# Patient Record
Sex: MALE | Race: WHITE | NOT HISPANIC OR LATINO | Employment: OTHER | URBAN - NONMETROPOLITAN AREA
[De-identification: names, ages, dates, MRNs, and addresses within clinical notes are randomized per-mention and may not be internally consistent; named-entity substitution may affect disease eponyms.]

---

## 2017-01-03 RX ORDER — MELOXICAM 15 MG/1
TABLET ORAL
Qty: 30 TABLET | Refills: 4 | Status: SHIPPED | OUTPATIENT
Start: 2017-01-03 | End: 2017-06-02 | Stop reason: SDUPTHER

## 2017-02-22 ENCOUNTER — OFFICE VISIT (OUTPATIENT)
Dept: INTERNAL MEDICINE | Facility: CLINIC | Age: 82
End: 2017-02-22

## 2017-02-22 VITALS
HEIGHT: 74 IN | DIASTOLIC BLOOD PRESSURE: 64 MMHG | BODY MASS INDEX: 28.49 KG/M2 | OXYGEN SATURATION: 96 % | TEMPERATURE: 97.3 F | WEIGHT: 222 LBS | HEART RATE: 72 BPM | SYSTOLIC BLOOD PRESSURE: 122 MMHG

## 2017-02-22 DIAGNOSIS — J43.9 PULMONARY EMPHYSEMA, UNSPECIFIED EMPHYSEMA TYPE (HCC): ICD-10-CM

## 2017-02-22 DIAGNOSIS — I10 ESSENTIAL HYPERTENSION: Primary | ICD-10-CM

## 2017-02-22 DIAGNOSIS — L72.0 EPIDERMAL CYST OF FACE: ICD-10-CM

## 2017-02-22 DIAGNOSIS — E78.5 HYPERLIPIDEMIA, UNSPECIFIED HYPERLIPIDEMIA TYPE: ICD-10-CM

## 2017-02-22 DIAGNOSIS — N40.0 ENLARGED PROSTATE WITHOUT LOWER URINARY TRACT SYMPTOMS (LUTS): ICD-10-CM

## 2017-02-22 DIAGNOSIS — M15.9 PRIMARY OSTEOARTHRITIS INVOLVING MULTIPLE JOINTS: ICD-10-CM

## 2017-02-22 DIAGNOSIS — E55.9 VITAMIN D DEFICIENCY: ICD-10-CM

## 2017-02-22 PROCEDURE — 99406 BEHAV CHNG SMOKING 3-10 MIN: CPT | Performed by: INTERNAL MEDICINE

## 2017-02-22 PROCEDURE — 99214 OFFICE O/P EST MOD 30 MIN: CPT | Performed by: INTERNAL MEDICINE

## 2017-02-22 RX ORDER — GABAPENTIN 300 MG/1
300 CAPSULE ORAL DAILY
Qty: 30 CAPSULE | Refills: 11 | Status: SHIPPED | OUTPATIENT
Start: 2017-02-22 | End: 2017-06-07 | Stop reason: SDUPTHER

## 2017-02-22 NOTE — PROGRESS NOTES
"Chief Complaint   Patient presents with   • Follow-up     6 months for HLD and HTN. Pt states he's having some mid back pain. Pt states mobic doesn't seem to be working anymore. Pt would also like spot under left eye looked at.      Subjective   Ed Spear is a 81 y.o. male.     HPI Comments: PMH of BPH, HTN, HLD, vitD def.   No CP. He is SOB with minimal exertion.  He denies edema.   DJD bother hands, neck, back. He is taking mobic 15mg q day.  Back pain is worse with standing or walking.   He is smoking 1-2 PPD.   Using his nebulizer about three times a day.   May awaken once or twice at night to urinate.  He is on flomax every day.   He is taking vit D 1000 u daily.   His flu shot is UTD.   Has developed skin masses over left cheek.  Has had these removed in the past and these have recurred.  He cannot remember name of doctor that did this.        The following portions of the patient's history were reviewed and updated as appropriate: allergies, current medications, past family history, past medical history, past social history, past surgical history and problem list.    Review of Systems   Respiratory: Positive for shortness of breath.    Cardiovascular: Negative for chest pain, palpitations and leg swelling.   Genitourinary:        Urinates approximately twice every night   Musculoskeletal: Positive for arthralgias and back pain.   Skin:        Skin lesions over her left cheek   All other systems reviewed and are negative.      Objective   Visit Vitals   • /64   • Pulse 72   • Temp 97.3 °F (36.3 °C)   • Ht 74\" (188 cm)   • Wt 222 lb (101 kg)   • SpO2 96%   • BMI 28.5 kg/m2     Body mass index is 28.5 kg/(m^2).  Physical Exam   Constitutional: He is oriented to person, place, and time. He appears well-developed and well-nourished.   HENT:   Head: Normocephalic and atraumatic.   Mouth/Throat: Oropharynx is clear and moist.   Eyes: Conjunctivae and EOM are normal. Pupils are equal, round, and reactive to " light.   Neck: Normal range of motion. Neck supple. No thyromegaly present.   Cardiovascular: Normal rate, regular rhythm and normal heart sounds.    No murmur heard.  Dampened dorsalis pedis and posterior tibial pulses bilaterally   Pulmonary/Chest: Effort normal. No respiratory distress.   Decreased breath sounds in all lung fields, scattered rhonchi and end expiratory wheezes   Abdominal: Soft. Bowel sounds are normal.   Musculoskeletal: He exhibits no edema.   Lymphadenopathy:     He has no cervical adenopathy.   Neurological: He is alert and oriented to person, place, and time. No cranial nerve deficit.   Skin:   Left upper cheek just lateral to right eye over zygomatic area with multiple cystic lesions measuring 5-12 mm in size   Psychiatric: He has a normal mood and affect. Judgment normal.   Nursing note and vitals reviewed.      Assessment/Plan   Ed Spear is here today and the following problems have been addressed:      Ed was seen today for follow-up.    Diagnoses and all orders for this visit:    Essential hypertension  -     CBC & Differential; Future    Hyperlipidemia, unspecified hyperlipidemia type  -     Comprehensive Metabolic Panel; Future  -     Lipid Panel; Future    Pulmonary emphysema, unspecified emphysema type    Vitamin D deficiency    Enlarged prostate without lower urinary tract symptoms (luts)  -     PSA; Future    Primary osteoarthritis involving multiple joints    Epidermal cyst of face  -     Ambulatory Referral to Dermatology    Other orders  -     gabapentin (NEURONTIN) 300 MG capsule; Take 1 capsule by mouth Daily.    labs as noted  Start gabapentin 300 mg qhs one hour before bed for chronic back pain  No other med changes  Follow HH diet  Try to cut back on smoking more-discussed need for smoking cessation for approximately 3-5 minutes with patient, he is not interested at this time  Exercise as tolerated  Referred to derm for left facial cystic lesions    RTC 2 mo to  followup on back pain

## 2017-02-27 ENCOUNTER — LAB (OUTPATIENT)
Dept: INTERNAL MEDICINE | Facility: CLINIC | Age: 82
End: 2017-02-27

## 2017-02-27 DIAGNOSIS — I10 ESSENTIAL HYPERTENSION: ICD-10-CM

## 2017-02-27 DIAGNOSIS — E78.5 HYPERLIPIDEMIA, UNSPECIFIED HYPERLIPIDEMIA TYPE: ICD-10-CM

## 2017-02-27 DIAGNOSIS — N40.0 ENLARGED PROSTATE WITHOUT LOWER URINARY TRACT SYMPTOMS (LUTS): ICD-10-CM

## 2017-02-27 LAB
ALBUMIN SERPL-MCNC: 4.2 G/DL (ref 3.2–4.8)
ALBUMIN/GLOB SERPL: 1.5 G/DL (ref 1.5–2.5)
ALP SERPL-CCNC: 55 U/L (ref 25–100)
ALT SERPL W P-5'-P-CCNC: 18 U/L (ref 7–40)
ANION GAP SERPL CALCULATED.3IONS-SCNC: 1 MMOL/L (ref 3–11)
ARTICHOKE IGE QN: 86 MG/DL (ref 0–130)
AST SERPL-CCNC: 19 U/L (ref 0–33)
BASOPHILS # BLD AUTO: 0.04 10*3/MM3 (ref 0–0.2)
BASOPHILS NFR BLD AUTO: 0.5 % (ref 0–1)
BILIRUB SERPL-MCNC: 0.8 MG/DL (ref 0.3–1.2)
BUN BLD-MCNC: 15 MG/DL (ref 9–23)
BUN/CREAT SERPL: 15 (ref 7–25)
CALCIUM SPEC-SCNC: 9.9 MG/DL (ref 8.7–10.4)
CHLORIDE SERPL-SCNC: 103 MMOL/L (ref 99–109)
CHOLEST SERPL-MCNC: 155 MG/DL (ref 0–200)
CO2 SERPL-SCNC: 36 MMOL/L (ref 20–31)
CREAT BLD-MCNC: 1 MG/DL (ref 0.6–1.3)
DEPRECATED RDW RBC AUTO: 47.3 FL (ref 37–54)
EOSINOPHIL # BLD AUTO: 0.27 10*3/MM3 (ref 0.1–0.3)
EOSINOPHIL NFR BLD AUTO: 3.7 % (ref 0–3)
ERYTHROCYTE [DISTWIDTH] IN BLOOD BY AUTOMATED COUNT: 13.3 % (ref 11.3–14.5)
GFR SERPL CREATININE-BSD FRML MDRD: 72 ML/MIN/1.73
GLOBULIN UR ELPH-MCNC: 2.8 GM/DL
GLUCOSE BLD-MCNC: 84 MG/DL (ref 70–100)
HCT VFR BLD AUTO: 48 % (ref 38.9–50.9)
HDLC SERPL-MCNC: 57 MG/DL (ref 40–60)
HGB BLD-MCNC: 15.4 G/DL (ref 13.1–17.5)
IMM GRANULOCYTES # BLD: 0.01 10*3/MM3 (ref 0–0.03)
IMM GRANULOCYTES NFR BLD: 0.1 % (ref 0–0.6)
LYMPHOCYTES # BLD AUTO: 2.78 10*3/MM3 (ref 0.6–4.8)
LYMPHOCYTES NFR BLD AUTO: 38.2 % (ref 24–44)
MCH RBC QN AUTO: 31.2 PG (ref 27–31)
MCHC RBC AUTO-ENTMCNC: 32.1 G/DL (ref 32–36)
MCV RBC AUTO: 97.2 FL (ref 80–99)
MONOCYTES # BLD AUTO: 0.65 10*3/MM3 (ref 0–1)
MONOCYTES NFR BLD AUTO: 8.9 % (ref 0–12)
NEUTROPHILS # BLD AUTO: 3.53 10*3/MM3 (ref 1.5–8.3)
NEUTROPHILS NFR BLD AUTO: 48.6 % (ref 41–71)
PLATELET # BLD AUTO: 202 10*3/MM3 (ref 150–450)
PMV BLD AUTO: 10.7 FL (ref 6–12)
POTASSIUM BLD-SCNC: 5.2 MMOL/L (ref 3.5–5.5)
PROT SERPL-MCNC: 7 G/DL (ref 5.7–8.2)
PSA SERPL-MCNC: 4.4 NG/ML (ref 0–4)
RBC # BLD AUTO: 4.94 10*6/MM3 (ref 4.2–5.76)
SODIUM BLD-SCNC: 140 MMOL/L (ref 132–146)
TRIGL SERPL-MCNC: 92 MG/DL (ref 0–150)
WBC NRBC COR # BLD: 7.28 10*3/MM3 (ref 3.5–10.8)

## 2017-02-27 PROCEDURE — 85025 COMPLETE CBC W/AUTO DIFF WBC: CPT | Performed by: INTERNAL MEDICINE

## 2017-02-27 PROCEDURE — 80061 LIPID PANEL: CPT | Performed by: INTERNAL MEDICINE

## 2017-02-27 PROCEDURE — 80053 COMPREHEN METABOLIC PANEL: CPT | Performed by: INTERNAL MEDICINE

## 2017-02-27 PROCEDURE — 36415 COLL VENOUS BLD VENIPUNCTURE: CPT | Performed by: INTERNAL MEDICINE

## 2017-02-27 PROCEDURE — 84153 ASSAY OF PSA TOTAL: CPT | Performed by: INTERNAL MEDICINE

## 2017-02-28 ENCOUNTER — TELEPHONE (OUTPATIENT)
Dept: INTERNAL MEDICINE | Facility: CLINIC | Age: 82
End: 2017-02-28

## 2017-02-28 DIAGNOSIS — R97.20 ELEVATED PSA: Primary | ICD-10-CM

## 2017-03-02 DIAGNOSIS — N40.0 ENLARGED PROSTATE WITHOUT LOWER URINARY TRACT SYMPTOMS (LUTS): ICD-10-CM

## 2017-03-02 RX ORDER — LORATADINE 10 MG/1
TABLET ORAL
Qty: 30 TABLET | Refills: 9 | Status: SHIPPED | OUTPATIENT
Start: 2017-03-02 | End: 2018-01-06 | Stop reason: SDUPTHER

## 2017-03-02 RX ORDER — TAMSULOSIN HYDROCHLORIDE 0.4 MG/1
CAPSULE ORAL
Qty: 30 CAPSULE | Refills: 10 | Status: SHIPPED | OUTPATIENT
Start: 2017-03-02 | End: 2018-02-02 | Stop reason: SDUPTHER

## 2017-04-03 RX ORDER — SIMVASTATIN 40 MG
TABLET ORAL
Qty: 30 TABLET | Refills: 2 | Status: SHIPPED | OUTPATIENT
Start: 2017-04-03 | End: 2017-07-03 | Stop reason: SDUPTHER

## 2017-06-02 RX ORDER — ALBUTEROL SULFATE 2.5 MG/3ML
SOLUTION RESPIRATORY (INHALATION)
Qty: 375 ML | Refills: 5 | Status: SHIPPED | OUTPATIENT
Start: 2017-06-02 | End: 2021-02-01

## 2017-06-02 RX ORDER — MELOXICAM 15 MG/1
TABLET ORAL
Qty: 30 TABLET | Refills: 3 | Status: SHIPPED | OUTPATIENT
Start: 2017-06-02 | End: 2017-10-02 | Stop reason: SDUPTHER

## 2017-06-07 ENCOUNTER — OFFICE VISIT (OUTPATIENT)
Dept: INTERNAL MEDICINE | Facility: CLINIC | Age: 82
End: 2017-06-07

## 2017-06-07 VITALS
BODY MASS INDEX: 30.54 KG/M2 | SYSTOLIC BLOOD PRESSURE: 132 MMHG | OXYGEN SATURATION: 96 % | WEIGHT: 238 LBS | DIASTOLIC BLOOD PRESSURE: 66 MMHG | HEART RATE: 99 BPM | HEIGHT: 74 IN | TEMPERATURE: 97.6 F

## 2017-06-07 DIAGNOSIS — M54.50 CHRONIC BILATERAL LOW BACK PAIN WITHOUT SCIATICA: Primary | ICD-10-CM

## 2017-06-07 DIAGNOSIS — G89.29 CHRONIC BILATERAL LOW BACK PAIN WITHOUT SCIATICA: Primary | ICD-10-CM

## 2017-06-07 DIAGNOSIS — R10.32 LEFT LOWER QUADRANT PAIN: ICD-10-CM

## 2017-06-07 PROCEDURE — 99214 OFFICE O/P EST MOD 30 MIN: CPT | Performed by: INTERNAL MEDICINE

## 2017-06-07 RX ORDER — GABAPENTIN 300 MG/1
300 CAPSULE ORAL 2 TIMES DAILY
Qty: 60 CAPSULE | Refills: 11 | Status: SHIPPED | OUTPATIENT
Start: 2017-06-07 | End: 2017-08-25

## 2017-06-07 RX ORDER — RANITIDINE 150 MG/1
150 CAPSULE ORAL 2 TIMES DAILY
Qty: 60 CAPSULE | Refills: 11 | Status: SHIPPED | OUTPATIENT
Start: 2017-06-07 | End: 2018-06-03 | Stop reason: SDUPTHER

## 2017-06-07 NOTE — PROGRESS NOTES
"Chief Complaint   Patient presents with   • Follow-up     2 months for back pain. Pt states back is still bothering him. Also states he's been having some abdominal pain.      Subjective   Ed Spear is a 81 y.o. male.     HPI Comments: Here for follow up of back pain.  Was here 4 mo ago with complaints of back pain and was started on gabapentin.  If he stands or is very active his back is bothersome.  If he lays around and does not do much, he back does not bother him.   He occasionally takes the robaxin.  He also takes meloxicam daily with food.    Complains of intermittent stomach pain in mid abdomen.  Has gas after he eats.  No NVD or constipation. No black stools.  Has a good appetite.   The pain is more left sided.     Labs in Feb revealed elevated PSA.  He has not been to see the urologist.  Cannot afford it.        The following portions of the patient's history were reviewed and updated as appropriate: allergies, current medications, past family history, past medical history, past social history, past surgical history and problem list.    Review of Systems   Constitutional: Negative for appetite change and unexpected weight change.   Gastrointestinal: Positive for abdominal pain. Negative for blood in stool, constipation, diarrhea, nausea and vomiting.        Occasional gas after eating meals   Musculoskeletal: Positive for back pain.   Neurological: Negative for numbness.       Objective   /66  Pulse 99  Temp 97.6 °F (36.4 °C)  Ht 74\" (188 cm)  Wt 238 lb (108 kg)  SpO2 96%  BMI 30.56 kg/m2  Body mass index is 30.56 kg/(m^2).  Physical Exam   Constitutional: He is oriented to person, place, and time. He appears well-developed and well-nourished.   Obese   HENT:   Head: Normocephalic and atraumatic.   Cardiovascular: Normal rate, regular rhythm and normal heart sounds.    Pulmonary/Chest: Effort normal and breath sounds normal.   Abdominal: Soft. Bowel sounds are normal. There is no rebound and " no guarding.   Obese, tenderness noted in epigastric and left abdomen, no palpable masses, no hepatosplenomegaly noted however obesity limits exam   Musculoskeletal:   No point tenderness over vertebrae or sacroiliac joints, negative straight leg raise, no pain with hip inversion or eversion   Neurological: He is alert and oriented to person, place, and time.   Nursing note and vitals reviewed.      Assessment/Plan   Ed Spear is here today and the following problems have been addressed:      Ed was seen today for follow-up.    Diagnoses and all orders for this visit:    Chronic bilateral low back pain without sciatica    Left lower quadrant pain  -     CBC & Differential  -     Comprehensive Metabolic Panel  -     Helicobacter Pylori, IgA IgG IgM  -     ranitidine (ZANTAC) 150 MG capsule; Take 1 capsule by mouth 2 (Two) Times a Day.    Other orders  -     gabapentin (NEURONTIN) 300 MG capsule; Take 1 capsule by mouth 2 (Two) Times a Day.    Labs as noted  Start zantac 150 mg BID  Increase neurontin to BID for chronic back pain- no red flags on exam or history  No other med changes  Encouraged patient to see urologist as scheduled due to elevated PSA noted on labs last visit, as metastatic prostate cancer could be causing back pain and or stomach pain and this was discussed with patient and wife    RTC one mo    Please note that portions of this note were completed with a voice recognition program.  Efforts were made to edit dictation, but occasionally words are mistranscribed.

## 2017-06-08 LAB
ALBUMIN SERPL-MCNC: 3.7 G/DL (ref 3.5–5)
ALBUMIN/GLOB SERPL: 1.3 G/DL (ref 1–2)
ALP SERPL-CCNC: 59 U/L (ref 38–126)
ALT SERPL-CCNC: 25 U/L (ref 13–69)
AST SERPL-CCNC: 18 U/L (ref 15–46)
BASOPHILS # BLD AUTO: 0.04 10*3/MM3 (ref 0–0.2)
BASOPHILS NFR BLD AUTO: 0.6 % (ref 0–2.5)
BILIRUB SERPL-MCNC: 0.8 MG/DL (ref 0.2–1.3)
BUN SERPL-MCNC: 17 MG/DL (ref 7–20)
BUN/CREAT SERPL: 15.5 (ref 6.3–21.9)
CALCIUM SERPL-MCNC: 9.5 MG/DL (ref 8.4–10.2)
CHLORIDE SERPL-SCNC: 101 MMOL/L (ref 98–107)
CO2 SERPL-SCNC: 27 MMOL/L (ref 26–30)
CREAT SERPL-MCNC: 1.1 MG/DL (ref 0.6–1.3)
EOSINOPHIL # BLD AUTO: 0.2 10*3/MM3 (ref 0–0.7)
EOSINOPHIL NFR BLD AUTO: 3 % (ref 0–7)
ERYTHROCYTE [DISTWIDTH] IN BLOOD BY AUTOMATED COUNT: 13.3 % (ref 11.5–14.5)
GLOBULIN SER CALC-MCNC: 2.8 GM/DL
GLUCOSE SERPL-MCNC: 87 MG/DL (ref 74–98)
H PYLORI IGA SER-ACNC: <9 UNITS (ref 0–8.9)
H PYLORI IGG SER IA-ACNC: 1 U/ML (ref 0–0.8)
H PYLORI IGM SER-ACNC: <9 UNITS (ref 0–8.9)
HCT VFR BLD AUTO: 45 % (ref 42–52)
HGB BLD-MCNC: 14.5 G/DL (ref 14–18)
IMM GRANULOCYTES # BLD: 0.01 10*3/MM3 (ref 0–0.06)
IMM GRANULOCYTES NFR BLD: 0.1 % (ref 0–0.6)
LYMPHOCYTES # BLD AUTO: 2.2 10*3/MM3 (ref 0.6–3.4)
LYMPHOCYTES NFR BLD AUTO: 32.8 % (ref 10–50)
MCH RBC QN AUTO: 31.1 PG (ref 27–31)
MCHC RBC AUTO-ENTMCNC: 32.2 G/DL (ref 30–37)
MCV RBC AUTO: 96.6 FL (ref 80–94)
MONOCYTES # BLD AUTO: 0.77 10*3/MM3 (ref 0–0.9)
MONOCYTES NFR BLD AUTO: 11.5 % (ref 0–12)
NEUTROPHILS # BLD AUTO: 3.48 10*3/MM3 (ref 2–6.9)
NEUTROPHILS NFR BLD AUTO: 52 % (ref 37–80)
NRBC BLD AUTO-RTO: 0 /100 WBC (ref 0–0)
PLATELET # BLD AUTO: 193 10*3/MM3 (ref 130–400)
POTASSIUM SERPL-SCNC: 4.6 MMOL/L (ref 3.5–5.1)
PROT SERPL-MCNC: 6.5 G/DL (ref 6.3–8.2)
RBC # BLD AUTO: 4.66 10*6/MM3 (ref 4.7–6.1)
SODIUM SERPL-SCNC: 138 MMOL/L (ref 137–145)
WBC # BLD AUTO: 6.7 10*3/MM3 (ref 4.8–10.8)

## 2017-07-03 RX ORDER — SIMVASTATIN 40 MG
TABLET ORAL
Qty: 30 TABLET | Refills: 1 | Status: SHIPPED | OUTPATIENT
Start: 2017-07-03 | End: 2017-09-01 | Stop reason: SDUPTHER

## 2017-08-25 ENCOUNTER — TELEPHONE (OUTPATIENT)
Dept: INTERNAL MEDICINE | Facility: CLINIC | Age: 82
End: 2017-08-25

## 2017-08-29 ENCOUNTER — TELEPHONE (OUTPATIENT)
Dept: INTERNAL MEDICINE | Facility: CLINIC | Age: 82
End: 2017-08-29

## 2017-08-29 RX ORDER — METHOCARBAMOL 500 MG/1
500 TABLET, FILM COATED ORAL 3 TIMES DAILY PRN
Qty: 90 TABLET | Refills: 1 | Status: SHIPPED | OUTPATIENT
Start: 2017-08-29 | End: 2017-11-16

## 2017-09-01 RX ORDER — SIMVASTATIN 40 MG
40 TABLET ORAL NIGHTLY
Qty: 30 TABLET | Refills: 0 | Status: SHIPPED | OUTPATIENT
Start: 2017-09-01 | End: 2017-10-02 | Stop reason: SDUPTHER

## 2017-09-01 RX ORDER — SIMVASTATIN 40 MG
TABLET ORAL
Qty: 30 TABLET | Refills: 0 | OUTPATIENT
Start: 2017-09-01

## 2017-10-02 RX ORDER — SIMVASTATIN 40 MG
TABLET ORAL
Qty: 30 TABLET | Refills: 0 | Status: SHIPPED | OUTPATIENT
Start: 2017-10-02 | End: 2017-11-01 | Stop reason: SDUPTHER

## 2017-10-02 RX ORDER — MELOXICAM 15 MG/1
TABLET ORAL
Qty: 30 TABLET | Refills: 2 | Status: SHIPPED | OUTPATIENT
Start: 2017-10-02 | End: 2017-11-16

## 2017-10-12 ENCOUNTER — FLU SHOT (OUTPATIENT)
Dept: INTERNAL MEDICINE | Facility: CLINIC | Age: 82
End: 2017-10-12

## 2017-10-12 PROCEDURE — 90662 IIV NO PRSV INCREASED AG IM: CPT | Performed by: INTERNAL MEDICINE

## 2017-10-12 PROCEDURE — G0008 ADMIN INFLUENZA VIRUS VAC: HCPCS | Performed by: INTERNAL MEDICINE

## 2017-11-01 RX ORDER — SIMVASTATIN 40 MG
TABLET ORAL
Qty: 30 TABLET | Refills: 5 | Status: SHIPPED | OUTPATIENT
Start: 2017-11-01 | End: 2017-11-02 | Stop reason: SDUPTHER

## 2017-11-02 RX ORDER — SIMVASTATIN 40 MG
40 TABLET ORAL NIGHTLY
Qty: 90 TABLET | Refills: 1 | Status: SHIPPED | OUTPATIENT
Start: 2017-11-02 | End: 2018-11-14 | Stop reason: SDUPTHER

## 2017-11-16 ENCOUNTER — OFFICE VISIT (OUTPATIENT)
Dept: INTERNAL MEDICINE | Facility: CLINIC | Age: 82
End: 2017-11-16

## 2017-11-16 VITALS
BODY MASS INDEX: 30.29 KG/M2 | SYSTOLIC BLOOD PRESSURE: 126 MMHG | TEMPERATURE: 97.9 F | HEART RATE: 69 BPM | WEIGHT: 236 LBS | HEIGHT: 74 IN | DIASTOLIC BLOOD PRESSURE: 82 MMHG | OXYGEN SATURATION: 96 %

## 2017-11-16 DIAGNOSIS — I10 ESSENTIAL HYPERTENSION: ICD-10-CM

## 2017-11-16 DIAGNOSIS — G89.29 CHRONIC BILATERAL LOW BACK PAIN WITHOUT SCIATICA: ICD-10-CM

## 2017-11-16 DIAGNOSIS — I47.1 PAT (PAROXYSMAL ATRIAL TACHYCARDIA) (HCC): ICD-10-CM

## 2017-11-16 DIAGNOSIS — M54.50 CHRONIC BILATERAL LOW BACK PAIN WITHOUT SCIATICA: ICD-10-CM

## 2017-11-16 DIAGNOSIS — E78.2 MIXED HYPERLIPIDEMIA: ICD-10-CM

## 2017-11-16 DIAGNOSIS — J43.9 PULMONARY EMPHYSEMA, UNSPECIFIED EMPHYSEMA TYPE (HCC): Primary | ICD-10-CM

## 2017-11-16 PROBLEM — I47.19 PAT (PAROXYSMAL ATRIAL TACHYCARDIA): Status: ACTIVE | Noted: 2017-11-16

## 2017-11-16 PROCEDURE — 99214 OFFICE O/P EST MOD 30 MIN: CPT | Performed by: INTERNAL MEDICINE

## 2017-11-16 PROCEDURE — 90732 PPSV23 VACC 2 YRS+ SUBQ/IM: CPT | Performed by: INTERNAL MEDICINE

## 2017-11-16 PROCEDURE — 93000 ELECTROCARDIOGRAM COMPLETE: CPT | Performed by: INTERNAL MEDICINE

## 2017-11-16 PROCEDURE — G0009 ADMIN PNEUMOCOCCAL VACCINE: HCPCS | Performed by: INTERNAL MEDICINE

## 2017-11-16 RX ORDER — MELOXICAM 15 MG/1
15 TABLET ORAL DAILY
Qty: 30 TABLET | Refills: 2
Start: 2017-11-16 | End: 2018-01-30

## 2017-11-16 RX ORDER — IPRATROPIUM BROMIDE AND ALBUTEROL SULFATE 2.5; .5 MG/3ML; MG/3ML
3 SOLUTION RESPIRATORY (INHALATION)
Qty: 360 ML | Refills: 6 | Status: SHIPPED | OUTPATIENT
Start: 2017-11-16 | End: 2018-10-04 | Stop reason: SDUPTHER

## 2017-11-16 RX ORDER — TIZANIDINE HYDROCHLORIDE 4 MG/1
4 CAPSULE, GELATIN COATED ORAL 2 TIMES DAILY
Qty: 60 CAPSULE | Refills: 5 | Status: SHIPPED | OUTPATIENT
Start: 2017-11-16 | End: 2017-11-17 | Stop reason: CLARIF

## 2017-11-16 RX ORDER — CARVEDILOL 6.25 MG/1
6.25 TABLET ORAL 2 TIMES DAILY WITH MEALS
Qty: 60 TABLET | Refills: 3 | Status: SHIPPED | OUTPATIENT
Start: 2017-11-16 | End: 2018-03-01 | Stop reason: SDUPTHER

## 2017-11-16 NOTE — PROGRESS NOTES
"Chief Complaint   Patient presents with   • Follow-up     for HLD, HTN, and COPD. Pt states his back has been hurting him.      Subjective   Ed Spear is a 82 y.o. male.     HPI Comments: Here for followup of BPH, HTN, HLD, vitD def.   He denies any CP, edema.  He is having some SOB.  He does have wheezing.  He notes occasional palpitations especially when he lays down at night.  Uses his nebulizer 3 times a day.    He is still smoking about 1.5 PPD.   He does take his vit D daily.   Has occasional GERD.  Takes zantac twice a day.  He awakens twice a night to urinate.   Has some back pain when he stands a lot.   Takes mobic daily.  States the robaxin made his stomach upset.    He has lesions over left upper cheek on face that have been removed in the past.  He is able to express discharge from these lesions that has a very foul smell.  He needs his pneumovax today.        The following portions of the patient's history were reviewed and updated as appropriate: allergies, current medications, past family history, past medical history, past social history, past surgical history and problem list.    Review of Systems   Respiratory: Positive for shortness of breath.    Cardiovascular: Positive for palpitations.   Musculoskeletal: Positive for back pain.   Skin:        Lesions over left upper cheek with foul-smelling discharge       Objective   /82  Pulse 69  Temp 97.9 °F (36.6 °C)  Ht 74\" (188 cm)  Wt 236 lb (107 kg)  SpO2 96%  BMI 30.3 kg/m2  Body mass index is 30.3 kg/(m^2).  Physical Exam   Constitutional: He is oriented to person, place, and time. He appears well-developed and well-nourished.   HENT:   Head: Normocephalic and atraumatic.   Mouth/Throat: Oropharynx is clear and moist.   Eyes: Conjunctivae and EOM are normal. Pupils are equal, round, and reactive to light.   Neck: Normal range of motion. Neck supple. No thyromegaly present.   Cardiovascular: Normal heart sounds.    No murmur " heard.  Rate approximately 120 with ectopic beats noted  Dampened DP and PT pulses   Pulmonary/Chest: Effort normal. No respiratory distress.   Decreased breath sounds with diffuse rhonchi in all lung fields   Abdominal: Soft. Bowel sounds are normal.   Musculoskeletal: He exhibits no edema.   Lymphadenopathy:     He has no cervical adenopathy.   Neurological: He is alert and oriented to person, place, and time. No cranial nerve deficit.   Skin:   There is a large cluster of cystic lesions over left upper cheek consistent with sebaceous cysts   Psychiatric: He has a normal mood and affect. Judgment normal.   Nursing note and vitals reviewed.      ECG 12 Lead  Date/Time: 11/16/2017 5:11 PM  Performed by: VERMEESCH, MARILYN K  Authorized by: VERMEESCH, MARILYN K   Comparison: not compared with previous ECG   Rhythm: SVT  Ectopy: atrial premature contractions  Rate: tachycardic  BPM: 130  Conduction: conduction normal  QRS axis: left  Q waves: III and aVF  Clinical impression: abnormal ECG            Assessment/Plan   Ed Spear is here today and the following problems have been addressed:      Ed was seen today for follow-up.    Diagnoses and all orders for this visit:    Pulmonary emphysema, unspecified emphysema type    PAT (paroxysmal atrial tachycardia)    Essential hypertension    Mixed hyperlipidemia    Other orders  -     ipratropium-albuterol (DUO-NEB) 0.5-2.5 mg/mL nebulizer; Take 3 mL by nebulization 4 (Four) Times a Day. USE 1 UNIT DOSE VIA NEBULIZER TWO - FOUR TIMES DAILY  -     meloxicam (MOBIC) 15 MG tablet; Take 1 tablet by mouth Daily.  -     TiZANidine (ZANAFLEX) 4 MG capsule; Take 1 capsule by mouth 2 (Two) Times a Day.  -     carvedilol (COREG) 6.25 MG tablet; Take 1 tablet by mouth 2 (Two) Times a Day With Meals.    Stop lisinopril  Start coreg 6.25 mg BID for HR and BP  EKG with paroxysmal atrial tachycardia likely due to underlying COPD  Given tizanidine for LBP, continue Mobic  Heat to LBP  3-4 times daily prn  Pneumovax given today  Given sample of BREO 200/25 1 puff daily-explained proper use.  Patient has enough for 2 weeks supply, he is to call at that time if medication is working well and I will call in refill    RTC 4 weeks  Please note that portions of this note were completed with a voice recognition program.  Efforts were made to edit dictation, but occasionally words are mistranscribed.

## 2017-11-17 RX ORDER — METHOCARBAMOL 500 MG/1
500 TABLET, FILM COATED ORAL 3 TIMES DAILY PRN
Qty: 75 TABLET | Refills: 2 | Status: SHIPPED | OUTPATIENT
Start: 2017-11-17 | End: 2018-11-02

## 2018-01-02 RX ORDER — MELOXICAM 15 MG/1
TABLET ORAL
Qty: 30 TABLET | Refills: 5 | Status: SHIPPED | OUTPATIENT
Start: 2018-01-02 | End: 2018-01-23 | Stop reason: SDUPTHER

## 2018-01-08 RX ORDER — LORATADINE 10 MG/1
TABLET ORAL
Qty: 30 TABLET | Refills: 8 | Status: SHIPPED | OUTPATIENT
Start: 2018-01-08 | End: 2018-11-02

## 2018-01-23 ENCOUNTER — OFFICE VISIT (OUTPATIENT)
Dept: INTERNAL MEDICINE | Facility: CLINIC | Age: 83
End: 2018-01-23

## 2018-01-23 VITALS
SYSTOLIC BLOOD PRESSURE: 134 MMHG | TEMPERATURE: 97.8 F | OXYGEN SATURATION: 88 % | HEART RATE: 87 BPM | DIASTOLIC BLOOD PRESSURE: 80 MMHG | BODY MASS INDEX: 28.43 KG/M2 | WEIGHT: 221.5 LBS | HEIGHT: 74 IN

## 2018-01-23 DIAGNOSIS — J44.1 OBSTRUCTIVE CHRONIC BRONCHITIS WITH EXACERBATION (HCC): Primary | ICD-10-CM

## 2018-01-23 DIAGNOSIS — R19.7 DIARRHEA, UNSPECIFIED TYPE: ICD-10-CM

## 2018-01-23 PROCEDURE — 99213 OFFICE O/P EST LOW 20 MIN: CPT | Performed by: INTERNAL MEDICINE

## 2018-01-23 RX ORDER — METRONIDAZOLE 500 MG/1
500 TABLET ORAL 3 TIMES DAILY
Qty: 30 TABLET | Refills: 0 | Status: SHIPPED | OUTPATIENT
Start: 2018-01-23 | End: 2018-03-23

## 2018-01-23 RX ORDER — CIPROFLOXACIN 500 MG/1
500 TABLET, FILM COATED ORAL EVERY 12 HOURS SCHEDULED
Qty: 20 TABLET | Refills: 0 | Status: SHIPPED | OUTPATIENT
Start: 2018-01-23 | End: 2018-03-23

## 2018-01-23 NOTE — PROGRESS NOTES
"Chief Complaint   Patient presents with   • Diarrhea     X 1 week. Pt also states he's having pain behind his left ear. States he's been having left sided abdominal pain.      Subjective   Ed Spear is a 82 y.o. male.     HPI Comments: He has had pain behind left ear for a week.     Diarrhea    This is a new problem. The current episode started in the past 7 days. The problem occurs less than 2 times per day. The problem has been waxing and waning. The stool consistency is described as watery. The patient states that diarrhea does not awaken him from sleep. Associated symptoms include abdominal pain, bloating and coughing. Pertinent negatives include no chills, fever or vomiting. Associated symptoms comments: Left lower quadrant pain, this improves after a BM.  The pain comes and goes throughout the day.  Cramping type pain. Nothing aggravates the symptoms. There are no known risk factors. Treatments tried: gas X and helped minimally. The treatment provided mild relief. There is no history of bowel resection, inflammatory bowel disease, irritable bowel syndrome, malabsorption, a recent abdominal surgery or short gut syndrome.        The following portions of the patient's history were reviewed and updated as appropriate: allergies, current medications, past family history, past medical history, past social history, past surgical history and problem list.    Review of Systems   Constitutional: Negative for chills and fever.   HENT: Positive for ear pain. Negative for ear discharge, postnasal drip, sinus pain, sinus pressure and sore throat.    Respiratory: Positive for cough.    Gastrointestinal: Positive for abdominal pain, bloating and diarrhea. Negative for blood in stool, constipation, nausea and vomiting.       Objective   /80  Pulse 87  Temp 97.8 °F (36.6 °C)  Ht 188 cm (74\")  Wt 100 kg (221 lb 8 oz)  SpO2 (!) 88%  BMI 28.44 kg/m2  Body mass index is 28.44 kg/(m^2).  Physical Exam "   Constitutional: He is oriented to person, place, and time. He appears well-developed and well-nourished.   Pleasant gentleman in no apparent distress, overweight, tip of nose appears slightly cyanotic   HENT:   Head: Normocephalic and atraumatic.   Right Ear: External ear normal.   Mouth/Throat: Oropharynx is clear and moist.   Left mastoid area is slightly tender to touch and mildly inflamed compared to right side, tympanic membranes are slightly dull bilaterally with no erythema   Eyes: Conjunctivae and EOM are normal. Pupils are equal, round, and reactive to light.   Neck: Normal range of motion. Neck supple. No thyromegaly present.   Cardiovascular: Normal rate, regular rhythm and normal heart sounds.    No murmur heard.  Pulmonary/Chest: Effort normal. No respiratory distress.   Scattered rhonchi in all lung fields posteriorly with occasional end expiratory wheeze   Abdominal: Soft. Bowel sounds are normal. He exhibits no mass. There is tenderness. There is no rebound and no guarding. No hernia.   Left upper and lower quadrant tenderness to palpation, no rebound or guarding, tenderness is mild, no distention   Musculoskeletal: He exhibits no edema.   Lymphadenopathy:     He has no cervical adenopathy.   Neurological: He is alert and oriented to person, place, and time. No cranial nerve deficit.   Psychiatric: He has a normal mood and affect. Judgment normal.   Nursing note and vitals reviewed.      Assessment/Plan   Ed Spear is here today and the following problems have been addressed:      Ed was seen today for diarrhea.    Diagnoses and all orders for this visit:    Obstructive chronic bronchitis with exacerbation    Diarrhea, unspecified type    Other orders  -     ciprofloxacin (CIPRO) 500 MG tablet; Take 1 tablet by mouth Every 12 (Twelve) Hours.  -     metroNIDAZOLE (FLAGYL) 500 MG tablet; Take 1 tablet by mouth 3 (Three) Times a Day.    Suspect he may have diverticulitis based on history and  exam  Given cipro and flagyl to take over next 10 days  Has bronchitis also based on exam, thus antibiotics were also given for this reason  Recommend he do deep breathing exercises hourly as directed  Take OTC mucinex as directed  Discussed need to work on smoking cessation again  Do nebulizers every 3-4 hours prn  Apply heat to right posterior ear 2-3 times a day  I know patient has lost approximately 15 pounds over last 2 months, will discuss this with him on follow-up visit and perhaps do CAT scan of lungs and labs for further evaluation    RTC 2 weeks for follow up  Please note that portions of this note were completed with a voice recognition program.  Efforts were made to edit dictation, but occasionally words are mistranscribed.

## 2018-01-30 ENCOUNTER — HOSPITAL ENCOUNTER (OUTPATIENT)
Dept: GENERAL RADIOLOGY | Facility: HOSPITAL | Age: 83
Discharge: HOME | End: 2018-01-30
Attending: INTERNAL MEDICINE | Admitting: INTERNAL MEDICINE

## 2018-01-30 ENCOUNTER — OFFICE VISIT (OUTPATIENT)
Dept: INTERNAL MEDICINE | Facility: CLINIC | Age: 83
End: 2018-01-30

## 2018-01-30 VITALS
TEMPERATURE: 97.4 F | HEIGHT: 74 IN | WEIGHT: 223 LBS | OXYGEN SATURATION: 93 % | DIASTOLIC BLOOD PRESSURE: 80 MMHG | BODY MASS INDEX: 28.62 KG/M2 | HEART RATE: 87 BPM | SYSTOLIC BLOOD PRESSURE: 128 MMHG

## 2018-01-30 DIAGNOSIS — E16.2 HYPOGLYCEMIA: ICD-10-CM

## 2018-01-30 DIAGNOSIS — I48.0 PAROXYSMAL ATRIAL FIBRILLATION (HCC): ICD-10-CM

## 2018-01-30 DIAGNOSIS — R19.7 DIARRHEA, UNSPECIFIED TYPE: ICD-10-CM

## 2018-01-30 DIAGNOSIS — R63.4 WEIGHT LOSS: ICD-10-CM

## 2018-01-30 DIAGNOSIS — I10 ESSENTIAL HYPERTENSION: Primary | ICD-10-CM

## 2018-01-30 DIAGNOSIS — J43.9 PULMONARY EMPHYSEMA, UNSPECIFIED EMPHYSEMA TYPE (HCC): ICD-10-CM

## 2018-01-30 PROBLEM — I48.91 ATRIAL FIBRILLATION: Status: ACTIVE | Noted: 2018-01-30

## 2018-01-30 LAB
ALBUMIN SERPL-MCNC: 4.2 G/DL (ref 3.5–5)
ALBUMIN/GLOB SERPL: 1.8 G/DL (ref 1–2)
ALP SERPL-CCNC: 48 U/L (ref 38–126)
ALT SERPL-CCNC: 41 U/L (ref 13–69)
AST SERPL-CCNC: 28 U/L (ref 15–46)
BASOPHILS # BLD AUTO: 0.05 10*3/MM3 (ref 0–0.2)
BASOPHILS NFR BLD AUTO: 0.8 % (ref 0–2.5)
BILIRUB SERPL-MCNC: 0.6 MG/DL (ref 0.2–1.3)
BUN SERPL-MCNC: 22 MG/DL (ref 7–20)
BUN/CREAT SERPL: 22 (ref 6.3–21.9)
CALCIUM SERPL-MCNC: 9.8 MG/DL (ref 8.4–10.2)
CHLORIDE SERPL-SCNC: 99 MMOL/L (ref 98–107)
CO2 SERPL-SCNC: 29 MMOL/L (ref 26–30)
CREAT SERPL-MCNC: 1 MG/DL (ref 0.6–1.3)
EOSINOPHIL # BLD AUTO: 0.2 10*3/MM3 (ref 0–0.7)
EOSINOPHIL NFR BLD AUTO: 3.2 % (ref 0–7)
ERYTHROCYTE [DISTWIDTH] IN BLOOD BY AUTOMATED COUNT: 13.3 % (ref 11.5–14.5)
GFR SERPLBLD CREATININE-BSD FMLA CKD-EPI: 72 ML/MIN/1.73
GFR SERPLBLD CREATININE-BSD FMLA CKD-EPI: 87 ML/MIN/1.73
GLOBULIN SER CALC-MCNC: 2.4 GM/DL
GLUCOSE SERPL-MCNC: 97 MG/DL (ref 74–98)
HBA1C MFR BLD: 5.9 %
HCT VFR BLD AUTO: 47.5 % (ref 42–52)
HGB BLD-MCNC: 15.8 G/DL (ref 14–18)
IMM GRANULOCYTES # BLD: 0.01 10*3/MM3 (ref 0–0.06)
IMM GRANULOCYTES NFR BLD: 0.2 % (ref 0–0.6)
LYMPHOCYTES # BLD AUTO: 2.26 10*3/MM3 (ref 0.6–3.4)
LYMPHOCYTES NFR BLD AUTO: 35.9 % (ref 10–50)
MCH RBC QN AUTO: 31.9 PG (ref 27–31)
MCHC RBC AUTO-ENTMCNC: 33.3 G/DL (ref 30–37)
MCV RBC AUTO: 95.8 FL (ref 80–94)
MONOCYTES # BLD AUTO: 0.66 10*3/MM3 (ref 0–0.9)
MONOCYTES NFR BLD AUTO: 10.5 % (ref 0–12)
NEUTROPHILS # BLD AUTO: 3.11 10*3/MM3 (ref 2–6.9)
NEUTROPHILS NFR BLD AUTO: 49.4 % (ref 37–80)
NRBC BLD AUTO-RTO: 0 /100 WBC (ref 0–0)
PLATELET # BLD AUTO: 167 10*3/MM3 (ref 130–400)
POTASSIUM SERPL-SCNC: 4.3 MMOL/L (ref 3.5–5.1)
PROT SERPL-MCNC: 6.6 G/DL (ref 6.3–8.2)
RBC # BLD AUTO: 4.96 10*6/MM3 (ref 4.7–6.1)
SODIUM SERPL-SCNC: 140 MMOL/L (ref 137–145)
TSH SERPL DL<=0.005 MIU/L-ACNC: 1.32 MIU/ML (ref 0.47–4.68)
WBC # BLD AUTO: 6.29 10*3/MM3 (ref 4.8–10.8)

## 2018-01-30 PROCEDURE — 71046 X-RAY EXAM CHEST 2 VIEWS: CPT

## 2018-01-30 PROCEDURE — 99213 OFFICE O/P EST LOW 20 MIN: CPT | Performed by: INTERNAL MEDICINE

## 2018-01-30 PROCEDURE — 93000 ELECTROCARDIOGRAM COMPLETE: CPT | Performed by: INTERNAL MEDICINE

## 2018-01-30 NOTE — PROGRESS NOTES
"Chief Complaint   Patient presents with   • Follow-up     4 weeks for HR, LBP, and BP. And 2 weeks for bronchitis and diverticulitis.     Subjective   Ed Spear is a 82 y.o. male.     HPI Comments: Here for follow up of HTN and LBP.   We have changed lisinopril to coreg a few months ago.  His BP is running about 120s/80s.  His HR is 70-80s.  He has noted less palpitations and SOA.   A few weeks ago he was treated for diverticulitis with cipro and flagyl.  He had not been taking the cipro twice a day.  Still has some twinges of LLQ pain.  No diarrhea.   He no longer has a cough.  Has some sinus congestion.   He is taking tylenol for his back pain.    He has lost 20 lbs in 2 months.  He denies any black stools or blood in stool.        The following portions of the patient's history were reviewed and updated as appropriate: allergies, current medications, past family history, past medical history, past social history, past surgical history and problem list.    Review of Systems   Constitutional: Positive for fatigue and unexpected weight change.   HENT: Positive for congestion.    Respiratory: Positive for shortness of breath. Negative for cough.    Cardiovascular: Positive for palpitations. Negative for chest pain and leg swelling.   Gastrointestinal: Positive for abdominal pain (occasional twinge of left lower quadrant pain). Negative for diarrhea.   Neurological: Positive for weakness.   All other systems reviewed and are negative.      Objective   /80  Pulse 87  Temp 97.4 °F (36.3 °C)  Ht 188 cm (74\")  Wt 101 kg (223 lb)  SpO2 93%  BMI 28.63 kg/m2  Body mass index is 28.63 kg/(m^2).  Physical Exam   Constitutional: He is oriented to person, place, and time. He appears well-developed and well-nourished.   Pleasant gentleman, quiet, no apparent distress   HENT:   Head: Normocephalic and atraumatic.   Mouth/Throat: Oropharynx is clear and moist.   Eyes: Conjunctivae and EOM are normal. Pupils are " equal, round, and reactive to light.   Neck: Normal range of motion. Neck supple. No thyromegaly present.   Cardiovascular: Normal rate, normal heart sounds and intact distal pulses.    No murmur heard.  Irregularly irregular, distant heart sounds   Pulmonary/Chest: Effort normal. No respiratory distress.   Decreased breath sounds throughout   Abdominal: Soft. Bowel sounds are normal. He exhibits no distension. There is no tenderness. There is no rebound and no guarding.   Musculoskeletal: He exhibits no edema.   Lymphadenopathy:     He has no cervical adenopathy.   Neurological: He is alert and oriented to person, place, and time. No cranial nerve deficit.   Psychiatric: Judgment normal.   Flat affect, quiet today   Nursing note and vitals reviewed.    ECG 12 Lead  Date/Time: 1/30/2018 6:04 PM  Performed by: VERMEESCH, MARILYN K  Authorized by: VERMEESCH, MARILYN K   Comparison: not compared with previous ECG   Rhythm: atrial fibrillation  Rate: normal  BPM: 100  Conduction: conduction normal  ST Segments: ST segments normal  T Waves: T waves normal  QRS axis: normal  Other: no other findings  Clinical impression: abnormal ECG  Comments: A. fib with rate 100            Assessment/Plan   Ed Spear is here today and the following problems have been addressed:      Ed was seen today for follow-up.    Diagnoses and all orders for this visit:    Essential hypertension  -     CBC & Differential    Pulmonary emphysema, unspecified emphysema type  -     XR Chest 2 View; Future    Diarrhea, unspecified type    Weight loss  -     CBC & Differential  -     Comprehensive Metabolic Panel  -     Hemoglobin A1c  -     TSH  -     XR Chest 2 View; Future    Hypoglycemia   -     Hemoglobin A1c    Paroxysmal atrial fibrillation  -     Ambulatory Referral to Cardiology    Other orders  -     apixaban (ELIQUIS) 2.5 MG tablet tablet; Take 1 tablet by mouth Every 12 (Twelve) Hours.    Follow heart healthy/low salt diet  Avoid  processed foods  Monitor blood pressure as discussed  Exercise as tolerated up to 30 minutes 5 days per week  Take all medications as prescribed  EKG with Afib, rate controlled  On coreg for rate control  Start eliquis 2. 5 mg BID  Stop mobic  Labs as noted due to weight loss  Finish current ABX for LLQ pain due to diverticulitis, takes Cipro twice daily and Flagyl 3 times daily  XR chest ordered due to weight loss    RTC 6 weeks    Please note that portions of this note were completed with a voice recognition program.  Efforts were made to edit dictation, but occasionally words are mistranscribed.

## 2018-02-02 DIAGNOSIS — N40.0 ENLARGED PROSTATE WITHOUT LOWER URINARY TRACT SYMPTOMS (LUTS): ICD-10-CM

## 2018-02-02 RX ORDER — TAMSULOSIN HYDROCHLORIDE 0.4 MG/1
CAPSULE ORAL
Qty: 30 CAPSULE | Refills: 9 | Status: SHIPPED | OUTPATIENT
Start: 2018-02-02 | End: 2018-06-03 | Stop reason: SDUPTHER

## 2018-03-01 RX ORDER — CARVEDILOL 6.25 MG/1
TABLET ORAL
Qty: 60 TABLET | Refills: 2 | Status: SHIPPED | OUTPATIENT
Start: 2018-03-01 | End: 2018-03-20 | Stop reason: SDUPTHER

## 2018-03-20 ENCOUNTER — TELEPHONE (OUTPATIENT)
Dept: INTERNAL MEDICINE | Facility: CLINIC | Age: 83
End: 2018-03-20

## 2018-03-20 RX ORDER — CARVEDILOL 6.25 MG/1
6.25 TABLET ORAL 2 TIMES DAILY WITH MEALS
Qty: 60 TABLET | Refills: 0 | Status: SHIPPED | OUTPATIENT
Start: 2018-03-20 | End: 2018-03-20 | Stop reason: SDUPTHER

## 2018-03-20 RX ORDER — CARVEDILOL 6.25 MG/1
6.25 TABLET ORAL 2 TIMES DAILY WITH MEALS
Qty: 60 TABLET | Refills: 0 | Status: SHIPPED | OUTPATIENT
Start: 2018-03-20 | End: 2018-03-23 | Stop reason: ALTCHOICE

## 2018-03-20 NOTE — TELEPHONE ENCOUNTER
----- Message from Ed Spear sent at 3/20/2018 12:34 PM EDT -----  Regarding: Prescription Question  Contact: [phone]  I'm out of refills for Carvedilol 6.25MG [facility]

## 2018-03-20 NOTE — TELEPHONE ENCOUNTER
----- Message from dE Spear sent at 3/20/2018  3:35 PM EDT -----  Regarding: Non-Urgent Medical Question  Contact: [phone]  I gave the wrong pharmacy [facility]

## 2018-03-23 ENCOUNTER — OFFICE VISIT (OUTPATIENT)
Dept: INTERNAL MEDICINE | Facility: CLINIC | Age: 83
End: 2018-03-23

## 2018-03-23 VITALS
TEMPERATURE: 97.9 F | HEART RATE: 78 BPM | OXYGEN SATURATION: 93 % | DIASTOLIC BLOOD PRESSURE: 80 MMHG | WEIGHT: 220 LBS | SYSTOLIC BLOOD PRESSURE: 130 MMHG | HEIGHT: 74 IN | BODY MASS INDEX: 28.23 KG/M2

## 2018-03-23 DIAGNOSIS — R10.32 LEFT LOWER QUADRANT PAIN: Primary | ICD-10-CM

## 2018-03-23 DIAGNOSIS — J43.9 PULMONARY EMPHYSEMA, UNSPECIFIED EMPHYSEMA TYPE (HCC): ICD-10-CM

## 2018-03-23 DIAGNOSIS — I48.0 PAROXYSMAL ATRIAL FIBRILLATION (HCC): ICD-10-CM

## 2018-03-23 PROBLEM — R19.7 DIARRHEA: Status: RESOLVED | Noted: 2018-01-23 | Resolved: 2018-03-23

## 2018-03-23 PROCEDURE — 99214 OFFICE O/P EST MOD 30 MIN: CPT | Performed by: INTERNAL MEDICINE

## 2018-03-23 RX ORDER — AMOXICILLIN AND CLAVULANATE POTASSIUM 875; 125 MG/1; MG/1
1 TABLET, FILM COATED ORAL EVERY 12 HOURS SCHEDULED
Qty: 20 TABLET | Refills: 0 | Status: SHIPPED | OUTPATIENT
Start: 2018-03-23 | End: 2018-11-02

## 2018-03-23 RX ORDER — BISOPROLOL FUMARATE 10 MG/1
TABLET, FILM COATED ORAL
COMMUNITY
Start: 2018-03-12 | End: 2021-01-06 | Stop reason: SDUPTHER

## 2018-03-23 NOTE — PROGRESS NOTES
"Chief Complaint   Patient presents with   • Follow-up     6 weeks for diverticulitis and afib. No new complaints.      Subjective   Ed Spear is a 82 y.o. male.     Here for follow up of Afib, diverticulitis and wt loss.   He has been seen by Dr Mittal, has had nuclear stress test and it was good.   Has an ECHO ordered and is pending. He was taken off coreg and placed on bisoprolol once a day.  He has not been having any palpitations or edema.   He is quite SOA with minimal exertion.   He is still having intermittent LLQ pain, no loose stools or blood in stool.  He did not take cipro and flagyl as directed, initially only took them once a day.   His wt is down another 3 lbs.  He states he is eating well overall.  Chest x-ray reveals interstitial fibrosis and a small oval density at the left hemidiaphragm.  We will repeat chest x-ray at end of April.  He is smoking about 1-1.5 PPD.  He may have cut back some.   Has been doing his nebulizer machine 4 times a day.            The following portions of the patient's history were reviewed and updated as appropriate: allergies, current medications, past family history, past medical history, past social history, past surgical history and problem list.    Review of Systems   Constitutional: Positive for unexpected weight change.   Respiratory: Positive for shortness of breath.    Cardiovascular: Negative for chest pain, palpitations and leg swelling.   Gastrointestinal: Positive for abdominal pain. Negative for blood in stool, constipation and diarrhea.   Psychiatric/Behavioral: Negative for dysphoric mood.   All other systems reviewed and are negative.      Objective   /80   Pulse 78   Temp 97.9 °F (36.6 °C)   Ht 188 cm (74\")   Wt 99.8 kg (220 lb)   SpO2 93%   BMI 28.25 kg/m²   Body mass index is 28.25 kg/m².  Physical Exam   Constitutional: He is oriented to person, place, and time. He appears well-developed and well-nourished.   HENT:   Head: Normocephalic " and atraumatic.   Mouth/Throat: Oropharynx is clear and moist.   Eyes: Conjunctivae and EOM are normal. Pupils are equal, round, and reactive to light.   Neck: Normal range of motion. Neck supple. No thyromegaly present.   Cardiovascular: Normal rate, normal heart sounds and intact distal pulses.    No murmur heard.  Irregularly irregular   Pulmonary/Chest: Effort normal. No respiratory distress.   Decreased breath sounds throughout with occasional rhonchi   Abdominal: Soft. Bowel sounds are normal. He exhibits no mass. There is tenderness. There is no rebound and no guarding.   Mild tenderness in left lower quadrant with no rebound or guarding   Musculoskeletal: He exhibits no edema.   Lymphadenopathy:     He has no cervical adenopathy.   Neurological: He is alert and oriented to person, place, and time. No cranial nerve deficit.   Psychiatric: He has a normal mood and affect. Judgment normal.   Nursing note and vitals reviewed.      Assessment/Plan   Ed Spear is here today and the following problems have been addressed:      Ed was seen today for follow-up.    Diagnoses and all orders for this visit:    Left lower quadrant pain    Pulmonary emphysema, unspecified emphysema type    Paroxysmal atrial fibrillation    Other orders  -     amoxicillin-clavulanate (AUGMENTIN) 875-125 MG per tablet; Take 1 tablet by mouth Every 12 (Twelve) Hours.  -     umeclidinium-vilanterol (ANORO ELLIPTA) 62.5-25 MCG/INH aerosol powder  inhaler; Inhale 1 puff Daily.    given augmentin to take BID for 7-10 days for ongoing left lower quadrant pain due to diverticulitis  Given anoro to use one puff daily  Use duo nebs only twice a day  Follow up with Dr Mittal as scheduled  No other med changes  Repeat chest x-ray ordered for follow-up of abnormality near left hemidiaphragm on chest x-ray done in January    [facility] 3 mo    Please note that portions of this note were completed with a voice recognition program.  Efforts were made to edit  dictation, but occasionally words are mistranscribed.

## 2018-06-03 DIAGNOSIS — N40.0 ENLARGED PROSTATE WITHOUT LOWER URINARY TRACT SYMPTOMS (LUTS): ICD-10-CM

## 2018-06-03 DIAGNOSIS — R10.32 LEFT LOWER QUADRANT PAIN: ICD-10-CM

## 2018-06-04 RX ORDER — TAMSULOSIN HYDROCHLORIDE 0.4 MG/1
CAPSULE ORAL
Qty: 30 CAPSULE | Refills: 2 | Status: SHIPPED | OUTPATIENT
Start: 2018-06-04 | End: 2018-11-02

## 2018-06-04 RX ORDER — RANITIDINE 150 MG/1
CAPSULE ORAL
Qty: 60 CAPSULE | Refills: 2 | Status: SHIPPED | OUTPATIENT
Start: 2018-06-04 | End: 2018-09-04 | Stop reason: SDUPTHER

## 2018-09-04 DIAGNOSIS — R10.32 LEFT LOWER QUADRANT PAIN: ICD-10-CM

## 2018-09-04 RX ORDER — RANITIDINE 150 MG/1
CAPSULE ORAL
Qty: 60 CAPSULE | Refills: 1 | Status: SHIPPED | OUTPATIENT
Start: 2018-09-04 | End: 2018-11-02 | Stop reason: SDUPTHER

## 2018-10-04 RX ORDER — IPRATROPIUM BROMIDE AND ALBUTEROL SULFATE 2.5; .5 MG/3ML; MG/3ML
SOLUTION RESPIRATORY (INHALATION)
Qty: 360 ML | Refills: 5 | Status: SHIPPED | OUTPATIENT
Start: 2018-10-04 | End: 2019-09-30 | Stop reason: SDUPTHER

## 2018-11-02 ENCOUNTER — OFFICE VISIT (OUTPATIENT)
Dept: INTERNAL MEDICINE | Facility: CLINIC | Age: 83
End: 2018-11-02

## 2018-11-02 VITALS
SYSTOLIC BLOOD PRESSURE: 130 MMHG | DIASTOLIC BLOOD PRESSURE: 70 MMHG | HEART RATE: 82 BPM | OXYGEN SATURATION: 94 % | BODY MASS INDEX: 28.75 KG/M2 | WEIGHT: 224 LBS | HEIGHT: 74 IN | TEMPERATURE: 96.9 F

## 2018-11-02 DIAGNOSIS — I47.1 PAT (PAROXYSMAL ATRIAL TACHYCARDIA) (HCC): ICD-10-CM

## 2018-11-02 DIAGNOSIS — J43.9 PULMONARY EMPHYSEMA, UNSPECIFIED EMPHYSEMA TYPE (HCC): ICD-10-CM

## 2018-11-02 DIAGNOSIS — L72.0 EPIDERMAL CYST OF FACE: ICD-10-CM

## 2018-11-02 DIAGNOSIS — E78.2 MIXED HYPERLIPIDEMIA: Primary | ICD-10-CM

## 2018-11-02 DIAGNOSIS — N40.0 ENLARGED PROSTATE WITHOUT LOWER URINARY TRACT SYMPTOMS (LUTS): ICD-10-CM

## 2018-11-02 DIAGNOSIS — I10 ESSENTIAL HYPERTENSION: ICD-10-CM

## 2018-11-02 DIAGNOSIS — R10.32 LEFT LOWER QUADRANT PAIN: ICD-10-CM

## 2018-11-02 DIAGNOSIS — E55.9 VITAMIN D DEFICIENCY: ICD-10-CM

## 2018-11-02 PROCEDURE — 99214 OFFICE O/P EST MOD 30 MIN: CPT | Performed by: INTERNAL MEDICINE

## 2018-11-02 PROCEDURE — 90662 IIV NO PRSV INCREASED AG IM: CPT | Performed by: INTERNAL MEDICINE

## 2018-11-02 PROCEDURE — G0008 ADMIN INFLUENZA VIRUS VAC: HCPCS | Performed by: INTERNAL MEDICINE

## 2018-11-02 RX ORDER — RANITIDINE 150 MG/1
CAPSULE ORAL
Qty: 60 CAPSULE | Refills: 2 | Status: SHIPPED | OUTPATIENT
Start: 2018-11-02 | End: 2019-02-01 | Stop reason: SDUPTHER

## 2018-11-02 RX ORDER — DILTIAZEM HYDROCHLORIDE 180 MG/1
CAPSULE, EXTENDED RELEASE ORAL
COMMUNITY
Start: 2018-10-04 | End: 2021-04-01 | Stop reason: SDUPTHER

## 2018-11-02 RX ORDER — APIXABAN 5 MG/1
TABLET, FILM COATED ORAL
COMMUNITY
Start: 2018-10-04 | End: 2019-10-03 | Stop reason: SDUPTHER

## 2018-11-02 NOTE — PROGRESS NOTES
Chief Complaint   Patient presents with   • Follow-up     for COPD, HLD, and HTN. Daughter states Pt's having SOA. Pt would like bumps/spots on forehead looked at.      Subjective   Ed Spear is a 83 y.o. male.     Here today for follow up of COPD, HTN, HLD, Afib, vit D def, LBP, BPH.   COPD- he stopped taking the anoro inhaler.  He does not feel it helped his breathing.  Uses nebulizer 3 times a day with duo nebs.  He continues to smoke over a PPD.   HTN/HLD- BP is controlled today.  He is not checking his BP.  He denies CP or palpitations.  He is always SOA.  No edema  Afib- no palpitations noted per pt  Vit D def- he has not been taking any vitamin D supplement  LBP- tylenol is helpful for this  BPH- he does not awaken more than once to urinate.  No difficulty to start a stream.  He used to see Dr Reyna, was supposed to see Dr Nava last yr for PSA of 4.4 and never went  HCM- just got his flu shot.    He has has had large cysts on left cheek of face for yrs.  He would like to see dermatologist about this.           The following portions of the patient's history were reviewed and updated as appropriate: allergies, current medications, past family history, past medical history, past social history, past surgical history and problem list.    Review of Systems   Constitutional: Negative for activity change, appetite change and unexpected weight change.   HENT: Negative.    Respiratory: Positive for shortness of breath.    Cardiovascular: Negative for chest pain, palpitations and leg swelling.   Gastrointestinal: Negative for abdominal pain.   Genitourinary: Negative for difficulty urinating.   Musculoskeletal: Positive for back pain.   Skin:        Left cheek skin lesions   Neurological: Negative for headaches.   Psychiatric/Behavioral: Negative for dysphoric mood and sleep disturbance.   All other systems reviewed and are negative.      Objective   /70   Pulse 82   Temp 96.9 °F (36.1 °C)   Ht 188 cm  "(74\")   Wt 102 kg (224 lb)   SpO2 94%   BMI 28.76 kg/m²   Body mass index is 28.76 kg/m².  Physical Exam   Constitutional: He is oriented to person, place, and time. He appears well-developed and well-nourished.   Pleasant gentleman with a loose cough noted   HENT:   Head: Normocephalic and atraumatic.   Right Ear: External ear normal.   Left Ear: External ear normal.   Mouth/Throat: Oropharynx is clear and moist.   Eyes: Pupils are equal, round, and reactive to light. Conjunctivae and EOM are normal.   Neck: Normal range of motion. Neck supple. No thyromegaly present.   Cardiovascular: Normal rate, regular rhythm, normal heart sounds and intact distal pulses.    No murmur heard.  Currently in normal sinus rhythm today   Pulmonary/Chest: Effort normal. No respiratory distress. He has wheezes. He has rales.   Slightly diminished breath sounds with scattered rhonchi and wheezes throughout all lung fields   Abdominal: Soft. Bowel sounds are normal. He exhibits no distension. There is no tenderness.   Musculoskeletal: Normal range of motion.   Lymphadenopathy:     He has no cervical adenopathy.   Neurological: He is alert and oriented to person, place, and time. No cranial nerve deficit. Coordination normal.   Skin:   Face: Left cheek with large cystic lesions in a cluster approximately 2 x 5 cm in length   Psychiatric: He has a normal mood and affect. His behavior is normal. Judgment and thought content normal.   Nursing note and vitals reviewed.      Assessment/Plan   Ed Spear is here today and the following problems have been addressed:      Ed was seen today for follow-up.    Diagnoses and all orders for this visit:    Mixed hyperlipidemia  -     Comprehensive Metabolic Panel  -     Lipid Panel With / Chol / HDL Ratio    Essential hypertension  -     CBC & Differential    PAT (paroxysmal atrial tachycardia) (CMS/HCC)    Pulmonary emphysema, unspecified emphysema type (CMS/HCC)    Vitamin D deficiency  -     " Vitamin D 25 Hydroxy    Enlarged prostate without lower urinary tract symptoms (luts)  -     PSA Screen    Epidermal cyst of face  -     Ambulatory Referral to Dermatology      Labs as noted  Follow heart healthy/low salt diet  Avoid processed foods  Monitor blood pressure on occasion  Exercise as tolerated up to 30 minutes 5 days per week  Take all medications as prescribed  Recommend continue nebulizers with DuoNeb 2-3 times daily  Encourage patient to work on smoking cessation, although he is not interested at this time  Patient is not interested in pulmonary consult as he states he cannot afford it  Patient did not tolerate anoro inhaler due to side effects, do not have samples of any other inhalers for COPD at this time  Refer to dermatologist for left cheek cysts  If PSA is elevated again will refer patient back to Dr. Nava, as he recently missed appointment  He was given flu shot today, recommend hep A vaccine at pharmacy    Return in about 4 months (around 3/2/2019) for Next scheduled follow up.      Marilyn K. Vermeesch, MD      Please note that portions of this note were completed with a voice recognition program.  Efforts were made to edit dictation, but occasionally words are mistranscribed.

## 2018-11-14 RX ORDER — SIMVASTATIN 40 MG
TABLET ORAL
Qty: 90 TABLET | Refills: 0 | Status: SHIPPED | OUTPATIENT
Start: 2018-11-14 | End: 2019-02-01 | Stop reason: SDUPTHER

## 2019-02-01 DIAGNOSIS — R10.32 LEFT LOWER QUADRANT PAIN: ICD-10-CM

## 2019-02-01 RX ORDER — APIXABAN 2.5 MG/1
TABLET, FILM COATED ORAL
Qty: 60 TABLET | Refills: 5 | Status: SHIPPED | OUTPATIENT
Start: 2019-02-01 | End: 2019-09-30 | Stop reason: SDUPTHER

## 2019-02-01 RX ORDER — SIMVASTATIN 40 MG
TABLET ORAL
Qty: 90 TABLET | Refills: 1 | Status: SHIPPED | OUTPATIENT
Start: 2019-02-01 | End: 2019-07-31 | Stop reason: SDUPTHER

## 2019-02-01 RX ORDER — RANITIDINE 150 MG/1
CAPSULE ORAL
Qty: 90 CAPSULE | Refills: 1 | Status: SHIPPED | OUTPATIENT
Start: 2019-02-01 | End: 2019-06-14 | Stop reason: SDUPTHER

## 2019-03-01 DIAGNOSIS — N40.0 ENLARGED PROSTATE WITHOUT LOWER URINARY TRACT SYMPTOMS (LUTS): ICD-10-CM

## 2019-03-01 RX ORDER — TAMSULOSIN HYDROCHLORIDE 0.4 MG/1
CAPSULE ORAL
Qty: 90 CAPSULE | Refills: 0 | Status: SHIPPED | OUTPATIENT
Start: 2019-03-01 | End: 2019-06-04 | Stop reason: SDUPTHER

## 2019-04-25 LAB
25(OH)D3+25(OH)D2 SERPL-MCNC: 39.4 NG/ML
ALBUMIN SERPL-MCNC: 3.9 G/DL (ref 3.5–5)
ALBUMIN/GLOB SERPL: 1.4 G/DL (ref 1–2)
ALP SERPL-CCNC: 63 U/L (ref 38–126)
ALT SERPL-CCNC: 19 U/L (ref 13–69)
AST SERPL-CCNC: 16 U/L (ref 15–46)
BASOPHILS # BLD AUTO: 0.06 10*3/MM3 (ref 0–0.2)
BASOPHILS NFR BLD AUTO: 1 % (ref 0–1.5)
BILIRUB SERPL-MCNC: 0.8 MG/DL (ref 0.2–1.3)
BUN SERPL-MCNC: 13 MG/DL (ref 7–20)
BUN/CREAT SERPL: 11.8 (ref 6.3–21.9)
CALCIUM SERPL-MCNC: 9.6 MG/DL (ref 8.4–10.2)
CHLORIDE SERPL-SCNC: 102 MMOL/L (ref 98–107)
CHOLEST SERPL-MCNC: 117 MG/DL (ref 0–199)
CHOLEST/HDLC SERPL: 2.6 {RATIO}
CO2 SERPL-SCNC: 28 MMOL/L (ref 26–30)
CREAT SERPL-MCNC: 1.1 MG/DL (ref 0.6–1.3)
EOSINOPHIL # BLD AUTO: 0.15 10*3/MM3 (ref 0–0.4)
EOSINOPHIL NFR BLD AUTO: 2.5 % (ref 0.3–6.2)
ERYTHROCYTE [DISTWIDTH] IN BLOOD BY AUTOMATED COUNT: 14.4 % (ref 12.3–15.4)
GLOBULIN SER CALC-MCNC: 2.7 GM/DL
GLUCOSE SERPL-MCNC: 91 MG/DL (ref 74–98)
HCT VFR BLD AUTO: 40.5 % (ref 37.5–51)
HDLC SERPL-MCNC: 45 MG/DL (ref 40–60)
HGB BLD-MCNC: 12.4 G/DL (ref 13–17.7)
IMM GRANULOCYTES # BLD AUTO: 0.02 10*3/MM3 (ref 0–0.05)
IMM GRANULOCYTES NFR BLD AUTO: 0.3 % (ref 0–0.5)
LDLC SERPL CALC-MCNC: 51 MG/DL (ref 0–99)
LYMPHOCYTES # BLD AUTO: 2.09 10*3/MM3 (ref 0.7–3.1)
LYMPHOCYTES NFR BLD AUTO: 34.2 % (ref 19.6–45.3)
MCH RBC QN AUTO: 28.4 PG (ref 26.6–33)
MCHC RBC AUTO-ENTMCNC: 30.6 G/DL (ref 31.5–35.7)
MCV RBC AUTO: 92.9 FL (ref 79–97)
MONOCYTES # BLD AUTO: 0.7 10*3/MM3 (ref 0.1–0.9)
MONOCYTES NFR BLD AUTO: 11.5 % (ref 5–12)
NEUTROPHILS # BLD AUTO: 3.09 10*3/MM3 (ref 1.7–7)
NEUTROPHILS NFR BLD AUTO: 50.5 % (ref 42.7–76)
NRBC BLD AUTO-RTO: 0 /100 WBC (ref 0–0.2)
PLATELET # BLD AUTO: 240 10*3/MM3 (ref 140–450)
POTASSIUM SERPL-SCNC: 4.9 MMOL/L (ref 3.5–5.1)
PROT SERPL-MCNC: 6.6 G/DL (ref 6.3–8.2)
PSA SERPL-MCNC: 3.06 NG/ML (ref 0.06–4)
RBC # BLD AUTO: 4.36 10*6/MM3 (ref 4.14–5.8)
SODIUM SERPL-SCNC: 139 MMOL/L (ref 137–145)
TRIGL SERPL-MCNC: 106 MG/DL
VLDLC SERPL CALC-MCNC: 21.2 MG/DL
WBC # BLD AUTO: 6.11 10*3/MM3 (ref 3.4–10.8)

## 2019-04-26 DIAGNOSIS — J43.2 CENTRILOBULAR EMPHYSEMA (HCC): Primary | ICD-10-CM

## 2019-04-26 DIAGNOSIS — D50.9 IRON DEFICIENCY ANEMIA, UNSPECIFIED IRON DEFICIENCY ANEMIA TYPE: ICD-10-CM

## 2019-05-06 ENCOUNTER — CLINICAL SUPPORT (OUTPATIENT)
Dept: INTERNAL MEDICINE | Facility: CLINIC | Age: 84
End: 2019-05-06

## 2019-05-06 DIAGNOSIS — D50.9 IRON DEFICIENCY ANEMIA, UNSPECIFIED IRON DEFICIENCY ANEMIA TYPE: ICD-10-CM

## 2019-05-06 LAB
DEVELOPER EXPIRATION DATE: ABNORMAL
DEVELOPER LOT NUMBER: ABNORMAL
EXPIRATION DATE: ABNORMAL
FECAL OCCULT BLOOD SCREEN, POC: POSITIVE
Lab: ABNORMAL
NEGATIVE CONTROL: NEGATIVE
POSITIVE CONTROL: POSITIVE

## 2019-05-06 PROCEDURE — 82274 ASSAY TEST FOR BLOOD FECAL: CPT | Performed by: INTERNAL MEDICINE

## 2019-05-06 NOTE — PROGRESS NOTES
Please let daughter know that stool was positive for blood.  In lab orders there is an iron panel and B12 that still need to be done.  I would like to see those lab results before I do any further testing on him such as colonoscopy given his age.  I suspect blood in his stool could be due to hemorrhoids.  If he has iron deficiency anemia I would be more worried and be more likely to do a colonoscopy.  Please ask her if she could bring him up to the lab and have the labs drawn prior to my ordering a GI consult.

## 2019-05-09 ENCOUNTER — HOSPITAL ENCOUNTER (OUTPATIENT)
Dept: GENERAL RADIOLOGY | Facility: HOSPITAL | Age: 84
Discharge: HOME | End: 2019-05-09
Admitting: INTERNAL MEDICINE

## 2019-05-09 DIAGNOSIS — J43.2 CENTRILOBULAR EMPHYSEMA (HCC): ICD-10-CM

## 2019-05-09 LAB
IRON SATN MFR SERPL: 18 % (ref 11–46)
IRON SERPL-MCNC: 65 MCG/DL (ref 37–181)
TIBC SERPL-MCNC: 369 MCG/DL (ref 261–497)
UIBC SERPL-MCNC: 304 MCG/DL (ref 112–346)
VIT B12 SERPL-MCNC: 241 PG/ML (ref 239–931)

## 2019-05-09 PROCEDURE — 71046 X-RAY EXAM CHEST 2 VIEWS: CPT

## 2019-05-09 NOTE — PROGRESS NOTES
Please call daughter or family with results.  CXR reveals old scarring in lungs but no infection or mass.

## 2019-06-04 DIAGNOSIS — N40.0 ENLARGED PROSTATE WITHOUT LOWER URINARY TRACT SYMPTOMS (LUTS): ICD-10-CM

## 2019-06-04 RX ORDER — TAMSULOSIN HYDROCHLORIDE 0.4 MG/1
CAPSULE ORAL
Qty: 90 CAPSULE | Refills: 1 | Status: SHIPPED | OUTPATIENT
Start: 2019-06-04 | End: 2019-11-29 | Stop reason: SDUPTHER

## 2019-06-14 DIAGNOSIS — R10.32 LEFT LOWER QUADRANT PAIN: ICD-10-CM

## 2019-06-14 RX ORDER — RANITIDINE 150 MG/1
CAPSULE ORAL
Qty: 90 CAPSULE | Refills: 0 | Status: SHIPPED | OUTPATIENT
Start: 2019-06-14 | End: 2019-07-11 | Stop reason: SDUPTHER

## 2019-07-11 DIAGNOSIS — R10.32 LEFT LOWER QUADRANT PAIN: ICD-10-CM

## 2019-07-11 RX ORDER — RANITIDINE 150 MG/1
CAPSULE ORAL
Qty: 90 CAPSULE | Refills: 3 | Status: SHIPPED | OUTPATIENT
Start: 2019-07-11 | End: 2019-10-31

## 2019-07-31 RX ORDER — SIMVASTATIN 40 MG
TABLET ORAL
Qty: 90 TABLET | Refills: 0 | Status: SHIPPED | OUTPATIENT
Start: 2019-07-31 | End: 2019-11-01 | Stop reason: SDUPTHER

## 2019-09-30 RX ORDER — IPRATROPIUM BROMIDE AND ALBUTEROL SULFATE 2.5; .5 MG/3ML; MG/3ML
SOLUTION RESPIRATORY (INHALATION)
Qty: 360 ML | Refills: 0 | Status: SHIPPED | OUTPATIENT
Start: 2019-09-30 | End: 2019-11-29 | Stop reason: SDUPTHER

## 2019-09-30 RX ORDER — APIXABAN 2.5 MG/1
TABLET, FILM COATED ORAL
Qty: 180 TABLET | Refills: 0 | Status: SHIPPED | OUTPATIENT
Start: 2019-09-30 | End: 2019-12-27

## 2019-10-03 ENCOUNTER — OFFICE VISIT (OUTPATIENT)
Dept: INTERNAL MEDICINE | Facility: CLINIC | Age: 84
End: 2019-10-03

## 2019-10-03 VITALS
DIASTOLIC BLOOD PRESSURE: 72 MMHG | TEMPERATURE: 97.4 F | OXYGEN SATURATION: 97 % | HEIGHT: 74 IN | HEART RATE: 81 BPM | BODY MASS INDEX: 27.98 KG/M2 | SYSTOLIC BLOOD PRESSURE: 134 MMHG | WEIGHT: 218 LBS

## 2019-10-03 DIAGNOSIS — E55.9 VITAMIN D DEFICIENCY: ICD-10-CM

## 2019-10-03 DIAGNOSIS — E78.2 MIXED HYPERLIPIDEMIA: ICD-10-CM

## 2019-10-03 DIAGNOSIS — I10 ESSENTIAL HYPERTENSION: Primary | ICD-10-CM

## 2019-10-03 DIAGNOSIS — L57.0 ACTINIC KERATOSIS: ICD-10-CM

## 2019-10-03 DIAGNOSIS — L72.0 EPIDERMAL CYST OF FACE: ICD-10-CM

## 2019-10-03 DIAGNOSIS — N40.0 ENLARGED PROSTATE WITHOUT LOWER URINARY TRACT SYMPTOMS (LUTS): ICD-10-CM

## 2019-10-03 DIAGNOSIS — J43.9 PULMONARY EMPHYSEMA, UNSPECIFIED EMPHYSEMA TYPE (HCC): ICD-10-CM

## 2019-10-03 DIAGNOSIS — I48.0 PAROXYSMAL ATRIAL FIBRILLATION (HCC): ICD-10-CM

## 2019-10-03 PROCEDURE — 90653 IIV ADJUVANT VACCINE IM: CPT | Performed by: INTERNAL MEDICINE

## 2019-10-03 PROCEDURE — G0008 ADMIN INFLUENZA VIRUS VAC: HCPCS | Performed by: INTERNAL MEDICINE

## 2019-10-03 PROCEDURE — 99214 OFFICE O/P EST MOD 30 MIN: CPT | Performed by: INTERNAL MEDICINE

## 2019-10-03 NOTE — PROGRESS NOTES
Chief Complaint   Patient presents with   • Follow-up     for COPD, HLD, and HTN. Pt would like bumps on head and arms looked at. Daughter states Pt is having more SOA.      Subjective   Ed Spear is a 83 y.o. male.     Here today for follow up of COPD, HTN, HLD, Afib, vit D def, LBP, BPH.   HTN/HLD/Afib- BP is controlled today.  He is not checking his BP.  He denies CP or palpitations.  He is always SOA.  No edema.  Some dizziness  COPD- he continues to smoke a PPD.  He is using neb twice a day.  He also uses ProAir several times a day.   Complains of DOA and wheezing.  He barely walks more than 20 feet or across the room and he is out of air  Vit D def- he has not been taking any vitamin D supplement  Vit B12 def- he has been taking B12 daily  LBP- he is taking tylenol 2 tabs every 4-6 hours, it does not help his pain much.  He has not tried any of the over-the-counter pain patches or heat patches  BPH- he does not awaken more than once to urinate.  No difficulty to start a stream.  Last PSA level in April this yr was down to normal from last yr when elevated  HCM- just got his flu shot.    He has has had large cysts on left cheek of face for yrs.  He also has one on back of his neck.  If he lances these there is thick and white foul smelling discharge.  He has a lot of dry, white skin lesions over forearms that are itchy.  He has seen dermatology in the past for lesions on forearms and was given a cream that cost over $300, he could not afford this so did not treat his arms         The following portions of the patient's history were reviewed and updated as appropriate: allergies, current medications, past family history, past medical history, past social history, past surgical history and problem list.    Review of Systems   Constitutional: Negative for activity change, appetite change and unexpected weight change.   Eyes: Negative for visual disturbance.   Respiratory: Positive for shortness of breath and  "wheezing.    Cardiovascular: Negative for chest pain, palpitations and leg swelling.   Gastrointestinal: Negative for abdominal pain.   Musculoskeletal: Positive for back pain.   Skin:        Cystic lesions over left upper cheek of face  Multiple skin lesions over forearms   Neurological: Negative for headaches.   Psychiatric/Behavioral: Positive for sleep disturbance. Negative for dysphoric mood. The patient is not nervous/anxious.        Objective   /72   Pulse 81   Temp 97.4 °F (36.3 °C)   Ht 188 cm (74\")   Wt 98.9 kg (218 lb)   SpO2 97%   BMI 27.99 kg/m²   Body mass index is 27.99 kg/m².  Physical Exam   Constitutional: He is oriented to person, place, and time. He appears well-developed and well-nourished. No distress.   Very pleasant gentleman who appears his stated age, he appears somewhat short of breath    HENT:   Head: Normocephalic and atraumatic.   Right Ear: External ear normal.   Left Ear: External ear normal.   Mouth/Throat: Oropharynx is clear and moist.   Tympanic membranes normal, oropharynx clear   Eyes: Conjunctivae and EOM are normal. Pupils are equal, round, and reactive to light.   Neck: Normal range of motion. Neck supple. No thyromegaly present.   Cardiovascular: Normal rate, regular rhythm and intact distal pulses.   No murmur heard.  No carotid bruits  Distant heart sounds with occasional ectopic beat noted   Pulmonary/Chest: Effort normal. No respiratory distress. He has no wheezes.   Decreased breath sounds, with scattered rhonchi noted throughout all lung fields   Abdominal: Soft. Bowel sounds are normal. He exhibits no distension. There is no tenderness.   Musculoskeletal: Normal range of motion. He exhibits edema.   Trace edema at ankles noted  Patient ambulates with use of a cane   Lymphadenopathy:     He has no cervical adenopathy.   Neurological: He is alert and oriented to person, place, and time. No cranial nerve deficit. Coordination normal.   Skin:   Left upper " cheek with multiple cystic lesions approximately 5 to 10 mm in size, no erythema or discharge noted  Forearms are covered with erythematous/brown/tan lesions with intermittent white peeling scabs consistent with AK   Psychiatric: He has a normal mood and affect. His behavior is normal. Judgment and thought content normal.   Nursing note and vitals reviewed.      Assessment/Plan   Ed Spear is here today and the following problems have been addressed:      Ed was seen today for follow-up.    Diagnoses and all orders for this visit:    Essential hypertension    Mixed hyperlipidemia    Paroxysmal atrial fibrillation (CMS/HCC)    Pulmonary emphysema, unspecified emphysema type (CMS/HCC)    Vitamin D deficiency    Enlarged prostate without lower urinary tract symptoms (luts)    Epidermal cyst of face  -     Ambulatory Referral to Dermatology    Actinic keratosis  -     Ambulatory Referral to Dermatology        Follow heart healthy/low salt diet  Avoid processed foods  Monitor blood pressure and heart rate on occasion  Take all medications as prescribed  Flu given shot today  Given sample of symbicort 160/4.5 1 puff twice a day-rinse mouth well after use  Take vitamin B12 1000 mcg on Monday, Wednesday and Friday  Encouraged him to take vitamin D 1000 units daily  Refer to dermatology for cystic lesions on face and AK lesions on arms  Patient did a 6-minute walk test in office today due to shortness of breath.  Oxygen was 97% at rest on room air.  He was unable to walk more than a few minutes and oxygen saturation dropped to 87%, he was placed on oxygen at 2 L per nasal cannula and saturations returned to 97.  We will arrange oxygen at 2 L per nasal cannula to be used during ambulation and sleep    Return in about 4 weeks (around 10/31/2019) for Medicare Wellness.      Marilyn K. Vermeesch, MD      Please note that portions of this note were completed with a voice recognition program.  Efforts were made to edit  dictation, but occasionally words are mistranscribed.

## 2019-10-31 ENCOUNTER — OFFICE VISIT (OUTPATIENT)
Dept: INTERNAL MEDICINE | Facility: CLINIC | Age: 84
End: 2019-10-31

## 2019-10-31 VITALS
TEMPERATURE: 97.2 F | HEIGHT: 74 IN | BODY MASS INDEX: 28.11 KG/M2 | OXYGEN SATURATION: 91 % | HEART RATE: 78 BPM | WEIGHT: 219 LBS | DIASTOLIC BLOOD PRESSURE: 68 MMHG | SYSTOLIC BLOOD PRESSURE: 115 MMHG

## 2019-10-31 DIAGNOSIS — E78.2 MIXED HYPERLIPIDEMIA: ICD-10-CM

## 2019-10-31 DIAGNOSIS — N40.0 ENLARGED PROSTATE WITHOUT LOWER URINARY TRACT SYMPTOMS (LUTS): ICD-10-CM

## 2019-10-31 DIAGNOSIS — I10 ESSENTIAL HYPERTENSION: ICD-10-CM

## 2019-10-31 DIAGNOSIS — J43.9 PULMONARY EMPHYSEMA, UNSPECIFIED EMPHYSEMA TYPE (HCC): ICD-10-CM

## 2019-10-31 DIAGNOSIS — E53.8 VITAMIN B 12 DEFICIENCY: ICD-10-CM

## 2019-10-31 DIAGNOSIS — G89.29 CHRONIC BILATERAL LOW BACK PAIN WITHOUT SCIATICA: ICD-10-CM

## 2019-10-31 DIAGNOSIS — E55.9 VITAMIN D DEFICIENCY: ICD-10-CM

## 2019-10-31 DIAGNOSIS — I48.0 PAROXYSMAL ATRIAL FIBRILLATION (HCC): ICD-10-CM

## 2019-10-31 DIAGNOSIS — Z00.00 ENCOUNTER FOR MEDICARE ANNUAL WELLNESS EXAM: Primary | ICD-10-CM

## 2019-10-31 DIAGNOSIS — M54.50 CHRONIC BILATERAL LOW BACK PAIN WITHOUT SCIATICA: ICD-10-CM

## 2019-10-31 PROBLEM — L57.0 ACTINIC KERATOSIS: Status: RESOLVED | Noted: 2019-10-03 | Resolved: 2019-10-31

## 2019-10-31 PROCEDURE — G0439 PPPS, SUBSEQ VISIT: HCPCS | Performed by: INTERNAL MEDICINE

## 2019-10-31 RX ORDER — DULOXETIN HYDROCHLORIDE 30 MG/1
30 CAPSULE, DELAYED RELEASE ORAL DAILY
Qty: 30 CAPSULE | Refills: 1 | Status: SHIPPED | OUTPATIENT
Start: 2019-10-31 | End: 2019-12-27

## 2019-10-31 RX ORDER — FAMOTIDINE 40 MG/1
40 TABLET, FILM COATED ORAL NIGHTLY PRN
Qty: 30 TABLET | Refills: 11 | Status: SHIPPED | OUTPATIENT
Start: 2019-10-31 | End: 2021-02-17 | Stop reason: SDUPTHER

## 2019-10-31 NOTE — PROGRESS NOTES
The ABCs of the Annual Wellness Visit  Subsequent Medicare Wellness Visit    Chief Complaint   Patient presents with   • Medicare Wellness-subsequent     Pt declined O2 when they brought it out to his house, as he wouldn't be alot to smoke.        Subjective   History of Present Illness:  Ed Spear is a 84 y.o. male who presents for a Subsequent Medicare Wellness Visit.  PMH of HTN, HLD, Afib, COPD, vit D def, LBP, skin cancer/AK, BPH, vit B12 def.  DJD has been bothering his right hip lately.  He was breathing better with symbicort 1 puff twice a day.   He stopped using it about 3 days after starting it because he thought it caused his hip to hurt.  Hip is still bothering him.  He denies any CP or palpitations.  He is smoking about 1.5 PPD.  He is unable to quit at this time.  He smokes more if he cannot get outside.  He is having a lot of back pain on and off.  He still has not been to the skin doctor due to lack of monies.  He urinates only once at night.  He is taking vit B 12 MWF.  He is a worrier and gets depressed at times.  He does sleep well.      HEALTH RISK ASSESSMENT    Recent Hospitalizations:  No hospitalization(s) within the last year.    Current Medical Providers:  Patient Care Team:  Vermeesch, Marilyn K, MD as PCP - General    Smoking Status:  Social History     Tobacco Use   Smoking Status Current Every Day Smoker       Alcohol Consumption:  Social History     Substance and Sexual Activity   Alcohol Use No       Depression Screen:   PHQ-2/PHQ-9 Depression Screening 10/31/2019   Little interest or pleasure in doing things 0   Feeling down, depressed, or hopeless 1   Total Score 1       Fall Risk Screen:  STEADI Fall Risk Assessment was completed, and patient is at MODERATE risk for falls. Assessment completed on:10/31/2019    Health Habits and Functional and Cognitive Screening:  Functional & Cognitive Status 10/31/2019   Do you have difficulty preparing food and eating? No   Do you have  difficulty bathing yourself, getting dressed or grooming yourself? No   Do you have difficulty using the toilet? No   Do you have difficulty moving around from place to place? Yes   Do you have trouble with steps or getting out of a bed or a chair? Yes   Current Diet Unhealthy Diet   Dental Exam Not up to date   Eye Exam Not up to date   Exercise (times per week) 0 times per week   Current Exercise Activities Include None   Do you need help using the phone?  No   Are you deaf or do you have serious difficulty hearing?  Yes   Do you need help with transportation? Yes   Do you need help shopping? Yes   Do you need help preparing meals?  No   Do you need help with housework?  No   Do you need help with laundry? No   Do you need help taking your medications? Yes   Do you need help managing money? Yes   Do you ever drive or ride in a car without wearing a seat belt? No   Have you felt unusual stress, anger or loneliness in the last month? Yes   Who do you live with? Child   If you need help, do you have trouble finding someone available to you? No   Do you have difficulty concentrating, remembering or making decisions? Yes         Does the patient have evidence of cognitive impairment? No    Asprin use counseling:Does not need ASA (and currently is not on it)    Age-appropriate Screening Schedule:  Refer to the list below for future screening recommendations based on patient's age, sex and/or medical conditions. Orders for these recommended tests are listed in the plan section. The patient has been provided with a written plan.    Health Maintenance   Topic Date Due   • LIPID PANEL  04/25/2020   • INFLUENZA VACCINE  Completed   • PNEUMOCOCCAL VACCINES (65+ LOW/MEDIUM RISK)  Completed   • TDAP/TD VACCINES  Discontinued   • ZOSTER VACCINE  Discontinued          The following portions of the patient's history were reviewed and updated as appropriate: allergies, current medications, past family history, past medical history,  past social history, past surgical history and problem list.    Outpatient Medications Prior to Visit   Medication Sig Dispense Refill   • acetaminophen (TYLENOL) 325 MG tablet Take  by mouth.     • albuterol (PROVENTIL) (2.5 MG/3ML) 0.083% nebulizer solution INHALE ONE VIAL VIA NEBULIZER BY MOUTH EVERY 4 TO 6 HOURS AS NEEDED FOR WHEEZING 375 mL 5   • bisoprolol (ZEBeta) 10 MG tablet      • CARTIA  MG 24 hr capsule      • ELIQUIS 2.5 MG tablet tablet TAKE ONE TABLET BY MOUTH EVERY 12 HOURS 180 tablet 0   • ipratropium-albuterol (DUO-NEB) 0.5-2.5 mg/3 ml nebulizer INHALE ONE VIAL VIA NEBULIZATION BY MOUTH TWO TIMES A DAY TO FOUR TIMES A  mL 0   • PROAIR  (90 Base) MCG/ACT inhaler INHALE ONE PUFF BY MOUTH EVERY 4 HOURS AS NEEDED 1 inhaler 5   • ranitidine (ZANTAC) 150 MG capsule TAKE ONE CAPSULE BY MOUTH TWICE A DAY 90 capsule 3   • simvastatin (ZOCOR) 40 MG tablet TAKE ONE TABLET BY MOUTH ONCE NIGHTLY 90 tablet 0   • tamsulosin (FLOMAX) 0.4 MG capsule 24 hr capsule TAKE ONE CAPSULE BY MOUTH DAILY 90 capsule 1     No facility-administered medications prior to visit.        Patient Active Problem List   Diagnosis   • Pulmonary emphysema (CMS/HCC)   • Hyperlipidemia   • Hypertension   • Malignant neoplasm of skin   • Vitamin D deficiency   • Arthritis, degenerative   • Enlarged prostate without lower urinary tract symptoms (luts)   • Skin cancer   • Epidermal cyst of face   • Chronic bilateral low back pain without sciatica   • PAT (paroxysmal atrial tachycardia) (CMS/HCC)   • Weight loss   • Atrial fibrillation (CMS/HCC)   • Encounter for Medicare annual wellness exam   • Vitamin B 12 deficiency       Advanced Care Planning:  Patient does not have an advance directive - information provided to the patient today    Review of Systems   Constitutional: Positive for fatigue.   HENT: Positive for hearing loss and tinnitus.    Eyes: Negative.    Respiratory: Positive for cough, shortness of breath and  "wheezing.    Cardiovascular: Negative.    Gastrointestinal: Negative.    Endocrine: Negative.    Genitourinary: Negative.    Musculoskeletal: Positive for arthralgias and back pain.   Skin:        Cysts on left side of cheek   Allergic/Immunologic: Negative.    Neurological: Positive for weakness.   Hematological: Bruises/bleeds easily.   Psychiatric/Behavioral: Positive for dysphoric mood.       Compared to one year ago, the patient feels his physical health is the same.  Compared to one year ago, the patient feels his mental health is the same.    Reviewed chart for potential of high risk medication in the elderly: yes  Reviewed chart for potential of harmful drug interactions in the elderly:yes    Objective         Vitals:    10/31/19 1358   BP: 115/68   Pulse: 78   Temp: 97.2 °F (36.2 °C)   SpO2: 91%   Weight: 99.3 kg (219 lb)   Height: 188 cm (74\")   PainSc: 2  Comment: right leg/hip       Body mass index is 28.12 kg/m².  Discussed the patient's BMI with him. The BMI is above average; BMI management plan is completed.    Physical Exam   Constitutional: He is oriented to person, place, and time. He appears well-developed and well-nourished. No distress.   Very pleasant gentleman who appears his stated age, he is in no distress today   HENT:   Head: Normocephalic and atraumatic.   Right Ear: External ear normal.   Left Ear: External ear normal.   Mouth/Throat: Oropharynx is clear and moist.   Tympanic membranes normal, dentures are in place   Eyes: Conjunctivae and EOM are normal. Pupils are equal, round, and reactive to light.   Neck: Normal range of motion. Neck supple. No thyromegaly present.   Cardiovascular: Normal rate, normal heart sounds and intact distal pulses.   No murmur heard.  No carotid bruits  Irregularly irregular   Pulmonary/Chest: Effort normal. No respiratory distress. He has no wheezes.   Decreased breath sounds throughout all lung fields, scattered rhonchi in all lung fields   Abdominal: " Soft. Bowel sounds are normal. He exhibits no distension and no mass. There is tenderness. There is no rebound and no guarding.   Mild tenderness in the left mid abdomen   Musculoskeletal: Normal range of motion. He exhibits no edema.   Mild inflammation of ankles noted  Antalgic gait noted due to back pain and ankle pain   Lymphadenopathy:     He has no cervical adenopathy.   Neurological: He is alert and oriented to person, place, and time. He displays normal reflexes. No cranial nerve deficit. Coordination normal.   Skin:   Face: Several cystic lesions noted over left upper cheek   Psychiatric: His behavior is normal. Judgment and thought content normal.   Flat affect noted, patient appears depressed and does not like to make eye contact during our interview today   Nursing note and vitals reviewed.            Assessment/Plan   Medicare Risks and Personalized Health Plan  CMS Preventative Services Quick Reference  Advance Directive Discussion  Cardiovascular risk  Chronic Pain   Depression/Dysphoria  Hearing Problem  Immunizations Discussed/Encouraged (specific immunizations; Shingrix )  Inactivity/Sedentary  Obesity/Overweight     The above risks/problems have been discussed with the patient.  Pertinent information has been shared with the patient in the After Visit Summary.  Follow up plans and orders are seen below in the Assessment/Plan Section.    Diagnoses and all orders for this visit:    1. Encounter for Medicare annual wellness exam (Primary)  -     Vitamin B12    2. Essential hypertension    3. Mixed hyperlipidemia    4. Paroxysmal atrial fibrillation (CMS/[facility])    5. Pulmonary emphysema, unspecified emphysema type (CMS/HCC)    6. Vitamin D deficiency    7. Chronic bilateral low back pain without sciatica    8. Enlarged prostate without lower urinary tract symptoms (luts)    9. Vitamin B 12 deficiency  -     Vitamin B12    Other orders  -     DULoxetine (CYMBALTA) 30 MG capsule; Take 1 capsule by mouth  Daily.  Dispense: 30 capsule; Refill: 1    check B12 level today  Continue vitamin B12 1000 mcg 3 days a week, will adjust dose if level is still low, or will consider monthly injections if needed  Continue daily vitamin D  A. fib rate is well controlled with use of Cartia, continue Eliquis  Start duloextine 30 mg with evening meal daily for back pain/DJD/mood  Continue Flomax, no current BPH symptoms with this medication  Follow heart healthy/low salt diet  Avoid processed foods  Monitor blood pressure on occasion  Exercise as tolerated up to 30 minutes 5 days per week  Take all medications as prescribed  Other labs are UTD  Given info on advance directives, need copy of living will when complete  Recommend shingles vaccine at pharmacy  Resume symbicort 1 puff twice a day  Patient continues to smoke and is not interested in smoking cessation  Discontinue Zantac due to medication recall, change to Pepcid 40 mg daily    Follow Up:  Return in about 6 weeks (around 12/12/2019) for Next scheduled follow up.  For follow-up of depression and back pain/arthritis    An After Visit Summary and PPPS were given to the patient.

## 2019-11-01 LAB — VIT B12 SERPL-MCNC: 593 PG/ML (ref 211–946)

## 2019-11-01 RX ORDER — SIMVASTATIN 40 MG
TABLET ORAL
Qty: 90 TABLET | Refills: 1 | Status: SHIPPED | OUTPATIENT
Start: 2019-11-01 | End: 2020-05-04

## 2019-11-29 DIAGNOSIS — N40.0 ENLARGED PROSTATE WITHOUT LOWER URINARY TRACT SYMPTOMS (LUTS): ICD-10-CM

## 2019-12-02 RX ORDER — IPRATROPIUM BROMIDE AND ALBUTEROL SULFATE 2.5; .5 MG/3ML; MG/3ML
SOLUTION RESPIRATORY (INHALATION)
Qty: 360 ML | Refills: 0 | Status: SHIPPED | OUTPATIENT
Start: 2019-12-02 | End: 2019-12-27

## 2019-12-02 RX ORDER — TAMSULOSIN HYDROCHLORIDE 0.4 MG/1
CAPSULE ORAL
Qty: 90 CAPSULE | Refills: 1 | Status: SHIPPED | OUTPATIENT
Start: 2019-12-02 | End: 2020-06-03

## 2019-12-27 RX ORDER — APIXABAN 2.5 MG/1
TABLET, FILM COATED ORAL
Qty: 180 TABLET | Refills: 1 | Status: SHIPPED | OUTPATIENT
Start: 2019-12-27 | End: 2020-07-01

## 2019-12-27 RX ORDER — DULOXETIN HYDROCHLORIDE 30 MG/1
CAPSULE, DELAYED RELEASE ORAL
Qty: 30 CAPSULE | Refills: 5 | Status: SHIPPED | OUTPATIENT
Start: 2019-12-27 | End: 2020-07-01

## 2019-12-27 RX ORDER — IPRATROPIUM BROMIDE AND ALBUTEROL SULFATE 2.5; .5 MG/3ML; MG/3ML
SOLUTION RESPIRATORY (INHALATION)
Qty: 360 ML | Refills: 0 | Status: SHIPPED | OUTPATIENT
Start: 2019-12-27 | End: 2020-03-04

## 2020-03-04 RX ORDER — IPRATROPIUM BROMIDE AND ALBUTEROL SULFATE 2.5; .5 MG/3ML; MG/3ML
1.5 SOLUTION RESPIRATORY (INHALATION)
Qty: 360 ML | Refills: 0 | Status: SHIPPED | OUTPATIENT
Start: 2020-03-04 | End: 2020-07-18

## 2020-03-04 RX ORDER — IPRATROPIUM BROMIDE AND ALBUTEROL SULFATE 2.5; .5 MG/3ML; MG/3ML
SOLUTION RESPIRATORY (INHALATION)
Qty: 360 ML | Refills: 0 | Status: SHIPPED | OUTPATIENT
Start: 2020-03-04 | End: 2020-05-26

## 2020-03-05 RX ORDER — BUDESONIDE AND FORMOTEROL FUMARATE DIHYDRATE 160; 4.5 UG/1; UG/1
1 AEROSOL RESPIRATORY (INHALATION)
Qty: 10.2 G | Refills: 1 | Status: SHIPPED | OUTPATIENT
Start: 2020-03-05 | End: 2021-02-18 | Stop reason: SDUPTHER

## 2020-05-04 RX ORDER — SIMVASTATIN 40 MG
TABLET ORAL
Qty: 90 TABLET | Refills: 1 | Status: SHIPPED | OUTPATIENT
Start: 2020-05-04 | End: 2020-11-02

## 2020-05-26 RX ORDER — IPRATROPIUM BROMIDE AND ALBUTEROL SULFATE 2.5; .5 MG/3ML; MG/3ML
SOLUTION RESPIRATORY (INHALATION)
Qty: 180 VIAL | Refills: 0 | Status: SHIPPED | OUTPATIENT
Start: 2020-05-26 | End: 2020-06-23

## 2020-06-03 DIAGNOSIS — N40.0 ENLARGED PROSTATE WITHOUT LOWER URINARY TRACT SYMPTOMS (LUTS): ICD-10-CM

## 2020-06-03 RX ORDER — TAMSULOSIN HYDROCHLORIDE 0.4 MG/1
1 CAPSULE ORAL DAILY
Qty: 90 CAPSULE | Refills: 0 | Status: SHIPPED | OUTPATIENT
Start: 2020-06-03 | End: 2020-09-02

## 2020-06-23 RX ORDER — IPRATROPIUM BROMIDE AND ALBUTEROL SULFATE 2.5; .5 MG/3ML; MG/3ML
SOLUTION RESPIRATORY (INHALATION)
Qty: 100 VIAL | Refills: 0 | Status: SHIPPED | OUTPATIENT
Start: 2020-06-23 | End: 2020-07-18

## 2020-07-01 RX ORDER — DULOXETIN HYDROCHLORIDE 30 MG/1
30 CAPSULE, DELAYED RELEASE ORAL DAILY
Qty: 30 CAPSULE | Refills: 0 | Status: SHIPPED | OUTPATIENT
Start: 2020-07-01 | End: 2020-08-01

## 2020-07-01 RX ORDER — ALBUTEROL SULFATE 90 UG/1
1 AEROSOL, METERED RESPIRATORY (INHALATION) EVERY 4 HOURS PRN
Qty: 8.5 G | Refills: 0 | Status: SHIPPED | OUTPATIENT
Start: 2020-07-01 | End: 2020-09-14

## 2020-07-18 ENCOUNTER — OFFICE VISIT (OUTPATIENT)
Dept: INTERNAL MEDICINE | Facility: CLINIC | Age: 85
End: 2020-07-18

## 2020-07-18 VITALS
DIASTOLIC BLOOD PRESSURE: 62 MMHG | OXYGEN SATURATION: 98 % | BODY MASS INDEX: 28.11 KG/M2 | TEMPERATURE: 97.8 F | HEART RATE: 67 BPM | WEIGHT: 219 LBS | HEIGHT: 74 IN | SYSTOLIC BLOOD PRESSURE: 110 MMHG | RESPIRATION RATE: 16 BRPM

## 2020-07-18 DIAGNOSIS — I48.0 PAROXYSMAL ATRIAL FIBRILLATION (HCC): ICD-10-CM

## 2020-07-18 DIAGNOSIS — E78.2 MIXED HYPERLIPIDEMIA: ICD-10-CM

## 2020-07-18 DIAGNOSIS — E55.9 VITAMIN D DEFICIENCY: ICD-10-CM

## 2020-07-18 DIAGNOSIS — I10 ESSENTIAL HYPERTENSION: Primary | ICD-10-CM

## 2020-07-18 DIAGNOSIS — E53.8 VITAMIN B 12 DEFICIENCY: ICD-10-CM

## 2020-07-18 PROBLEM — R63.4 WEIGHT LOSS: Status: RESOLVED | Noted: 2018-01-30 | Resolved: 2020-07-18

## 2020-07-18 PROCEDURE — 99214 OFFICE O/P EST MOD 30 MIN: CPT | Performed by: INTERNAL MEDICINE

## 2020-07-18 RX ORDER — IPRATROPIUM BROMIDE AND ALBUTEROL SULFATE 2.5; .5 MG/3ML; MG/3ML
3 SOLUTION RESPIRATORY (INHALATION)
Qty: 360 ML | Refills: 3 | Status: SHIPPED | OUTPATIENT
Start: 2020-07-18 | End: 2021-02-01

## 2020-07-18 NOTE — PROGRESS NOTES
"Chief Complaint   Patient presents with   • Emphysema   • Hypertension   • Hyperlipidemia     Subjective   Ed Spear is a 84 y.o. male.     Here today for follow up of HTN, HLD, Afib, COPD, LBP, vit d/B12 def.  HTN/HLD/Afib- BP and HR are well controlled today.  He does not check BP.  He denies CP, SOA, palpitations or edema.  He wears compression socks and has minimal edema.  He takes his zocor every PM.  He does not eat a HH diet  COPD- he is using nebulizer 3 times a day with duoneb. He uses his symbicort MDI twice a day.  If he mows yard he may use albuterol MDI in addition .  He continues to smoke 1.5 packs of cigarettes daily  LBP- he is taking tylenol for his pain.    Vit B12/D def- he is taking vit D 1000 u daily, B12 1000 u MWF  HCM- immunizations are UTD except shingrix.             The following portions of the patient's history were reviewed and updated as appropriate: allergies, current medications, past family history, past medical history, past social history, past surgical history and problem list.    Review of Systems   Constitutional: Negative for activity change, appetite change and unexpected weight change.   Eyes: Negative for visual disturbance.   Respiratory: Positive for shortness of breath.    Cardiovascular: Positive for leg swelling. Negative for chest pain and palpitations.   Gastrointestinal: Negative for abdominal pain.   Neurological: Negative for headaches.   Psychiatric/Behavioral: Negative for dysphoric mood and sleep disturbance. The patient is not nervous/anxious.        Objective   /62   Pulse 67   Temp 97.8 °F (36.6 °C)   Resp 16   Ht 188 cm (74\")   Wt 99.3 kg (219 lb)   SpO2 98%   BMI 28.12 kg/m²   Body mass index is 28.12 kg/m².  Physical Exam   Constitutional: He is oriented to person, place, and time. He appears well-developed and well-nourished. No distress.   Pleasant elderly man, appears his age, wearing a mask and in no distress today   HENT:   Head: " Normocephalic and atraumatic.   Right Ear: External ear normal.   Left Ear: External ear normal.   Eyes: Pupils are equal, round, and reactive to light. Conjunctivae and EOM are normal.   Neck: Normal range of motion. Neck supple. No thyromegaly present.   Cardiovascular: Normal rate, normal heart sounds and intact distal pulses.   No murmur heard.  No carotid bruits  Irregular irregular rhythm   Pulmonary/Chest: Effort normal. No respiratory distress. He has no wheezes.   Decreased breath sounds throughout all lung fields   Abdominal: Soft. Bowel sounds are normal. He exhibits no distension. There is no tenderness.   Musculoskeletal: Normal range of motion. He exhibits edema.   Trace edema at ankles   Lymphadenopathy:     He has no cervical adenopathy.   Neurological: He is alert and oriented to person, place, and time. No cranial nerve deficit. Coordination normal.   Skin:   Several cysts noted over left upper cheek area  He has multiple brown macular lesions noted over tips of ears/pinna bilaterally and multiple thickened white/tan macular lesions over dorsum of hands   Psychiatric: He has a normal mood and affect. His behavior is normal. Judgment and thought content normal.   Nursing note and vitals reviewed.      Assessment/Plan   Ed Spear is here today and the following problems have been addressed:      Ed was seen today for emphysema, hypertension and hyperlipidemia.    Diagnoses and all orders for this visit:    Essential hypertension  -     CBC & Differential  -     Comprehensive Metabolic Panel    Mixed hyperlipidemia  -     Comprehensive Metabolic Panel  -     Lipid Panel With / Chol / HDL Ratio    Paroxysmal atrial fibrillation (CMS/HCC)    Vitamin B 12 deficiency  -     Vitamin B12    Vitamin D deficiency    Other orders  -     ipratropium-albuterol (DUO-NEB) 0.5-2.5 mg/3 ml nebulizer; Take 3 mL by nebulization 4 (Four) Times a Day.        Follow heart healthy/low salt diet  Avoid processed  foods  Monitor blood pressure as discussed  Exercise as tolerated   Take all medications as prescribed  Recommend zyrtec 10 mg qhs  Daughter to call derm regarding ear and hand skin lesions for evaluation/ treatment, suspect these are skin cancer lesions  Labs as noted  Continue vit D and B12 at current dose  Continue nebulizers and symbicort for COPD    Return in about 6 months (around 1/18/2021) for Medicare Wellness.      Marilyn K. Vermeesch, MD      Please note that portions of this note were completed with a voice recognition program.  Efforts were made to edit dictation, but occasionally words are mistranscribed.

## 2020-08-01 RX ORDER — DULOXETIN HYDROCHLORIDE 30 MG/1
CAPSULE, DELAYED RELEASE ORAL
Qty: 30 CAPSULE | Refills: 0 | Status: SHIPPED | OUTPATIENT
Start: 2020-08-01 | End: 2020-09-02

## 2020-09-02 DIAGNOSIS — N40.0 ENLARGED PROSTATE WITHOUT LOWER URINARY TRACT SYMPTOMS (LUTS): ICD-10-CM

## 2020-09-02 RX ORDER — TAMSULOSIN HYDROCHLORIDE 0.4 MG/1
1 CAPSULE ORAL DAILY
Qty: 90 CAPSULE | Refills: 0 | Status: SHIPPED | OUTPATIENT
Start: 2020-09-02 | End: 2020-12-02

## 2020-09-02 RX ORDER — DULOXETIN HYDROCHLORIDE 30 MG/1
30 CAPSULE, DELAYED RELEASE ORAL DAILY
Qty: 30 CAPSULE | Refills: 2 | Status: SHIPPED | OUTPATIENT
Start: 2020-09-02 | End: 2020-12-02

## 2020-09-14 RX ORDER — ALBUTEROL SULFATE 90 UG/1
1 AEROSOL, METERED RESPIRATORY (INHALATION)
Qty: 8.5 G | Refills: 1 | Status: SHIPPED | OUTPATIENT
Start: 2020-09-14 | End: 2021-02-01

## 2020-10-07 ENCOUNTER — FLU SHOT (OUTPATIENT)
Dept: INTERNAL MEDICINE | Facility: CLINIC | Age: 85
End: 2020-10-07

## 2020-10-07 DIAGNOSIS — Z23 NEED FOR INFLUENZA VACCINATION: ICD-10-CM

## 2020-10-07 PROCEDURE — 90694 VACC AIIV4 NO PRSRV 0.5ML IM: CPT | Performed by: INTERNAL MEDICINE

## 2020-10-07 PROCEDURE — G0008 ADMIN INFLUENZA VIRUS VAC: HCPCS | Performed by: INTERNAL MEDICINE

## 2020-10-13 ENCOUNTER — TELEMEDICINE (OUTPATIENT)
Dept: INTERNAL MEDICINE | Facility: CLINIC | Age: 85
End: 2020-10-13

## 2020-10-13 DIAGNOSIS — M54.50 LUMBOSACRAL PAIN: ICD-10-CM

## 2020-10-13 DIAGNOSIS — I48.0 PAROXYSMAL ATRIAL FIBRILLATION (HCC): ICD-10-CM

## 2020-10-13 DIAGNOSIS — R31.0 HEMATURIA, GROSS: ICD-10-CM

## 2020-10-13 DIAGNOSIS — I10 BENIGN ESSENTIAL HYPERTENSION: Primary | ICD-10-CM

## 2020-10-13 PROCEDURE — 99214 OFFICE O/P EST MOD 30 MIN: CPT | Performed by: INTERNAL MEDICINE

## 2020-10-13 NOTE — PROGRESS NOTES
Subjective   Ed Spear is a 84 y.o. male.     You have chosen to receive care through a telehealth visit.  Do you consent to use a video/audio connection for your medical care today? Yes  Active parties in video visit  Janeth Yao CMA  Ed Spear Patient  Farida Cano Daughter         Chief Complaint   Patient presents with   • Hypertension   • Atrial Fibrillation   • Back Pain   • Blood in Urine     HPI: Video visit with patient today, patient's history is mostly from his daughter Farida,  who is with patient at this video visit today  Patient is following on the blood pressure  The patient is taking the blood pressure medications as prescribed and has had no side effects. The patient is complaining of blood in urine which his daughter describes as reddish-pinkish tinge in commode when she went to flush for the past 3 days, he is currently on Eliquis for atrial fibrillation, he has not seen urology since Dr. Rasmussen retired, she also states he complains of pain in his back which is going down his legs on both sides and his legs are painful, he denies any swelling, he denies any fever    During today's visit, I reviewed the documented allergies, medications, chief complaint, and pertinent vitals.  I have confirmed with the patient that there have been no changes since this information was discussed with my clinical team member.    The following portions of the patient's history were reviewed and updated as appropriate: allergies, current medications, past family history, past medical history, past social history, past surgical history and problem list.    Review of Systems  Afebrile  Back pain radiating down his legs  Blood and urine     bp  108/68 ,pulse 79  Objective   Physical Exam  All vitals recorded within this visit are reported by the patient.  General appearance: Normocephalic and nontraumatic  HEENT: External inspection of eyes, ears and nose appears benign, hearing appears  intact  Neck: Neck appears supple, trachea in midline, thyroid appears not enlarged  Respiratory: Respiratory effort appears normal  Musculoskeletal: Moving all limbs , pain in lower lumbar area  Range of motion of visible joints appears within normal  CNS: No gross motor or sensory deficits  No gross cranial nerve deficits  No tremors  Psychiatry: Alert and oriented   Memory appears intact  Mood and affect appears normal  Insight appears normal      Assessment/Plan   Diagnoses and all orders for this visit:    1. Benign essential hypertension (Primary)    2. Hematuria, gross  -     CBC & Differential  -     Comprehensive Metabolic Panel  -     Ambulatory Referral to Urology    3. Paroxysmal atrial fibrillation (CMS/HCC)    4. Lumbosacral pain  -     XR Spine Lumbar 2 or 3 View    Plan:  1.  Benign essential hypertension: Will continue current medication, low-sodium diet advised, Counseled to regularly check BP at home with goal averaging <130/80.   2.atrial fibrillation: We will continue Eliquis  3.  Hematuria: We will obtain labs and refer patient to urology  4.  Lumbosacral pain: We will obtain x-rays  Patient and daughter have been informed that he will need to go to the ER if his symptoms worsen, he also needs an appointment to follow-up with PCP and they will call back  This was a video enabled telemedicine encounter.

## 2020-10-14 ENCOUNTER — HOSPITAL ENCOUNTER (EMERGENCY)
Facility: HOSPITAL | Age: 85
Discharge: HOME | End: 2020-10-14
Attending: EMERGENCY MEDICINE | Admitting: EMERGENCY MEDICINE

## 2020-10-14 ENCOUNTER — APPOINTMENT (OUTPATIENT)
Dept: GENERAL RADIOLOGY | Facility: HOSPITAL | Age: 85
End: 2020-10-14

## 2020-10-14 VITALS
TEMPERATURE: 98 F | SYSTOLIC BLOOD PRESSURE: 122 MMHG | OXYGEN SATURATION: 94 % | HEART RATE: 80 BPM | WEIGHT: 240 LBS | RESPIRATION RATE: 18 BRPM | BODY MASS INDEX: 30.8 KG/M2 | DIASTOLIC BLOOD PRESSURE: 74 MMHG | HEIGHT: 74 IN

## 2020-10-14 DIAGNOSIS — M54.16 LUMBAR RADICULOPATHY: Primary | ICD-10-CM

## 2020-10-14 PROCEDURE — 99283 EMERGENCY DEPT VISIT LOW MDM: CPT

## 2020-10-14 PROCEDURE — 72100 X-RAY EXAM L-S SPINE 2/3 VWS: CPT

## 2020-10-14 RX ORDER — HYDROCODONE BITARTRATE AND ACETAMINOPHEN 5; 325 MG/1; MG/1
1 TABLET ORAL EVERY 8 HOURS PRN
Qty: 9 TABLET | Refills: 0 | Status: SHIPPED | OUTPATIENT
Start: 2020-10-14 | End: 2021-02-18

## 2020-10-14 RX ORDER — HYDROCODONE BITARTRATE AND ACETAMINOPHEN 5; 325 MG/1; MG/1
1 TABLET ORAL ONCE
Status: COMPLETED | OUTPATIENT
Start: 2020-10-14 | End: 2020-10-14

## 2020-10-14 RX ORDER — HYDROCODONE BITARTRATE AND ACETAMINOPHEN 5; 325 MG/1; MG/1
1 TABLET ORAL ONCE
Status: DISCONTINUED | OUTPATIENT
Start: 2020-10-14 | End: 2020-10-14

## 2020-10-14 RX ADMIN — HYDROCODONE BITARTRATE AND ACETAMINOPHEN 1 TABLET: 5; 325 TABLET ORAL at 13:46

## 2020-10-20 NOTE — ED PROVIDER NOTES
Subjective   History of Present Illness    Chief Complaint: Back pain, tingling down both legs  History of Present Illness: 84-year-old male presents with recent fall within the last week.  Reported low back pain over the last few days, states that he then has tingling to bilateral posterior lateral thighs and his legs will just go out and feels weak.  Does have a access to a walker.  Onset: Over the last few days  Duration: Persist  Exacerbating / Alleviating factors: Worse with ambulation  Associated symptoms: None      Nurses Notes reviewed and agree, including vitals, allergies, social history and prior medical history.     REVIEW OF SYSTEMS: All systems reviewed and not pertinent unless noted.    Positive for: Low back pain tingling down the backs of both legs down to his knees    Negative for: Incontinence foot drop saddle anesthesia  Review of Systems    Past Medical History:   Diagnosis Date   • Acute sinusitis    • Allergic rhinitis    • Arthritis, degenerative    • BMI 28.0-28.9,adult    • COPD (chronic obstructive pulmonary disease) (CMS/[facility])    • Cough    • Enlarged prostate without lower urinary tract symptoms (luts)    • Hyperlipidemia    • Hypertension    • Obstructive chronic bronchitis with exacerbation (CMS/[facility])    • Shortness of breath    • Skin cancer    • Tinnitus of both ears    • Vitamin D deficiency        No Known Allergies    Past Surgical History:   Procedure Laterality Date   • CATARACT EXTRACTION         Family History   Problem Relation Age of Onset   • Cancer Mother    • Kidney disease Mother    • Cancer Father    • Cancer Brother        Social History     Socioeconomic History   • Marital status:      Spouse name: Not on file   • Number of children: Not on file   • Years of education: Not on file   • Highest education level: Not on file   Tobacco Use   • Smoking status: Current Every Day Smoker   Substance and Sexual Activity   • Alcohol use: No   • Drug use: Never   • Sexual  activity: Defer           Objective   Physical Exam  GENERAL APPEARANCE: Well developed, well nourished, 84-year-old white male,  in no acute distress.  VITAL SIGNS: per nursing, reviewed and noted  SKIN: Exposed skin with no rashes, ulcerations or petechiae.    Head: Normocephalic, atraumatic.   EYES:  EOMI.  ENT: Normal voice.  Patient maintained wearing mask throughout patient encounter due to coronavirus pandemic  LUNGS: No increased work of breathing. No retractions.   CARDIOVASCULAR: Good Peripheral pulses. Good capillary refill. Pink and warm extremities.   MUSCULOSKELETAL: No compartment syndrome. Intact sensation no focal weakness.  No foot drop.  No saddle anesthesia  NEUROLOGIC: Alert, oriented x 3. No gross deficits. GCS 15.  Intact sensation..   NECK: Supple, symmetric. No tenderness, Full ROM  Psychiatric: normal affect.   Back: full rom, no paraspinal spasm.     Procedures     No attending physician procedures were performed on this patient.      ED Course  ED Course as of Oct 19 2021   Wed Oct 14, 2020   1228 PROCEDURE: XR SPINE LUMBAR 2 OR 3 VW-     HISTORY: low back pain, fall     FINDINGS: 3 views of the lumbar spine were obtained. The pedicles are  intact. There are diffuse degenerative changes throughout the lumbar  spine with near complete loss of disc space height and facet  arthropathy. This produces 8 mm of anterolisthesis of L2 on L3 and 9 mm  of anterolisthesis of L3 on L4. No fractures are identified.     IMPRESSION:  Diffuse degenerative change with grade 1 anterolisthesis  noted at the L2/3 and L3/4 levels.     This report was finalized on 10/14/2020 12:15 PM by Gretel Hoang M.D..    [PF]      ED Course User Index  [PF] Srinivas Ontiveros, DO                                           [facility]  84-year-old male with lumbar radiculopathy symptoms after reported fall 1 week ago.  Radiologist interpreted lumbar x-ray reveals diffuse degenerative changes grade 1 anterolisthesis at L2-L3 and  L3L4.  Discussed case with Dr. Antoine.  Advised outpatient follow-up.  Will discharge with prescription [place].  Outpatient Ultram cautions discussed.  Final diagnoses:   Lumbar radiculopathy            Srinivas Ontiveros, DO  10/19/20 2021

## 2020-11-02 RX ORDER — SIMVASTATIN 40 MG
TABLET ORAL
Qty: 90 TABLET | Refills: 1 | Status: SHIPPED | OUTPATIENT
Start: 2020-11-02 | End: 2021-02-17 | Stop reason: SDUPTHER

## 2020-11-02 NOTE — TELEPHONE ENCOUNTER
Please tell patient I gave him only 30 tablets with 1 refill of his simvastatin because he has not completed his labs.  He has labs in computer from earlier this year in July and he must come in and complete those.  Please tell him to come into the lab and tell them that he wants labs drawn from July.  Please cancel labs that were entered from October.  When I see that labs are completed he will get further refills.

## 2020-11-30 ENCOUNTER — HOSPITAL ENCOUNTER (OUTPATIENT)
Dept: GENERAL RADIOLOGY | Facility: HOSPITAL | Age: 85
Discharge: HOME | End: 2020-11-30
Admitting: INTERNAL MEDICINE

## 2020-11-30 LAB
ALBUMIN SERPL-MCNC: 4.2 G/DL (ref 3.5–5.2)
ALBUMIN/GLOB SERPL: 1.9 G/DL
ALP SERPL-CCNC: 69 U/L (ref 39–117)
ALT SERPL-CCNC: 16 U/L (ref 1–41)
AST SERPL-CCNC: 16 U/L (ref 1–40)
BASOPHILS # BLD AUTO: 0.06 10*3/MM3 (ref 0–0.2)
BASOPHILS NFR BLD AUTO: 0.8 % (ref 0–1.5)
BILIRUB SERPL-MCNC: 0.5 MG/DL (ref 0–1.2)
BUN SERPL-MCNC: 14 MG/DL (ref 8–23)
BUN/CREAT SERPL: 11 (ref 7–25)
CALCIUM SERPL-MCNC: 9.5 MG/DL (ref 8.6–10.5)
CHLORIDE SERPL-SCNC: 99 MMOL/L (ref 98–107)
CHOLEST SERPL-MCNC: 129 MG/DL (ref 0–200)
CHOLEST/HDLC SERPL: 2.22 {RATIO}
CO2 SERPL-SCNC: 29.2 MMOL/L (ref 22–29)
CREAT SERPL-MCNC: 1.27 MG/DL (ref 0.76–1.27)
EOSINOPHIL # BLD AUTO: 0.26 10*3/MM3 (ref 0–0.4)
EOSINOPHIL NFR BLD AUTO: 3.5 % (ref 0.3–6.2)
ERYTHROCYTE [DISTWIDTH] IN BLOOD BY AUTOMATED COUNT: 15.7 % (ref 12.3–15.4)
GLOBULIN SER CALC-MCNC: 2.2 GM/DL
GLUCOSE SERPL-MCNC: 90 MG/DL (ref 65–99)
HCT VFR BLD AUTO: 42.9 % (ref 37.5–51)
HDLC SERPL-MCNC: 58 MG/DL (ref 40–60)
HGB BLD-MCNC: 13.4 G/DL (ref 13–17.7)
IMM GRANULOCYTES # BLD AUTO: 0.03 10*3/MM3 (ref 0–0.05)
IMM GRANULOCYTES NFR BLD AUTO: 0.4 % (ref 0–0.5)
LDLC SERPL CALC-MCNC: 53 MG/DL (ref 0–100)
LYMPHOCYTES # BLD AUTO: 2.37 10*3/MM3 (ref 0.7–3.1)
LYMPHOCYTES NFR BLD AUTO: 31.6 % (ref 19.6–45.3)
MCH RBC QN AUTO: 26.5 PG (ref 26.6–33)
MCHC RBC AUTO-ENTMCNC: 31.2 G/DL (ref 31.5–35.7)
MCV RBC AUTO: 85 FL (ref 79–97)
MONOCYTES # BLD AUTO: 0.68 10*3/MM3 (ref 0.1–0.9)
MONOCYTES NFR BLD AUTO: 9.1 % (ref 5–12)
NEUTROPHILS # BLD AUTO: 4.1 10*3/MM3 (ref 1.7–7)
NEUTROPHILS NFR BLD AUTO: 54.6 % (ref 42.7–76)
NRBC BLD AUTO-RTO: 0 /100 WBC (ref 0–0.2)
PLATELET # BLD AUTO: 258 10*3/MM3 (ref 140–450)
POTASSIUM SERPL-SCNC: 5 MMOL/L (ref 3.5–5.2)
PROT SERPL-MCNC: 6.4 G/DL (ref 6–8.5)
RBC # BLD AUTO: 5.05 10*6/MM3 (ref 4.14–5.8)
SODIUM SERPL-SCNC: 137 MMOL/L (ref 136–145)
TRIGL SERPL-MCNC: 94 MG/DL (ref 0–150)
VIT B12 SERPL-MCNC: 683 PG/ML (ref 211–946)
VLDLC SERPL CALC-MCNC: 18 MG/DL (ref 5–40)
WBC # BLD AUTO: 7.5 10*3/MM3 (ref 3.4–10.8)

## 2020-11-30 PROCEDURE — 72100 X-RAY EXAM L-S SPINE 2/3 VWS: CPT

## 2020-12-02 DIAGNOSIS — N40.0 ENLARGED PROSTATE WITHOUT LOWER URINARY TRACT SYMPTOMS (LUTS): ICD-10-CM

## 2020-12-03 RX ORDER — TAMSULOSIN HYDROCHLORIDE 0.4 MG/1
CAPSULE ORAL
Qty: 90 CAPSULE | Refills: 3 | Status: SHIPPED | OUTPATIENT
Start: 2020-12-03 | End: 2021-02-17 | Stop reason: SDUPTHER

## 2020-12-03 RX ORDER — DULOXETIN HYDROCHLORIDE 30 MG/1
CAPSULE, DELAYED RELEASE ORAL
Qty: 90 CAPSULE | Refills: 3 | Status: SHIPPED | OUTPATIENT
Start: 2020-12-03 | End: 2021-02-17 | Stop reason: SDUPTHER

## 2021-01-05 RX ORDER — BISOPROLOL FUMARATE 10 MG/1
TABLET, FILM COATED ORAL
Status: CANCELLED | OUTPATIENT
Start: 2021-01-05

## 2021-01-05 NOTE — TELEPHONE ENCOUNTER
PTS DAUGHTER CALLED REQUESTING A REFILL FOR:  bisoprolol (ZEBeta) 10 MG tablet    SHE STATED PT HAS BEEN OUT FOR 3 DAYS    MICHEAL WEEKS [address] [facility], [place] - [address] AT [facility] [phone] [facility] [phone] FX

## 2021-01-06 RX ORDER — BISOPROLOL FUMARATE 10 MG/1
10 TABLET, FILM COATED ORAL DAILY
Qty: 30 TABLET | Refills: 0 | Status: SHIPPED | OUTPATIENT
Start: 2021-01-06 | End: 2021-02-01

## 2021-01-06 NOTE — TELEPHONE ENCOUNTER
Attempted contacting Farida, no answer. I need to know what the directions are on this medication.    HUB may ask how many times a day does Pt take medication.

## 2021-01-06 NOTE — TELEPHONE ENCOUNTER
Caller: Chele Cano    Relationship: Emergency Contact    Best call back number:    Medication needed: bisoprolol (ZEBeta) 10 MG tablet    When do you need the refill by: ASAP    What details did the patient provide when requesting the medication:CHELE STATED THE PATIENT IS OUT AND NO LONGER SEEING THE CARDIOLOGIST AND NEEDS THIS REFILLED  Does the patient have less than a 3 day supply:  [x] Yes  [] No    What is the patient's preferred pharmacy: [facility] [address] [phone] [facility] [phone]

## 2021-01-06 NOTE — TELEPHONE ENCOUNTER
Medication hasn't been prescribed by you before. Pt is no longer seeing prescribing doctor.    Script pended for approval.

## 2021-01-06 NOTE — TELEPHONE ENCOUNTER
"    Caller: Ed Spear    Relationship to patient: Self    Best call back number: [phone]      Attempted contacting Farida, no answer. I need to know what the directions are on this medication.     HUB may ask how many times a day does Pt take medication.     RESPONSE FROM PATIENT'S DAUGHTER    \"PATIENT  TAKES bisoprolol (ZEBeta) 10 MG tablet    ONE TIME A DAY    "

## 2021-02-01 RX ORDER — ALBUTEROL SULFATE 90 UG/1
1 AEROSOL, METERED RESPIRATORY (INHALATION)
Qty: 8.5 G | Refills: 0 | Status: SHIPPED | OUTPATIENT
Start: 2021-02-01 | End: 2021-05-04

## 2021-02-01 RX ORDER — BISOPROLOL FUMARATE 10 MG/1
10 TABLET, FILM COATED ORAL DAILY
Qty: 30 TABLET | Refills: 0 | Status: SHIPPED | OUTPATIENT
Start: 2021-02-01 | End: 2021-03-05

## 2021-02-01 RX ORDER — IPRATROPIUM BROMIDE AND ALBUTEROL SULFATE 2.5; .5 MG/3ML; MG/3ML
3 SOLUTION RESPIRATORY (INHALATION)
Qty: 360 ML | Refills: 0 | Status: SHIPPED | OUTPATIENT
Start: 2021-02-01 | End: 2021-03-05

## 2021-02-11 ENCOUNTER — OFFICE VISIT (OUTPATIENT)
Dept: UROLOGY | Facility: CLINIC | Age: 86
End: 2021-02-11

## 2021-02-11 DIAGNOSIS — R31.0 GROSS HEMATURIA: Primary | ICD-10-CM

## 2021-02-11 PROCEDURE — 99442 PR PHYS/QHP TELEPHONE EVALUATION 11-20 MIN: CPT | Performed by: UROLOGY

## 2021-02-11 NOTE — PROGRESS NOTES
Chief Complaint  Gross hematuria    Ref Prov  Janeth Gonzales MD    HPI  Mr. Spear is a 85 y.o. male with history of COPD who presents with gross hematuria.    History of smoking?    yes, COPD  History of second-hand smoking exposure? no  History of chemotherapy?   no  History of radiation?    no  History of kidney or bladder stones?  no  History of frequent urinary tract infections? no    He currently denies fevers, chills, nausea, vomiting, constipation or flank pain.    + LUTS, urgency, frequency  No straining to urinate, + weak stream    Past Medical History  Past Medical History:   Diagnosis Date   • Acute sinusitis    • Allergic rhinitis    • Arthritis, degenerative    • BMI 28.0-28.9,adult    • COPD (chronic obstructive pulmonary disease) (CMS/HCC)    • Cough    • Enlarged prostate without lower urinary tract symptoms (luts)    • Hyperlipidemia    • Hypertension    • Obstructive chronic bronchitis with exacerbation (CMS/[facility])    • Shortness of breath    • Skin cancer    • Tinnitus of both ears    • Vitamin D deficiency        Past Surgical History  Past Surgical History:   Procedure Laterality Date   • CATARACT EXTRACTION         Medications    Current Outpatient Medications:   •  acetaminophen (TYLENOL) 325 MG tablet, Take  by mouth., Disp: , Rfl:   •  albuterol sulfate  (90 Base) MCG/ACT inhaler, Inhale 1 puff 4 (Four) Times a Day. MUST KEEP 2/18 APPOINTMENT FOR FURTHER REFILLS., Disp: 8.5 g, Rfl: 0  •  apixaban (Eliquis) 2.5 MG tablet tablet, Take 1 tablet by mouth Every 12 (Twelve) Hours., Disp: 180 tablet, Rfl: 3  •  bisoprolol (ZEBeta) 10 MG tablet, Take 1 tablet by mouth Daily. MUST KEEP 2/18 APPOINTMENT FOR FURTHER REFILLS., Disp: 30 tablet, Rfl: 0  •  budesonide-formoterol (SYMBICORT) 160-4.5 MCG/ACT inhaler, Inhale 1 puff 2 (Two) Times a Day. Rinse mouth well after use., Disp: 10.2 g, Rfl: 1  •  CARTIA  MG 24 hr capsule, , Disp: , Rfl:   •  DULoxetine (CYMBALTA) 30 MG capsule, TAKE ONE  CAPSULE BY MOUTH DAILY, Disp: 90 capsule, Rfl: 3  •  famotidine (PEPCID) 40 MG tablet, Take 1 tablet by mouth At Night As Needed for Heartburn., Disp: 30 tablet, Rfl: 11  •  HYDROcodone-acetaminophen (NORCO) 5-325 MG per tablet, Take 1 tablet by mouth Every 8 (Eight) Hours As Needed for Moderate Pain ., Disp: 9 tablet, Rfl: 0  •  ipratropium-albuterol (DUO-NEB) 0.5-2.5 mg/3 ml nebulizer, Take 3 mL by nebulization 4 (Four) Times a Day. MUST KEEP 2/18 APPOINTMENT FOR FURTHER REFILLS., Disp: 360 mL, Rfl: 0  •  simvastatin (ZOCOR) 40 MG tablet, TAKE ONE TABLET BY MOUTH ONCE NIGHTLY, Disp: 90 tablet, Rfl: 1  •  tamsulosin (FLOMAX) 0.4 MG capsule 24 hr capsule, TAKE ONE CAPSULE BY MOUTH DAILY, Disp: 90 capsule, Rfl: 3    Allergies  No Known Allergies    Social History  Social History     Socioeconomic History   • Marital status:      Spouse name: Not on file   • Number of children: Not on file   • Years of education: Not on file   • Highest education level: Not on file   Tobacco Use   • Smoking status: Current Every Day Smoker   Substance and Sexual Activity   • Alcohol use: No   • Drug use: Never   • Sexual activity: Defer       Family History  Family History   Problem Relation Age of Onset   • Cancer Mother    • Kidney disease Mother    • Cancer Father    • Cancer Brother          Review of Systems  Constitutional: No fevers or chills  Skin: Negative for rash  Endocrine: No heat/cold intolerance   Cardiovascular: Negative for chest pain or dyspnea on exertion  Respiratory: Negative for shortness of breath or wheezing  Gastrointestinal: No constipation, nausea or vomiting  Genitourinary: Negative for new lower urinary tract symptoms or dysuria.  Musculoskeletal: No flank pain  Neurological:  Negative for frequent headaches or dizziness  Lymph/Heme: Negative for leg swelling or calf pain.    Physical Exam  There were no vitals taken for this visit.  telephone    Labs  Lab Results   Component Value Date    GLUCOSE  84 02/27/2017    CALCIUM 9.5 11/30/2020     11/30/2020    K 5.0 11/30/2020    CO2 29.2 (H) 11/30/2020    CL 99 11/30/2020    BUN 14 11/30/2020    CREATININE 1.27 11/30/2020    EGFRIFAFRI 66 11/30/2020    EGFRIFNONA 54 (L) 11/30/2020    BCR 11.0 11/30/2020    ANIONGAP 1.0 (L) 02/27/2017     Brief Urine Lab Results     None            Radiologic Studies       Assessment  85 y.o. male who presents with gross hematuria.  Risk factors include smoking.  In order to complete the hematuria work up we need to perform a flexible cystoscopy and acquire upper tract imaging.    Plan  1.  We discussed the indications for diagnostic flexible cystoscopy to be performed at the next clinic visit.  2.  CT Urogram prior    This visit has been rescheduled as a phone visit to comply with patient safety concerns in accordance with CDC recommendations. Total time of discussion was 12 minutes.      No follow-ups on file.    Chavez Sepulveda MD

## 2021-02-17 NOTE — PROGRESS NOTES
Chief Complaint   Patient presents with   • Follow-up     for HLD and HTN. Daughter states Pt's been complaining of neck and back pain.      Subjective   Ed Spear is a 85 y.o. male.     Telemedicine video visit with pt today for follow up of HTN, HLD, Afib, COPD, LBP, vit d/B12 def.  HTN/HLD/Afib- BP and HR are well controlled today.  He does not check BP.  He denies CP, SOA, palpitations.  He is having some edema, quit wearing his compression socks.   He takes his zocor every PM.  He does not eat a HH diet  COPD- he is using nebulizer 3 times a day with duoneb. He quit his symbicort and has been using albuterol MDI more frequently. He continues to smoke 1 pack of cigarettes daily  LBP- he continues to have neck and back pain.  XR lumbar spine in Oct revealed DDD L2-4.  He takes tylenol as needed and is on a low dose of duloxetine.  Cannot take NSAIDS due to use of eliquis for AFIB.     Vit B12/D def- he is taking vit D 1000 u daily, B12 1000 u MWF  HCM- immunizations are UTD except shingrix.  He declines to take COVID vaccine.       The following portions of the patient's history were reviewed and updated as appropriate: allergies, current medications, past family history, past medical history, past social history, past surgical history and problem list.    Review of Systems   Constitutional: Negative for activity change, appetite change and unexpected weight change.   Eyes: Negative for visual disturbance.   Respiratory: Negative for shortness of breath.    Cardiovascular: Positive for leg swelling. Negative for chest pain and palpitations.   Gastrointestinal: Negative for abdominal pain.   Musculoskeletal: Positive for back pain and neck pain.   Neurological: Negative for headaches.   Psychiatric/Behavioral: Negative for dysphoric mood and sleep disturbance. The patient is not nervous/anxious.        Objective   There were no vitals taken for this visit.  There is no height or weight on file to calculate  BMI.  Physical Exam  Nursing note reviewed.   Constitutional:       General: He is not in acute distress.     Appearance: Normal appearance. He is not ill-appearing.      Comments: Pleasant man, sitting on his couch, appears in NAD today, is [place]   HENT:      Head: Atraumatic.      Right Ear: External ear normal.      Left Ear: External ear normal.      Ears:      Comments: [place], daughter often has to relay our messages back and forth  Eyes:      General:         Right eye: No discharge.         Left eye: No discharge.      Extraocular Movements: Extraocular movements intact.   Pulmonary:      Effort: Pulmonary effort is normal. No respiratory distress.   Neurological:      Mental Status: He is alert and oriented to person, place, and time.   Psychiatric:         Behavior: Behavior normal.         Thought Content: Thought content normal.         Judgment: Judgment normal.      Comments: Appears slightly edgy today         Assessment/Plan   Ed Spear is here today and the following problems have been addressed:      Diagnoses and all orders for this visit:    1. Essential hypertension (Primary)    2. Enlarged prostate without lower urinary tract symptoms (luts)  -     tamsulosin (FLOMAX) 0.4 MG capsule 24 hr capsule; Take 1 capsule by mouth Daily.  Dispense: 90 capsule; Refill: 1    3. Mixed hyperlipidemia    4. Paroxysmal atrial fibrillation (CMS/HCC)    5. Vitamin D deficiency    6. Vitamin B 12 deficiency    7. Primary osteoarthritis involving multiple joints    Other orders  -     apixaban (Eliquis) 2.5 MG tablet tablet; Take 1 tablet by mouth Every 12 (Twelve) Hours.  Dispense: 180 tablet; Refill: 1  -     Discontinue: DULoxetine (CYMBALTA) 30 MG capsule; Take 1 capsule by mouth Daily.  Dispense: 90 capsule; Refill: 1  -     famotidine (PEPCID) 40 MG tablet; Take 1 tablet by mouth At Night As Needed for Heartburn.  Dispense: 90 tablet; Refill: 1  -     simvastatin (ZOCOR) 40 MG tablet; Take 1 tablet by mouth  Every Night.  Dispense: 90 tablet; Refill: 1  -     DULoxetine (CYMBALTA) 60 MG capsule; Take 1 capsule by mouth Daily.  Dispense: 30 capsule; Refill: 5  -     budesonide-formoterol (Symbicort) 160-4.5 MCG/ACT inhaler; Inhale 1 puff 2 (Two) Times a Day. Rinse mouth well after use.  Dispense: 10.2 g; Refill: 6        Follow heart healthy/low salt/low fat diet  Avoid processed foods  Monitor blood pressure and HR on occasion  Recommend compression socks, on in AM and off in PM  Exercises for low back core strengthening recommended 5 days a week to help chronic LBP/DJD/DDD  Take all medications as prescribed  BP and HR are well controlled on current meds  Encouraged him to resume symbicort one puff BID  Continue daily nebulizers TID prn  Use albuterol MDI as rescue inhaler only, discussed this with he and his daughter  Increased duloxetine to 60 mg daily to help with DJD/DDD pain in neck and back, may continue tylenol also  Continue Vit D and B12    Return in about 4 months (around 6/18/2021) for Next scheduled follow up.      Marilyn K. Vermeesch, MD      Please note that portions of this note were completed with a voice recognition program.  Efforts were made to edit dictation, but occasionally words are mistranscribed.You have chosen to receive care through a telehealth visit.  Do you consent to use a video/audio connection for your medical care today? Yes

## 2021-02-18 ENCOUNTER — TELEMEDICINE (OUTPATIENT)
Dept: INTERNAL MEDICINE | Facility: CLINIC | Age: 86
End: 2021-02-18

## 2021-02-18 DIAGNOSIS — I48.0 PAROXYSMAL ATRIAL FIBRILLATION (HCC): ICD-10-CM

## 2021-02-18 DIAGNOSIS — E53.8 VITAMIN B 12 DEFICIENCY: ICD-10-CM

## 2021-02-18 DIAGNOSIS — E78.2 MIXED HYPERLIPIDEMIA: ICD-10-CM

## 2021-02-18 DIAGNOSIS — E55.9 VITAMIN D DEFICIENCY: ICD-10-CM

## 2021-02-18 DIAGNOSIS — M15.9 PRIMARY OSTEOARTHRITIS INVOLVING MULTIPLE JOINTS: ICD-10-CM

## 2021-02-18 DIAGNOSIS — I10 ESSENTIAL HYPERTENSION: Primary | ICD-10-CM

## 2021-02-18 DIAGNOSIS — N40.0 ENLARGED PROSTATE WITHOUT LOWER URINARY TRACT SYMPTOMS (LUTS): ICD-10-CM

## 2021-02-18 PROCEDURE — 99214 OFFICE O/P EST MOD 30 MIN: CPT | Performed by: INTERNAL MEDICINE

## 2021-02-18 RX ORDER — BUDESONIDE AND FORMOTEROL FUMARATE DIHYDRATE 160; 4.5 UG/1; UG/1
1 AEROSOL RESPIRATORY (INHALATION)
Qty: 10.2 G | Refills: 6 | Status: SHIPPED | OUTPATIENT
Start: 2021-02-18 | End: 2021-05-04

## 2021-02-18 RX ORDER — DULOXETIN HYDROCHLORIDE 60 MG/1
60 CAPSULE, DELAYED RELEASE ORAL DAILY
Qty: 30 CAPSULE | Refills: 5 | Status: SHIPPED | OUTPATIENT
Start: 2021-02-18 | End: 2021-04-12 | Stop reason: SDUPTHER

## 2021-02-18 RX ORDER — FAMOTIDINE 40 MG/1
40 TABLET, FILM COATED ORAL NIGHTLY PRN
Qty: 90 TABLET | Refills: 1 | Status: SHIPPED | OUTPATIENT
Start: 2021-02-18 | End: 2021-04-12 | Stop reason: SDUPTHER

## 2021-02-18 RX ORDER — TAMSULOSIN HYDROCHLORIDE 0.4 MG/1
1 CAPSULE ORAL DAILY
Qty: 90 CAPSULE | Refills: 1 | Status: SHIPPED | OUTPATIENT
Start: 2021-02-18 | End: 2021-04-12 | Stop reason: SDUPTHER

## 2021-02-18 RX ORDER — SIMVASTATIN 40 MG
40 TABLET ORAL NIGHTLY
Qty: 90 TABLET | Refills: 1 | Status: SHIPPED | OUTPATIENT
Start: 2021-02-18 | End: 2021-04-12 | Stop reason: SDUPTHER

## 2021-02-18 RX ORDER — DULOXETIN HYDROCHLORIDE 30 MG/1
30 CAPSULE, DELAYED RELEASE ORAL DAILY
Qty: 90 CAPSULE | Refills: 1 | Status: SHIPPED | OUTPATIENT
Start: 2021-02-18 | End: 2021-02-18

## 2021-02-26 ENCOUNTER — HOSPITAL ENCOUNTER (OUTPATIENT)
Dept: CT IMAGING | Facility: HOSPITAL | Age: 86
Discharge: HOME | End: 2021-02-26
Admitting: UROLOGY

## 2021-02-26 DIAGNOSIS — R31.0 GROSS HEMATURIA: ICD-10-CM

## 2021-02-26 LAB — CREAT BLDA-MCNC: 1.1 MG/DL (ref 0.6–1.3)

## 2021-02-26 PROCEDURE — 25010000002 IOPAMIDOL 61 % SOLUTION: Performed by: UROLOGY

## 2021-02-26 PROCEDURE — 82565 ASSAY OF CREATININE: CPT

## 2021-02-26 PROCEDURE — 74178 CT ABD&PLV WO CNTR FLWD CNTR: CPT

## 2021-02-26 RX ADMIN — IOPAMIDOL 100 ML: 612 INJECTION, SOLUTION INTRAVENOUS at 17:23

## 2021-03-01 ENCOUNTER — PROCEDURE VISIT (OUTPATIENT)
Dept: UROLOGY | Facility: CLINIC | Age: 86
End: 2021-03-01

## 2021-03-01 VITALS — OXYGEN SATURATION: 93 %

## 2021-03-01 DIAGNOSIS — R31.9 HEMATURIA, UNSPECIFIED TYPE: ICD-10-CM

## 2021-03-01 DIAGNOSIS — R39.9 LOWER URINARY TRACT SYMPTOMS (LUTS): Primary | ICD-10-CM

## 2021-03-01 PROCEDURE — 52000 CYSTOURETHROSCOPY: CPT | Performed by: UROLOGY

## 2021-03-01 PROCEDURE — 99214 OFFICE O/P EST MOD 30 MIN: CPT | Performed by: UROLOGY

## 2021-03-01 PROCEDURE — 76872 US TRANSRECTAL: CPT | Performed by: UROLOGY

## 2021-03-01 RX ORDER — SULFAMETHOXAZOLE AND TRIMETHOPRIM 800; 160 MG/1; MG/1
1 TABLET ORAL 2 TIMES DAILY
Qty: 6 TABLET | Refills: 0 | Status: SHIPPED | OUTPATIENT
Start: 2021-03-01 | End: 2021-04-12

## 2021-03-01 RX ORDER — ACETAMINOPHEN 325 MG/1
650 TABLET ORAL EVERY 6 HOURS
Qty: 32 TABLET | Refills: 0 | Status: SHIPPED | OUTPATIENT
Start: 2021-03-01 | End: 2021-03-05

## 2021-03-01 RX ORDER — PHENAZOPYRIDINE HYDROCHLORIDE 100 MG/1
100 TABLET, FILM COATED ORAL 3 TIMES DAILY PRN
Qty: 30 TABLET | Refills: 0 | Status: SHIPPED | OUTPATIENT
Start: 2021-03-01 | End: 2021-03-04

## 2021-03-01 NOTE — PROGRESS NOTES
Chief Complaint  Gross hematuria    Ref Prov  No ref. provider found    HPI  Mr. Spear is a 85 y.o. male with history of COPD who presents with gross hematuria.    + LUTS, urgency, frequency  No straining to urinate, + weak stream    Past Medical History  Past Medical History:   Diagnosis Date   • Acute sinusitis    • Allergic rhinitis    • Arthritis, degenerative    • BMI 28.0-28.9,adult    • COPD (chronic obstructive pulmonary disease) (CMS/[facility])    • Cough    • Enlarged prostate without lower urinary tract symptoms (luts)    • Hyperlipidemia    • Hypertension    • Obstructive chronic bronchitis with exacerbation (CMS/[facility])    • Shortness of breath    • Skin cancer    • Tinnitus of both ears    • Vitamin D deficiency        Past Surgical History  Past Surgical History:   Procedure Laterality Date   • CATARACT EXTRACTION         Medications    Current Outpatient Medications:   •  acetaminophen (TYLENOL) 325 MG tablet, Take  by mouth., Disp: , Rfl:   •  albuterol sulfate  (90 Base) MCG/ACT inhaler, Inhale 1 puff 4 (Four) Times a Day. MUST KEEP 2/18 APPOINTMENT FOR FURTHER REFILLS., Disp: 8.5 g, Rfl: 0  •  apixaban (Eliquis) 2.5 MG tablet tablet, Take 1 tablet by mouth Every 12 (Twelve) Hours., Disp: 180 tablet, Rfl: 1  •  bisoprolol (ZEBeta) 10 MG tablet, Take 1 tablet by mouth Daily. MUST KEEP 2/18 APPOINTMENT FOR FURTHER REFILLS., Disp: 30 tablet, Rfl: 0  •  budesonide-formoterol (Symbicort) 160-4.5 MCG/ACT inhaler, Inhale 1 puff 2 (Two) Times a Day. Rinse mouth well after use., Disp: 10.2 g, Rfl: 6  •  CARTIA  MG 24 hr capsule, , Disp: , Rfl:   •  DULoxetine (CYMBALTA) 60 MG capsule, Take 1 capsule by mouth Daily., Disp: 30 capsule, Rfl: 5  •  famotidine (PEPCID) 40 MG tablet, Take 1 tablet by mouth At Night As Needed for Heartburn., Disp: 90 tablet, Rfl: 1  •  ipratropium-albuterol (DUO-NEB) 0.5-2.5 mg/3 ml nebulizer, Take 3 mL by nebulization 4 (Four) Times a Day. MUST KEEP 2/18 APPOINTMENT FOR  FURTHER REFILLS., Disp: 360 mL, Rfl: 0  •  simvastatin (ZOCOR) 40 MG tablet, Take 1 tablet by mouth Every Night., Disp: 90 tablet, Rfl: 1  •  tamsulosin (FLOMAX) 0.4 MG capsule 24 hr capsule, Take 1 capsule by mouth Daily., Disp: 90 capsule, Rfl: 1    Allergies  No Known Allergies    Social History  Social History     Socioeconomic History   • Marital status:      Spouse name: Not on file   • Number of children: Not on file   • Years of education: Not on file   • Highest education level: Not on file   Tobacco Use   • Smoking status: Current Every Day Smoker   Substance and Sexual Activity   • Alcohol use: No   • Drug use: Never   • Sexual activity: Defer       Family History  Family History   Problem Relation Age of Onset   • Cancer Mother    • Kidney disease Mother    • Cancer Father    • Cancer Brother          Review of Systems  Constitutional: No fevers or chills  Skin: Negative for rash  Endocrine: No heat/cold intolerance   Cardiovascular: Negative for chest pain or dyspnea on exertion  Respiratory: Negative for shortness of breath or wheezing  Gastrointestinal: No constipation, nausea or vomiting  Genitourinary: Negative for new lower urinary tract symptoms or dysuria.  Musculoskeletal: No flank pain  Neurological:  Negative for frequent headaches or dizziness  Lymph/Heme: Negative for leg swelling or calf pain.    Physical Exam  Visit Vitals  SpO2 93%     telephone    Labs  Lab Results   Component Value Date    GLUCOSE 84 02/27/2017    CALCIUM 9.5 11/30/2020     11/30/2020    K 5.0 11/30/2020    CO2 29.2 (H) 11/30/2020    CL 99 11/30/2020    BUN 14 11/30/2020    CREATININE 1.10 02/26/2021    EGFRIFAFRI 66 11/30/2020    EGFRIFNONA 54 (L) 11/30/2020    BCR 11.0 11/30/2020    ANIONGAP 1.0 (L) 02/27/2017     Brief Urine Lab Results     None            Radiologic Studies  Ct Abdomen Pelvis With & Without Contrast    Result Date: 2/28/2021  Impression: Left nephrolithiasis  Bilateral, hypodense  nonenhancing renal cysts measuring up to 50 mm on the right  Subcentimeter hypodense and hyperdense renal lesions too small to characterize on CT; recommend 6 month follow-up  Partially filled bladder is indented by a prominent prostate, recommend correlation with PSA and prostate exam    This report was finalized on 2/28/2021 4:01 PM by Svitlana Alvarado MD.      Preprocedure diagnosis  Gross hematuria and lower urinary tract symptoms.     Postprocedure diagnosis  Gross hematuria and lower urinary tract symptoms.     Procedure  Flexible Cystourethroscopy    Attending surgeon  Chavez Sepulveda MD    Anesthesia  2% lidocaine jelly intraurethrally    Complications  None    Indications  85 y.o. male undergoing a flexible cystoscopy for the above mentioned indications.  Informed consent was obtained.      Findings  Cystoscopic findings included one right and left ureteral orifice in the normal anatomic position with normal bladder mucosa and no tumors, masses or stones. The urethral urothelium was within normal limits with no strictures.  There was a small median lobe/hump.  The lateral lobes were long and very obstructive in appearance.  The lateral lobes were long and very obstructive in appearance. This would be amenable to a UroLift, as the median low would likely sit down after the lateral lobes were pulled back.     Procedure  The patient was placed in supine position and prepped and draped in sterile fashion with lidocaine jelly per urethra for anesthesia.  A timeout was performed.  The 14F flexible cystoscope was lubricated and gently placed through the penile urethra and into the bladder.  The bladder was completely visualized.  The cystoscope was retroflexed and the bladder neck and prostate visualized.  The cystoscope was slowly withdrawn while visualizing the urethra and the procedure terminated.  The patient tolerated the procedure well.      TRUS OF PROSTATE    Preoperative diagnosis  LUTS    Postoperative  diagnosis  Same    Procedure  1.  Transrectal ultrasound of the prostate    Attending Surgeon  Chavez Sepuvleda MD    Anesthesia  2% lidocaine jelly, intrarectal instillation, 10mL    Complications  None    Specimen  None    Indications  Mr. Spear is a 85 y.o. male with LUTS.  He presents for prostate ultrasound to evaluate prostate size.    Procedure  The patient was positioned and prepped in a left lateral position with lower extremities flexed.  Lidocaine jelly, 2%, was injected per rectum. A digital rectal exam was performed which demonstrated a smooth prostate without nodules or induration. The [employer] E8CS rectal ultrasound probe was slowly introduced into the rectum without difficulty.  The prostate and seminal vesicles were inspected systematically using cross and sagittal views with the ultrasound. A median lobe was not seen.  The dimensions of the prostate were measured, for a calculated volume of 73.26 mL.  The seminal vesicles appeared normal.  The rectal ultrasound probe was removed.  The patient tolerated the procedure well.    PVR  Post-void residual performed with ultrasound scanner by staff and interpreted by me - 0 mL.      Assessment  85 y.o. male who presented with gross hematuria and acute exacerbation of chronic lower urinary tract symptoms, notably weak stream, urgency, and frequency.    He was found to have a large prostate with occlusive lateral lobes, and a small median hump, 72 cc in size. The patient is significantly bothered by his symptoms, and therefore in a separate room in a separate encounter we discussed the risks, benefits, and alternatives to different bladder outlet procedures.     The total time spent reviewing the patient's chart in discussion with him in that separate encounter was 20 minutes.    Plan  1.  Schedule a UroLift in the office. His risk factors for this surgery are his severe COPD, and the fact that he is on blood thinners. I recommend stopping Eliquis 3 days prior to  the procedure.     Scribed for Chavez Sepulveda MD by AKIRA WHITTINGTON.  3/1/2021  15:54 EST    I Chavez Sepulveda MD have personally performed the services described in this document as scribed by the above individual, and it is both accurate and complete.     Chavez Sepulveda MD  3/1/2021  21:34 EST

## 2021-03-01 NOTE — PATIENT INSTRUCTIONS
Dr. Sepulveda's Preoperative Instructions Before and After UroLift Procedure  The following instructions will help you care for yourself, or be cared for before and after your procedure.      Diet  Drink plenty of liquids and eat light meals today.    It is very important to treat plenty of fluids before and especially after your procedure.     Anesthesia Precautions & Expectations  You may have been prescribed a medication to help with anxiety and to provide some anesthesia during her procedure (lorazepam). This medication will make you feel funny and possibly lightheaded.  After anesthesia, rest for 24 hours.    Do not drive, drink alcoholic beverages or make any important decisions during this time.  You will need someone to drive you the day of your procedure.  Please take the medication 1 hour prior to your procedure.      What to expect during the procedure  During the procedure you will be in stirrups with a drape up so that you cannot see the procedure.  We will use a telescope to place the sutures, similar to the one you had in the office in the past.   You can expect to feel urinary urgency or a great desire to urinate. This is normal.  To numb the prostate, we will instill numbing jelly in your bladder 20 min prior.     We will give you an antibiotic to be taken one hour ahead of time that day and for the next 3 days.   We will also give you an anti-inflammatory medication and a medication for urinary burning to be taken starting that day and for the next few days.     Activity  Start normal activities in twenty-four (24) hours.    Wound Care and Hygiene  No restrictions, start normal routine.    What to Expect after Surgery  Mild pain with voiding.  Frequency or urgency - expect these to occur for 3-6 weeks after surgery. Call if these are severe and we will give a medication to help with them.  Bladder cramps.  Minimal bleeding with voiding.    Call your Doctor  Passing clots in urine preventing  bladder emptying  Severe pain not controlled by oral medication  Temperature above 101.5 degrees  Inability to urinate within eight (8) hours after surgery    Stop eliquis 3d prior to procedure    Other Contacts  Urology Office:  [address] [facility] [id number]   [place] [id number] [phone] office  [phone] fax

## 2021-03-05 RX ORDER — BISOPROLOL FUMARATE 10 MG/1
TABLET, FILM COATED ORAL
Qty: 90 TABLET | Refills: 1 | Status: SHIPPED | OUTPATIENT
Start: 2021-03-05 | End: 2021-09-13

## 2021-03-05 RX ORDER — IPRATROPIUM BROMIDE AND ALBUTEROL SULFATE 2.5; .5 MG/3ML; MG/3ML
3 SOLUTION RESPIRATORY (INHALATION)
Qty: 360 ML | Refills: 1 | Status: SHIPPED | OUTPATIENT
Start: 2021-03-05 | End: 2021-07-01

## 2021-04-01 RX ORDER — DILTIAZEM HYDROCHLORIDE 180 MG/1
180 CAPSULE, EXTENDED RELEASE ORAL DAILY
Qty: 90 CAPSULE | Refills: 1 | Status: SHIPPED | OUTPATIENT
Start: 2021-04-01 | End: 2021-10-06

## 2021-04-01 NOTE — TELEPHONE ENCOUNTER
PATIENTS DAUGHTER, CHELE CALLED AND ADVISED DR GODINEZ ISNT PATIENTS PHYSICIAN ANY LONGER AND WILL NEED A REFILL ON:     DITIAZEM 180 MG 1 DAILY    [facility] RIOS     PATIENT ONLY HAS 2 PILLS LEFT

## 2021-04-12 ENCOUNTER — OFFICE VISIT (OUTPATIENT)
Dept: INTERNAL MEDICINE | Facility: CLINIC | Age: 86
End: 2021-04-12

## 2021-04-12 VITALS
DIASTOLIC BLOOD PRESSURE: 72 MMHG | HEART RATE: 65 BPM | WEIGHT: 235 LBS | BODY MASS INDEX: 30.16 KG/M2 | OXYGEN SATURATION: 98 % | SYSTOLIC BLOOD PRESSURE: 126 MMHG | TEMPERATURE: 97 F | HEIGHT: 74 IN

## 2021-04-12 DIAGNOSIS — J43.2 CENTRILOBULAR EMPHYSEMA (HCC): ICD-10-CM

## 2021-04-12 DIAGNOSIS — I10 ESSENTIAL HYPERTENSION: ICD-10-CM

## 2021-04-12 DIAGNOSIS — M15.9 PRIMARY OSTEOARTHRITIS INVOLVING MULTIPLE JOINTS: ICD-10-CM

## 2021-04-12 DIAGNOSIS — E78.2 MIXED HYPERLIPIDEMIA: ICD-10-CM

## 2021-04-12 DIAGNOSIS — I48.0 PAROXYSMAL ATRIAL FIBRILLATION (HCC): ICD-10-CM

## 2021-04-12 DIAGNOSIS — N40.0 ENLARGED PROSTATE WITHOUT LOWER URINARY TRACT SYMPTOMS (LUTS): ICD-10-CM

## 2021-04-12 PROCEDURE — 99214 OFFICE O/P EST MOD 30 MIN: CPT | Performed by: INTERNAL MEDICINE

## 2021-04-12 RX ORDER — FAMOTIDINE 40 MG/1
40 TABLET, FILM COATED ORAL NIGHTLY PRN
Qty: 90 TABLET | Refills: 1 | Status: SHIPPED | OUTPATIENT
Start: 2021-04-12 | End: 2021-10-15 | Stop reason: SDUPTHER

## 2021-04-12 RX ORDER — SIMVASTATIN 40 MG
40 TABLET ORAL NIGHTLY
Qty: 90 TABLET | Refills: 1 | Status: SHIPPED | OUTPATIENT
Start: 2021-04-12 | End: 2021-10-15 | Stop reason: SDUPTHER

## 2021-04-12 RX ORDER — TAMSULOSIN HYDROCHLORIDE 0.4 MG/1
1 CAPSULE ORAL DAILY
Qty: 90 CAPSULE | Refills: 1 | Status: SHIPPED | OUTPATIENT
Start: 2021-04-12 | End: 2021-10-15 | Stop reason: SDUPTHER

## 2021-04-12 RX ORDER — DULOXETIN HYDROCHLORIDE 60 MG/1
60 CAPSULE, DELAYED RELEASE ORAL DAILY
Qty: 90 CAPSULE | Refills: 1 | Status: SHIPPED | OUTPATIENT
Start: 2021-04-12 | End: 2021-10-15 | Stop reason: SDUPTHER

## 2021-04-12 NOTE — PROGRESS NOTES
Chief Complaint   Patient presents with   • Follow-up     for HLD and HTN.     Subjective   Ed Spear is a 85 y.o. male.     Here today for follow up of HTN, HLD, Afib, COPD, LBP, vit d/B12 def.  HTN/HLD/Afib- BP and HR are well controlled today.  He does not check BP.  He denies CP, palpitations.  He is having some edema.   He takes his zocor every PM-lipid panel normal 4 mo ago.  He does not eat a HH diet  COPD- he is using nebulizer 3 times a day with duoneb. He is using symbicort and has been using albuterol MDI more frequently. He continues to smoke 1 pack of cigarettes daily  LBP- he continues to have neck and back pain.  XR lumbar spine in Oct revealed DDD L2-4.  He takes tylenol 500 mg 2 tabs TID and is on 60 mg dose of duloxetine.  Cannot take NSAIDS due to use of eliquis for AFIB.     Vit B12/D def- he is taking vit D 1000 u daily, B12 1000 u MWF  HCM- immunizations are UTD except shingrix.    He is having some ringing in the ears.    He will see derm later this week to have skin cancers removed from dorsum of hands.   He has had some blood in urine.  He has seen Dr Sepulveda and is scheduled for a urolift.  He is also on flomax. Only awakens once to urinate.   He has seen Dr Antoine for his back pain and had an epidural twice-it helped some.         The following portions of the patient's history were reviewed and updated as appropriate: allergies, current medications, past family history, past medical history, past social history, past surgical history and problem list.    Review of Systems   Constitutional: Negative for activity change, appetite change and unexpected weight change.   HENT: Positive for hearing loss and tinnitus.    Eyes: Negative for visual disturbance.   Respiratory: Negative for shortness of breath.    Cardiovascular: Negative for chest pain, palpitations and leg swelling.   Gastrointestinal: Negative for abdominal pain.   Musculoskeletal: Positive for back pain, gait problem and neck  "pain.   Neurological: Positive for weakness. Negative for headaches.   Psychiatric/Behavioral: Negative for dysphoric mood and sleep disturbance. The patient is not nervous/anxious.        Objective   /72   Pulse 65   Temp 97 °F (36.1 °C)   Ht 188 cm (74\")   Wt 107 kg (235 lb)   SpO2 98%   BMI 30.17 kg/m²   Body mass index is 30.17 kg/m².  Physical Exam  Vitals and nursing note reviewed.   Constitutional:       General: He is not in acute distress.     Appearance: Normal appearance. He is well-developed. He is not ill-appearing.      Comments: Kind man, appears his age, seated in wheelchair due to back pain   HENT:      Head: Normocephalic and atraumatic.      Right Ear: External ear normal.      Left Ear: External ear normal.   Eyes:      General:         Right eye: No discharge.         Left eye: No discharge.      Extraocular Movements: Extraocular movements intact.      Conjunctiva/sclera: Conjunctivae normal.      Pupils: Pupils are equal, round, and reactive to light.   Neck:      Thyroid: No thyromegaly.      Comments: Tenderness to palpation over cervical paraspinous muscles  Cardiovascular:      Rate and Rhythm: Normal rate and regular rhythm.      Heart sounds: Normal heart sounds. No murmur heard.        Comments: No carotid bruits  Pulmonary:      Effort: Pulmonary effort is normal. No respiratory distress.      Breath sounds: Wheezing and rhonchi present.      Comments: Diffuse wheezes and rhonchi throughout all lung fields  Abdominal:      General: Bowel sounds are normal. There is no distension.      Palpations: Abdomen is soft.      Tenderness: There is no abdominal tenderness.   Musculoskeletal:         General: Normal range of motion.      Cervical back: Normal range of motion. Tenderness present.      Right lower leg: No edema.      Left lower leg: No edema.   Lymphadenopathy:      Cervical: No cervical adenopathy.   Skin:     Comments: Thick white plaque noted over dorsum of right " hand   Neurological:      General: No focal deficit present.      Mental Status: He is alert and oriented to person, place, and time. Mental status is at baseline.      Cranial Nerves: No cranial nerve deficit.      Motor: Weakness present.      Coordination: Coordination normal.      Gait: Gait abnormal.      Comments: Patient complains of significant back pain and is currently using a wheelchair   Psychiatric:         Behavior: Behavior normal.         Thought Content: Thought content normal.         Judgment: Judgment normal.      Comments: Flat affect noted         Assessment/Plan   Ed Spear is here today and the following problems have been addressed:      Diagnoses and all orders for this visit:    1. Essential hypertension    2. Centrilobular emphysema (CMS/HCC)  -     Ambulatory Referral to Pulmonology    3. Mixed hyperlipidemia    4. Enlarged prostate without lower urinary tract symptoms (luts)  -     tamsulosin (FLOMAX) 0.4 MG capsule 24 hr capsule; Take 1 capsule by mouth Daily.  Dispense: 90 capsule; Refill: 1    5. Paroxysmal atrial fibrillation (CMS/HCC)    6. Primary osteoarthritis involving multiple joints    Other orders  -     apixaban (Eliquis) 2.5 MG tablet tablet; Take 1 tablet by mouth Every 12 (Twelve) Hours.  Dispense: 180 tablet; Refill: 1  -     DULoxetine (CYMBALTA) 60 MG capsule; Take 1 capsule by mouth Daily.  Dispense: 90 capsule; Refill: 1  -     famotidine (PEPCID) 40 MG tablet; Take 1 tablet by mouth At Night As Needed for Heartburn.  Dispense: 90 tablet; Refill: 1  -     simvastatin (ZOCOR) 40 MG tablet; Take 1 tablet by mouth Every Night.  Dispense: 90 tablet; Refill: 1        Follow heart healthy/low salt diet  Avoid processed foods  Monitor blood pressure as discussed  Exercise as tolerated   Take all medications as prescribed  Continue Symbicort inhaler, use nebulizer 3 times daily as directed  Refer to Dr. Osman for evaluation and treatment of severe COPD  Encourage patient  to decrease smoking, however he is not interested  Follow-up with Dr. Sepulveda for underlying BPH, continue Flomax  Continue Cartia and Eliquis for underlying A. fib, currently in sinus rhythm today  Follow-up with Dr. Antoine for chronic back and neck pain, he has had an epidural.  I encouraged daughter to call for a follow-up appointment today  Encouraged him to continue extra strength Tylenol 2 tablets 3 times daily, continue duloxetine also for depression but also for chronic arthritis pain    Return in about 3 months (around 7/12/2021) for Next scheduled follow up.      Marilyn K. Vermeesch, MD      Please note that portions of this note were completed with a voice recognition program.  Efforts were made to edit dictation, but occasionally words are mistranscribed.

## 2021-05-04 RX ORDER — ALBUTEROL SULFATE 90 UG/1
AEROSOL, METERED RESPIRATORY (INHALATION)
Qty: 8.5 G | Refills: 0 | Status: SHIPPED | OUTPATIENT
Start: 2021-05-04 | End: 2021-08-10

## 2021-05-04 RX ORDER — BUDESONIDE AND FORMOTEROL FUMARATE DIHYDRATE 160; 4.5 UG/1; UG/1
AEROSOL RESPIRATORY (INHALATION)
Qty: 10.2 G | Refills: 0 | Status: SHIPPED | OUTPATIENT
Start: 2021-05-04 | End: 2021-07-15 | Stop reason: SDUPTHER

## 2021-06-04 ENCOUNTER — PROCEDURE VISIT (OUTPATIENT)
Dept: UROLOGY | Facility: CLINIC | Age: 86
End: 2021-06-04

## 2021-06-04 DIAGNOSIS — N40.0 BENIGN PROSTATIC HYPERPLASIA WITHOUT LOWER URINARY TRACT SYMPTOMS: Primary | ICD-10-CM

## 2021-06-04 PROCEDURE — 76872 US TRANSRECTAL: CPT | Performed by: UROLOGY

## 2021-06-04 PROCEDURE — 52442 CYSTO INS TRNSPRSTC IMPLT EA: CPT | Performed by: UROLOGY

## 2021-06-04 PROCEDURE — 96372 THER/PROPH/DIAG INJ SC/IM: CPT | Performed by: UROLOGY

## 2021-06-04 PROCEDURE — 52441 CYSTO INSJ TRNSPRSTC 1 IMPLT: CPT | Performed by: UROLOGY

## 2021-06-04 NOTE — PROGRESS NOTES
Preoperative diagnosis  Clinical BPH (symptoms included acute urinary retention, weak stream, straining, hesitancy, and incomplete emptying) 600.01    Postoperative diagnosis  Clinical BPH (symptoms included acute urinary retention, weak stream, straining, hesitancy, and incomplete emptying) 600.01    Procedure performed  1.  Rigid cystoscopy  2.  UroLift Prostatic Urethral Lift (52441 x 1, 52442 x 4)    Attending surgeon  Chavez Sepulveda MD    Anesthesia  2% lidocaine intraurethrally  1% lidocaine 20 cc transrectal injection    EBL  Minimal    Complications  None    Findings  Cystoscopy revealed bilateral ureteral orifices This area was not involved in the suture placement  We placed 5 implants to create a nice open anterior channel      Indications  85 y.o. male agreed to undergo the above named procedure after discussion of the alternatives, risks and benefits. He was found to have BPH symptoms listed above and has  failed medical therapy. Office cystoscopy excluded a significant median lobe component. After discussion of surgical treatment options, the patient elected a prostatic urethral lift procedure in which permanent transprostatic implants are installed to create a wider channel by which to void. Informed consent was obtained.    Procedure  The patient received local anesthesia: 10cc 2% lidocaine gel to urethra; penile clamp installed for 20 min dwell prior to procedure.  He also received transrectal ultrasound with 20 cc total of 1% lidocaine injected at the neurovascular bundles transrectally.    A 20F cystoscope was inserted into the bladder.  The cystoscopy bridge was replaced with a UroLift delivery device.  The first treatment site was the patient's left side approximately 1.5cm distal to the bladder neck. The distal tip of the delivery device was then angled laterally approximately 20 degrees at this position to compress the lateral lobe.  The trigger was pulled, thereby deploying a needle containing  the implant through the prostate.  The needle was then retracted, allowing one end of the implant to be delivered to the capsular surface of the prostate.  The implant was then tensioned to assure capsular seating and removal of slack monofilament.  The device was then angled back toward midline and slowly advanced proximally (typically 3 to 4 mm) until cystoscopic verification of the monofilament being centered in the delivery bay. The urethral end piece was then affixed to the monofilament thereby tailoring the size of the implant.  Excess filament was then severed.  The delivery device was then re-advanced into the bladder. The delivery device was then replaced with cystoscope and bridge and the implant location and opening effect was confirmed cystoscopically.  The same procedure was then repeated on the right side, and two additional implants were delivered just proximal to the veru montanum, again one on right and one on left side of the prostate, following the same technique.  Cystoscopy then revealed a persistent area of obstruction, and 1 more implants were delivered in the mid prostate.  A final cystoscopy was conducted first to inspect the location and state of each implant and second, to confirm the presence of a continuous anterior channel was present through the prostatic urethra with irrigation flow turned off.      The patient had not stopped his Eliquis as instructed, therefore we placed an 18 [place] coudé catheter that he will have in for a week and he will follow-up in 1 week for catheter removal when his urine is clear.  He was instructed to hold the Eliquis until his urine is completely clear.    He will follow up in one month with IPSS and PVR.

## 2021-06-11 ENCOUNTER — PROCEDURE VISIT (OUTPATIENT)
Dept: UROLOGY | Facility: CLINIC | Age: 86
End: 2021-06-11

## 2021-06-11 VITALS — BODY MASS INDEX: 30.29 KG/M2 | HEIGHT: 74 IN | WEIGHT: 236 LBS | RESPIRATION RATE: 17 BRPM

## 2021-06-11 DIAGNOSIS — R39.9 LOWER URINARY TRACT SYMPTOMS (LUTS): ICD-10-CM

## 2021-06-11 PROCEDURE — 51700 IRRIGATION OF BLADDER: CPT | Performed by: UROLOGY

## 2021-06-11 NOTE — PROGRESS NOTES
Patient in office for fill n void trial, post urolift surgery done 6-4-21. Patient was filled with sterile water through catheter approximately 300ml. Catheter was removed and his PVR was 49ml. Patient wasn't able to void right away , but leaked out a lot of water while I was filling him up. He was able to leave without catheter and advised to call by 3pm if unable to void.

## 2021-07-02 RX ORDER — IPRATROPIUM BROMIDE AND ALBUTEROL SULFATE 2.5; .5 MG/3ML; MG/3ML
SOLUTION RESPIRATORY (INHALATION)
Qty: 360 ML | Refills: 0 | Status: SHIPPED | OUTPATIENT
Start: 2021-07-02 | End: 2021-08-02

## 2021-07-15 ENCOUNTER — OFFICE VISIT (OUTPATIENT)
Dept: INTERNAL MEDICINE | Facility: CLINIC | Age: 86
End: 2021-07-15

## 2021-07-15 VITALS
HEIGHT: 74 IN | WEIGHT: 226 LBS | TEMPERATURE: 97 F | HEART RATE: 89 BPM | OXYGEN SATURATION: 94 % | SYSTOLIC BLOOD PRESSURE: 128 MMHG | BODY MASS INDEX: 29 KG/M2 | DIASTOLIC BLOOD PRESSURE: 74 MMHG

## 2021-07-15 DIAGNOSIS — E78.2 MIXED HYPERLIPIDEMIA: ICD-10-CM

## 2021-07-15 DIAGNOSIS — I48.0 PAROXYSMAL ATRIAL FIBRILLATION (HCC): ICD-10-CM

## 2021-07-15 DIAGNOSIS — N40.0 ENLARGED PROSTATE WITHOUT LOWER URINARY TRACT SYMPTOMS (LUTS): ICD-10-CM

## 2021-07-15 DIAGNOSIS — J43.2 CENTRILOBULAR EMPHYSEMA (HCC): ICD-10-CM

## 2021-07-15 DIAGNOSIS — I10 ESSENTIAL HYPERTENSION: Primary | ICD-10-CM

## 2021-07-15 DIAGNOSIS — M15.9 PRIMARY OSTEOARTHRITIS INVOLVING MULTIPLE JOINTS: ICD-10-CM

## 2021-07-15 DIAGNOSIS — E53.8 VITAMIN B 12 DEFICIENCY: ICD-10-CM

## 2021-07-15 PROBLEM — I47.19 PAT (PAROXYSMAL ATRIAL TACHYCARDIA): Status: RESOLVED | Noted: 2017-11-16 | Resolved: 2021-07-15

## 2021-07-15 PROBLEM — I47.1 PAT (PAROXYSMAL ATRIAL TACHYCARDIA): Status: RESOLVED | Noted: 2017-11-16 | Resolved: 2021-07-15

## 2021-07-15 PROCEDURE — 99214 OFFICE O/P EST MOD 30 MIN: CPT | Performed by: INTERNAL MEDICINE

## 2021-07-15 RX ORDER — BUDESONIDE AND FORMOTEROL FUMARATE DIHYDRATE 160; 4.5 UG/1; UG/1
2 AEROSOL RESPIRATORY (INHALATION)
Qty: 10.2 G | Refills: 6 | Status: SHIPPED | OUTPATIENT
Start: 2021-07-15 | End: 2022-04-04

## 2021-07-15 NOTE — PROGRESS NOTES
Chief Complaint   Patient presents with   • Follow-up     for HLD and HTN.     Subjective   Ed Spear is a 85 y.o. male.     Here today for follow up of HTN, HLD, Afib, COPD, LBP, vit d/B12 def.  HTN/HLD/Afib- BP and HR are well controlled today.  He does not check BP.  He denies CP, palpitations.  He is having some edema.   He takes his zocor every PM-lipid panel normal 8 mo ago.  He does not eat a HH diet.  Chronically short of breath due to COPD  COPD- he is using nebulizer 3 times a day with duoneb. He ran out of symbicort but has been using albuterol MDI more frequently. He continues to smoke 1 pack of cigarettes daily  LBP- he continues to have neck and back pain.  XR lumbar spine in Oct 2020 revealed DDD L2-4.  He takes tylenol 500 mg 2 tabs TID and is on 60 mg dose of duloxetine.  Cannot take NSAIDS due to use of eliquis for AFIB.  He has seen Dr. Antoine and had 2 epidurals thus far, states they were slightly helpful. He does not apply heat or cold to areas.  Vit B12/D def- he is taking vit D 1000 u daily, B12 1000 u MWF  BPH-currently on Flomax.  Patient underwent a UroLift procedure approximately 6 weeks ago.  He may awaken twice a night to urinate.  Skin cancer- he had skin cancer removed from dorsum of right hand.   HCM- immunizations are UTD except shingrix. He did get his COVID vaccines.        The following portions of the patient's history were reviewed and updated as appropriate: allergies, current medications, past family history, past medical history, past social history, past surgical history and problem list.    Review of Systems   Constitutional: Negative for activity change, appetite change and unexpected weight change.   Eyes: Negative for visual disturbance.   Respiratory: Positive for shortness of breath.    Cardiovascular: Positive for leg swelling. Negative for chest pain and palpitations.   Gastrointestinal: Negative for abdominal pain.   Genitourinary: Negative for hematuria.  "  Musculoskeletal: Positive for back pain, gait problem and neck pain.   Skin: Positive for color change.        Skin lesions over forearms and dorsum of hands   Neurological: Negative for headaches.   Psychiatric/Behavioral: Negative for dysphoric mood and sleep disturbance. The patient is not nervous/anxious.        Objective   /74   Pulse 89   Temp 97 °F (36.1 °C)   Ht 188 cm (74.02\")   Wt 103 kg (226 lb)   SpO2 94%   BMI 29.00 kg/m²   Body mass index is 29 kg/m².  Physical Exam  Vitals and nursing note reviewed.   Constitutional:       General: He is not in acute distress.     Appearance: Normal appearance. He is well-developed. He is not ill-appearing.      Comments: Kind and pleasant man, appears stated age and in NAD today, patient ambulates with rolling walker due to weakness   HENT:      Head: Normocephalic and atraumatic.      Right Ear: External ear normal.      Left Ear: External ear normal.   Eyes:      General:         Right eye: No discharge.         Left eye: No discharge.      Extraocular Movements: Extraocular movements intact.      Conjunctiva/sclera: Conjunctivae normal.      Pupils: Pupils are equal, round, and reactive to light.   Neck:      Thyroid: No thyromegaly.      Vascular: No carotid bruit.      Comments: No thyromegaly or mass  Cardiovascular:      Rate and Rhythm: Normal rate and regular rhythm.      Pulses: Normal pulses.      Heart sounds: Normal heart sounds. No murmur heard.     Pulmonary:      Effort: Pulmonary effort is normal. No respiratory distress.      Breath sounds: Wheezing present.      Comments: Decreased breath sounds throughout all lung fields with occasional end expiratory wheezes  Abdominal:      General: Bowel sounds are normal. There is no distension.      Palpations: Abdomen is soft.      Tenderness: There is no abdominal tenderness.   Musculoskeletal:         General: Normal range of motion.      Cervical back: Normal range of motion and neck supple. "      Right lower leg: No edema.      Left lower leg: No edema.   Lymphadenopathy:      Cervical: No cervical adenopathy.   Skin:     General: Skin is warm.      Findings: Erythema present. No rash.      Comments: Distal forearms and dorsum of hands with multiple erythematous and peeling maculopapular lesions, dorsum of left hand with 1.5 cm nodular lesion, left upper cheek with 1 cm red nodular lesion consistent with cyst   Neurological:      General: No focal deficit present.      Mental Status: He is alert and oriented to person, place, and time. Mental status is at baseline.      Cranial Nerves: No cranial nerve deficit.      Motor: Weakness present.      Coordination: Coordination normal.      Gait: Gait abnormal.      Comments: Difficulty standing from seated position due to hip flexor weakness, ambulates with use of a rolling walker   Psychiatric:         Mood and Affect: Mood normal.         Behavior: Behavior normal.         Thought Content: Thought content normal.         Judgment: Judgment normal.         Assessment/Plan   Ed Spear is here today and the following problems have been addressed:      Diagnoses and all orders for this visit:    1. Essential hypertension (Primary)    2. Mixed hyperlipidemia    3. Paroxysmal atrial fibrillation (CMS/HCC)    4. Vitamin B 12 deficiency    5. Enlarged prostate without lower urinary tract symptoms (luts)    6. Primary osteoarthritis involving multiple joints    7. Centrilobular emphysema (CMS/HCC)    Other orders  -     budesonide-formoterol (SYMBICORT) 160-4.5 MCG/ACT inhaler; Inhale 2 puffs 2 (Two) Times a Day.  Dispense: 10.2 g; Refill: 6        Follow heart healthy/low salt diet  Avoid processed foods  Monitor blood pressure as discussed  Exercise as tolerated -recommend chair exercises  Take all medications as prescribed  Refill of Symbicort provided 2 puffs twice daily, rinse mouth well afterwards  Use albuterol as needed for shortness of breath and  continue nebulizers 3 times daily  Follow-up with pulmonologist as scheduled for further evaluation of COPD  BP and heart rate are well controlled on current medications, continue Eliquis for underlying atrial fibrillation, currently in sinus rhythm on exam today  Recent UroLift procedure, patient states his symptoms are better, continue Flomax every night  Continue Pepcid for GERD symptoms, no complaints of GERD at this time  Recommend Tylenol only for arthritis pain due to use of Eliquis, follow-up with orthopedics for possible epidural injections in back and steroid injections in other joints    Return to clinic in 3 months for Medicare wellness and labs      Return in about 3 months (around 10/15/2021) for Medicare Wellness.      Marilyn K. Vermeesch, MD      Please note that portions of this note were completed with a voice recognition program.  Efforts were made to edit dictation, but occasionally words are mistranscribed.

## 2021-08-02 RX ORDER — IPRATROPIUM BROMIDE AND ALBUTEROL SULFATE 2.5; .5 MG/3ML; MG/3ML
SOLUTION RESPIRATORY (INHALATION)
Qty: 360 ML | Refills: 1 | Status: SHIPPED | OUTPATIENT
Start: 2021-08-02 | End: 2021-11-01

## 2021-08-10 DIAGNOSIS — J43.2 CENTRILOBULAR EMPHYSEMA (HCC): Primary | ICD-10-CM

## 2021-08-10 RX ORDER — ALBUTEROL SULFATE 90 UG/1
AEROSOL, METERED RESPIRATORY (INHALATION)
Qty: 8.5 G | Refills: 1 | Status: SHIPPED | OUTPATIENT
Start: 2021-08-10 | End: 2021-11-01

## 2021-08-10 NOTE — TELEPHONE ENCOUNTER
Rx Refill Note  Requested Prescriptions     Pending Prescriptions Disp Refills   • albuterol sulfate  (90 Base) MCG/ACT inhaler [Pharmacy Med Name: ALBUTEROL HFA 90 MCG INHALER] 8.5 g 1     Sig: INHALE ONE PUFF BY MOUTH FOUR TIMES A DAY      Last office visit with prescribing clinician: 7/15/2021      Next office visit with prescribing clinician: 10/15/2021            TIFFANY HAMILTON MA  08/10/21, 16:53 EDT

## 2021-09-13 RX ORDER — BISOPROLOL FUMARATE 10 MG/1
TABLET, FILM COATED ORAL
Qty: 90 TABLET | Refills: 1 | Status: SHIPPED | OUTPATIENT
Start: 2021-09-13 | End: 2022-03-09

## 2021-10-06 RX ORDER — DILTIAZEM HYDROCHLORIDE 180 MG/1
CAPSULE, EXTENDED RELEASE ORAL
Qty: 90 CAPSULE | Refills: 1 | Status: SHIPPED | OUTPATIENT
Start: 2021-10-06 | End: 2022-04-01

## 2021-10-15 ENCOUNTER — OFFICE VISIT (OUTPATIENT)
Dept: INTERNAL MEDICINE | Facility: CLINIC | Age: 86
End: 2021-10-15

## 2021-10-15 VITALS
HEIGHT: 74 IN | HEART RATE: 76 BPM | TEMPERATURE: 97.8 F | DIASTOLIC BLOOD PRESSURE: 80 MMHG | OXYGEN SATURATION: 95 % | BODY MASS INDEX: 29.52 KG/M2 | SYSTOLIC BLOOD PRESSURE: 132 MMHG | WEIGHT: 230 LBS

## 2021-10-15 DIAGNOSIS — N40.0 ENLARGED PROSTATE WITHOUT LOWER URINARY TRACT SYMPTOMS (LUTS): ICD-10-CM

## 2021-10-15 DIAGNOSIS — E78.2 MIXED HYPERLIPIDEMIA: ICD-10-CM

## 2021-10-15 DIAGNOSIS — J43.2 CENTRILOBULAR EMPHYSEMA (HCC): ICD-10-CM

## 2021-10-15 DIAGNOSIS — Z00.00 ENCOUNTER FOR MEDICARE ANNUAL WELLNESS EXAM: Primary | ICD-10-CM

## 2021-10-15 DIAGNOSIS — I10 PRIMARY HYPERTENSION: ICD-10-CM

## 2021-10-15 DIAGNOSIS — I48.0 PAROXYSMAL ATRIAL FIBRILLATION (HCC): ICD-10-CM

## 2021-10-15 DIAGNOSIS — M15.9 PRIMARY OSTEOARTHRITIS INVOLVING MULTIPLE JOINTS: ICD-10-CM

## 2021-10-15 DIAGNOSIS — L84 CALLUS OF FOOT: ICD-10-CM

## 2021-10-15 PROBLEM — I48.91 ATRIAL FIBRILLATION (HCC): Status: RESOLVED | Noted: 2018-01-30 | Resolved: 2021-10-15

## 2021-10-15 PROCEDURE — 1170F FXNL STATUS ASSESSED: CPT | Performed by: INTERNAL MEDICINE

## 2021-10-15 PROCEDURE — 90662 IIV NO PRSV INCREASED AG IM: CPT | Performed by: INTERNAL MEDICINE

## 2021-10-15 PROCEDURE — G0008 ADMIN INFLUENZA VIRUS VAC: HCPCS | Performed by: INTERNAL MEDICINE

## 2021-10-15 PROCEDURE — 1159F MED LIST DOCD IN RCRD: CPT | Performed by: INTERNAL MEDICINE

## 2021-10-15 PROCEDURE — 99397 PER PM REEVAL EST PAT 65+ YR: CPT | Performed by: INTERNAL MEDICINE

## 2021-10-15 PROCEDURE — G0439 PPPS, SUBSEQ VISIT: HCPCS | Performed by: INTERNAL MEDICINE

## 2021-10-15 RX ORDER — DULOXETIN HYDROCHLORIDE 60 MG/1
60 CAPSULE, DELAYED RELEASE ORAL DAILY
Qty: 90 CAPSULE | Refills: 1 | Status: SHIPPED | OUTPATIENT
Start: 2021-10-15 | End: 2022-06-14

## 2021-10-15 RX ORDER — SIMVASTATIN 40 MG
40 TABLET ORAL NIGHTLY
Qty: 90 TABLET | Refills: 1 | Status: SHIPPED | OUTPATIENT
Start: 2021-10-15 | End: 2022-04-29

## 2021-10-15 RX ORDER — TAMSULOSIN HYDROCHLORIDE 0.4 MG/1
1 CAPSULE ORAL DAILY
Qty: 90 CAPSULE | Refills: 1 | Status: SHIPPED | OUTPATIENT
Start: 2021-10-15 | End: 2022-06-02

## 2021-10-15 RX ORDER — FAMOTIDINE 40 MG/1
40 TABLET, FILM COATED ORAL NIGHTLY PRN
Qty: 90 TABLET | Refills: 1 | Status: SHIPPED | OUTPATIENT
Start: 2021-10-15 | End: 2022-06-02

## 2021-10-15 NOTE — PROGRESS NOTES
The ABCs of the Annual Wellness Visit  Subsequent Medicare Wellness Visit    Chief Complaint   Patient presents with   • Medicare Wellness-subsequent      Subjective    History of Present Illness:  Ed Spear is a 85 y.o. male who presents for a Subsequent Medicare Wellness Visit.  PMH of HTN, HLD, atrial fibrillation, BPH, skin cancer, DJD, emphysema.  BP and heart rate are well controlled today.  He denies any CP, SOA, palpitations or edema.  He is too SOA to walk into clinic today so is in a wheelchair.  Uses a cane when is walks in house and out in public.  He uses symbicort as directed and nebulizer 3 times a day.  He continues to see dermatologist for multiple cysts on face, back.  He has chronic fatigue.  He sleeps well at night.  He eats whatever he wants, some healthy and some junk.  He urinates about twice a night.  He takes tylenol ES TID and it helps some for his neck pain to a degree.     The following portions of the patient's history were reviewed and   updated as appropriate: allergies, current medications, past family history, past medical history, past social history, past surgical history and problem list.    Compared to one year ago, the patient feels his physical   health is worse.    Compared to one year ago, the patient feels his mental   health is the same.    Recent Hospitalizations:  He was not admitted to the hospital during the last year.       Current Medical Providers:  Patient Care Team:  Vermeesch, Marilyn K, MD as PCP - General (Internal Medicine & Pediatrics)    Outpatient Medications Prior to Visit   Medication Sig Dispense Refill   • acetaminophen (TYLENOL) 325 MG tablet Take  by mouth.     • albuterol sulfate  (90 Base) MCG/ACT inhaler INHALE ONE PUFF BY MOUTH FOUR TIMES A DAY 8.5 g 1   • bisoprolol (ZEBeta) 10 MG tablet TAKE ONE TABLET BY MOUTH DAILY 90 tablet 1   • budesonide-formoterol (SYMBICORT) 160-4.5 MCG/ACT inhaler Inhale 2 puffs 2 (Two) Times a Day. 10.2 g 6    • Cartia  MG 24 hr capsule TAKE ONE CAPSULE BY MOUTH DAILY 90 capsule 1   • ipratropium-albuterol (DUO-NEB) 0.5-2.5 mg/3 ml nebulizer INHALE 3 ML VIA NEBULIZATION FOUR TIMES A  mL 1   • apixaban (Eliquis) 2.5 MG tablet tablet Take 1 tablet by mouth Every 12 (Twelve) Hours. 180 tablet 1   • DULoxetine (CYMBALTA) 60 MG capsule Take 1 capsule by mouth Daily. 90 capsule 1   • famotidine (PEPCID) 40 MG tablet Take 1 tablet by mouth At Night As Needed for Heartburn. 90 tablet 1   • simvastatin (ZOCOR) 40 MG tablet Take 1 tablet by mouth Every Night. 90 tablet 1   • tamsulosin (FLOMAX) 0.4 MG capsule 24 hr capsule Take 1 capsule by mouth Daily. 90 capsule 1     No facility-administered medications prior to visit.       No opioid medication identified on active medication list. I have reviewed chart for other potential  high risk medication/s and harmful drug interactions in the elderly.          Aspirin is not on active medication list.  Aspirin use is not indicated based on review of current medical condition/s. Risk of harm outweighs potential benefits.  .    Patient Active Problem List   Diagnosis   • Pulmonary emphysema (HCC)   • Hyperlipidemia   • Hypertension   • Malignant neoplasm of skin   • Vitamin D deficiency   • Arthritis, degenerative   • Enlarged prostate without lower urinary tract symptoms (luts)   • Skin cancer   • Epidermal cyst of face   • Chronic bilateral low back pain without sciatica   • Paroxysmal atrial fibrillation (HCC)   • Encounter for Medicare annual wellness exam   • Vitamin B 12 deficiency   • Callus of foot     Advance Care Planning  Advance Directive is not on file.  ACP discussion was held with the patient during this visit. Patient does not have an advance directive, information provided.    Review of Systems   Constitutional: Positive for fatigue.   HENT: Positive for hearing loss and tinnitus.    Eyes: Negative.    Respiratory: Positive for cough and shortness of breath.  "   Cardiovascular: Negative.    Gastrointestinal: Negative.    Endocrine: Negative.    Genitourinary: Negative.    Musculoskeletal: Positive for gait problem and neck pain.   Skin: Negative.    Allergic/Immunologic: Negative.    Neurological: Positive for weakness.   Hematological: Bruises/bleeds easily.   Psychiatric/Behavioral: Negative.         Objective    Vitals:    10/15/21 1034   BP: 132/80   Pulse: 76   Temp: 97.8 °F (36.6 °C)   SpO2: 95%   Weight: 104 kg (230 lb)   Height: 188 cm (74.02\")   PainSc:   4     BMI Readings from Last 1 Encounters:   10/15/21 29.51 kg/m²   BMI is above normal parameters. Recommendations include: exercise counseling and nutrition counseling    Does the patient have evidence of cognitive impairment? No    Physical Exam  Vitals and nursing note reviewed.   Constitutional:       General: He is not in acute distress.     Appearance: Normal appearance. He is well-developed. He is not ill-appearing.      Comments: Kind and pleasant man, appears stated age and in NAD today, seated in wheelchair due to shortness of breath with ambulation   HENT:      Head: Normocephalic and atraumatic.      Right Ear: Tympanic membrane, ear canal and external ear normal.      Left Ear: Tympanic membrane, ear canal and external ear normal.   Eyes:      General:         Right eye: No discharge.         Left eye: No discharge.      Extraocular Movements: Extraocular movements intact.      Conjunctiva/sclera: Conjunctivae normal.      Pupils: Pupils are equal, round, and reactive to light.   Neck:      Thyroid: No thyromegaly.      Vascular: No carotid bruit.      Comments: No thyromegaly or mass  Cardiovascular:      Rate and Rhythm: Normal rate. Rhythm irregular.      Pulses: Normal pulses.      Heart sounds: Normal heart sounds. No murmur heard.       Comments: Irregularly irregular  Pulmonary:      Effort: Pulmonary effort is normal. No respiratory distress.      Breath sounds: Normal breath sounds. No " wheezing.      Comments: Decreased breath sounds throughout all lung fields  Abdominal:      General: Bowel sounds are normal. There is no distension.      Palpations: Abdomen is soft.      Tenderness: There is no abdominal tenderness.   Musculoskeletal:         General: Normal range of motion.      Cervical back: Normal range of motion and neck supple.      Right lower leg: No edema.      Left lower leg: No edema.   Lymphadenopathy:      Cervical: No cervical adenopathy.   Skin:     General: Skin is warm.      Findings: No rash.      Comments: Calluses noted on plantar surface of feet at heels bilaterally and on right foot at base of third MTP   Neurological:      General: No focal deficit present.      Mental Status: He is alert and oriented to person, place, and time. Mental status is at baseline.      Cranial Nerves: No cranial nerve deficit.      Motor: No weakness.      Coordination: Coordination normal.      Gait: Gait normal.   Psychiatric:         Mood and Affect: Mood normal.         Behavior: Behavior normal.         Thought Content: Thought content normal.         Judgment: Judgment normal.                 HEALTH RISK ASSESSMENT    Smoking Status:  Social History     Tobacco Use   Smoking Status Current Every Day Smoker   Smokeless Tobacco Never Used     Alcohol Consumption:  Social History     Substance and Sexual Activity   Alcohol Use No     Fall Risk Screen:    STEADI Fall Risk Assessment was completed, and patient is at MODERATE risk for falls. Assessment completed on:10/15/2021    Depression Screening:  PHQ-2/PHQ-9 Depression Screening 10/15/2021   Little interest or pleasure in doing things 0   Feeling down, depressed, or hopeless 1   Total Score 1       Health Habits and Functional and Cognitive Screening:  Functional & Cognitive Status 10/15/2021   Do you have difficulty preparing food and eating? Yes   Do you have difficulty bathing yourself, getting dressed or grooming yourself? Yes   Do you  have difficulty using the toilet? No   Do you have difficulty moving around from place to place? Yes   Do you have trouble with steps or getting out of a bed or a chair? Yes   Current Diet Unhealthy Diet   Dental Exam Not up to date   Eye Exam Not up to date   Exercise (times per week) 0 times per week   Current Exercises Include No Regular Exercise   Current Exercise Activities Include -   Do you need help using the phone?  Yes   Are you deaf or do you have serious difficulty hearing?  Yes   Do you need help with transportation? Yes   Do you need help shopping? Yes   Do you need help preparing meals?  Yes   Do you need help with housework?  Yes   Do you need help with laundry? Yes   Do you need help taking your medications? Yes   Do you need help managing money? Yes   Do you ever drive or ride in a car without wearing a seat belt? No   Have you felt unusual stress, anger or loneliness in the last month? No   Who do you live with? Child   If you need help, do you have trouble finding someone available to you? No   Have you been bothered in the last four weeks by sexual problems? No   Do you have difficulty concentrating, remembering or making decisions? No       Age-appropriate Screening Schedule:  Refer to the list below for future screening recommendations based on patient's age, sex and/or medical conditions. Orders for these recommended tests are listed in the plan section. The patient has been provided with a written plan.    Health Maintenance   Topic Date Due   • LIPID PANEL  11/30/2021   • INFLUENZA VACCINE  Completed   • TDAP/TD VACCINES  Discontinued   • ZOSTER VACCINE  Discontinued              Assessment/Plan   CMS Preventative Services Quick Reference  Risk Factors Identified During Encounter  Chronic Pain   Immunizations Discussed/Encouraged (specific Immunizations; Influenza  Inactivity/Sedentary  Obesity/Overweight   The above risks/problems have been discussed with the patient.  Follow up  actions/plans if indicated are seen below in the Assessment/Plan Section.  Pertinent information has been shared with the patient in the After Visit Summary.    Diagnoses and all orders for this visit:    1. Encounter for Medicare annual wellness exam (Primary)  -     CBC & Differential  -     Comprehensive Metabolic Panel  -     Lipid Panel With / Chol / HDL Ratio  Given information on living will and request copy when completed  Flu vaccine provided today    2. Enlarged prostate without lower urinary tract symptoms (luts)  Continue Flomax    3. Primary hypertension  -     CBC & Differential  -     Comprehensive Metabolic Panel  -     Lipid Panel With / Chol / HDL Ratio  Follow heart healthy/low salt diet  Avoid processed foods  Monitor blood pressure as discussed  Exercise as tolerated up to 30 minutes 5 days per week  Take all medications as prescribed    4. Mixed hyperlipidemia  -     CBC & Differential  -     Comprehensive Metabolic Panel  -     Lipid Panel With / Chol / HDL Ratio  Continue simvastatin  Follow low-fat/low-cholesterol diet    5. Primary osteoarthritis involving multiple joints  Continue extra strength Tylenol 2 tablets 3 times a day  Recommend Aspercreme arthritis and Salonpas arthritis pain patch trial to areas of arthritis    6. Centrilobular emphysema (HCC)  Continue duo nebs and albuterol as needed    7. Paroxysmal atrial fibrillation (HCC)  Heart rate well controlled with bisoprolol, continue Eliquis    8. Callus of foot  Recommend callus pads to multiple calluses on feet as discussed    Other orders  -     apixaban (Eliquis) 2.5 MG tablet tablet; Take 1 tablet by mouth Every 12 (Twelve) Hours.  Dispense: 180 tablet; Refill: 1  -     DULoxetine (CYMBALTA) 60 MG capsule; Take 1 capsule by mouth Daily.  Dispense: 90 capsule; Refill: 1  -     simvastatin (ZOCOR) 40 MG tablet; Take 1 tablet by mouth Every Night.  Dispense: 90 tablet; Refill: 1  -     tamsulosin (FLOMAX) 0.4 MG capsule 24 hr  capsule; Take 1 capsule by mouth Daily.  Dispense: 90 capsule; Refill: 1  -     famotidine (PEPCID) 40 MG tablet; Take 1 tablet by mouth At Night As Needed for Heartburn.  Dispense: 90 tablet; Refill: 1  -     Fluzone High-Dose 65+yrs (1362-2911)        Follow Up:   Return in about 6 months (around 4/15/2022), or if symptoms worsen or fail to improve.     An After Visit Summary and PPPS were made available to the patient.

## 2021-10-28 ENCOUNTER — TELEPHONE (OUTPATIENT)
Dept: INTERNAL MEDICINE | Facility: CLINIC | Age: 86
End: 2021-10-28

## 2021-10-28 RX ORDER — METHOCARBAMOL 500 MG/1
500 TABLET, FILM COATED ORAL 3 TIMES DAILY PRN
Qty: 60 TABLET | Refills: 2 | Status: SHIPPED | OUTPATIENT
Start: 2021-10-28 | End: 2022-01-04

## 2021-10-28 NOTE — TELEPHONE ENCOUNTER
Caller: Farida Cano    Relationship: Emergency Contact    Best call back number: [phone] (H)    PATIENTS DAUGHTER CALLED AND STATED THAT DR. VERMEESCH WAS GOING TO CALL IN A MUSCLE RELAXER AT HIS LAST APPOINTMENT 10/15/21 DUE TO HIS NECK PAIN. PATIENT HAS NOT RECEIVED THE PRESCRIPTION.      [facility] [address] AT [facility] [phone] [facility] [phone] FX     PLEASE CALL PATIENTS DAUGHTER WHEN THE RX HAS BEEN  CALLED IN

## 2021-10-28 NOTE — TELEPHONE ENCOUNTER
Attempted contacting daughter, no answer.    HUB TO SHARE: Dr. Vermeesch sent in Robaxin to take up to 3 times a day as needed for muscle spasms

## 2021-11-01 DIAGNOSIS — J43.2 CENTRILOBULAR EMPHYSEMA (HCC): ICD-10-CM

## 2021-11-01 RX ORDER — IPRATROPIUM BROMIDE AND ALBUTEROL SULFATE 2.5; .5 MG/3ML; MG/3ML
SOLUTION RESPIRATORY (INHALATION)
Qty: 360 ML | Refills: 1 | Status: SHIPPED | OUTPATIENT
Start: 2021-11-01 | End: 2022-01-12 | Stop reason: SDUPTHER

## 2021-11-01 RX ORDER — ALBUTEROL SULFATE 90 UG/1
AEROSOL, METERED RESPIRATORY (INHALATION)
Qty: 8.5 G | Refills: 1 | Status: SHIPPED | OUTPATIENT
Start: 2021-11-01 | End: 2021-11-02 | Stop reason: SDUPTHER

## 2021-11-01 NOTE — TELEPHONE ENCOUNTER
Rx Refill Note  Requested Prescriptions     Pending Prescriptions Disp Refills   • ipratropium-albuterol (DUO-NEB) 0.5-2.5 mg/3 ml nebulizer [Pharmacy Med Name: IPRAT-ALBUT 0.5-3 MG/3 ML (60 VLS)] 360 mL 1     Sig: INHALE THREE MILLILITERS VIA NEBULIZATION BY MOUTH FOUR TIMES A DAY      Last office visit with prescribing clinician: 10/15/2021      Next office visit with prescribing clinician: 04/18/2022           Falguni Garner LPN  11/01/21, 12:52 EDT

## 2021-11-02 DIAGNOSIS — J43.2 CENTRILOBULAR EMPHYSEMA (HCC): ICD-10-CM

## 2021-11-02 RX ORDER — ALBUTEROL SULFATE 90 UG/1
2 AEROSOL, METERED RESPIRATORY (INHALATION) EVERY 4 HOURS PRN
Qty: 18 G | Refills: 1 | Status: SHIPPED | OUTPATIENT
Start: 2021-11-02 | End: 2022-01-27

## 2021-11-02 NOTE — TELEPHONE ENCOUNTER
Caller: Chele Cano    Relationship: Emergency Contact      Medication requested (name and dosage):   Requested Prescriptions:   Requested Prescriptions     Pending Prescriptions Disp Refills   • albuterol sulfate  (90 Base) MCG/ACT inhaler 8.5 g 1        Pharmacy where request should be sent: [facility] [address] AT [facility]. - [phone] [facility] [phone] FX     Additional details provided by patient: THE PATIENT'S DAUGHTER CHELE (ON THE  VERNAL) IS REQUESTING THE DOSING TO BE INCREASED FROM 1 PUFF 4 TIMES DAILY TO TWO PUFFS 4 TIMES DAILY.   Best call back number:[phone]   Does the patient have less than a 3 day supply:  [x] Yes  [] No    Judith Ballard Rep   11/02/21 11:39 EDT

## 2021-12-02 LAB
ALBUMIN SERPL-MCNC: 4.3 G/DL (ref 3.5–5.2)
ALBUMIN/GLOB SERPL: 1.8 G/DL
ALP SERPL-CCNC: 62 U/L (ref 39–117)
ALT SERPL-CCNC: 14 U/L (ref 1–41)
AST SERPL-CCNC: 18 U/L (ref 1–40)
BASOPHILS # BLD AUTO: 0.04 10*3/MM3 (ref 0–0.2)
BASOPHILS NFR BLD AUTO: 0.5 % (ref 0–1.5)
BILIRUB SERPL-MCNC: 0.6 MG/DL (ref 0–1.2)
BUN SERPL-MCNC: 14 MG/DL (ref 8–23)
BUN/CREAT SERPL: 12.8 (ref 7–25)
CALCIUM SERPL-MCNC: 9.4 MG/DL (ref 8.6–10.5)
CHLORIDE SERPL-SCNC: 101 MMOL/L (ref 98–107)
CHOLEST SERPL-MCNC: 122 MG/DL (ref 0–200)
CHOLEST/HDLC SERPL: 2.49 {RATIO}
CO2 SERPL-SCNC: 29.8 MMOL/L (ref 22–29)
CREAT SERPL-MCNC: 1.09 MG/DL (ref 0.76–1.27)
EOSINOPHIL # BLD AUTO: 0.22 10*3/MM3 (ref 0–0.4)
EOSINOPHIL NFR BLD AUTO: 3 % (ref 0.3–6.2)
ERYTHROCYTE [DISTWIDTH] IN BLOOD BY AUTOMATED COUNT: 14.5 % (ref 12.3–15.4)
GLOBULIN SER CALC-MCNC: 2.4 GM/DL
GLUCOSE SERPL-MCNC: 93 MG/DL (ref 65–99)
HCT VFR BLD AUTO: 41.5 % (ref 37.5–51)
HDLC SERPL-MCNC: 49 MG/DL (ref 40–60)
HGB BLD-MCNC: 13.6 G/DL (ref 13–17.7)
IMM GRANULOCYTES # BLD AUTO: 0.02 10*3/MM3 (ref 0–0.05)
IMM GRANULOCYTES NFR BLD AUTO: 0.3 % (ref 0–0.5)
LDLC SERPL CALC-MCNC: 55 MG/DL (ref 0–100)
LYMPHOCYTES # BLD AUTO: 1.96 10*3/MM3 (ref 0.7–3.1)
LYMPHOCYTES NFR BLD AUTO: 26.4 % (ref 19.6–45.3)
MCH RBC QN AUTO: 28.9 PG (ref 26.6–33)
MCHC RBC AUTO-ENTMCNC: 32.8 G/DL (ref 31.5–35.7)
MCV RBC AUTO: 88.3 FL (ref 79–97)
MONOCYTES # BLD AUTO: 0.7 10*3/MM3 (ref 0.1–0.9)
MONOCYTES NFR BLD AUTO: 9.4 % (ref 5–12)
NEUTROPHILS # BLD AUTO: 4.48 10*3/MM3 (ref 1.7–7)
NEUTROPHILS NFR BLD AUTO: 60.4 % (ref 42.7–76)
NRBC BLD AUTO-RTO: 0 /100 WBC (ref 0–0.2)
PLATELET # BLD AUTO: 236 10*3/MM3 (ref 140–450)
POTASSIUM SERPL-SCNC: 4.7 MMOL/L (ref 3.5–5.2)
PROT SERPL-MCNC: 6.7 G/DL (ref 6–8.5)
RBC # BLD AUTO: 4.7 10*6/MM3 (ref 4.14–5.8)
SODIUM SERPL-SCNC: 138 MMOL/L (ref 136–145)
TRIGL SERPL-MCNC: 95 MG/DL (ref 0–150)
VLDLC SERPL CALC-MCNC: 18 MG/DL (ref 5–40)
WBC # BLD AUTO: 7.42 10*3/MM3 (ref 3.4–10.8)

## 2022-01-01 ENCOUNTER — TELEPHONE (OUTPATIENT)
Dept: INTERNAL MEDICINE | Facility: CLINIC | Age: 87
End: 2022-01-01

## 2022-01-04 RX ORDER — METHOCARBAMOL 500 MG/1
TABLET, FILM COATED ORAL
Qty: 60 TABLET | Refills: 2 | Status: SHIPPED | OUTPATIENT
Start: 2022-01-04 | End: 2022-03-08

## 2022-01-12 RX ORDER — IPRATROPIUM BROMIDE AND ALBUTEROL SULFATE 2.5; .5 MG/3ML; MG/3ML
SOLUTION RESPIRATORY (INHALATION)
Qty: 360 ML | Refills: 1 | Status: SHIPPED | OUTPATIENT
Start: 2022-01-12 | End: 2022-03-08

## 2022-01-12 NOTE — TELEPHONE ENCOUNTER
Caller: Farida Cano    Relationship: Emergency Contact    Best call back number: [phone] (H)    Requested Prescriptions:   Requested Prescriptions     Pending Prescriptions Disp Refills   • ipratropium-albuterol (DUO-NEB) 0.5-2.5 mg/3 ml nebulizer 360 mL 1        Pharmacy where request should be sent: [facility] [address] [phone] [facility] [phone]      Additional details provided by patient: PATIENT IS OUT OF MEDICATION     Does the patient have less than a 3 day supply:  [x] Yes  [] No    uJdith Martin Rep   01/12/22 09:44 EST

## 2022-01-12 NOTE — TELEPHONE ENCOUNTER
Rx Refill Note  Requested Prescriptions     Pending Prescriptions Disp Refills   • ipratropium-albuterol (DUO-NEB) 0.5-2.5 mg/3 ml nebulizer 360 mL 1      Last office visit with prescribing clinician: 10/15/2021      Next office visit with prescribing clinician: 4/18/2022            TIFFANY HAMILTON MA  01/12/22, 10:36 EST

## 2022-01-27 DIAGNOSIS — J43.2 CENTRILOBULAR EMPHYSEMA: ICD-10-CM

## 2022-01-27 RX ORDER — ALBUTEROL SULFATE 90 UG/1
AEROSOL, METERED RESPIRATORY (INHALATION)
Qty: 18 G | Refills: 1 | Status: SHIPPED | OUTPATIENT
Start: 2022-01-27 | End: 2022-02-09 | Stop reason: SDUPTHER

## 2022-01-27 NOTE — TELEPHONE ENCOUNTER
Rx Refill Note  Requested Prescriptions     Pending Prescriptions Disp Refills   • albuterol sulfate  (90 Base) MCG/ACT inhaler [Pharmacy Med Name: ALBUTEROL HFA 90 MCG INHALER] 18 g 1     Sig: INHALE TWO PUFFS BY MOUTH EVERY 4 HOURS AS NEEDED FOR WHEEZING OR SHORTNESS OF AIR      Last office visit with prescribing clinician: 10/15/2021      Next office visit with prescribing clinician: 4/18/2022            TIFFANY HAMILTON MA  01/27/22, 12:02 EST

## 2022-02-09 DIAGNOSIS — J43.2 CENTRILOBULAR EMPHYSEMA: ICD-10-CM

## 2022-02-09 RX ORDER — ALBUTEROL SULFATE 90 UG/1
2 AEROSOL, METERED RESPIRATORY (INHALATION) EVERY 4 HOURS PRN
Qty: 18 G | Refills: 1 | Status: SHIPPED | OUTPATIENT
Start: 2022-02-09 | End: 2022-04-29

## 2022-02-09 NOTE — TELEPHONE ENCOUNTER
Caller: Farida Cano    Relationship: Emergency Contact    Best call back number: [phone]    Requested Prescriptions:   Requested Prescriptions     Pending Prescriptions Disp Refills   • albuterol sulfate  (90 Base) MCG/ACT inhaler 18 g 1     Sig: Inhale 2 puffs Every 4 (Four) Hours As Needed for Wheezing or Shortness of Air.        Pharmacy where request should be sent: [facility] [address] [phone] [facility] [phone] FX     Additional details provided by patient: PATIENT IS OUT OF INHALER    Does the patient have less than a 3 day supply:  [x] Yes  [] No    Judith Wilburn Rep   02/09/22 13:56 EST

## 2022-03-08 RX ORDER — IPRATROPIUM BROMIDE AND ALBUTEROL SULFATE 2.5; .5 MG/3ML; MG/3ML
SOLUTION RESPIRATORY (INHALATION)
Qty: 360 ML | Refills: 1 | Status: SHIPPED | OUTPATIENT
Start: 2022-03-08 | End: 2022-03-28

## 2022-03-08 RX ORDER — METHOCARBAMOL 500 MG/1
TABLET, FILM COATED ORAL
Qty: 60 TABLET | Refills: 2 | Status: SHIPPED | OUTPATIENT
Start: 2022-03-08 | End: 2022-03-28

## 2022-03-08 NOTE — TELEPHONE ENCOUNTER
Rx Refill Note  Requested Prescriptions     Pending Prescriptions Disp Refills   • methocarbamol (ROBAXIN) 500 MG tablet [Pharmacy Med Name: METHOCARBAMOL 500 MG TABLET] 60 tablet 2     Sig: TAKE ONE TABLET BY MOUTH THREE TIMES A DAY AS NEEDED FOR MUSCLE SPASMS   • ipratropium-albuterol (DUO-NEB) 0.5-2.5 mg/3 ml nebulizer [Pharmacy Med Name: IPRAT-ALBUT 0.5-3 MG/3 ML (60 VLS)] 360 mL 1     Sig: INHALE THREE MILLILITERS VIA NEBULIZATION BY MOUTH FOUR TIMES A DAY      Last office visit with prescribing clinician: 10/15/2021      Next office visit with prescribing clinician: 4/18/2022            TIFFANY HAMILTON MA  03/08/22, 14:23 EST

## 2022-03-09 RX ORDER — BISOPROLOL FUMARATE 10 MG/1
TABLET, FILM COATED ORAL
Qty: 90 TABLET | Refills: 1 | Status: SHIPPED | OUTPATIENT
Start: 2022-03-09 | End: 2022-09-30

## 2022-03-28 RX ORDER — METHOCARBAMOL 500 MG/1
TABLET, FILM COATED ORAL
Qty: 60 TABLET | Refills: 2 | Status: SHIPPED | OUTPATIENT
Start: 2022-03-28

## 2022-03-28 RX ORDER — IPRATROPIUM BROMIDE AND ALBUTEROL SULFATE 2.5; .5 MG/3ML; MG/3ML
SOLUTION RESPIRATORY (INHALATION)
Qty: 360 ML | Refills: 1 | Status: SHIPPED | OUTPATIENT
Start: 2022-03-28 | End: 2022-07-08 | Stop reason: SDUPTHER

## 2022-04-01 RX ORDER — DILTIAZEM HYDROCHLORIDE 180 MG/1
CAPSULE, EXTENDED RELEASE ORAL
Qty: 90 CAPSULE | Refills: 1 | Status: SHIPPED | OUTPATIENT
Start: 2022-04-01 | End: 2022-11-03

## 2022-04-04 RX ORDER — BUDESONIDE AND FORMOTEROL FUMARATE DIHYDRATE 160; 4.5 UG/1; UG/1
AEROSOL RESPIRATORY (INHALATION)
Qty: 10.2 G | Refills: 6 | Status: SHIPPED | OUTPATIENT
Start: 2022-04-04 | End: 2022-07-08 | Stop reason: SDUPTHER

## 2022-04-04 NOTE — TELEPHONE ENCOUNTER
Rx Refill Note  Requested Prescriptions     Pending Prescriptions Disp Refills   • Symbicort 160-4.5 MCG/ACT inhaler [Pharmacy Med Name: SYMBICORT 160-4.5 MCG INHALER] 10.2 g 6     Sig: INHALE TWO PUFFS BY MOUTH TWICE A DAY      Last office visit with prescribing clinician: 10/15/2021      Next office visit with prescribing clinician: 4/18/2022            Kirstin Bell LPN  04/04/22, 10:27 EDT

## 2022-04-29 DIAGNOSIS — I48.0 PAROXYSMAL ATRIAL FIBRILLATION: ICD-10-CM

## 2022-04-29 DIAGNOSIS — E78.2 MIXED HYPERLIPIDEMIA: Primary | ICD-10-CM

## 2022-04-29 DIAGNOSIS — J43.2 CENTRILOBULAR EMPHYSEMA: ICD-10-CM

## 2022-04-29 RX ORDER — ALBUTEROL SULFATE 90 UG/1
AEROSOL, METERED RESPIRATORY (INHALATION)
Qty: 18 G | Refills: 1 | Status: SHIPPED | OUTPATIENT
Start: 2022-04-29 | End: 2022-06-20

## 2022-04-29 RX ORDER — SIMVASTATIN 40 MG
TABLET ORAL
Qty: 90 TABLET | Refills: 1 | Status: SHIPPED | OUTPATIENT
Start: 2022-04-29 | End: 2022-11-03

## 2022-04-29 RX ORDER — APIXABAN 2.5 MG/1
TABLET, FILM COATED ORAL
Qty: 180 TABLET | Refills: 1 | Status: SHIPPED | OUTPATIENT
Start: 2022-04-29 | End: 2022-11-09

## 2022-04-29 NOTE — TELEPHONE ENCOUNTER
Rx Refill Note  Requested Prescriptions     Pending Prescriptions Disp Refills   • albuterol sulfate  (90 Base) MCG/ACT inhaler [Pharmacy Med Name: ALBUTEROL HFA 90 MCG INHALER] 18 g 1     Sig: INHALE TWO PUFFS BY MOUTH EVERY 4 HOURS AS NEEDED FOR WHEEZING OR SHORTNESS OF AIR   • Eliquis 2.5 MG tablet tablet [Pharmacy Med Name: ELIQUIS 2.5 MG TABLET] 180 tablet 1     Sig: TAKE ONE TABLET BY MOUTH EVERY 12 HOURS   • simvastatin (ZOCOR) 40 MG tablet [Pharmacy Med Name: SIMVASTATIN 40 MG TABLET] 90 tablet 1     Sig: TAKE ONE TABLET BY MOUTH ONCE NIGHTLY      Last office visit with prescribing clinician: 10/15/2021      Next office visit with prescribing clinician: Visit date not found            TIFFANY HAMILTON MA  04/29/22, 13:34 EDT

## 2022-06-02 RX ORDER — TAMSULOSIN HYDROCHLORIDE 0.4 MG/1
CAPSULE ORAL
Qty: 90 CAPSULE | Refills: 1 | Status: SHIPPED | OUTPATIENT
Start: 2022-06-02 | End: 2022-12-06

## 2022-06-02 RX ORDER — FAMOTIDINE 40 MG/1
TABLET, FILM COATED ORAL
Qty: 90 TABLET | Refills: 1 | Status: SHIPPED | OUTPATIENT
Start: 2022-06-02 | End: 2022-12-06

## 2022-06-14 RX ORDER — DULOXETIN HYDROCHLORIDE 60 MG/1
60 CAPSULE, DELAYED RELEASE ORAL DAILY
Qty: 30 CAPSULE | Refills: 0 | Status: SHIPPED | OUTPATIENT
Start: 2022-06-14 | End: 2022-08-08

## 2022-06-18 DIAGNOSIS — J43.2 CENTRILOBULAR EMPHYSEMA: ICD-10-CM

## 2022-06-20 RX ORDER — ALBUTEROL SULFATE 90 UG/1
AEROSOL, METERED RESPIRATORY (INHALATION)
Qty: 18 G | Refills: 1 | Status: SHIPPED | OUTPATIENT
Start: 2022-06-20 | End: 2022-08-08

## 2022-06-20 NOTE — TELEPHONE ENCOUNTER
Rx Refill Note  Requested Prescriptions     Pending Prescriptions Disp Refills   • albuterol sulfate  (90 Base) MCG/ACT inhaler [Pharmacy Med Name: ALBUTEROL HFA 90 MCG INHALER] 18 g 1     Sig: INHALE TWO PUFFS BY MOUTH EVERY 4 HOURS AS NEEDED FOR WHEEZING OR SHORTNESS OF AIR      Last office visit with prescribing clinician: 10/15/2021      Next office visit with prescribing clinician: Visit date not found            Brenda Christine LPN  06/20/22, 12:26 EDT  
all other ROS negative except as per HPI

## 2022-07-05 RX ORDER — IPRATROPIUM BROMIDE AND ALBUTEROL SULFATE 2.5; .5 MG/3ML; MG/3ML
SOLUTION RESPIRATORY (INHALATION)
Qty: 360 ML | Refills: 1 | OUTPATIENT
Start: 2022-07-05

## 2022-07-08 ENCOUNTER — OFFICE VISIT (OUTPATIENT)
Dept: INTERNAL MEDICINE | Facility: CLINIC | Age: 87
End: 2022-07-08

## 2022-07-08 VITALS
SYSTOLIC BLOOD PRESSURE: 132 MMHG | BODY MASS INDEX: 29.52 KG/M2 | TEMPERATURE: 97.8 F | HEART RATE: 83 BPM | HEIGHT: 74 IN | OXYGEN SATURATION: 93 % | DIASTOLIC BLOOD PRESSURE: 84 MMHG | WEIGHT: 230 LBS

## 2022-07-08 DIAGNOSIS — E78.2 MIXED HYPERLIPIDEMIA: ICD-10-CM

## 2022-07-08 DIAGNOSIS — I10 PRIMARY HYPERTENSION: ICD-10-CM

## 2022-07-08 DIAGNOSIS — J43.2 CENTRILOBULAR EMPHYSEMA: ICD-10-CM

## 2022-07-08 DIAGNOSIS — L84 CALLUS OF HEEL: ICD-10-CM

## 2022-07-08 DIAGNOSIS — I48.0 PAROXYSMAL ATRIAL FIBRILLATION: ICD-10-CM

## 2022-07-08 PROCEDURE — 99214 OFFICE O/P EST MOD 30 MIN: CPT | Performed by: INTERNAL MEDICINE

## 2022-07-08 RX ORDER — ROPINIROLE 0.25 MG/1
0.25 TABLET, FILM COATED ORAL 2 TIMES DAILY
Qty: 60 TABLET | Refills: 3 | Status: SHIPPED | OUTPATIENT
Start: 2022-07-08 | End: 2022-09-23

## 2022-07-08 RX ORDER — BUDESONIDE AND FORMOTEROL FUMARATE DIHYDRATE 160; 4.5 UG/1; UG/1
2 AEROSOL RESPIRATORY (INHALATION) 2 TIMES DAILY
Qty: 10.2 G | Refills: 6 | Status: SHIPPED | OUTPATIENT
Start: 2022-07-08

## 2022-07-08 RX ORDER — IPRATROPIUM BROMIDE AND ALBUTEROL SULFATE 2.5; .5 MG/3ML; MG/3ML
SOLUTION RESPIRATORY (INHALATION)
Qty: 360 ML | Refills: 1 | Status: SHIPPED | OUTPATIENT
Start: 2022-07-08 | End: 2022-08-23

## 2022-07-08 NOTE — PROGRESS NOTES
Chief Complaint   Patient presents with   • Follow-up     For HTN, HLD, and med refills. Would like to discuss RLS.      Subjective   Ed Spear is a 86 y.o. male.     Here today for follow up of HTN, HLD, Afib, COPD, LBP, vit d/B12 def.  HTN/HLD/Afib- BP and HR are well controlled today.  He does not check BP.  He denies CP, palpitations.  He is having some edema.   He takes his zocor every PM.  He does not eat a HH diet.  Chronically short of breath due to COPD  COPD- he is using nebulizer 3 times a day with duoneb. He also uses Symbicort inhaler daily. He continues to smoke 1 pack of cigarettes daily and is not interested in quitting  LBP- he continues to have neck and back pain.  He takes tylenol 500 mg 2 tabs TID and is on 60 mg dose of duloxetine.   He has seen Dr. Antoine and had 2 epidurals thus far, states they were not helpful. He does apply absorpine jignesh to areas and it helps some.  He has been having restless legs for quite a long time.    Vit B12/D def- he is taking vit D 1000 u daily, B12 1000 u MWF  BPH-currently on Flomax.    He may awaken twice a night to urinate.  He has a problem with spraying now when he urinates.   Skin cancer- he had skin cancer removed from dorsum of right hand.   HCM- immunizations are UTD except shingrix. He did get his COVID vaccines but no boosters.  He has a sore on left heel for a few weeks.  Daughter has been putting some moleskin over area but it has not been very helpful.  His heel is quite tender.         The following portions of the patient's history were reviewed and updated as appropriate: allergies, current medications, past family history, past medical history, past social history, past surgical history and problem list.    Review of Systems   Constitutional: Negative for activity change, appetite change and unexpected weight change.   Eyes: Negative for visual disturbance.   Respiratory: Positive for cough, shortness of breath and wheezing.   "  Cardiovascular: Negative for chest pain, palpitations and leg swelling.   Gastrointestinal: Negative for abdominal pain.   Genitourinary: Negative for hematuria.   Musculoskeletal: Positive for back pain and neck pain.        Restless legs  Left heel pain   Skin:        Callus/corn on left heel   Neurological: Negative for headaches.   Psychiatric/Behavioral: Negative for dysphoric mood and sleep disturbance. The patient is not nervous/anxious.        Objective   /84   Pulse 83   Temp 97.8 °F (36.6 °C)   Ht 188 cm (74.02\")   Wt 104 kg (230 lb)   SpO2 93%   BMI 29.51 kg/m²   Body mass index is 29.51 kg/m².  Physical Exam  Vitals and nursing note reviewed.   Constitutional:       General: He is not in acute distress.     Appearance: Normal appearance. He is well-developed. He is not ill-appearing.      Comments: Kind and pleasant man, appears stated age and in NAD today   HENT:      Head: Normocephalic and atraumatic.      Right Ear: External ear normal.      Left Ear: External ear normal.      Ears:      Comments: Hard of hearing  Eyes:      General:         Right eye: No discharge.         Left eye: No discharge.      Extraocular Movements: Extraocular movements intact.      Conjunctiva/sclera: Conjunctivae normal.   Neck:      Thyroid: No thyromegaly.      Vascular: No carotid bruit.      Comments: No thyromegaly or mass  Cardiovascular:      Rate and Rhythm: Normal rate and regular rhythm.      Pulses: Normal pulses.      Heart sounds: Normal heart sounds. No murmur heard.  Pulmonary:      Effort: Pulmonary effort is normal. No respiratory distress.      Breath sounds: Wheezing present.      Comments: Diffuse wheezes throughout all lung fields with slightly diminished breath sounds  Abdominal:      General: Bowel sounds are normal. There is no distension.      Palpations: Abdomen is soft.      Tenderness: There is no abdominal tenderness.   Musculoskeletal:         General: Normal range of motion.    "   Cervical back: Normal range of motion and neck supple.      Right lower leg: No edema.      Left lower leg: No edema.   Lymphadenopathy:      Cervical: No cervical adenopathy.   Skin:     General: Skin is warm.      Findings: No rash.      Comments: Left heel with 1 cm area of corn that is tender to palpation, just medial to this there is a V shaped superficial abrasion, entire heel is dried and peeling  Arms with diffuse tan/brown macular lesions consistent with AK   Neurological:      General: No focal deficit present.      Mental Status: He is alert and oriented to person, place, and time. Mental status is at baseline.      Cranial Nerves: No cranial nerve deficit.      Motor: Weakness present.      Coordination: Coordination normal.      Gait: Gait abnormal.      Comments: Currently not ambulatory due to shortness of breath and back pain, using a transport chair in clinic although he uses a cane outside of clinic   Psychiatric:         Mood and Affect: Mood normal.         Behavior: Behavior normal.         Thought Content: Thought content normal.         Judgment: Judgment normal.         Assessment & Plan   Ed Spear is here today and the following problems have been addressed:      Diagnoses and all orders for this visit:    1. Primary hypertension    2. Mixed hyperlipidemia    3. Paroxysmal atrial fibrillation (HCC)    4. Centrilobular emphysema (HCC)    5. Callus of heel    Other orders  -     ipratropium-albuterol (DUO-NEB) 0.5-2.5 mg/3 ml nebulizer; Inhale 3 mL via neb machine by mouth 4 times daily.  Dispense: 360 mL; Refill: 1  -     budesonide-formoterol (Symbicort) 160-4.5 MCG/ACT inhaler; Inhale 2 puffs 2 (Two) Times a Day.  Dispense: 10.2 g; Refill: 6  -     rOPINIRole (Requip) 0.25 MG tablet; Take 1 tablet by mouth 2 (Two) Times a Day. Take 1 hour before bedtime.  Dispense: 60 tablet; Refill: 3        Follow heart healthy/low salt/low-cholesterol diet  Avoid processed foods  Monitor blood  pressure on occasion  Take all medications as prescribed  Refill of Symbicort and DuoNeb provided, patient has not been taking Symbicort lately as he ran out  He remains on duloxetine and Robaxin for chronic back pain, he also uses over-the-counter Absorbine Jr. and states that is helpful.  He still has rather bad back pain and states that epidural injections were not helpful.  Continue Tylenol extra strength 2 tablets 3 times daily  We will start Requip low-dose 0.25 mg twice daily for his restless leg syndrome.  If this medication is not helpful I will consider gabapentin on follow-up visit in a few months as this will help both his restless legs as well as his chronic back pain issues  I offered referral to pain clinic however patient is not interested in seeing another doctor as it is too painful to leave his house and move around much  I recommend medicated corn pad to area on his left heel, change daily-his daughter will work on left heel foot care.  He declined offer to see podiatrist again because he does not want to leave house  Continue simvastatin for hyperlipidemia  Labs due on next office visit with Medicare wellness    Return in about 3 months (around 10/8/2022) for Medicare Wellness.      Marilyn K. Vermeesch, MD      Please note that portions of this note were completed with a voice recognition program.  Efforts were made to edit dictation, but occasionally words are mistranscribed.

## 2022-08-08 DIAGNOSIS — J43.2 CENTRILOBULAR EMPHYSEMA: ICD-10-CM

## 2022-08-08 RX ORDER — ALBUTEROL SULFATE 90 UG/1
AEROSOL, METERED RESPIRATORY (INHALATION)
Qty: 18 G | Refills: 1 | Status: SHIPPED | OUTPATIENT
Start: 2022-08-08 | End: 2022-11-09

## 2022-08-08 RX ORDER — DULOXETIN HYDROCHLORIDE 60 MG/1
CAPSULE, DELAYED RELEASE ORAL
Qty: 30 CAPSULE | Refills: 0 | Status: SHIPPED | OUTPATIENT
Start: 2022-08-08 | End: 2022-09-06

## 2022-08-23 RX ORDER — IPRATROPIUM BROMIDE AND ALBUTEROL SULFATE 2.5; .5 MG/3ML; MG/3ML
SOLUTION RESPIRATORY (INHALATION)
Qty: 360 ML | Refills: 1 | Status: SHIPPED | OUTPATIENT
Start: 2022-08-23 | End: 2022-08-25 | Stop reason: SDUPTHER

## 2022-08-23 NOTE — TELEPHONE ENCOUNTER
Rx Refill Note  Requested Prescriptions     Pending Prescriptions Disp Refills   • ipratropium-albuterol (DUO-NEB) 0.5-2.5 mg/3 ml nebulizer [Pharmacy Med Name: IPRAT-ALBUT 0.5-3 MG/3 ML (60 VLS)] 360 mL 1     Sig: INHALE THREE MILLILITERS ( ONE VIAL )  VIA NEBULIZATION BY MOUTH FOUR TIMES A DAY      Last office visit with prescribing clinician: 7/8/2022      Next office visit with prescribing clinician: 10/18/2022            Terra Linares MA  08/23/22, 13:31 EDT

## 2022-08-25 ENCOUNTER — TELEPHONE (OUTPATIENT)
Dept: INTERNAL MEDICINE | Facility: CLINIC | Age: 87
End: 2022-08-25

## 2022-08-25 RX ORDER — IPRATROPIUM BROMIDE AND ALBUTEROL SULFATE 2.5; .5 MG/3ML; MG/3ML
SOLUTION RESPIRATORY (INHALATION)
Qty: 3 ML | Refills: 6 | Status: SHIPPED | OUTPATIENT
Start: 2022-08-25

## 2022-08-25 NOTE — TELEPHONE ENCOUNTER
Rx Refill Note  Requested Prescriptions     Pending Prescriptions Disp Refills   • ipratropium-albuterol (DUO-NEB) 0.5-2.5 mg/3 ml nebulizer 360 mL 1     Sig: INHALE THREE MILLILITERS ( ONE VIAL )  VIA NEBULIZATION BY MOUTH FOUR TIMES A DAY      Last office visit with prescribing clinician: 7/8/2022      Next office visit with prescribing clinician: 10/18/2022            Brenda Christine LPN  08/25/22, 12:43 EDT

## 2022-08-25 NOTE — TELEPHONE ENCOUNTER
Caller: Farida Cano    Relationship: Emergency Contact    Best call back number:[phone]    Requested Prescriptions:   Requested Prescriptions     Pending Prescriptions Disp Refills   • ipratropium-albuterol (DUO-NEB) 0.5-2.5 mg/3 ml nebulizer 360 mL 1     Sig: INHALE THREE MILLILITERS ( ONE VIAL )  VIA NEBULIZATION BY MOUTH FOUR TIMES A DAY        Pharmacy where request should be sent: [facility] [place] [id number] AT [facility] [phone] [facility] [phone] FX     Additional details provided by patient:     Does the patient have less than a 3 day supply:  [x] Yes  [] No    Judith Darby Rep   08/25/22 12:38 EDT

## 2022-09-06 RX ORDER — DULOXETIN HYDROCHLORIDE 60 MG/1
CAPSULE, DELAYED RELEASE ORAL
Qty: 90 CAPSULE | Refills: 0 | Status: SHIPPED | OUTPATIENT
Start: 2022-09-06 | End: 2022-12-06

## 2022-09-23 RX ORDER — ROPINIROLE 0.25 MG/1
TABLET, FILM COATED ORAL
Qty: 60 TABLET | Refills: 3 | Status: SHIPPED | OUTPATIENT
Start: 2022-09-23

## 2022-09-23 NOTE — TELEPHONE ENCOUNTER
Rx Refill Note  Requested Prescriptions     Pending Prescriptions Disp Refills   • rOPINIRole (REQUIP) 0.25 MG tablet [Pharmacy Med Name: rOPINIRole HCL 0.25 MG TABLET] 60 tablet 3     Sig: TAKE ONE TABLET IN THE MORNING AND ONE TABLET AT NIGHT 1 HOUR BEFORE BEDTIME.      Last office visit with prescribing clinician: 7/8/2022      Next office visit with prescribing clinician: 10/18/2022            Brenda Christine LPN  09/23/22, 13:43 EDT

## 2022-09-30 RX ORDER — BISOPROLOL FUMARATE 10 MG/1
TABLET, FILM COATED ORAL
Qty: 90 TABLET | Refills: 1 | Status: SHIPPED | OUTPATIENT
Start: 2022-09-30

## 2022-10-28 ENCOUNTER — HOSPITAL ENCOUNTER (INPATIENT)
Facility: HOSPITAL | Age: 87
LOS: 4 days | Discharge: HOME | End: 2022-11-01
Attending: EMERGENCY MEDICINE | Admitting: FAMILY MEDICINE

## 2022-10-28 ENCOUNTER — APPOINTMENT (OUTPATIENT)
Dept: GENERAL RADIOLOGY | Facility: HOSPITAL | Age: 87
End: 2022-10-28

## 2022-10-28 DIAGNOSIS — J10.1 INFLUENZA A: Primary | ICD-10-CM

## 2022-10-28 DIAGNOSIS — J44.1 COPD WITH ACUTE EXACERBATION: ICD-10-CM

## 2022-10-28 DIAGNOSIS — J96.02 ACUTE RESPIRATORY FAILURE WITH HYPOXIA AND HYPERCAPNIA: ICD-10-CM

## 2022-10-28 DIAGNOSIS — J96.01 ACUTE RESPIRATORY FAILURE WITH HYPOXIA: ICD-10-CM

## 2022-10-28 DIAGNOSIS — J44.1 COPD EXACERBATION: ICD-10-CM

## 2022-10-28 DIAGNOSIS — J96.01 ACUTE RESPIRATORY FAILURE WITH HYPOXIA AND HYPERCAPNIA: ICD-10-CM

## 2022-10-28 LAB
A-A DO2: ABNORMAL
ALBUMIN SERPL-MCNC: 4.1 G/DL (ref 3.5–5.2)
ALBUMIN/GLOB SERPL: 1.2 G/DL
ALP SERPL-CCNC: 67 U/L (ref 39–117)
ALT SERPL W P-5'-P-CCNC: 12 U/L (ref 1–41)
ANION GAP SERPL CALCULATED.3IONS-SCNC: 9.4 MMOL/L (ref 5–15)
ANISOCYTOSIS BLD QL: NORMAL
ARTERIAL PATENCY WRIST A: ABNORMAL
AST SERPL-CCNC: 17 U/L (ref 1–40)
ATMOSPHERIC PRESS: 741 MMHG
B PARAPERT DNA SPEC QL NAA+PROBE: NOT DETECTED
B PERT DNA SPEC QL NAA+PROBE: NOT DETECTED
BASE EXCESS BLDA CALC-SCNC: 0.2 MMOL/L (ref 0–2)
BASOPHILS # BLD AUTO: 0.04 10*3/MM3 (ref 0–0.2)
BASOPHILS NFR BLD AUTO: 0.5 % (ref 0–1.5)
BDY SITE: ABNORMAL
BILIRUB SERPL-MCNC: 0.4 MG/DL (ref 0–1.2)
BUN SERPL-MCNC: 18 MG/DL (ref 8–23)
BUN/CREAT SERPL: 15.5 (ref 7–25)
C PNEUM DNA NPH QL NAA+NON-PROBE: NOT DETECTED
CALCIUM SPEC-SCNC: 8.5 MG/DL (ref 8.6–10.5)
CHLORIDE SERPL-SCNC: 96 MMOL/L (ref 98–107)
CO2 SERPL-SCNC: 26.6 MMOL/L (ref 22–29)
COHGB MFR BLD: 2.5 % (ref 0–2)
CREAT SERPL-MCNC: 1.16 MG/DL (ref 0.76–1.27)
DEPRECATED RDW RBC AUTO: 56.4 FL (ref 37–54)
EGFRCR SERPLBLD CKD-EPI 2021: 62.1 ML/MIN/1.73
EOSINOPHIL # BLD AUTO: 0 10*3/MM3 (ref 0–0.4)
EOSINOPHIL NFR BLD AUTO: 0 % (ref 0.3–6.2)
ERYTHROCYTE [DISTWIDTH] IN BLOOD BY AUTOMATED COUNT: 20.9 % (ref 12.3–15.4)
FLUAV H3 RNA NPH QL NAA+PROBE: DETECTED
FLUBV RNA ISLT QL NAA+PROBE: NOT DETECTED
GAS FLOW AIRWAY: 5 LPM
GLOBULIN UR ELPH-MCNC: 3.3 GM/DL
GLUCOSE SERPL-MCNC: 152 MG/DL (ref 65–99)
HADV DNA SPEC NAA+PROBE: NOT DETECTED
HCO3 BLDA-SCNC: 27.1 MMOL/L (ref 22–28)
HCOV 229E RNA SPEC QL NAA+PROBE: NOT DETECTED
HCOV HKU1 RNA SPEC QL NAA+PROBE: NOT DETECTED
HCOV NL63 RNA SPEC QL NAA+PROBE: NOT DETECTED
HCOV OC43 RNA SPEC QL NAA+PROBE: NOT DETECTED
HCT VFR BLD AUTO: 31 % (ref 37.5–51)
HCT VFR BLD CALC: 28.4 %
HGB BLD-MCNC: 9.2 G/DL (ref 13–17.7)
HMPV RNA NPH QL NAA+NON-PROBE: NOT DETECTED
HOLD SPECIMEN: NORMAL
HOLD SPECIMEN: NORMAL
HPIV1 RNA ISLT QL NAA+PROBE: NOT DETECTED
HPIV2 RNA SPEC QL NAA+PROBE: NOT DETECTED
HPIV3 RNA NPH QL NAA+PROBE: NOT DETECTED
HPIV4 P GENE NPH QL NAA+PROBE: NOT DETECTED
HYPOCHROMIA BLD QL: NORMAL
IMM GRANULOCYTES # BLD AUTO: 0.02 10*3/MM3 (ref 0–0.05)
IMM GRANULOCYTES NFR BLD AUTO: 0.3 % (ref 0–0.5)
IRON 24H UR-MRATE: 15 MCG/DL (ref 59–158)
IRON SATN MFR SERPL: 3 % (ref 20–50)
LYMPHOCYTES # BLD AUTO: 1.06 10*3/MM3 (ref 0.7–3.1)
LYMPHOCYTES NFR BLD AUTO: 13.5 % (ref 19.6–45.3)
Lab: ABNORMAL
M PNEUMO IGG SER IA-ACNC: NOT DETECTED
MCH RBC QN AUTO: 22.5 PG (ref 26.6–33)
MCHC RBC AUTO-ENTMCNC: 29.7 G/DL (ref 31.5–35.7)
MCV RBC AUTO: 75.8 FL (ref 79–97)
METHGB BLD QL: 0.5 % (ref 0–1.5)
MODALITY: ABNORMAL
MONOCYTES # BLD AUTO: 0.87 10*3/MM3 (ref 0.1–0.9)
MONOCYTES NFR BLD AUTO: 11.1 % (ref 5–12)
NEUTROPHILS NFR BLD AUTO: 5.85 10*3/MM3 (ref 1.7–7)
NEUTROPHILS NFR BLD AUTO: 74.6 % (ref 42.7–76)
NOTE: ABNORMAL
NRBC BLD AUTO-RTO: 0 /100 WBC (ref 0–0.2)
NT-PROBNP SERPL-MCNC: 3201 PG/ML (ref 0–1800)
OXYHGB MFR BLDV: 92.9 % (ref 94–99)
PCO2 BLDA: 55.5 MM HG (ref 35–45)
PCO2 TEMP ADJ BLD: ABNORMAL MM[HG]
PH BLDA: 7.3 PH UNITS (ref 7.35–7.45)
PH, TEMP CORRECTED: ABNORMAL
PLATELET # BLD AUTO: 310 10*3/MM3 (ref 140–450)
PMV BLD AUTO: 9.3 FL (ref 6–12)
PO2 BLDA: 90.5 MM HG (ref 75–100)
PO2 TEMP ADJ BLD: ABNORMAL MM[HG]
POTASSIUM SERPL-SCNC: 4.3 MMOL/L (ref 3.5–5.2)
PROCALCITONIN SERPL-MCNC: 0.09 NG/ML (ref 0–0.25)
PROT SERPL-MCNC: 7.4 G/DL (ref 6–8.5)
RBC # BLD AUTO: 4.09 10*6/MM3 (ref 4.14–5.8)
RHINOVIRUS RNA SPEC NAA+PROBE: NOT DETECTED
RSV RNA NPH QL NAA+NON-PROBE: NOT DETECTED
SAO2 % BLDCOA: 95.8 % (ref 94–100)
SARS-COV-2 RNA NPH QL NAA+NON-PROBE: NOT DETECTED
SMALL PLATELETS BLD QL SMEAR: ADEQUATE
SODIUM SERPL-SCNC: 132 MMOL/L (ref 136–145)
TIBC SERPL-MCNC: 451 MCG/DL (ref 298–536)
TRANSFERRIN SERPL-MCNC: 303 MG/DL (ref 200–360)
TROPONIN T SERPL-MCNC: 0.02 NG/ML (ref 0–0.03)
VENTILATOR MODE: ABNORMAL
WBC MORPH BLD: NORMAL
WBC NRBC COR # BLD: 7.84 10*3/MM3 (ref 3.4–10.8)
WHOLE BLOOD HOLD COAG: NORMAL
WHOLE BLOOD HOLD SPECIMEN: NORMAL

## 2022-10-28 PROCEDURE — 94799 UNLISTED PULMONARY SVC/PX: CPT

## 2022-10-28 PROCEDURE — 36600 WITHDRAWAL OF ARTERIAL BLOOD: CPT

## 2022-10-28 PROCEDURE — 99285 EMERGENCY DEPT VISIT HI MDM: CPT

## 2022-10-28 PROCEDURE — 94640 AIRWAY INHALATION TREATMENT: CPT

## 2022-10-28 PROCEDURE — 84145 PROCALCITONIN (PCT): CPT | Performed by: PHYSICIAN ASSISTANT

## 2022-10-28 PROCEDURE — 82375 ASSAY CARBOXYHB QUANT: CPT

## 2022-10-28 PROCEDURE — 85025 COMPLETE CBC W/AUTO DIFF WBC: CPT | Performed by: EMERGENCY MEDICINE

## 2022-10-28 PROCEDURE — 71045 X-RAY EXAM CHEST 1 VIEW: CPT

## 2022-10-28 PROCEDURE — 84466 ASSAY OF TRANSFERRIN: CPT | Performed by: FAMILY MEDICINE

## 2022-10-28 PROCEDURE — 25010000002 METHYLPREDNISOLONE PER 40 MG: Performed by: FAMILY MEDICINE

## 2022-10-28 PROCEDURE — 93005 ELECTROCARDIOGRAM TRACING: CPT | Performed by: EMERGENCY MEDICINE

## 2022-10-28 PROCEDURE — 80053 COMPREHEN METABOLIC PANEL: CPT | Performed by: EMERGENCY MEDICINE

## 2022-10-28 PROCEDURE — 94761 N-INVAS EAR/PLS OXIMETRY MLT: CPT

## 2022-10-28 PROCEDURE — 94660 CPAP INITIATION&MGMT: CPT

## 2022-10-28 PROCEDURE — 83880 ASSAY OF NATRIURETIC PEPTIDE: CPT | Performed by: EMERGENCY MEDICINE

## 2022-10-28 PROCEDURE — 83540 ASSAY OF IRON: CPT | Performed by: FAMILY MEDICINE

## 2022-10-28 PROCEDURE — 83050 HGB METHEMOGLOBIN QUAN: CPT

## 2022-10-28 PROCEDURE — 25010000002 MAGNESIUM SULFATE 2 GM/50ML SOLUTION: Performed by: PHYSICIAN ASSISTANT

## 2022-10-28 PROCEDURE — 85007 BL SMEAR W/DIFF WBC COUNT: CPT | Performed by: EMERGENCY MEDICINE

## 2022-10-28 PROCEDURE — 99223 1ST HOSP IP/OBS HIGH 75: CPT | Performed by: FAMILY MEDICINE

## 2022-10-28 PROCEDURE — 84484 ASSAY OF TROPONIN QUANT: CPT | Performed by: EMERGENCY MEDICINE

## 2022-10-28 PROCEDURE — 82805 BLOOD GASES W/O2 SATURATION: CPT

## 2022-10-28 PROCEDURE — 94664 DEMO&/EVAL PT USE INHALER: CPT

## 2022-10-28 PROCEDURE — 25010000002 DEXAMETHASONE SODIUM PHOSPHATE 10 MG/ML SOLUTION: Performed by: PHYSICIAN ASSISTANT

## 2022-10-28 PROCEDURE — 25010000002 FUROSEMIDE PER 20 MG: Performed by: FAMILY MEDICINE

## 2022-10-28 PROCEDURE — 0202U NFCT DS 22 TRGT SARS-COV-2: CPT | Performed by: PHYSICIAN ASSISTANT

## 2022-10-28 RX ORDER — MAGNESIUM SULFATE HEPTAHYDRATE 40 MG/ML
2 INJECTION, SOLUTION INTRAVENOUS ONCE
Status: COMPLETED | OUTPATIENT
Start: 2022-10-28 | End: 2022-10-28

## 2022-10-28 RX ORDER — DULOXETIN HYDROCHLORIDE 30 MG/1
60 CAPSULE, DELAYED RELEASE ORAL DAILY
Status: DISCONTINUED | OUTPATIENT
Start: 2022-10-29 | End: 2022-11-01 | Stop reason: HOSPADM

## 2022-10-28 RX ORDER — IPRATROPIUM BROMIDE AND ALBUTEROL SULFATE 2.5; .5 MG/3ML; MG/3ML
3 SOLUTION RESPIRATORY (INHALATION)
Status: DISCONTINUED | OUTPATIENT
Start: 2022-10-28 | End: 2022-10-29

## 2022-10-28 RX ORDER — IPRATROPIUM BROMIDE AND ALBUTEROL SULFATE 2.5; .5 MG/3ML; MG/3ML
3 SOLUTION RESPIRATORY (INHALATION) EVERY 4 HOURS PRN
Status: DISCONTINUED | OUTPATIENT
Start: 2022-10-28 | End: 2022-11-01 | Stop reason: HOSPADM

## 2022-10-28 RX ORDER — ATORVASTATIN CALCIUM 20 MG/1
20 TABLET, FILM COATED ORAL DAILY
Status: DISCONTINUED | OUTPATIENT
Start: 2022-10-29 | End: 2022-11-01 | Stop reason: HOSPADM

## 2022-10-28 RX ORDER — FUROSEMIDE 10 MG/ML
20 INJECTION INTRAMUSCULAR; INTRAVENOUS ONCE
Status: COMPLETED | OUTPATIENT
Start: 2022-10-28 | End: 2022-10-28

## 2022-10-28 RX ORDER — SODIUM CHLORIDE 0.9 % (FLUSH) 0.9 %
10 SYRINGE (ML) INJECTION AS NEEDED
Status: DISCONTINUED | OUTPATIENT
Start: 2022-10-28 | End: 2022-11-01 | Stop reason: HOSPADM

## 2022-10-28 RX ORDER — ONDANSETRON 2 MG/ML
4 INJECTION INTRAMUSCULAR; INTRAVENOUS EVERY 6 HOURS PRN
Status: DISCONTINUED | OUTPATIENT
Start: 2022-10-28 | End: 2022-11-01 | Stop reason: HOSPADM

## 2022-10-28 RX ORDER — ONDANSETRON 4 MG/1
4 TABLET, FILM COATED ORAL EVERY 6 HOURS PRN
Status: DISCONTINUED | OUTPATIENT
Start: 2022-10-28 | End: 2022-11-01 | Stop reason: HOSPADM

## 2022-10-28 RX ORDER — ACETAMINOPHEN 325 MG/1
650 TABLET ORAL EVERY 4 HOURS PRN
Status: DISCONTINUED | OUTPATIENT
Start: 2022-10-28 | End: 2022-11-01 | Stop reason: HOSPADM

## 2022-10-28 RX ORDER — DEXAMETHASONE SODIUM PHOSPHATE 10 MG/ML
10 INJECTION, SOLUTION INTRAMUSCULAR; INTRAVENOUS ONCE
Status: COMPLETED | OUTPATIENT
Start: 2022-10-28 | End: 2022-10-28

## 2022-10-28 RX ORDER — TAMSULOSIN HYDROCHLORIDE 0.4 MG/1
0.4 CAPSULE ORAL DAILY
Status: DISCONTINUED | OUTPATIENT
Start: 2022-10-29 | End: 2022-11-01 | Stop reason: HOSPADM

## 2022-10-28 RX ORDER — BISOPROLOL FUMARATE 5 MG/1
5 TABLET, FILM COATED ORAL DAILY
Status: DISCONTINUED | OUTPATIENT
Start: 2022-10-29 | End: 2022-11-01 | Stop reason: HOSPADM

## 2022-10-28 RX ORDER — DILTIAZEM HYDROCHLORIDE 180 MG/1
180 CAPSULE, COATED, EXTENDED RELEASE ORAL DAILY
Status: DISCONTINUED | OUTPATIENT
Start: 2022-10-29 | End: 2022-11-01 | Stop reason: HOSPADM

## 2022-10-28 RX ORDER — ROPINIROLE 0.25 MG/1
0.25 TABLET, FILM COATED ORAL NIGHTLY
Status: DISCONTINUED | OUTPATIENT
Start: 2022-10-28 | End: 2022-11-01 | Stop reason: HOSPADM

## 2022-10-28 RX ORDER — IPRATROPIUM BROMIDE AND ALBUTEROL SULFATE 2.5; .5 MG/3ML; MG/3ML
6 SOLUTION RESPIRATORY (INHALATION) ONCE
Status: COMPLETED | OUTPATIENT
Start: 2022-10-28 | End: 2022-10-28

## 2022-10-28 RX ORDER — ALBUTEROL SULFATE 90 UG/1
2 AEROSOL, METERED RESPIRATORY (INHALATION) ONCE
Status: COMPLETED | OUTPATIENT
Start: 2022-10-28 | End: 2022-10-28

## 2022-10-28 RX ORDER — BUDESONIDE 0.5 MG/2ML
0.5 INHALANT ORAL
Status: DISCONTINUED | OUTPATIENT
Start: 2022-10-28 | End: 2022-11-01 | Stop reason: HOSPADM

## 2022-10-28 RX ORDER — ACETAMINOPHEN 650 MG/1
650 SUPPOSITORY RECTAL EVERY 4 HOURS PRN
Status: DISCONTINUED | OUTPATIENT
Start: 2022-10-28 | End: 2022-11-01 | Stop reason: HOSPADM

## 2022-10-28 RX ORDER — ACETAMINOPHEN 160 MG/5ML
650 SOLUTION ORAL EVERY 4 HOURS PRN
Status: DISCONTINUED | OUTPATIENT
Start: 2022-10-28 | End: 2022-11-01 | Stop reason: HOSPADM

## 2022-10-28 RX ORDER — METHYLPREDNISOLONE SODIUM SUCCINATE 40 MG/ML
40 INJECTION, POWDER, LYOPHILIZED, FOR SOLUTION INTRAMUSCULAR; INTRAVENOUS EVERY 8 HOURS
Status: DISCONTINUED | OUTPATIENT
Start: 2022-10-28 | End: 2022-10-29

## 2022-10-28 RX ORDER — SODIUM CHLORIDE 0.9 % (FLUSH) 0.9 %
10 SYRINGE (ML) INJECTION EVERY 12 HOURS SCHEDULED
Status: DISCONTINUED | OUTPATIENT
Start: 2022-10-28 | End: 2022-11-01 | Stop reason: HOSPADM

## 2022-10-28 RX ORDER — BENZONATATE 100 MG/1
200 CAPSULE ORAL 3 TIMES DAILY PRN
Status: DISCONTINUED | OUTPATIENT
Start: 2022-10-28 | End: 2022-11-01 | Stop reason: HOSPADM

## 2022-10-28 RX ORDER — OSELTAMIVIR PHOSPHATE 75 MG/1
75 CAPSULE ORAL EVERY 12 HOURS SCHEDULED
Status: DISCONTINUED | OUTPATIENT
Start: 2022-10-28 | End: 2022-11-01 | Stop reason: HOSPADM

## 2022-10-28 RX ORDER — FAMOTIDINE 20 MG/1
40 TABLET, FILM COATED ORAL NIGHTLY PRN
Status: DISCONTINUED | OUTPATIENT
Start: 2022-10-28 | End: 2022-11-01 | Stop reason: HOSPADM

## 2022-10-28 RX ADMIN — FUROSEMIDE 20 MG: 10 INJECTION, SOLUTION INTRAMUSCULAR; INTRAVENOUS at 22:43

## 2022-10-28 RX ADMIN — IPRATROPIUM BROMIDE AND ALBUTEROL SULFATE 6 ML: 2.5; .5 SOLUTION RESPIRATORY (INHALATION) at 19:33

## 2022-10-28 RX ADMIN — MAGNESIUM SULFATE HEPTAHYDRATE 2 G: 40 INJECTION, SOLUTION INTRAVENOUS at 17:56

## 2022-10-28 RX ADMIN — ROPINIROLE HYDROCHLORIDE 0.25 MG: 0.25 TABLET, FILM COATED ORAL at 22:43

## 2022-10-28 RX ADMIN — DEXAMETHASONE SODIUM PHOSPHATE 10 MG: 10 INJECTION INTRAMUSCULAR; INTRAVENOUS at 17:56

## 2022-10-28 RX ADMIN — FAMOTIDINE 40 MG: 20 TABLET ORAL at 22:43

## 2022-10-28 RX ADMIN — Medication 10 ML: at 22:44

## 2022-10-28 RX ADMIN — ALBUTEROL SULFATE 2 PUFF: 90 AEROSOL, METERED RESPIRATORY (INHALATION) at 18:36

## 2022-10-28 RX ADMIN — METHYLPREDNISOLONE SODIUM SUCCINATE 40 MG: 40 INJECTION, POWDER, FOR SOLUTION INTRAMUSCULAR; INTRAVENOUS at 22:43

## 2022-10-28 RX ADMIN — OSELTAMIVIR PHOSPHATE 75 MG: 75 CAPSULE ORAL at 19:58

## 2022-10-29 PROBLEM — L72.0 EPIDERMAL CYST OF FACE: Status: RESOLVED | Noted: 2017-02-22 | Resolved: 2022-10-29

## 2022-10-29 PROBLEM — Z00.00 ENCOUNTER FOR MEDICARE ANNUAL WELLNESS EXAM: Status: RESOLVED | Noted: 2019-10-31 | Resolved: 2022-10-29

## 2022-10-29 LAB
ANION GAP SERPL CALCULATED.3IONS-SCNC: 9.3 MMOL/L (ref 5–15)
BUN SERPL-MCNC: 21 MG/DL (ref 8–23)
BUN/CREAT SERPL: 21.6 (ref 7–25)
CALCIUM SPEC-SCNC: 8.5 MG/DL (ref 8.6–10.5)
CHLORIDE SERPL-SCNC: 97 MMOL/L (ref 98–107)
CO2 SERPL-SCNC: 26.7 MMOL/L (ref 22–29)
CREAT SERPL-MCNC: 0.97 MG/DL (ref 0.76–1.27)
DEPRECATED RDW RBC AUTO: 55.6 FL (ref 37–54)
EGFRCR SERPLBLD CKD-EPI 2021: 77 ML/MIN/1.73
ERYTHROCYTE [DISTWIDTH] IN BLOOD BY AUTOMATED COUNT: 20.6 % (ref 12.3–15.4)
GLUCOSE SERPL-MCNC: 145 MG/DL (ref 65–99)
HCT VFR BLD AUTO: 30.7 % (ref 37.5–51)
HGB BLD-MCNC: 9.1 G/DL (ref 13–17.7)
MCH RBC QN AUTO: 22.4 PG (ref 26.6–33)
MCHC RBC AUTO-ENTMCNC: 29.6 G/DL (ref 31.5–35.7)
MCV RBC AUTO: 75.4 FL (ref 79–97)
PLATELET # BLD AUTO: 268 10*3/MM3 (ref 140–450)
PMV BLD AUTO: 9.7 FL (ref 6–12)
POTASSIUM SERPL-SCNC: 4.7 MMOL/L (ref 3.5–5.2)
RBC # BLD AUTO: 4.07 10*6/MM3 (ref 4.14–5.8)
SODIUM SERPL-SCNC: 133 MMOL/L (ref 136–145)
WBC NRBC COR # BLD: 7.12 10*3/MM3 (ref 3.4–10.8)

## 2022-10-29 PROCEDURE — 94799 UNLISTED PULMONARY SVC/PX: CPT

## 2022-10-29 PROCEDURE — 25010000002 METHYLPREDNISOLONE PER 125 MG: Performed by: FAMILY MEDICINE

## 2022-10-29 PROCEDURE — 97161 PT EVAL LOW COMPLEX 20 MIN: CPT

## 2022-10-29 PROCEDURE — 80048 BASIC METABOLIC PNL TOTAL CA: CPT | Performed by: FAMILY MEDICINE

## 2022-10-29 PROCEDURE — 94660 CPAP INITIATION&MGMT: CPT

## 2022-10-29 PROCEDURE — 25010000002 METHYLPREDNISOLONE PER 40 MG: Performed by: FAMILY MEDICINE

## 2022-10-29 PROCEDURE — 99232 SBSQ HOSP IP/OBS MODERATE 35: CPT | Performed by: FAMILY MEDICINE

## 2022-10-29 PROCEDURE — 85027 COMPLETE CBC AUTOMATED: CPT | Performed by: FAMILY MEDICINE

## 2022-10-29 RX ORDER — METHYLPREDNISOLONE SODIUM SUCCINATE 125 MG/2ML
60 INJECTION, POWDER, LYOPHILIZED, FOR SOLUTION INTRAMUSCULAR; INTRAVENOUS EVERY 8 HOURS
Status: DISCONTINUED | OUTPATIENT
Start: 2022-10-29 | End: 2022-10-31

## 2022-10-29 RX ORDER — IPRATROPIUM BROMIDE AND ALBUTEROL SULFATE 2.5; .5 MG/3ML; MG/3ML
3 SOLUTION RESPIRATORY (INHALATION)
Status: DISCONTINUED | OUTPATIENT
Start: 2022-10-29 | End: 2022-11-01 | Stop reason: HOSPADM

## 2022-10-29 RX ORDER — FERROUS SULFATE TAB EC 324 MG (65 MG FE EQUIVALENT) 324 (65 FE) MG
324 TABLET DELAYED RESPONSE ORAL
Status: DISCONTINUED | OUTPATIENT
Start: 2022-10-30 | End: 2022-11-01 | Stop reason: HOSPADM

## 2022-10-29 RX ADMIN — IPRATROPIUM BROMIDE AND ALBUTEROL SULFATE 3 ML: 2.5; .5 SOLUTION RESPIRATORY (INHALATION) at 12:17

## 2022-10-29 RX ADMIN — ROPINIROLE HYDROCHLORIDE 0.25 MG: 0.25 TABLET, FILM COATED ORAL at 21:56

## 2022-10-29 RX ADMIN — METHYLPREDNISOLONE SODIUM SUCCINATE 40 MG: 40 INJECTION, POWDER, FOR SOLUTION INTRAMUSCULAR; INTRAVENOUS at 14:08

## 2022-10-29 RX ADMIN — BUDESONIDE 0.5 MG: 0.5 SUSPENSION RESPIRATORY (INHALATION) at 20:25

## 2022-10-29 RX ADMIN — BUDESONIDE 0.5 MG: 0.5 SUSPENSION RESPIRATORY (INHALATION) at 01:42

## 2022-10-29 RX ADMIN — ACETAMINOPHEN 650 MG: 325 TABLET, FILM COATED ORAL at 21:56

## 2022-10-29 RX ADMIN — DILTIAZEM HYDROCHLORIDE 180 MG: 180 CAPSULE, COATED, EXTENDED RELEASE ORAL at 10:06

## 2022-10-29 RX ADMIN — BENZONATATE 200 MG: 100 CAPSULE ORAL at 21:56

## 2022-10-29 RX ADMIN — DULOXETINE HYDROCHLORIDE 60 MG: 30 CAPSULE, DELAYED RELEASE ORAL at 10:06

## 2022-10-29 RX ADMIN — ATORVASTATIN CALCIUM 20 MG: 20 TABLET, FILM COATED ORAL at 10:06

## 2022-10-29 RX ADMIN — Medication 10 ML: at 10:07

## 2022-10-29 RX ADMIN — METHYLPREDNISOLONE SODIUM SUCCINATE 60 MG: 125 INJECTION, POWDER, FOR SOLUTION INTRAMUSCULAR; INTRAVENOUS at 21:56

## 2022-10-29 RX ADMIN — OSELTAMIVIR PHOSPHATE 75 MG: 75 CAPSULE ORAL at 10:06

## 2022-10-29 RX ADMIN — APIXABAN 2.5 MG: 2.5 TABLET, FILM COATED ORAL at 10:07

## 2022-10-29 RX ADMIN — METHYLPREDNISOLONE SODIUM SUCCINATE 40 MG: 40 INJECTION, POWDER, FOR SOLUTION INTRAMUSCULAR; INTRAVENOUS at 05:52

## 2022-10-29 RX ADMIN — BUDESONIDE 0.5 MG: 0.5 SUSPENSION RESPIRATORY (INHALATION) at 07:01

## 2022-10-29 RX ADMIN — Medication 10 ML: at 21:57

## 2022-10-29 RX ADMIN — TAMSULOSIN HYDROCHLORIDE 0.4 MG: 0.4 CAPSULE ORAL at 10:06

## 2022-10-29 RX ADMIN — ACETAMINOPHEN 650 MG: 325 TABLET, FILM COATED ORAL at 05:52

## 2022-10-29 RX ADMIN — APIXABAN 2.5 MG: 2.5 TABLET, FILM COATED ORAL at 21:56

## 2022-10-29 RX ADMIN — IPRATROPIUM BROMIDE AND ALBUTEROL SULFATE 3 ML: 2.5; .5 SOLUTION RESPIRATORY (INHALATION) at 01:42

## 2022-10-29 RX ADMIN — FAMOTIDINE 40 MG: 20 TABLET ORAL at 21:56

## 2022-10-29 RX ADMIN — IPRATROPIUM BROMIDE AND ALBUTEROL SULFATE 3 ML: 2.5; .5 SOLUTION RESPIRATORY (INHALATION) at 06:59

## 2022-10-29 RX ADMIN — BISOPROLOL FUMARATE 5 MG: 5 TABLET ORAL at 10:06

## 2022-10-29 RX ADMIN — IPRATROPIUM BROMIDE AND ALBUTEROL SULFATE 3 ML: 2.5; .5 SOLUTION RESPIRATORY (INHALATION) at 20:25

## 2022-10-29 RX ADMIN — OSELTAMIVIR PHOSPHATE 75 MG: 75 CAPSULE ORAL at 21:56

## 2022-10-30 ENCOUNTER — APPOINTMENT (OUTPATIENT)
Dept: CARDIOLOGY | Facility: HOSPITAL | Age: 87
End: 2022-10-30

## 2022-10-30 LAB
ANION GAP SERPL CALCULATED.3IONS-SCNC: 6.5 MMOL/L (ref 5–15)
ANISOCYTOSIS BLD QL: NORMAL
BASOPHILS # BLD AUTO: 0.01 10*3/MM3 (ref 0–0.2)
BASOPHILS NFR BLD AUTO: 0.1 % (ref 0–1.5)
BUN SERPL-MCNC: 31 MG/DL (ref 8–23)
BUN/CREAT SERPL: 29 (ref 7–25)
CALCIUM SPEC-SCNC: 9 MG/DL (ref 8.6–10.5)
CHLORIDE SERPL-SCNC: 98 MMOL/L (ref 98–107)
CO2 SERPL-SCNC: 31.5 MMOL/L (ref 22–29)
CREAT SERPL-MCNC: 1.07 MG/DL (ref 0.76–1.27)
DEPRECATED RDW RBC AUTO: 57.9 FL (ref 37–54)
EGFRCR SERPLBLD CKD-EPI 2021: 68.4 ML/MIN/1.73
EOSINOPHIL # BLD AUTO: 0.01 10*3/MM3 (ref 0–0.4)
EOSINOPHIL NFR BLD AUTO: 0.1 % (ref 0.3–6.2)
ERYTHROCYTE [DISTWIDTH] IN BLOOD BY AUTOMATED COUNT: 20.6 % (ref 12.3–15.4)
GLUCOSE SERPL-MCNC: 133 MG/DL (ref 65–99)
HCT VFR BLD AUTO: 30.7 % (ref 37.5–51)
HGB BLD-MCNC: 8.8 G/DL (ref 13–17.7)
HYPOCHROMIA BLD QL: NORMAL
IMM GRANULOCYTES # BLD AUTO: 0.07 10*3/MM3 (ref 0–0.05)
IMM GRANULOCYTES NFR BLD AUTO: 0.5 % (ref 0–0.5)
LYMPHOCYTES # BLD AUTO: 0.9 10*3/MM3 (ref 0.7–3.1)
LYMPHOCYTES NFR BLD AUTO: 6.2 % (ref 19.6–45.3)
MCH RBC QN AUTO: 22.2 PG (ref 26.6–33)
MCHC RBC AUTO-ENTMCNC: 28.7 G/DL (ref 31.5–35.7)
MCV RBC AUTO: 77.3 FL (ref 79–97)
MICROCYTES BLD QL: NORMAL
MONOCYTES # BLD AUTO: 0.58 10*3/MM3 (ref 0.1–0.9)
MONOCYTES NFR BLD AUTO: 4 % (ref 5–12)
NEUTROPHILS NFR BLD AUTO: 13.03 10*3/MM3 (ref 1.7–7)
NEUTROPHILS NFR BLD AUTO: 89.1 % (ref 42.7–76)
NRBC BLD AUTO-RTO: 0 /100 WBC (ref 0–0.2)
PLATELET # BLD AUTO: 313 10*3/MM3 (ref 140–450)
PMV BLD AUTO: 9.9 FL (ref 6–12)
POIKILOCYTOSIS BLD QL SMEAR: NORMAL
POTASSIUM SERPL-SCNC: 4.9 MMOL/L (ref 3.5–5.2)
PROCALCITONIN SERPL-MCNC: 0.07 NG/ML (ref 0–0.25)
RBC # BLD AUTO: 3.97 10*6/MM3 (ref 4.14–5.8)
SMALL PLATELETS BLD QL SMEAR: ADEQUATE
SODIUM SERPL-SCNC: 136 MMOL/L (ref 136–145)
WBC MORPH BLD: NORMAL
WBC NRBC COR # BLD: 14.6 10*3/MM3 (ref 3.4–10.8)

## 2022-10-30 PROCEDURE — 93306 TTE W/DOPPLER COMPLETE: CPT

## 2022-10-30 PROCEDURE — 94799 UNLISTED PULMONARY SVC/PX: CPT

## 2022-10-30 PROCEDURE — 97530 THERAPEUTIC ACTIVITIES: CPT

## 2022-10-30 PROCEDURE — 94761 N-INVAS EAR/PLS OXIMETRY MLT: CPT

## 2022-10-30 PROCEDURE — 25010000002 METHYLPREDNISOLONE PER 125 MG: Performed by: FAMILY MEDICINE

## 2022-10-30 PROCEDURE — 99232 SBSQ HOSP IP/OBS MODERATE 35: CPT | Performed by: FAMILY MEDICINE

## 2022-10-30 PROCEDURE — 80048 BASIC METABOLIC PNL TOTAL CA: CPT | Performed by: FAMILY MEDICINE

## 2022-10-30 PROCEDURE — 25010000002 FUROSEMIDE PER 20 MG: Performed by: FAMILY MEDICINE

## 2022-10-30 PROCEDURE — 84145 PROCALCITONIN (PCT): CPT | Performed by: FAMILY MEDICINE

## 2022-10-30 PROCEDURE — 93306 TTE W/DOPPLER COMPLETE: CPT | Performed by: INTERNAL MEDICINE

## 2022-10-30 PROCEDURE — 97116 GAIT TRAINING THERAPY: CPT

## 2022-10-30 PROCEDURE — 97110 THERAPEUTIC EXERCISES: CPT

## 2022-10-30 PROCEDURE — 94664 DEMO&/EVAL PT USE INHALER: CPT

## 2022-10-30 PROCEDURE — 85007 BL SMEAR W/DIFF WBC COUNT: CPT | Performed by: FAMILY MEDICINE

## 2022-10-30 PROCEDURE — 85025 COMPLETE CBC W/AUTO DIFF WBC: CPT | Performed by: FAMILY MEDICINE

## 2022-10-30 RX ORDER — FUROSEMIDE 10 MG/ML
20 INJECTION INTRAMUSCULAR; INTRAVENOUS ONCE
Status: COMPLETED | OUTPATIENT
Start: 2022-10-30 | End: 2022-10-30

## 2022-10-30 RX ORDER — SIMETHICONE 80 MG
80 TABLET,CHEWABLE ORAL 4 TIMES DAILY PRN
Status: DISCONTINUED | OUTPATIENT
Start: 2022-10-30 | End: 2022-11-01 | Stop reason: HOSPADM

## 2022-10-30 RX ADMIN — FERROUS SULFATE TAB EC 324 MG (65 MG FE EQUIVALENT) 324 MG: 324 (65 FE) TABLET DELAYED RESPONSE at 08:30

## 2022-10-30 RX ADMIN — SIMETHICONE 80 MG: 80 TABLET, CHEWABLE ORAL at 13:32

## 2022-10-30 RX ADMIN — OSELTAMIVIR PHOSPHATE 75 MG: 75 CAPSULE ORAL at 08:30

## 2022-10-30 RX ADMIN — METHYLPREDNISOLONE SODIUM SUCCINATE 60 MG: 125 INJECTION, POWDER, FOR SOLUTION INTRAMUSCULAR; INTRAVENOUS at 21:02

## 2022-10-30 RX ADMIN — ATORVASTATIN CALCIUM 20 MG: 20 TABLET, FILM COATED ORAL at 08:31

## 2022-10-30 RX ADMIN — Medication 10 ML: at 21:02

## 2022-10-30 RX ADMIN — OSELTAMIVIR PHOSPHATE 75 MG: 75 CAPSULE ORAL at 21:02

## 2022-10-30 RX ADMIN — BUDESONIDE 0.5 MG: 0.5 SUSPENSION RESPIRATORY (INHALATION) at 19:57

## 2022-10-30 RX ADMIN — IPRATROPIUM BROMIDE AND ALBUTEROL SULFATE 3 ML: 2.5; .5 SOLUTION RESPIRATORY (INHALATION) at 19:57

## 2022-10-30 RX ADMIN — IPRATROPIUM BROMIDE AND ALBUTEROL SULFATE 3 ML: 2.5; .5 SOLUTION RESPIRATORY (INHALATION) at 12:53

## 2022-10-30 RX ADMIN — DULOXETINE HYDROCHLORIDE 60 MG: 30 CAPSULE, DELAYED RELEASE ORAL at 08:30

## 2022-10-30 RX ADMIN — METHYLPREDNISOLONE SODIUM SUCCINATE 60 MG: 125 INJECTION, POWDER, FOR SOLUTION INTRAMUSCULAR; INTRAVENOUS at 05:19

## 2022-10-30 RX ADMIN — BUDESONIDE 0.5 MG: 0.5 SUSPENSION RESPIRATORY (INHALATION) at 06:49

## 2022-10-30 RX ADMIN — Medication 10 ML: at 08:31

## 2022-10-30 RX ADMIN — BISOPROLOL FUMARATE 5 MG: 5 TABLET ORAL at 08:30

## 2022-10-30 RX ADMIN — APIXABAN 2.5 MG: 2.5 TABLET, FILM COATED ORAL at 08:30

## 2022-10-30 RX ADMIN — ROPINIROLE HYDROCHLORIDE 0.25 MG: 0.25 TABLET, FILM COATED ORAL at 21:02

## 2022-10-30 RX ADMIN — METHYLPREDNISOLONE SODIUM SUCCINATE 60 MG: 125 INJECTION, POWDER, FOR SOLUTION INTRAMUSCULAR; INTRAVENOUS at 13:32

## 2022-10-30 RX ADMIN — IPRATROPIUM BROMIDE AND ALBUTEROL SULFATE 3 ML: 2.5; .5 SOLUTION RESPIRATORY (INHALATION) at 06:49

## 2022-10-30 RX ADMIN — ACETAMINOPHEN 650 MG: 325 TABLET, FILM COATED ORAL at 05:19

## 2022-10-30 RX ADMIN — DILTIAZEM HYDROCHLORIDE 180 MG: 180 CAPSULE, COATED, EXTENDED RELEASE ORAL at 08:30

## 2022-10-30 RX ADMIN — BENZONATATE 200 MG: 100 CAPSULE ORAL at 05:19

## 2022-10-30 RX ADMIN — TAMSULOSIN HYDROCHLORIDE 0.4 MG: 0.4 CAPSULE ORAL at 08:30

## 2022-10-30 RX ADMIN — APIXABAN 2.5 MG: 2.5 TABLET, FILM COATED ORAL at 21:02

## 2022-10-30 RX ADMIN — FUROSEMIDE 20 MG: 10 INJECTION, SOLUTION INTRAMUSCULAR; INTRAVENOUS at 15:53

## 2022-10-31 LAB
ANION GAP SERPL CALCULATED.3IONS-SCNC: 6.8 MMOL/L (ref 5–15)
ANISOCYTOSIS BLD QL: NORMAL
BASOPHILS # BLD AUTO: 0.01 10*3/MM3 (ref 0–0.2)
BASOPHILS NFR BLD AUTO: 0.1 % (ref 0–1.5)
BH CV ECHO MEAS - AO MAX PG: 7.4 MMHG
BH CV ECHO MEAS - AO MEAN PG: 3 MMHG
BH CV ECHO MEAS - AO ROOT DIAM: 3.4 CM
BH CV ECHO MEAS - AO V2 MAX: 136 CM/SEC
BH CV ECHO MEAS - AO V2 VTI: 25 CM
BH CV ECHO MEAS - AVA(I,D): 2.34 CM2
BH CV ECHO MEAS - EDV(CUBED): 65 ML
BH CV ECHO MEAS - EDV(MOD-SP2): 58.2 ML
BH CV ECHO MEAS - EDV(MOD-SP4): 56.8 ML
BH CV ECHO MEAS - EF(MOD-BP): 76 %
BH CV ECHO MEAS - EF(MOD-SP2): 81.3 %
BH CV ECHO MEAS - EF(MOD-SP4): 72.2 %
BH CV ECHO MEAS - ESV(CUBED): 22.9 ML
BH CV ECHO MEAS - ESV(MOD-SP2): 10.9 ML
BH CV ECHO MEAS - ESV(MOD-SP4): 15.8 ML
BH CV ECHO MEAS - FS: 29.4 %
BH CV ECHO MEAS - IVS/LVPW: 0.93 CM
BH CV ECHO MEAS - IVSD: 1.16 CM
BH CV ECHO MEAS - LA DIMENSION: 5 CM
BH CV ECHO MEAS - LAT PEAK E' VEL: 12.1 CM/SEC
BH CV ECHO MEAS - LV DIASTOLIC VOL/BSA (35-75): 25.6 CM2
BH CV ECHO MEAS - LV MASS(C)D: 167.7 GRAMS
BH CV ECHO MEAS - LV MAX PG: 2.8 MMHG
BH CV ECHO MEAS - LV MEAN PG: 1 MMHG
BH CV ECHO MEAS - LV SYSTOLIC VOL/BSA (12-30): 7.1 CM2
BH CV ECHO MEAS - LV V1 MAX: 83.6 CM/SEC
BH CV ECHO MEAS - LV V1 VTI: 15.8 CM
BH CV ECHO MEAS - LVIDD: 4 CM
BH CV ECHO MEAS - LVIDS: 2.8 CM
BH CV ECHO MEAS - LVOT AREA: 3.7 CM2
BH CV ECHO MEAS - LVOT DIAM: 2.17 CM
BH CV ECHO MEAS - LVPWD: 1.25 CM
BH CV ECHO MEAS - MED PEAK E' VEL: 7.9 CM/SEC
BH CV ECHO MEAS - MV DEC TIME: 0.17 MSEC
BH CV ECHO MEAS - MV E MAX VEL: 138 CM/SEC
BH CV ECHO MEAS - MV MAX PG: 6.7 MMHG
BH CV ECHO MEAS - MV MEAN PG: 3 MMHG
BH CV ECHO MEAS - MV V2 VTI: 27.8 CM
BH CV ECHO MEAS - MVA(VTI): 2.1 CM2
BH CV ECHO MEAS - PA ACC TIME: 0.06 SEC
BH CV ECHO MEAS - PA PR(ACCEL): 52 MMHG
BH CV ECHO MEAS - PA V2 MAX: 81.8 CM/SEC
BH CV ECHO MEAS - RAP SYSTOLE: 15 MMHG
BH CV ECHO MEAS - SI(MOD-SP2): 21.4 ML/M2
BH CV ECHO MEAS - SI(MOD-SP4): 18.5 ML/M2
BH CV ECHO MEAS - SV(LVOT): 58.4 ML
BH CV ECHO MEAS - SV(MOD-SP2): 47.3 ML
BH CV ECHO MEAS - SV(MOD-SP4): 41 ML
BH CV ECHO MEAS - TAPSE (>1.6): 2.33 CM
BH CV ECHO MEASUREMENTS AVERAGE E/E' RATIO: 13.8
BH CV XLRA - RV BASE: 4.8 CM
BH CV XLRA - TDI S': 11.4 CM/SEC
BUN SERPL-MCNC: 32 MG/DL (ref 8–23)
BUN/CREAT SERPL: 31.1 (ref 7–25)
CALCIUM SPEC-SCNC: 9 MG/DL (ref 8.6–10.5)
CHLORIDE SERPL-SCNC: 97 MMOL/L (ref 98–107)
CO2 SERPL-SCNC: 32.2 MMOL/L (ref 22–29)
CREAT SERPL-MCNC: 1.03 MG/DL (ref 0.76–1.27)
DEPRECATED RDW RBC AUTO: 56.3 FL (ref 37–54)
EGFRCR SERPLBLD CKD-EPI 2021: 71.2 ML/MIN/1.73
EOSINOPHIL # BLD AUTO: 0 10*3/MM3 (ref 0–0.4)
EOSINOPHIL NFR BLD AUTO: 0 % (ref 0.3–6.2)
ERYTHROCYTE [DISTWIDTH] IN BLOOD BY AUTOMATED COUNT: 20.5 % (ref 12.3–15.4)
GLUCOSE SERPL-MCNC: 136 MG/DL (ref 65–99)
HCT VFR BLD AUTO: 30.4 % (ref 37.5–51)
HGB BLD-MCNC: 9.1 G/DL (ref 13–17.7)
HYPOCHROMIA BLD QL: NORMAL
IMM GRANULOCYTES # BLD AUTO: 0.11 10*3/MM3 (ref 0–0.05)
IMM GRANULOCYTES NFR BLD AUTO: 0.8 % (ref 0–0.5)
LYMPHOCYTES # BLD AUTO: 0.75 10*3/MM3 (ref 0.7–3.1)
LYMPHOCYTES NFR BLD AUTO: 5.2 % (ref 19.6–45.3)
MAXIMAL PREDICTED HEART RATE: 136 BPM
MCH RBC QN AUTO: 22.6 PG (ref 26.6–33)
MCHC RBC AUTO-ENTMCNC: 29.9 G/DL (ref 31.5–35.7)
MCV RBC AUTO: 75.6 FL (ref 79–97)
MONOCYTES # BLD AUTO: 0.65 10*3/MM3 (ref 0.1–0.9)
MONOCYTES NFR BLD AUTO: 4.5 % (ref 5–12)
NEUTROPHILS NFR BLD AUTO: 12.84 10*3/MM3 (ref 1.7–7)
NEUTROPHILS NFR BLD AUTO: 89.4 % (ref 42.7–76)
NRBC BLD AUTO-RTO: 0 /100 WBC (ref 0–0.2)
PLATELET # BLD AUTO: 288 10*3/MM3 (ref 140–450)
PMV BLD AUTO: 9.5 FL (ref 6–12)
POIKILOCYTOSIS BLD QL SMEAR: NORMAL
POTASSIUM SERPL-SCNC: 4.9 MMOL/L (ref 3.5–5.2)
RBC # BLD AUTO: 4.02 10*6/MM3 (ref 4.14–5.8)
SMALL PLATELETS BLD QL SMEAR: ADEQUATE
SODIUM SERPL-SCNC: 136 MMOL/L (ref 136–145)
STRESS TARGET HR: 116 BPM
WBC MORPH BLD: NORMAL
WBC NRBC COR # BLD: 14.36 10*3/MM3 (ref 3.4–10.8)

## 2022-10-31 PROCEDURE — 25010000002 FUROSEMIDE PER 20 MG: Performed by: FAMILY MEDICINE

## 2022-10-31 PROCEDURE — 94799 UNLISTED PULMONARY SVC/PX: CPT

## 2022-10-31 PROCEDURE — 80048 BASIC METABOLIC PNL TOTAL CA: CPT | Performed by: FAMILY MEDICINE

## 2022-10-31 PROCEDURE — 25010000002 METHYLPREDNISOLONE PER 40 MG: Performed by: FAMILY MEDICINE

## 2022-10-31 PROCEDURE — 94664 DEMO&/EVAL PT USE INHALER: CPT

## 2022-10-31 PROCEDURE — 97165 OT EVAL LOW COMPLEX 30 MIN: CPT

## 2022-10-31 PROCEDURE — 97110 THERAPEUTIC EXERCISES: CPT

## 2022-10-31 PROCEDURE — 85007 BL SMEAR W/DIFF WBC COUNT: CPT | Performed by: FAMILY MEDICINE

## 2022-10-31 PROCEDURE — 97530 THERAPEUTIC ACTIVITIES: CPT

## 2022-10-31 PROCEDURE — 25010000002 METHYLPREDNISOLONE PER 125 MG: Performed by: FAMILY MEDICINE

## 2022-10-31 PROCEDURE — 99232 SBSQ HOSP IP/OBS MODERATE 35: CPT | Performed by: FAMILY MEDICINE

## 2022-10-31 PROCEDURE — 85025 COMPLETE CBC W/AUTO DIFF WBC: CPT | Performed by: FAMILY MEDICINE

## 2022-10-31 PROCEDURE — 94761 N-INVAS EAR/PLS OXIMETRY MLT: CPT

## 2022-10-31 RX ORDER — METHYLPREDNISOLONE SODIUM SUCCINATE 40 MG/ML
40 INJECTION, POWDER, LYOPHILIZED, FOR SOLUTION INTRAMUSCULAR; INTRAVENOUS EVERY 12 HOURS
Status: DISCONTINUED | OUTPATIENT
Start: 2022-10-31 | End: 2022-11-01 | Stop reason: HOSPADM

## 2022-10-31 RX ORDER — FUROSEMIDE 10 MG/ML
20 INJECTION INTRAMUSCULAR; INTRAVENOUS ONCE
Status: COMPLETED | OUTPATIENT
Start: 2022-10-31 | End: 2022-10-31

## 2022-10-31 RX ADMIN — BUDESONIDE 0.5 MG: 0.5 SUSPENSION RESPIRATORY (INHALATION) at 20:34

## 2022-10-31 RX ADMIN — BUDESONIDE 0.5 MG: 0.5 SUSPENSION RESPIRATORY (INHALATION) at 06:45

## 2022-10-31 RX ADMIN — DULOXETINE HYDROCHLORIDE 60 MG: 30 CAPSULE, DELAYED RELEASE ORAL at 08:33

## 2022-10-31 RX ADMIN — OSELTAMIVIR PHOSPHATE 75 MG: 75 CAPSULE ORAL at 21:47

## 2022-10-31 RX ADMIN — ATORVASTATIN CALCIUM 20 MG: 20 TABLET, FILM COATED ORAL at 08:34

## 2022-10-31 RX ADMIN — METHYLPREDNISOLONE SODIUM SUCCINATE 60 MG: 125 INJECTION, POWDER, FOR SOLUTION INTRAMUSCULAR; INTRAVENOUS at 06:13

## 2022-10-31 RX ADMIN — DILTIAZEM HYDROCHLORIDE 180 MG: 180 CAPSULE, COATED, EXTENDED RELEASE ORAL at 08:33

## 2022-10-31 RX ADMIN — METHYLPREDNISOLONE SODIUM SUCCINATE 40 MG: 40 INJECTION, POWDER, FOR SOLUTION INTRAMUSCULAR; INTRAVENOUS at 18:24

## 2022-10-31 RX ADMIN — IPRATROPIUM BROMIDE AND ALBUTEROL SULFATE 3 ML: 2.5; .5 SOLUTION RESPIRATORY (INHALATION) at 20:34

## 2022-10-31 RX ADMIN — Medication 10 ML: at 08:32

## 2022-10-31 RX ADMIN — Medication 10 ML: at 21:47

## 2022-10-31 RX ADMIN — IPRATROPIUM BROMIDE AND ALBUTEROL SULFATE 3 ML: 2.5; .5 SOLUTION RESPIRATORY (INHALATION) at 06:45

## 2022-10-31 RX ADMIN — IPRATROPIUM BROMIDE AND ALBUTEROL SULFATE 3 ML: 2.5; .5 SOLUTION RESPIRATORY (INHALATION) at 12:15

## 2022-10-31 RX ADMIN — APIXABAN 2.5 MG: 2.5 TABLET, FILM COATED ORAL at 21:47

## 2022-10-31 RX ADMIN — ROPINIROLE HYDROCHLORIDE 0.25 MG: 0.25 TABLET, FILM COATED ORAL at 21:53

## 2022-10-31 RX ADMIN — OSELTAMIVIR PHOSPHATE 75 MG: 75 CAPSULE ORAL at 08:34

## 2022-10-31 RX ADMIN — FERROUS SULFATE TAB EC 324 MG (65 MG FE EQUIVALENT) 324 MG: 324 (65 FE) TABLET DELAYED RESPONSE at 08:33

## 2022-10-31 RX ADMIN — BISOPROLOL FUMARATE 5 MG: 5 TABLET ORAL at 08:33

## 2022-10-31 RX ADMIN — TAMSULOSIN HYDROCHLORIDE 0.4 MG: 0.4 CAPSULE ORAL at 08:33

## 2022-10-31 RX ADMIN — APIXABAN 2.5 MG: 2.5 TABLET, FILM COATED ORAL at 08:34

## 2022-10-31 RX ADMIN — FUROSEMIDE 20 MG: 10 INJECTION, SOLUTION INTRAMUSCULAR; INTRAVENOUS at 15:27

## 2022-11-01 ENCOUNTER — READMISSION MANAGEMENT (OUTPATIENT)
Dept: CALL CENTER | Facility: HOSPITAL | Age: 87
End: 2022-11-01

## 2022-11-01 VITALS
HEART RATE: 73 BPM | OXYGEN SATURATION: 93 % | RESPIRATION RATE: 18 BRPM | TEMPERATURE: 98.1 F | HEIGHT: 73 IN | SYSTOLIC BLOOD PRESSURE: 134 MMHG | DIASTOLIC BLOOD PRESSURE: 89 MMHG | BODY MASS INDEX: 26.59 KG/M2 | WEIGHT: 200.62 LBS

## 2022-11-01 LAB
ANION GAP SERPL CALCULATED.3IONS-SCNC: 5.8 MMOL/L (ref 5–15)
ANISOCYTOSIS BLD QL: NORMAL
BASOPHILS # BLD AUTO: 0.01 10*3/MM3 (ref 0–0.2)
BASOPHILS NFR BLD AUTO: 0.1 % (ref 0–1.5)
BUN SERPL-MCNC: 33 MG/DL (ref 8–23)
BUN/CREAT SERPL: 36.3 (ref 7–25)
CALCIUM SPEC-SCNC: 8.7 MG/DL (ref 8.6–10.5)
CHLORIDE SERPL-SCNC: 94 MMOL/L (ref 98–107)
CO2 SERPL-SCNC: 34.2 MMOL/L (ref 22–29)
CREAT SERPL-MCNC: 0.91 MG/DL (ref 0.76–1.27)
DEPRECATED RDW RBC AUTO: 55.1 FL (ref 37–54)
EGFRCR SERPLBLD CKD-EPI 2021: 82.6 ML/MIN/1.73
EOSINOPHIL # BLD AUTO: 0 10*3/MM3 (ref 0–0.4)
EOSINOPHIL NFR BLD AUTO: 0 % (ref 0.3–6.2)
ERYTHROCYTE [DISTWIDTH] IN BLOOD BY AUTOMATED COUNT: 20.5 % (ref 12.3–15.4)
GLUCOSE SERPL-MCNC: 120 MG/DL (ref 65–99)
HCT VFR BLD AUTO: 29.2 % (ref 37.5–51)
HGB BLD-MCNC: 9 G/DL (ref 13–17.7)
IMM GRANULOCYTES # BLD AUTO: 0.08 10*3/MM3 (ref 0–0.05)
IMM GRANULOCYTES NFR BLD AUTO: 0.7 % (ref 0–0.5)
LYMPHOCYTES # BLD AUTO: 0.66 10*3/MM3 (ref 0.7–3.1)
LYMPHOCYTES NFR BLD AUTO: 5.8 % (ref 19.6–45.3)
MCH RBC QN AUTO: 23 PG (ref 26.6–33)
MCHC RBC AUTO-ENTMCNC: 30.8 G/DL (ref 31.5–35.7)
MCV RBC AUTO: 74.7 FL (ref 79–97)
MICROCYTES BLD QL: NORMAL
MONOCYTES # BLD AUTO: 0.86 10*3/MM3 (ref 0.1–0.9)
MONOCYTES NFR BLD AUTO: 7.6 % (ref 5–12)
NEUTROPHILS NFR BLD AUTO: 85.8 % (ref 42.7–76)
NEUTROPHILS NFR BLD AUTO: 9.77 10*3/MM3 (ref 1.7–7)
NRBC BLD AUTO-RTO: 0 /100 WBC (ref 0–0.2)
PLATELET # BLD AUTO: 282 10*3/MM3 (ref 140–450)
PMV BLD AUTO: 10 FL (ref 6–12)
POTASSIUM SERPL-SCNC: 4.5 MMOL/L (ref 3.5–5.2)
RBC # BLD AUTO: 3.91 10*6/MM3 (ref 4.14–5.8)
SMALL PLATELETS BLD QL SMEAR: ADEQUATE
SODIUM SERPL-SCNC: 134 MMOL/L (ref 136–145)
WBC MORPH BLD: NORMAL
WBC NRBC COR # BLD: 11.38 10*3/MM3 (ref 3.4–10.8)

## 2022-11-01 PROCEDURE — 80048 BASIC METABOLIC PNL TOTAL CA: CPT | Performed by: FAMILY MEDICINE

## 2022-11-01 PROCEDURE — 97530 THERAPEUTIC ACTIVITIES: CPT

## 2022-11-01 PROCEDURE — 99239 HOSP IP/OBS DSCHRG MGMT >30: CPT | Performed by: INTERNAL MEDICINE

## 2022-11-01 PROCEDURE — 85025 COMPLETE CBC W/AUTO DIFF WBC: CPT | Performed by: FAMILY MEDICINE

## 2022-11-01 PROCEDURE — 94799 UNLISTED PULMONARY SVC/PX: CPT

## 2022-11-01 PROCEDURE — 85007 BL SMEAR W/DIFF WBC COUNT: CPT | Performed by: FAMILY MEDICINE

## 2022-11-01 PROCEDURE — 97535 SELF CARE MNGMENT TRAINING: CPT

## 2022-11-01 PROCEDURE — 94618 PULMONARY STRESS TESTING: CPT

## 2022-11-01 PROCEDURE — 25010000002 METHYLPREDNISOLONE PER 40 MG: Performed by: FAMILY MEDICINE

## 2022-11-01 PROCEDURE — 94761 N-INVAS EAR/PLS OXIMETRY MLT: CPT

## 2022-11-01 PROCEDURE — 97110 THERAPEUTIC EXERCISES: CPT

## 2022-11-01 PROCEDURE — 94660 CPAP INITIATION&MGMT: CPT

## 2022-11-01 RX ORDER — PREDNISONE 20 MG/1
40 TABLET ORAL DAILY
Qty: 6 TABLET | Refills: 0 | Status: SHIPPED | OUTPATIENT
Start: 2022-11-01 | End: 2022-11-04

## 2022-11-01 RX ORDER — BISOPROLOL FUMARATE 5 MG/1
5 TABLET, FILM COATED ORAL DAILY
Qty: 30 TABLET | Refills: 0 | Status: SHIPPED | OUTPATIENT
Start: 2022-11-02 | End: 2023-03-09

## 2022-11-01 RX ORDER — BENZONATATE 200 MG/1
200 CAPSULE ORAL 3 TIMES DAILY PRN
Qty: 15 CAPSULE | Refills: 0 | Status: SHIPPED | OUTPATIENT
Start: 2022-11-01 | End: 2022-11-06

## 2022-11-01 RX ORDER — OSELTAMIVIR PHOSPHATE 75 MG/1
75 CAPSULE ORAL EVERY 12 HOURS SCHEDULED
Qty: 2 CAPSULE | Refills: 0 | Status: SHIPPED | OUTPATIENT
Start: 2022-11-01 | End: 2022-11-02

## 2022-11-01 RX ADMIN — ACETAMINOPHEN 650 MG: 325 TABLET, FILM COATED ORAL at 04:58

## 2022-11-01 RX ADMIN — TAMSULOSIN HYDROCHLORIDE 0.4 MG: 0.4 CAPSULE ORAL at 09:35

## 2022-11-01 RX ADMIN — OSELTAMIVIR PHOSPHATE 75 MG: 75 CAPSULE ORAL at 09:35

## 2022-11-01 RX ADMIN — BISOPROLOL FUMARATE 5 MG: 5 TABLET ORAL at 09:35

## 2022-11-01 RX ADMIN — BUDESONIDE 0.5 MG: 0.5 SUSPENSION RESPIRATORY (INHALATION) at 06:53

## 2022-11-01 RX ADMIN — APIXABAN 2.5 MG: 2.5 TABLET, FILM COATED ORAL at 09:36

## 2022-11-01 RX ADMIN — IPRATROPIUM BROMIDE AND ALBUTEROL SULFATE 3 ML: 2.5; .5 SOLUTION RESPIRATORY (INHALATION) at 06:53

## 2022-11-01 RX ADMIN — SIMETHICONE 80 MG: 80 TABLET, CHEWABLE ORAL at 04:58

## 2022-11-01 RX ADMIN — IPRATROPIUM BROMIDE AND ALBUTEROL SULFATE 3 ML: 2.5; .5 SOLUTION RESPIRATORY (INHALATION) at 12:46

## 2022-11-01 RX ADMIN — METHYLPREDNISOLONE SODIUM SUCCINATE 40 MG: 40 INJECTION, POWDER, FOR SOLUTION INTRAMUSCULAR; INTRAVENOUS at 05:17

## 2022-11-01 RX ADMIN — Medication 10 ML: at 09:35

## 2022-11-01 RX ADMIN — FERROUS SULFATE TAB EC 324 MG (65 MG FE EQUIVALENT) 324 MG: 324 (65 FE) TABLET DELAYED RESPONSE at 09:35

## 2022-11-01 RX ADMIN — DILTIAZEM HYDROCHLORIDE 180 MG: 180 CAPSULE, COATED, EXTENDED RELEASE ORAL at 09:36

## 2022-11-01 RX ADMIN — ATORVASTATIN CALCIUM 20 MG: 20 TABLET, FILM COATED ORAL at 09:36

## 2022-11-01 RX ADMIN — DULOXETINE HYDROCHLORIDE 60 MG: 30 CAPSULE, DELAYED RELEASE ORAL at 09:35

## 2022-11-01 NOTE — PROGRESS NOTES
Exercise Oximetry    Patient Name:Ed Spear   MRN: [id number]   Date: 11/01/22             ROOM AIR BASELINE   SpO2% 87   Heart Rate 89   Blood Pressure      EXERCISE ON ROOM AIR SpO2% EXERCISE ON O2 @ 2 LPM SpO2%   1 MINUTE  1 MINUTE    87   2 MINUTES  2 MINUTES    89   3 MINUTES  3 MINUTES    89   4 MINUTES  4 MINUTES    89   5 MINUTES  5 MINUTES     90   6 MINUTES  6 MINUTES    90              Distance Walked   Distance Walked     unable   Dyspnea (Toni Scale)   Dyspnea (Toni Scale)     4   Fatigue (Toni Scale)   Fatigue (Toni Scale)       5   SpO2% Post Exercise   SpO2% Post Exercise    89   HR Post Exercise   HR Post Exercise      113   Time to Recovery   Time to Recovery     3     Comments: pt mobility limited, stated his knees were to weak to walk, did stand to sit exercise and pt appeared fatigued.  Required 2L of oxygen to maintain sats    Recommend 2L of oxygen

## 2022-11-01 NOTE — DISCHARGE SUMMARY
[facility]   DISCHARGE SUMMARY      Name:  Ed Spear   Age:  87 y.o.  Sex:  male  :  1935  MRN:  [id number]   Visit Number:  [id number]    Admission Date:  10/28/2022  Date of Discharge:  2022  Primary Care Physician:  Vermeesch, Marilyn K, MD    Important issues to note:    Improving respiratory failure arranged for home oxygen    Discharge Diagnoses:     1. Acute respiratory failure with hypercapnia and hypoxia, present on admission  2. Influenza A infection  3. COPD with exacerbation  4. Chronic atrial fibrillation on Eliquis  5. Anemia, unsure etiology, work-up pending  6. Chronic pain  7. Hypertension    Problem List:     Active Hospital Problems    Diagnosis  POA   • **Acute respiratory failure with hypoxia and hypercapnia (HCC) [J96.01, J96.02]  Yes   • Influenza A [J10.1]  Yes   • COPD exacerbation (HCC) [J44.1]  Yes   • Atrial fibrillation (HCC) [I48.91]  Yes   • Hypertension [I10]  Yes   • Hyperlipidemia [E78.5]  Yes      Resolved Hospital Problems   No resolved problems to display.     Presenting Problem:    Chief Complaint   Patient presents with   • Shortness of Breath      Consults:     Consulting Physician(s)             None          Procedures Performed:        History of presenting illness/Hospital Course:    The patient is a chronically ill 86-year-old gentleman with history of arthritis, severe COPD, BPH, hypertension, atrial fibrillation, chronic tobacco abuse, who had presented from home with family due to complaints of increased shortness of breath and cough.  Patient lives with his daughter.  She notes multiple other family members have been sick with a viral type illness.  He started becoming sick yesterday and she noticed his breathing getting worse.  He notes he is feeling some better after treatments in the emergency room already.  He is usually fairly active, however daughter had noticed a decline over the last year and his overall health.   He continues to smoke.  He does use inhalers and breathing treatments at home.  He has not received his flu shot yet.  He denies any chest pains.  Daughter also noted patient has had some blood in his stools over the last 6 months or so when he wipes, thought it was due to hemorrhoids.     In the ER, patient was hypoxic on arrival and placed on nasal cannula oxygen.  Initial blood gas demonstrated pH 7.297 PCO2 55.  Respiratory panel positive for flu A.  BNP 3200.  Creatinine 1.16 sodium 132.  Troponin 0.017.  White count of 7 hemoglobin 9.2 and platelets of 310.  Procalcitonin 0.09.  Patient was given a DuoNeb treatment magnesium, Solu-Medrol, dose of Tamiflu.  We are asked to admit        Patient was admitted and started on Solu-Medrol and Tamiflu.  Pro-Bentley remains negative and no antibiotics was indicated.  Patient also received Lasix for possible volume overload.  His 2D echo came back and showed EF of 65 to 70% with grade 1 diastolic dysfunction.    Respiratory failure with hypoxia/hypercapnia:  Recommend trial of BiPAP for patient tolerates.  Otherwise continue nasal cannula oxygen.    Will taper Solu-Medrol to 40 mg every 12 hours  Continue bronchodilators.    on Tamiflu.    Repeated procalcitonin within normal limits, hold on any antibiotics.  Patient unfortunately continues to smoke prior to admission, he smokes around 1 pack a day.  I discussed with the patient in length and encouraged on smoking cessation, patient declines nicotine patches.  2D echo showing EF of 65 to 70% with grade 1 diastolic dysfunction.  Leukocytosis likely secondary to steroids improving  Arranged for home oxygen.  Rx Prednisone at DC  Rx Tamiflu at DC  Rx Tessalon at DC.     A. Fib:  Resume home medications including Cardizem and Eliquis.     Anemia:  Unsure etiology.   Likely chronic and Possibly due to chronic GI blood loss, daughter noted history of hemorrhoids. Chronically on Eliquis.  Iron stores are very low.  We will hold  on transfusion unless indicated.  Hemoglobin has been stable at 9.0 today 11/1/22. on iron supplements and continue to monitor.  Rx iron at DC     Further recommendation will be depend upon clinical course.   Patient is elderly and is a high risk due to multiple comorbidities and acute illness with respiratory failure.          Vital Signs:    Temp:  [97.6 °F (36.4 °C)-98.9 °F (37.2 °C)] 98.1 °F (36.7 °C)  Heart Rate:  [] 73  Resp:  [18-20] 18  BP: (102-138)/(69-89) 134/89    Physical Exam:    General Appearance:  Alert and cooperative.    Head:  Atraumatic and normocephalic.   Eyes: Conjunctivae and sclerae normal, no icterus. No pallor.   Ears:  Ears with no abnormalities noted.   Throat: No oral lesions, no thrush, oral mucosa moist.   Neck: Supple, trachea midline, no thyromegaly.   Back:   No kyphoscoliosis present. No tenderness to palpation.   Lungs:   Diminished bilaterrally with coarse crackles. No Pleural rub or bronchial breathing.   Heart:  Normal S1 and S2, no murmur, no gallop, no rub. No JVD.   Abdomen:   Normal bowel sounds, no masses, no organomegaly. Soft, nontender, nondistended, no rebound tenderness.   Extremities: Supple, no edema, no cyanosis, no clubbing.   Pulses: Pulses palpable bilaterally.   Skin: No bleeding or rash.   Neurologic: Alert not oriented. No facial asymmetry. Moves all four limbs. No tremors.     Pertinent Lab Results:     Results from last 7 days   Lab Units 11/01/22  0624 10/31/22  0608 10/30/22  0537 10/29/22  0555 10/28/22  1737   SODIUM mmol/L 134* 136 136   < > 132*   POTASSIUM mmol/L 4.5 4.9 4.9   < > 4.3   CHLORIDE mmol/L 94* 97* 98   < > 96*   CO2 mmol/L 34.2* 32.2* 31.5*   < > 26.6   BUN mg/dL 33* 32* 31*   < > 18   CREATININE mg/dL 0.91 1.03 1.07   < > 1.16   CALCIUM mg/dL 8.7 9.0 9.0   < > 8.5*   BILIRUBIN mg/dL  --   --   --   --  0.4   ALK PHOS U/L  --   --   --   --  67   ALT (SGPT) U/L  --   --   --   --  12   AST (SGOT) U/L  --   --   --   --  17    GLUCOSE mg/dL 120* 136* 133*   < > 152*    < > = values in this interval not displayed.     Results from last 7 days   Lab Units 11/01/22  0624 10/31/22  0608 10/30/22  0537   WBC 10*3/mm3 11.38* 14.36* 14.60*   HEMOGLOBIN g/dL 9.0* 9.1* 8.8*   HEMATOCRIT % 29.2* 30.4* 30.7*   PLATELETS 10*3/mm3 282 288 313         Results from last 7 days   Lab Units 10/28/22  1737   TROPONIN T ng/mL 0.017     Results from last 7 days   Lab Units 10/28/22  1737   PROBNP pg/mL 3,201.0*             Results from last 7 days   Lab Units 10/28/22  1800   PH, ARTERIAL pH units 7.297*   PO2 ART mm Hg 90.5   PCO2, ARTERIAL mm Hg 55.5*   HCO3 ART mmol/L 27.1           Pertinent Radiology Results:    Imaging Results (All)     Procedure Component Value Units Date/Time    XR Chest 1 View [[id number]] Collected: 10/28/22 2216     Updated: 10/28/22 2218    Narrative:      FINAL REPORT    TECHNIQUE:  An AP portable view of the chest was obtained.    CLINICAL HISTORY:  [facility] Triage Protocol    FINDINGS:  The lungs are clear. The heart and vasculature are unremarkable.  There is no pleural disease, adenopathy, or significant osseous  abnormality.      Impression:      Unremarkable.    Authenticated and Electronically Signed by Ronan Velasco M.D. on  10/28/2022 10:16:27 PM          Echo:    Results for orders placed during the hospital encounter of 10/28/22    Adult Transthoracic Echo Complete W/ Cont if Necessary Per Protocol    Interpretation Summary  1.  Normal left ventricular size and systolic function, LVEF 65-70%.  2.  Grade 1 diastolic dysfunction.  3.  Mild right ventricular dilation with normal RV systolic function by TAPSE.  4.  Mild left atrial dilation.  5.  Mild calcification of aortic valve without significant stenosis.    Condition on Discharge:      Stable.    Code status during the hospital stay:    Code Status and Medical Interventions:   Ordered at: 10/28/22 2055     Code Status (Patient has no pulse and is not breathing):    CPR  (Attempt to Resuscitate)     Medical Interventions (Patient has pulse or is breathing):    Full Support     Discharge Disposition:    Home-Health Care [facility] [place]    Discharge Medications:       Discharge Medications      New Medications      Instructions Start Date   benzonatate 200 MG capsule  Commonly known as: TESSALON   200 mg, Oral, 3 Times Daily PRN      oseltamivir 75 MG capsule  Commonly known as: TAMIFLU   75 mg, Oral, Every 12 Hours Scheduled      predniSONE 20 MG tablet  Commonly known as: DELTASONE   40 mg, Oral, Daily         Changes to Medications      Instructions Start Date   bisoprolol 10 MG tablet  Commonly known as: ZEBeta  What changed: Another medication with the same name was added. Make sure you understand how and when to take each.   TAKE ONE TABLET BY MOUTH DAILY      bisoprolol 5 MG tablet  Commonly known as: ZEBeta  What changed: You were already taking a medication with the same name, and this prescription was added. Make sure you understand how and when to take each.   5 mg, Oral, Daily   Start Date: November 2, 2022        Continue These Medications      Instructions Start Date   acetaminophen 325 MG tablet  Commonly known as: TYLENOL   325 mg, Oral, Every 6 Hours PRN      albuterol sulfate  (90 Base) MCG/ACT inhaler  Commonly known as: PROVENTIL HFA;VENTOLIN HFA;PROAIR HFA   INHALE TWO PUFFS BY MOUTH EVERY 4 HOURS AS NEEDED FOR WHEEZING OR SHORTNESS OF AIR      budesonide-formoterol 160-4.5 MCG/ACT inhaler  Commonly known as: Symbicort   2 puffs, Inhalation, 2 Times Daily      Cartia  MG 24 hr capsule  Generic drug: dilTIAZem CD   TAKE ONE CAPSULE BY MOUTH DAILY      DULoxetine 60 MG capsule  Commonly known as: CYMBALTA   TAKE ONE CAPSULE BY MOUTH DAILY      Eliquis 2.5 MG tablet tablet  Generic drug: apixaban   TAKE ONE TABLET BY MOUTH EVERY 12 HOURS      famotidine 40 MG tablet  Commonly known as: PEPCID   TAKE ONE TABLET BY MOUTH ONCE NIGHTLY AS NEEDED FOR HEARTBURN       ipratropium-albuterol 0.5-2.5 mg/3 ml nebulizer  Commonly known as: DUO-NEB   INHALE THREE MILLILITERS ( ONE VIAL )  VIA NEBULIZATION BY MOUTH FOUR TIMES A DAY      methocarbamol 500 MG tablet  Commonly known as: ROBAXIN   TAKE ONE TABLET BY MOUTH THREE TIMES A DAY AS NEEDED FOR MUSCLE SPASMS      rOPINIRole 0.25 MG tablet  Commonly known as: REQUIP   TAKE ONE TABLET IN THE MORNING AND ONE TABLET AT NIGHT 1 HOUR BEFORE BEDTIME.      simvastatin 40 MG tablet  Commonly known as: ZOCOR   TAKE ONE TABLET BY MOUTH ONCE NIGHTLY      tamsulosin 0.4 MG capsule 24 hr capsule  Commonly known as: FLOMAX   TAKE ONE CAPSULE BY MOUTH DAILY           Discharge Diet:       Activity at Discharge:       Follow-up Appointments:    Additional Instructions for the Follow-ups that You Need to Schedule     Ambulatory Referral to Home Health (Hospital)   As directed      Face to Face Visit Date: 11/1/2022    Follow-up provider for Plan of Care?: I treated the patient in an acute care facility and will not continue treatment after discharge.    Follow-up provider: VERMEESCH, MARILYN K [[id number]]    Reason/Clinical Findings: decline in functional status, COPD    Describe mobility limitations that make leaving home difficult: Patient reports he doesn't go out    Nursing/Therapeutic Services Requested: Physical Therapy Occupational Therapy Skilled Nursing    Skilled nursing orders: COPD management    PT orders: Strengthening Home safety assessment Gait Training    Weight Bearing Status: As Tolerated    Occupational orders: Home safety assessment Strengthening    Frequency: 1 Week 1            Contact information for follow-up providers     Vermeesch, Marilyn K, MD .    Specialty: Internal Medicine  Contact information:  [address] [place] [address] [phone]                   Contact information for after-discharge care     Durable Medical Equipment     [facility] .    Service: Durable Medical Equipment  Contact  information:  [address]  [facility] [address]  [phone]                 Home Medical Care     [facility] .    Service: Home Health Services  Contact information:  [address], [address]  [facility] [address] [phone]                           Future Appointments   Date Time Provider Department Center   11/22/2022 10:30 AM Vermeesch, Marilyn K, MD [facility]     Test Results Pending at Discharge:           Tomás Hankins DO  11/01/22  14:54 EDT    Time: I spent 45 minutes on this discharge activity which included: face-to-face encounter with the patient, reviewing the data in the system, coordination of the care with the nursing staff as well as consultants, documentation, and entering orders.     Dictated utilizing Dragon dictation.

## 2022-11-01 NOTE — PROGRESS NOTES
[facility]    Name:  Ed Spear   Age:  87 y.o.  Sex:  male  :  1935  MRN:  [id number]   Visit Number:  [id number]  Admission Date:  10/28/2022  Date Of Service:  22  Primary Care Physician:  Vermeesch, Marilyn K, MD     LOS: 4 days :    Chief Complaint:      Shortness of breath, cough, fever    Subjective:    Patient requiring 2 liters O2 this am. Not on at home. O2 sat in low 90s while resting. Awakes and is able to stand to the bedside to use urinal without difficulty. Won't use bipap per nsg. Patient ffeels shortness of breath is at baseline.    Hospital Course:    The patient is a chronically ill 86-year-old gentleman with history of arthritis, severe COPD, BPH, hypertension, atrial fibrillation, chronic tobacco abuse, who had presented from home with family due to complaints of increased shortness of breath and cough.  Patient lives with his daughter.  She notes multiple other family members have been sick with a viral type illness.  He started becoming sick yesterday and she noticed his breathing getting worse.  He notes he is feeling some better after treatments in the emergency room already.  He is usually fairly active, however daughter had noticed a decline over the last year and his overall health.  He continues to smoke.  He does use inhalers and breathing treatments at home.  He has not received his flu shot yet.  He denies any chest pains.  Daughter also noted patient has had some blood in his stools over the last 6 months or so when he wipes, thought it was due to hemorrhoids.     In the ER, patient was hypoxic on arrival and placed on nasal cannula oxygen.  Initial blood gas demonstrated pH 7.297 PCO2 55.  Respiratory panel positive for flu A.  BNP 3200.  Creatinine 1.16 sodium 132.  Troponin 0.017.  White count of 7 hemoglobin 9.2 and platelets of 310.  Procalcitonin 0.09.  Patient was given a DuoNeb treatment magnesium, Solu-Medrol,  dose of Tamiflu.  We are asked to admit      Patient was admitted and started on Solu-Medrol and Tamiflu.  Pro-Bentley remains negative and no antibiotics was indicated.  Patient also received Lasix for possible volume overload.  His 2D echo came back and showed EF of 65 to 70% with grade 1 diastolic dysfunction.    Review of Systems:     All systems were reviewed and negative except as mentioned in subjective, assessment and plan.    Vital Signs:    Temp:  [97.6 °F (36.4 °C)-98.9 °F (37.2 °C)] 98.9 °F (37.2 °C)  Heart Rate:  [] 77  Resp:  [18-20] 18  BP: (102-138)/(69-83) 115/71    Intake and output:    I/O last 3 completed shifts:  In: 960 [P.O.:960]  Out: 1905 [Urine:1905]  No intake/output data recorded.    Physical Examination:    General Appearance:  Alert and cooperative.  Acutely ill-appearing.  Frail.   Head:  Atraumatic and normocephalic.   Eyes: Conjunctivae and sclerae normal, no icterus. No pallor.   Throat: No oral lesions, no thrush, oral mucosa moist.   Neck: Supple, trachea midline, no thyromegaly.   Lungs:   Sound diminished bilaterally with coarse crackles    Heart:  Normal S1 and S2, no murmur, no gallop, no rub. No JVD.   Abdomen:   Normal bowel sounds, no masses, no organomegaly. Soft, nontender, nondistended, no rebound tenderness.   Extremities: Supple, no edema in lower ext, no cyanosis, no clubbing.   Skin: No bleeding or rash.   Neurologic: Alert not oriented to circumstances of admission or date. No facial asymmetry. Moves all four limbs. No tremors.      Laboratory results:    Results from last 7 days   Lab Units 11/01/22  0624 10/31/22  0608 10/30/22  0537 10/29/22  0555 10/28/22  1737   SODIUM mmol/L 134* 136 136   < > 132*   POTASSIUM mmol/L 4.5 4.9 4.9   < > 4.3   CHLORIDE mmol/L 94* 97* 98   < > 96*   CO2 mmol/L 34.2* 32.2* 31.5*   < > 26.6   BUN mg/dL 33* 32* 31*   < > 18   CREATININE mg/dL 0.91 1.03 1.07   < > 1.16   CALCIUM mg/dL 8.7 9.0 9.0   < > 8.5*   BILIRUBIN mg/dL  --    --   --   --  0.4   ALK PHOS U/L  --   --   --   --  67   ALT (SGPT) U/L  --   --   --   --  12   AST (SGOT) U/L  --   --   --   --  17   GLUCOSE mg/dL 120* 136* 133*   < > 152*    < > = values in this interval not displayed.     Results from last 7 days   Lab Units 11/01/22  0624 10/31/22  0608 10/30/22  0537   WBC 10*3/mm3 11.38* 14.36* 14.60*   HEMOGLOBIN g/dL 9.0* 9.1* 8.8*   HEMATOCRIT % 29.2* 30.4* 30.7*   PLATELETS 10*3/mm3 282 288 313         Results from last 7 days   Lab Units 10/28/22  1737   TROPONIN T ng/mL 0.017         Recent Labs     10/28/22  1800   PHART 7.297*   WXS3QOY 55.5*   PO2ART 90.5   GES0DXE 27.1   BASEEXCESS 0.2      I have reviewed the patient's laboratory results.    Radiology results:    No radiology results from the last 24 hrs  I have reviewed the patient's radiology reports.    Medication Review:     I have reviewed the patient's active and prn medications.     Problem List:      Acute respiratory failure with hypoxia and hypercapnia (HCC)    Hyperlipidemia    Hypertension    Atrial fibrillation (HCC)    Influenza A    COPD exacerbation (HCC)      Assessment:    1. Acute respiratory failure with hypercapnia and hypoxia, present on admission  2. Influenza A infection  3. COPD with exacerbation  4. Chronic atrial fibrillation on Eliquis  5. Anemia, unsure etiology, work-up pending  6. Chronic pain  7. Hypertension    Plan:    Respiratory failure with hypoxia/hypercapnia:  Recommend trial of BiPAP for patient tolerates.  Otherwise continue nasal cannula oxygen.    Will taper Solu-Medrol to 40 mg every 12 hours  Continue bronchodilators.    on Tamiflu.    Repeated procalcitonin within normal limits, hold on any antibiotics.  Patient unfortunately continues to smoke prior to admission, he smokes around 1 pack a day.  I discussed with the patient in length and encouraged on smoking cessation, patient declines nicotine patches.  2D echo showing EF of 65 to 70% with grade 1 diastolic  dysfunction.  Leukocytosis likely secondary to steroids improving  Walk oximetry on discharge.    A. Fib:  Resume home medications including Cardizem and Eliquis.     Anemia:  Unsure etiology.   Likely chronic and Possibly due to chronic GI blood loss, daughter noted history of hemorrhoids. Chronically on Eliquis.  Iron stores are very low.  We will hold on transfusion unless indicated.  Hemoglobin has been stable at 9.0 today 11/1/22. on iron supplements and continue to monitor.    Further recommendation will be depend upon clinical course.   Patient is elderly and is a high risk due to multiple comorbidities and acute illness with respiratory failure.         DVT Prophylaxis: Eliquis  Code Status: Full  Diet: Cardiac  Discharge Plan: Home with home health at [place] will place order for WILBER Hankins DO  11/01/22  08:49 EDT    Dictated utilizing Dragon dictation.

## 2022-11-01 NOTE — THERAPY TREATMENT NOTE
Patient Name: Ed Spear  : 1935    MRN: [id number]                              Today's Date: 2022       Admit Date: 10/28/2022    Visit Dx:     ICD-10-CM ICD-9-CM   1. Influenza A  J10.1 487.1   2. Acute respiratory failure with hypoxia (HCC)  J96.01 518.81   3. COPD with acute exacerbation (HCC)  J44.1 491.21   4. COPD exacerbation (HCC)  J44.1 491.21   5. Acute respiratory failure with hypoxia and hypercapnia (HCC)  J96.01 518.81    J96.02      Patient Active Problem List   Diagnosis   • Pulmonary emphysema (HCC)   • Hyperlipidemia   • Hypertension   • Malignant neoplasm of skin   • Vitamin D deficiency   • Arthritis, degenerative   • Enlarged prostate without lower urinary tract symptoms (luts)   • Skin cancer   • Chronic bilateral low back pain without sciatica   • Atrial fibrillation (HCC)   • Vitamin B 12 deficiency   • Callus of heel   • Influenza A   • COPD exacerbation (HCC)   • Acute respiratory failure with hypoxia and hypercapnia (HCC)     Past Medical History:   Diagnosis Date   • A-fib (HCC)    • Acute sinusitis    • Allergic rhinitis    • Arthritis, degenerative    • BMI 28.0-28.9,adult    • COPD (chronic obstructive pulmonary disease) (HCC)    • Cough    • Enlarged prostate without lower urinary tract symptoms (luts)    • Hyperlipidemia    • Hypertension    • Obstructive chronic bronchitis with exacerbation (HCC)    • Shortness of breath    • Skin cancer    • Tinnitus of both ears    • Vitamin D deficiency      Past Surgical History:   Procedure Laterality Date   • CATARACT EXTRACTION        General Information     Row Name 22 1257          Physical Therapy Time and Intention    Document Type therapy note (daily note)  -RM     Mode of Treatment physical therapy  -RM     Row Name 22 1257          General Information    Patient Profile Reviewed yes  -RM     Existing Precautions/Restrictions fall;oxygen therapy device and L/min  -RM     Row Name 22 1257           Cognition    Orientation Status (Cognition) oriented x 4  -RM     Row Name 11/01/22 1257          Safety Issues, Functional Mobility    Safety Issues Affecting Function (Mobility) positioning of assistive device;safety precaution awareness;safety precautions follow-through/compliance  -RM     Impairments Affecting Function (Mobility) balance;endurance/activity tolerance;shortness of breath;strength  -RM           User Key  (r) = Recorded By, (t) = Taken By, (c) = Cosigned By    Initials Name Provider Type    Peewee Arenas, KRIS Physical Therapist Assistant               Mobility     Row Name 11/01/22 1259          Bed Mobility    Supine-Sit [place] (Bed Mobility) minimum assist (75% patient effort);verbal cues  -RM     Assistive Device (Bed Mobility) bed rails;head of bed elevated  -RM     Row Name 11/01/22 1259          Sit-Stand Transfer    Sit-Stand [place] (Transfers) minimum assist (75% patient effort)  -RM     Assistive Device (Sit-Stand Transfers) walker, front-wheeled  -RM     Row Name 11/01/22 1259          Gait/Stairs (Locomotion)    [place] Level (Gait) minimum assist (75% patient effort);verbal cues;contact guard  -RM     Assistive Device (Gait) walker, front-wheeled  -RM     Distance in Feet (Gait) 12'  and 26'  -RM     Deviations/Abnormal Patterns (Gait) narayan decreased;stride length decreased  -RM     Bilateral Gait Deviations forward flexed posture;heel strike decreased  -RM           User Key  (r) = Recorded By, (t) = Taken By, (c) = Cosigned By    Initials Name Provider Type    Peewee Arenas, KRIS Physical Therapist Assistant               Obj/Interventions    No documentation.                Goals/Plan    No documentation.                Clinical Impression     Row Name 11/01/22 1300          Pain    Pretreatment Pain Rating 0/10 - no pain  -RM     Posttreatment Pain Rating 0/10 - no pain  -RM     Row Name 11/01/22 1300          Plan of Care Review    Plan of Care  Reviewed With patient  -RM     Progress improving  -RM     Outcome Evaluation Pt treatment completed this date. Pt was able to perform mobility with vc 's and  min a . Pt transferred to EOB and was able to perform  sts with rw.  PPt was able to ambulate min a with rw 12' and 24'  to and from bathroom with toilet transfer of min a with handrails. See flowsheet for details.  -RM     Row Name 11/01/22 1300          Vital Signs    Pre SpO2 (%) 92  -RM     O2 Delivery Pre Treatment supplemental O2  -RM     Intra SpO2 (%) 92  -RM     O2 Delivery Intra Treatment supplemental O2  -RM     Post SpO2 (%) 93  -RM     O2 Delivery Post Treatment supplemental O2  -RM     Pre Patient Position Supine  -RM     Intra Patient Position Standing  -RM     Post Patient Position Sitting  -RM     Row Name 11/01/22 1300          Positioning and Restraints    Pre-Treatment Position in bed  -RM     Post Treatment Position chair  -RM     In Chair reclined;call light within reach;encouraged to call for assist;exit alarm on;notified nsg  -           User Key  (r) = Recorded By, (t) = Taken By, (c) = Cosigned By    Initials Name Provider Type    RM Peewee Ivey, PTA Physical Therapist Assistant               Outcome Measures     Row Name 11/01/22 1306          How much help from another person do you currently need...    Turning from your back to your side while in flat bed without using bedrails? 3  -RM     Moving from lying on back to sitting on the side of a flat bed without bedrails? 3  -RM     Moving to and from a bed to a chair (including a wheelchair)? 3  -RM     Standing up from a chair using your arms (e.g., wheelchair, bedside chair)? 3  -RM     Climbing 3-5 steps with a railing? 2  -RM     To walk in hospital room? 3  -RM     AM-PAC 6 Clicks Score (PT) 17  -RM     Highest level of mobility 5 --> Static standing  -RM     Row Name 11/01/22 1306 11/01/22 1235       Functional Assessment    Outcome Measure Options AM-PAC 6 Clicks  Basic Mobility (PT)  - AM-PAC 6 Clicks Daily Activity (OT)  -SD          User Key  (r) = Recorded By, (t) = Taken By, (c) = Cosigned By    Initials Name Provider Type     Peewee Ivey, PTA Physical Therapist Assistant    Deepthi Vera, OT Occupational Therapist                             Physical Therapy Education     Title: PT OT SLP Therapies (Done)     Topic: Physical Therapy (Done)     Point: Mobility training (Done)     Learning Progress Summary           Patient Acceptance, E,TB,D, VU,NR by  at 11/1/2022 1306    Comment: safety and posture during mobility.    Acceptance, E,TB, VU by MD at 10/31/2022 1651    Acceptance, E,D, VU,DU by  at 10/29/2022 1455    Comment: Pt education for pursed lip breathing and for use of rolling walker for transfers.                   Point: Home exercise program (Done)     Learning Progress Summary           Patient Acceptance, E,TB, VU by MD at 10/31/2022 1651    Acceptance, E,TB, VU by CC at 10/30/2022 1526    Comment: Perform ex btw therapy sessions                   Point: Body mechanics (Done)     Learning Progress Summary           Patient Acceptance, E,TB, VU by MD at 10/31/2022 1651    Acceptance, E,TB, VU by CC at 10/31/2022 1325    Comment: Importance of upright posture in standing    Acceptance, E,D, VU,DU by  at 10/29/2022 1455    Comment: Pt education for pursed lip breathing and for use of rolling walker for transfers.                   Point: Precautions (Done)     Learning Progress Summary           Patient Acceptance, E,TB, VU by MD at 10/31/2022 1651                               User Key     Initials Effective Dates Name Provider Type Discipline    CC 06/16/21 -  Raeann Asher, PTA Physical Therapist Assistant PT     06/16/21 -  Peewee Ivey, PTA Physical Therapist Assistant PT    TW 06/16/21 -  Maureen Mendieta PT Physical Therapist PT    MD 06/16/21 -  Mark Samaniego, RN Registered Nurse Nurse              PT Recommendation  and Plan     Plan of Care Reviewed With: patient  Progress: improving  Outcome Evaluation: Pt treatment completed this date. Pt was able to perform mobility with vc 's and  min a . Pt transferred to EOB and was able to perform  sts with rw.  PPt was able to ambulate min a with rw 12' and 24'  to and from bathroom with toilet transfer of min a with handrails. See flowsheet for details.     Time Calculation:    PT Charges     Row Name 11/01/22 1307             Time Calculation    Start Time 1103  -RM      Stop Time 1123  -RM      Time Calculation (min) 20 min  -RM      PT Received On 11/01/22  -RM      PT Goal Re-Cert Due Date 11/08/22  -RM         Time Calculation- PT    Total Timed Code Minutes- PT 20 minute(s)  -RM            User Key  (r) = Recorded By, (t) = Taken By, (c) = Cosigned By    Initials Name Provider Type    Peewee Arenas, KRIS Physical Therapist Assistant              Therapy Charges for Today     Code Description Service Date Service Provider Modifiers Qty    51316961210 HC PT THER PROC EA 15 MIN 11/1/2022 Peewee Ivey, PTA GP 1    88338001061 HC PT THERAPEUTIC ACT EA 15 MIN 11/1/2022 Peewee Ivey, PTA GP 1          PT G-Codes  Outcome Measure Options: AM-PAC 6 Clicks Basic Mobility (PT)  AM-PAC 6 Clicks Score (PT): 17  AM-PAC 6 Clicks Score (OT): 18    Peewee Ivey PTA  11/1/2022

## 2022-11-01 NOTE — DISCHARGE PLACEMENT REQUEST
"Referral order and face sheet sent to Ed Kern (87 y.o. Male)     Date of Birth   1935    Social Security Number       Address   [address] [place] [address]    Home Phone   [phone]    MRN   [id number]       Advent   None    Marital Status                               Admission Date   10/28/22    Admission Type   Emergency    Admitting Provider   Shayla Belle DO    Attending Provider   Tomás Hankins DO    Department, Room/Bed   [facility]  3, 311/1       Discharge Date       Discharge Disposition       Discharge Destination                               Attending Provider: Tomás Hankins DO    Allergies: No Known Allergies    Isolation: Droplet   Infection: Influenza (10/28/22)   Code Status: CPR    Ht: 185.4 cm (72.99\")   Wt: 91 kg (200 lb 9.9 oz)    Admission Cmt: None   Principal Problem: Acute respiratory failure with hypoxia and hypercapnia (HCC) [J96.01,J96.02]                 Active Insurance as of 10/28/2022     Primary Coverage     Payor Plan Insurance Group Employer/Plan Group    [facility] MEDICARE REPLACEMENT [employer] MEDICARE REPLACEMENT      Payor Plan Address Payor Plan Phone Number Payor Plan Fax Number Effective Dates    [address] [address] [phone]  2022 - None Entered    [facility] [id number]       Subscriber Name Subscriber Birth Date Member ID       ED SPEAR 1935 [id number]                 Emergency Contacts      (Rel.) Home Phone Work Phone Mobile Phone    Farida Cano (Daughter) [phone] -- --    NATALIYA BROOKS (Daughter) -- -- --    NICOLAS CORDERO (Daughter) [phone] -- --               History & Physical      Shayla Belle DO at 10/28/22 1950            [facility] HOSPITALIST   HISTORY AND PHYSICAL      Name:  Ed Spear   Age:  86 y.o.  Sex:  male  :  1935  MRN:  [id number]   Visit Number:  [id number]  Admission Date:  " 10/28/2022  Date Of Service:  10/28/22  Primary Care Physician:  Vermeesch, Marilyn K, MD    Chief Complaint:     Shortness of breath, cough, fever    History Of Presenting Illness:      The patient is a chronically ill 86-year-old gentleman with history of arthritis, severe COPD, BPH, hypertension, atrial fibrillation, chronic tobacco abuse, who had presented from home with family due to complaints of increased shortness of breath and cough.  Patient lives with his daughter.  She notes multiple other family members have been sick with a viral type illness.  He started becoming sick yesterday and she noticed his breathing getting worse.  He notes he is feeling some better after treatments in the emergency room already.  He is usually fairly active, however daughter had noticed a decline over the last year and his overall health.  He continues to smoke.  He does use inhalers and breathing treatments at home.  He has not received his flu shot yet.  He denies any chest pains.  Daughter also noted patient has had some blood in his stools over the last 6 months or so when he wipes, thought it was due to hemorrhoids.    In the ER, patient was hypoxic on arrival and placed on nasal cannula oxygen.  Initial blood gas demonstrated pH 7.297 PCO2 55.  Respiratory panel positive for flu A.  BNP 3200.  Creatinine 1.16 sodium 132.  Troponin 0.017.  White count of 7 hemoglobin 9.2 and platelets of 310.  Procalcitonin 0.09.  Patient was given a DuoNeb treatment magnesium, Solu-Medrol, dose of Tamiflu.  We are asked to admit    Review Of Systems:    All systems were reviewed and negative except as mentioned in history of presenting illness, assessment and plan.    Past Medical History: Patient  has a past medical history of A-fib (HCC), Acute sinusitis, Allergic rhinitis, Arthritis, degenerative, BMI 28.0-28.9,adult, COPD (chronic obstructive pulmonary disease) (HCC), Cough, Enlarged prostate without lower urinary tract symptoms  (luts), Hyperlipidemia, Hypertension, Obstructive chronic bronchitis with exacerbation (HCC), Shortness of breath, Skin cancer, Tinnitus of both ears, and Vitamin D deficiency.    Past Surgical History: Patient  has a past surgical history that includes Cataract extraction.    Social History: Patient  reports that he has been smoking. He has never used smokeless tobacco. He reports that he does not drink alcohol and does not use drugs.    Family History:  Patient's family history has been reviewed and found to be non-contributory.     Allergies:      Patient has no known allergies.    Home Medications:    Prior to Admission Medications     Prescriptions Last Dose Informant Patient Reported? Taking?    acetaminophen (TYLENOL) 325 MG tablet   Yes No    Take  by mouth.    albuterol sulfate  (90 Base) MCG/ACT inhaler   No No    INHALE TWO PUFFS BY MOUTH EVERY 4 HOURS AS NEEDED FOR WHEEZING OR SHORTNESS OF AIR    bisoprolol (ZEBeta) 10 MG tablet   No No    TAKE ONE TABLET BY MOUTH DAILY    budesonide-formoterol (Symbicort) 160-4.5 MCG/ACT inhaler   No No    Inhale 2 puffs 2 (Two) Times a Day.    Cartia  MG 24 hr capsule   No No    TAKE ONE CAPSULE BY MOUTH DAILY    DULoxetine (CYMBALTA) 60 MG capsule   No No    TAKE ONE CAPSULE BY MOUTH DAILY    Eliquis 2.5 MG tablet tablet   No No    TAKE ONE TABLET BY MOUTH EVERY 12 HOURS    famotidine (PEPCID) 40 MG tablet   No No    TAKE ONE TABLET BY MOUTH ONCE NIGHTLY AS NEEDED FOR HEARTBURN    ipratropium-albuterol (DUO-NEB) 0.5-2.5 mg/3 ml nebulizer   No No    INHALE THREE MILLILITERS ( ONE VIAL )  VIA NEBULIZATION BY MOUTH FOUR TIMES A DAY    methocarbamol (ROBAXIN) 500 MG tablet   No No    TAKE ONE TABLET BY MOUTH THREE TIMES A DAY AS NEEDED FOR MUSCLE SPASMS    rOPINIRole (REQUIP) 0.25 MG tablet   No No    TAKE ONE TABLET IN THE MORNING AND ONE TABLET AT NIGHT 1 HOUR BEFORE BEDTIME.    simvastatin (ZOCOR) 40 MG tablet   No No    TAKE ONE TABLET BY MOUTH ONCE  "NIGHTLY    tamsulosin (FLOMAX) 0.4 MG capsule 24 hr capsule   No No    TAKE ONE CAPSULE BY MOUTH DAILY        ED Medications:    Medications   sodium chloride 0.9 % flush 10 mL (has no administration in time range)   oseltamivir (TAMIFLU) capsule 75 mg (has no administration in time range)   dexamethasone sodium phosphate injection 10 mg (10 mg Intravenous Given 10/28/22 1756)   magnesium sulfate 2g/50 mL (PREMIX) infusion (0 g Intravenous Stopped 10/28/22 1835)   ipratropium-albuterol (DUO-NEB) nebulizer solution 6 mL (6 mL Nebulization Given 10/28/22 1933)   albuterol sulfate HFA (PROVENTIL HFA;VENTOLIN HFA;PROAIR HFA) inhaler 2 puff (2 puffs Inhalation Given 10/28/22 1836)     Vital Signs:  Temp:  [97.7 °F (36.5 °C)] 97.7 °F (36.5 °C)  Heart Rate:  [] 87  Resp:  [20-40] 20  BP: (102-141)/() 102/73        10/28/22  1720   Weight: 101 kg (223 lb)     Body mass index is 28.63 kg/m².    Physical Exam:     Most recent vital Signs: /73   Pulse 87   Temp 97.7 °F (36.5 °C)   Resp 20   Ht 188 cm (74\")   Wt 101 kg (223 lb)   SpO2 99%   BMI 28.63 kg/m²     Physical Exam  Constitutional:       Appearance: He is ill-appearing.   HENT:      Head: Normocephalic.      Nose: Congestion and rhinorrhea present.      Mouth/Throat:      Mouth: Mucous membranes are moist.   Eyes:      Extraocular Movements: Extraocular movements intact.      Pupils: Pupils are equal, round, and reactive to light.   Cardiovascular:      Rate and Rhythm: Normal rate. Rhythm irregular.      Pulses: Normal pulses.      Heart sounds: No murmur heard.    No gallop.   Pulmonary:      Effort: Respiratory distress present.      Breath sounds: Wheezing and rales present.      Comments: Tachypneic at rest  Abdominal:      General: Abdomen is flat. Bowel sounds are normal. There is no distension.      Tenderness: There is no abdominal tenderness. There is no guarding.   Musculoskeletal:         General: Normal range of motion.      Right " lower leg: Edema present.      Left lower leg: Edema present.   Skin:     General: Skin is warm.      Capillary Refill: Capillary refill takes less than 2 seconds.      Findings: No bruising or lesion.   Neurological:      General: No focal deficit present.      Mental Status: He is alert and oriented to person, place, and time.      Sensory: No sensory deficit.      Motor: Weakness present.      Coordination: Coordination normal.   Psychiatric:         Mood and Affect: Mood normal.         Thought Content: Thought content normal.         Laboratory data:    I have reviewed the labs done in the emergency room.    Results from last 7 days   Lab Units 10/28/22  1737   SODIUM mmol/L 132*   POTASSIUM mmol/L 4.3   CHLORIDE mmol/L 96*   CO2 mmol/L 26.6   BUN mg/dL 18   CREATININE mg/dL 1.16   CALCIUM mg/dL 8.5*   BILIRUBIN mg/dL 0.4   ALK PHOS U/L 67   ALT (SGPT) U/L 12   AST (SGOT) U/L 17   GLUCOSE mg/dL 152*     Results from last 7 days   Lab Units 10/28/22  1737   WBC 10*3/mm3 7.84   HEMOGLOBIN g/dL 9.2*   HEMATOCRIT % 31.0*   PLATELETS 10*3/mm3 310         Results from last 7 days   Lab Units 10/28/22  1737   TROPONIN T ng/mL 0.017     Results from last 7 days   Lab Units 10/28/22  1737   PROBNP pg/mL 3,201.0*             Results from last 7 days   Lab Units 10/28/22  1800   PH, ARTERIAL pH units 7.297*   PO2 ART mm Hg 90.5   PCO2, ARTERIAL mm Hg 55.5*   HCO3 ART mmol/L 27.1           Invalid input(s): USDES,  BLOODU, NITRITITE, BACT, EP    Pain Management Panel    There is no flowsheet data to display.         EKG:      Appears to be atrial fibrillation, rate in the 90s, no acute ST or T wave changes    Radiology:    No radiology results for the last 3 days    Assessment:    1. Acute respiratory failure with hypercapnia and hypoxia, present on admission  2. Influenza A infection  3. COPD with exacerbation  4. Chronic atrial fibrillation on Eliquis  5. Anemia, unsure etiology, work-up pending  6. Chronic  pain  7. Hypertension    Plan:    Respiratory failure with hypoxia/hypercapnia:  Recommend trial of BiPAP for patient tolerates.  Otherwise continue nasal cannula oxygen.  We will give 20 mg of Lasix since he does have some degree of volume overload on exam, unsure if history of CHF.  We will continue with Solu-Medrol and bronchodilators.  Placed on Tamiflu as he is high risk.  Procalcitonin within normal limits hold on any antibiotics for now.    A. Fib:  Resume home medications including Cardizem and Eliquis.    Anemia:  Unsure etiology.  Possibly due to chronic GI blood loss, daughter noted history of hemorrhoids. Chronically on Eliquis.  We will check iron stores.  We will hold on any transfusion    Patient otherwise meets inpatient level care anticipate stay greater than 2 midnights.  Further recommendation will be depend upon clinical course.  Patient is high risk.  Patient was agreeable to being a full code at this time, daughter will talk with him about this as well.  She notes her mother was a DNR prior to her passing.    Risk Assessment: High  DVT Prophylaxis: Eliquis  Code Status: Full  Diet: Cardiac    Advance Care Planning   ACP discussion was held with the patient during this visit. Patient does not have an advance directive, declines further assistance.          Shayla Belle DO  10/28/22  19:50 EDT    Dictated utilizing Dragon dictation.    Electronically signed by Shayla Belle DO at 10/28/22 2201       Prior to Admission Medications     Prescriptions Last Dose Informant Patient Reported? Taking?    acetaminophen (TYLENOL) 325 MG tablet 10/28/2022  Yes Yes    Take 1 tablet by mouth Every 6 (Six) Hours As Needed.    albuterol sulfate  (90 Base) MCG/ACT inhaler 10/28/2022  No Yes    INHALE TWO PUFFS BY MOUTH EVERY 4 HOURS AS NEEDED FOR WHEEZING OR SHORTNESS OF AIR    bisoprolol (ZEBeta) 10 MG tablet 10/28/2022  No Yes    TAKE ONE TABLET BY MOUTH DAILY    budesonide-formoterol  (Symbicort) 160-4.5 MCG/ACT inhaler Past Month  No Yes    Inhale 2 puffs 2 (Two) Times a Day.    Cartia  MG 24 hr capsule Past Week  No Yes    TAKE ONE CAPSULE BY MOUTH DAILY    DULoxetine (CYMBALTA) 60 MG capsule 10/28/2022  No Yes    TAKE ONE CAPSULE BY MOUTH DAILY    Eliquis 2.5 MG tablet tablet 10/28/2022  No Yes    TAKE ONE TABLET BY MOUTH EVERY 12 HOURS    ipratropium-albuterol (DUO-NEB) 0.5-2.5 mg/3 ml nebulizer 10/28/2022  No Yes    INHALE THREE MILLILITERS ( ONE VIAL )  VIA NEBULIZATION BY MOUTH FOUR TIMES A DAY    methocarbamol (ROBAXIN) 500 MG tablet Past Month  No Yes    TAKE ONE TABLET BY MOUTH THREE TIMES A DAY AS NEEDED FOR MUSCLE SPASMS    rOPINIRole (REQUIP) 0.25 MG tablet 10/28/2022  No Yes    TAKE ONE TABLET IN THE MORNING AND ONE TABLET AT NIGHT 1 HOUR BEFORE BEDTIME.    simvastatin (ZOCOR) 40 MG tablet 10/28/2022  No Yes    TAKE ONE TABLET BY MOUTH ONCE NIGHTLY    tamsulosin (FLOMAX) 0.4 MG capsule 24 hr capsule 10/28/2022  No Yes    TAKE ONE CAPSULE BY MOUTH DAILY    famotidine (PEPCID) 40 MG tablet More than a month  No No    TAKE ONE TABLET BY MOUTH ONCE NIGHTLY AS NEEDED FOR HEARTBURN             Physical Therapy Notes (most recent note)      Raeann Asher, PTA at 10/31/22 1142  Version 1 of 1         Patient Name: Ed Spear  : 1935    MRN: [id number]                              Today's Date: 10/31/2022       Admit Date: 10/28/2022    Visit Dx:     ICD-10-CM ICD-9-CM   1. Influenza A  J10.1 487.1   2. Acute respiratory failure with hypoxia (HCC)  J96.01 518.81   3. COPD with acute exacerbation (HCC)  J44.1 491.21     Patient Active Problem List   Diagnosis   • Pulmonary emphysema (HCC)   • Hyperlipidemia   • Hypertension   • Malignant neoplasm of skin   • Vitamin D deficiency   • Arthritis, degenerative   • Enlarged prostate without lower urinary tract symptoms (luts)   • Skin cancer   • Chronic bilateral low back pain without sciatica   • Atrial fibrillation (HCC)   •  Vitamin B 12 deficiency   • Callus of heel   • Influenza A   • COPD exacerbation (HCC)   • Acute respiratory failure with hypoxia and hypercapnia (HCC)     Past Medical History:   Diagnosis Date   • A-fib (HCC)    • Acute sinusitis    • Allergic rhinitis    • Arthritis, degenerative    • BMI 28.0-28.9,adult    • COPD (chronic obstructive pulmonary disease) ([facility])    • Cough    • Enlarged prostate without lower urinary tract symptoms (luts)    • Hyperlipidemia    • Hypertension    • Obstructive chronic bronchitis with exacerbation ([facility])    • Shortness of breath    • Skin cancer    • Tinnitus of both ears    • Vitamin D deficiency      Past Surgical History:   Procedure Laterality Date   • CATARACT EXTRACTION        General Information     Row Name 10/31/22 1142          Physical Therapy Time and Intention    Document Type therapy note (daily note)  -CC     Mode of Treatment physical therapy  -CC     Row Name 10/31/22 1142          General Information    Patient Profile Reviewed yes  -CC     Existing Precautions/Restrictions fall;oxygen therapy device and L/min  -CC     Row Name 10/31/22 1142          Safety Issues, Functional Mobility    Safety Issues Affecting Function (Mobility) insight into deficits/self-awareness;safety precautions follow-through/compliance  -CC     Impairments Affecting Function (Mobility) balance;endurance/activity tolerance;shortness of breath;strength  -CC           User Key  (r) = Recorded By, (t) = Taken By, (c) = Cosigned By    Initials Name Provider Type    CC Raeann Asher, PTA Physical Therapist Assistant               Mobility    No documentation.                Obj/Interventions    No documentation.                Goals/Plan    No documentation.                Clinical Impression     Row Name 10/31/22 1142          Pain    Pretreatment Pain Rating 0/10 - no pain  -CC     Posttreatment Pain Rating 0/10 - no pain  -CC     Additional Documentation Pain Scale: Numbers  Pre/Post-Treatment (Group)  -CC     Row Name 10/31/22 1142          Plan of Care Review    Plan of Care Reviewed With patient  -CC     Progress no change  -CC     Outcome Evaluation Pt on 3L O2 with O2 SATS at 94-95%. Performed sit <->stand with VC S/SBA with pt performing 1x8 +1x10 reps from recliner to AD. Pt declined amb d/t to just feeling weak today.  Performed B LE ex in sitting AP, LAQ, hip abd, marching and reclined QS 1x15 reps each.  Con't with PT POC and progress as tolerated.  -CC     Row Name 10/31/22 1142          Positioning and Restraints    Pre-Treatment Position sitting in chair/recliner  -CC     Post Treatment Position chair  -CC     In Chair sitting;call light within reach;encouraged to call for assist  -CC           User Key  (r) = Recorded By, (t) = Taken By, (c) = Cosigned By    Initials Name Provider Type    CC Raeann Asher, KRIS Physical Therapist Assistant               Outcome Measures     Row Name 10/31/22 1142          How much help from another person do you currently need...    Turning from your back to your side while in flat bed without using bedrails? 4  -CC     Moving from lying on back to sitting on the side of a flat bed without bedrails? 3  -CC     Moving to and from a bed to a chair (including a wheelchair)? 3  -CC     Standing up from a chair using your arms (e.g., wheelchair, bedside chair)? 3  -CC     Climbing 3-5 steps with a railing? 2  -CC     To walk in hospital room? 3  -CC     AM-PAC 6 Clicks Score (PT) 18  -CC     Highest level of mobility 6 --> Walked 10 steps or more  -     Row Name 10/31/22 1156 10/31/22 1142       Functional Assessment    Outcome Measure Options AM-PAC 6 Clicks Daily Activity (OT)  -SD AM-PAC 6 Clicks Basic Mobility (PT)  -CC          User Key  (r) = Recorded By, (t) = Taken By, (c) = Cosigned By    Initials Name Provider Type    Raeann Domínguez, KRIS Physical Therapist Assistant    Deepthi Vera, OT Occupational Therapist                              Physical Therapy Education     Title: PT OT SLP Therapies (In Progress)     Topic: Physical Therapy (In Progress)     Point: Mobility training (Done)     Learning Progress Summary           Patient Acceptance, E,D, VU,DU by  at 10/29/2022 1455    Comment: Pt education for pursed lip breathing and for use of rolling walker for transfers.                   Point: Home exercise program (Done)     Learning Progress Summary           Patient Acceptance, E,TB, VU by  at 10/30/2022 1526    Comment: Perform ex btw therapy sessions                   Point: Body mechanics (Done)     Learning Progress Summary           Patient Acceptance, E,TB, VU by  at 10/31/2022 1325    Comment: Importance of upright posture in standing    Acceptance, E,D, VU,DU by  at 10/29/2022 1455    Comment: Pt education for pursed lip breathing and for use of rolling walker for transfers.                   Point: Precautions (Not Started)     Learner Progress:  Not documented in this visit.                      User Key     Initials Effective Dates Name Provider Type Discipline     06/16/21 -  Raeann Asher PTA Physical Therapist Assistant PT     06/16/21 -  Marueen Mendieta PT Physical Therapist PT              PT Recommendation and Plan     Plan of Care Reviewed With: patient  Progress: no change  Outcome Evaluation: Pt on 3L O2 with O2 SATS at 94-95%. Performed sit <->stand with VC S/SBA with pt performing 1x8 +1x10 reps from recliner to AD. Pt declined amb d/t to just feeling weak today.  Performed B LE ex in sitting AP, LAQ, hip abd, marching and reclined QS 1x15 reps each.  Con't with PT POC and progress as tolerated.     Time Calculation:    PT Charges     Row Name 10/31/22 3486             Time Calculation    Start Time 1142  -CC      Stop Time 1212  -CC      Time Calculation (min) 30 min  -CC      PT Received On 10/31/22  -CC      PT Goal Re-Cert Due Date 11/08/22  -CC         Timed Charges    90991 - PT  Therapeutic Exercise Minutes 15  -CC      04073 - PT Therapeutic Activity Minutes 15  -CC         Total Minutes    Timed Charges Total Minutes 30  -CC       Total Minutes 30  -CC            User Key  (r) = Recorded By, (t) = Taken By, (c) = Cosigned By    Initials Name Provider Type    CC Raeann Asher PTA Physical Therapist Assistant              Therapy Charges for Today     Code Description Service Date Service Provider Modifiers Qty    13930920173 HC PT THER PROC EA 15 MIN 10/30/2022 Raeann Asher, KRIS GP 1    51174980269 HC GAIT TRAINING EA 15 MIN 10/30/2022 Raeann Asher, KRIS GP 1    39488913830 HC PT THERAPEUTIC ACT EA 15 MIN 10/30/2022 Raeann Asher, KRIS GP 1    11542157253 HC PT THER PROC EA 15 MIN 10/31/2022 Raeann Asher PTA GP 1    53677508570 HC PT THERAPEUTIC ACT EA 15 MIN 10/31/2022 Raeann Asher, KRIS GP 1          PT G-Codes  Outcome Measure Options: AM-PAC 6 Clicks Daily Activity (OT)  AM-PAC 6 Clicks Score (PT): 18  AM-PAC 6 Clicks Score (OT): 15    Raeann Asher PTA  10/31/2022      Electronically signed by Raeann Asher PTA at 10/31/22 1330          Occupational Therapy Notes (most recent note)      Deepthi Barnes, OT at 10/31/22 1157          Patient Name: Ed Spear  : 1935    MRN: [id number]                              Today's Date: 10/31/2022       Admit Date: 10/28/2022    Visit Dx:     ICD-10-CM ICD-9-CM   1. Influenza A  J10.1 487.1   2. Acute respiratory failure with hypoxia (HCC)  J96.01 518.81   3. COPD with acute exacerbation (HCC)  J44.1 491.21     Patient Active Problem List   Diagnosis   • Pulmonary emphysema (HCC)   • Hyperlipidemia   • Hypertension   • Malignant neoplasm of skin   • Vitamin D deficiency   • Arthritis, degenerative   • Enlarged prostate without lower urinary tract symptoms (luts)   • Skin cancer   • Chronic bilateral low back pain without sciatica   • Atrial fibrillation (HCC)   • Vitamin B 12 deficiency   • Callus  of heel   • Influenza A   • COPD exacerbation (HCC)   • Acute respiratory failure with hypoxia and hypercapnia (HCC)     Past Medical History:   Diagnosis Date   • A-fib (HCC)    • Acute sinusitis    • Allergic rhinitis    • Arthritis, degenerative    • BMI 28.0-28.9,adult    • COPD (chronic obstructive pulmonary disease) (HCC)    • Cough    • Enlarged prostate without lower urinary tract symptoms (luts)    • Hyperlipidemia    • Hypertension    • Obstructive chronic bronchitis with exacerbation (HCC)    • Shortness of breath    • Skin cancer    • Tinnitus of both ears    • Vitamin D deficiency      Past Surgical History:   Procedure Laterality Date   • CATARACT EXTRACTION        General Information     Row Name 10/31/22 1144          OT Time and Intention    Document Type evaluation  -SD     Mode of Treatment occupational therapy  -SD     Row Name 10/31/22 1144          General Information    Patient Profile Reviewed yes  -SD     Prior Level of Function independent:;all household mobility;min assist:;ADL's  patient lives with his daughter, walks with a cane. Also has a walker, WC, SC and O2. Normally sponge bathes.  -SD     Existing Precautions/Restrictions fall;oxygen therapy device and L/min  -SD     Barriers to Rehab medically complex;previous functional deficit;hearing deficit  -SD     Row Name 10/31/22 1144          Living Environment    People in Home child(magali), adult  -SD     Row Name 10/31/22 1144          Home Main Entrance    Number of Stairs, Main Entrance two  -SD     Row Name 10/31/22 1144          Stairs Within Home, Primary    Stairs, Within Home, Primary holds to post  -SD     Number of Stairs, Within Home, Primary none  -SD     Row Name 10/31/22 1144          Cognition    Orientation Status (Cognition) oriented x 4  -SD     Row Name 10/31/22 1144          Safety Issues, Functional Mobility    Safety Issues Affecting Function (Mobility) safety precautions follow-through/compliance;safety precaution  awareness;awareness of need for assistance  -SD     Impairments Affecting Function (Mobility) balance;endurance/activity tolerance;shortness of breath;strength  -SD           User Key  (r) = Recorded By, (t) = Taken By, (c) = Cosigned By    Initials Name Provider Type    Deepthi Vera OT Occupational Therapist                 Mobility/ADL's     Row Name 10/31/22 1150          Bed Mobility    Comment, (Bed Mobility) patient in chair  -SD     Row Name 10/31/22 1150          Transfers    Transfers sit-stand transfer  -SD     Row Name 10/31/22 1150          Sit-Stand Transfer    Sit-Stand [place] (Transfers) minimum assist (75% patient effort)  -SD     Assistive Device (Sit-Stand Transfers) walker, front-wheeled  -SD     Row Name 10/31/22 1150          Functional Mobility    Functional Mobility- Ind. Level minimum assist (75% patient effort)  -SD     Functional Mobility- Device walker, front-wheeled  -SD     Functional Mobility-Distance (Feet) 6  x2  -SD     Functional Mobility- Safety Issues balance decreased during turns;sequencing ability decreased;step length decreased;weight-shifting ability decreased;supplemental O2  -SD     Row Name 10/31/22 1150          Activities of Daily Living    BADL Assessment/Intervention bathing;upper body dressing;lower body dressing;grooming;feeding;toileting  -SD     Row Name 10/31/22 1150          Bathing Assessment/Intervention    [place] Level (Bathing) moderate assist (50% patient effort)  -SD     Row Name 10/31/22 1150          Upper Body Dressing Assessment/Training    [place] Level (Upper Body Dressing) minimum assist (75% patient effort)  -SD     Row Name 10/31/22 1150          Lower Body Dressing Assessment/Training    [place] Level (Lower Body Dressing) moderate assist (50% patient effort)  -SD     Row Name 10/31/22 1150          Grooming Assessment/Training    [place] Level (Grooming) minimum assist (75% patient effort)  -SD     Row Name  10/31/22 1150          Self-Feeding Assessment/Training    [place] Level (Feeding) supervision  -SD     Row Name 10/31/22 1150          Toileting Assessment/Training    [place] Level (Toileting) moderate assist (50% patient effort)  -SD     Assistive Devices (Toileting) urinal;commode, bedside without drop arms  -SD           User Key  (r) = Recorded By, (t) = Taken By, (c) = Cosigned By    Initials Name Provider Type    SD Deepthi Barnes OT Occupational Therapist               Obj/Interventions     Row Name 10/31/22 1152          Range of Motion Comprehensive    General Range of Motion bilateral upper extremity ROM WFL  -SD     Row Name 10/31/22 1152          Strength Comprehensive (MMT)    General Manual Muscle Testing (MMT) Assessment upper extremity strength deficits identified  -SD     Comment, General Manual Muscle Testing (MMT) Assessment UB 3+/5 - 4-/5  -SD           User Key  (r) = Recorded By, (t) = Taken By, (c) = Cosigned By    Initials Name Provider Type    SD Deepthi Barnes OT Occupational Therapist               Goals/Plan     Row Name 10/31/22 1155          Transfer Goal 1 (OT)    Activity/Assistive Device (Transfer Goal 1, OT) sit-to-stand/stand-to-sit;walker, rolling  -SD     [place] Level/Cues Needed (Transfer Goal 1, OT) contact guard required  -SD     Time Frame (Transfer Goal 1, OT) long term goal (LTG)  -SD     Progress/Outcome (Transfer Goal 1, OT) goal ongoing  -SD     Row Name 10/31/22 1155          Dressing Goal 1 (OT)    Activity/Device (Dressing Goal 1, OT) lower body dressing  -SD     [place]/Cues Needed (Dressing Goal 1, OT) minimum assist (75% or more patient effort)  -SD     Time Frame (Dressing Goal 1, OT) 2 weeks  -SD     Progress/Outcome (Dressing Goal 1, OT) goal ongoing  -SD     Row Name 10/31/22 1155          Toileting Goal 1 (OT)    Activity/Device (Toileting Goal 1, OT) toileting skills, all;commode, bedside without drop arms;commode  -SD      [place] Level/Cues Needed (Toileting Goal 1, OT) minimum assist (75% or more patient effort)  -SD     Time Frame (Toileting Goal 1, OT) 2 weeks  -SD     Progress/Outcome (Toileting Goal 1, OT) goal ongoing  -SD     Row Name 10/31/22 1155          Strength Goal 1 (OT)    Strength Goal 1 (OT) Patient to perform UB ther ex as tolerated  -SD     Time Frame (Strength Goal 1, OT) long term goal (LTG)  -SD     Progress/Outcome (Strength Goal 1, OT) goal ongoing  -SD     Row Name 10/31/22 1155          Therapy Assessment/Plan (OT)    Planned Therapy Interventions (OT) activity tolerance training;adaptive equipment training;BADL retraining;patient/caregiver education/training;ROM/therapeutic exercise;strengthening exercise;transfer/mobility retraining  -SD           User Key  (r) = Recorded By, (t) = Taken By, (c) = Cosigned By    Initials Name Provider Type    Deepthi Vera OT Occupational Therapist               Clinical Impression     Row Name 10/31/22 1152          Pain Assessment    Pretreatment Pain Rating 0/10 - no pain  -SD     Posttreatment Pain Rating 0/10 - no pain  -SD     Row Name 10/31/22 1152          Plan of Care Review    Plan of Care Reviewed With patient  -SD     Progress no change  -SD     Outcome Evaluation OT eval completed. Patient sitting in chair on 3L O2 satting 97%. Patient requesting urinal, encouraging walking to bathroom. Patient completed tf and mobility using RW 6' at which point patient stumbled and required therapist assist to regain balance and sit at foot of bed to use urinal. Patient completed sit to stand and walked 6' back to chair. Patient requires mod A for ADLs. Patient is expected to benefit from continued OT services prior to DC and may benefit from STR.  -SD     Row Name 10/31/22 1156          Therapy Assessment/Plan (OT)    Patient/Family Therapy Goal Statement (OT) home  -SD     Rehab Potential (OT) good, to achieve stated therapy goals  -SD     Criteria for Skilled  Therapeutic Interventions Met (OT) skilled treatment is necessary  -SD     Therapy Frequency (OT) 3 times/wk  5 times if indicated  -SD     Row Name 10/31/22 1152          Therapy Plan Review/Discharge Plan (OT)    Anticipated Discharge Disposition (OT) inpatient rehabilitation facility  -SD     Row Name 10/31/22 1152          Vital Signs    Pre SpO2 (%) 97  -SD     O2 Delivery Pre Treatment supplemental O2  -SD     O2 Delivery Intra Treatment supplemental O2  -SD     Post SpO2 (%) 95  -SD     O2 Delivery Post Treatment supplemental O2  -SD     Row Name 10/31/22 1152          Positioning and Restraints    Pre-Treatment Position sitting in chair/recliner  -SD     Post Treatment Position chair  -SD     In Chair reclined;call light within reach;encouraged to call for assist  -SD           User Key  (r) = Recorded By, (t) = Taken By, (c) = Cosigned By    Initials Name Provider Type    Deepthi Vera OT Occupational Therapist               Outcome Measures     Row Name 10/31/22 1156          How much help from another is currently needed...    Putting on and taking off regular lower body clothing? 2  -SD     Bathing (including washing, rinsing, and drying) 2  -SD     Toileting (which includes using toilet bed pan or urinal) 2  -SD     Putting on and taking off regular upper body clothing 3  -SD     Taking care of personal grooming (such as brushing teeth) 3  -SD     Eating meals 3  -SD     AM-PAC 6 Clicks Score (OT) 15  -SD     Row Name 10/31/22 1156          Functional Assessment    Outcome Measure Options AM-PAC 6 Clicks Daily Activity (OT)  -SD           User Key  (r) = Recorded By, (t) = Taken By, (c) = Cosigned By    Initials Name Provider Type    Deepthi Vera OT Occupational Therapist                Occupational Therapy Education     Title: PT OT SLP Therapies (In Progress)     Topic: Occupational Therapy (In Progress)     Point: ADL training (Done)     Description:   Instruct learner(s) on proper  safety adaptation and remediation techniques during self care or transfers.   Instruct in proper use of assistive devices.              Learning Progress Summary           Patient Acceptance, E,TB, VU by SD at 10/31/2022 1156    Comment: OT POC                   Point: Home exercise program (Not Started)     Description:   Instruct learner(s) on appropriate technique for monitoring, assisting and/or progressing therapeutic exercises/activities.              Learner Progress:  Not documented in this visit.          Point: Precautions (Not Started)     Description:   Instruct learner(s) on prescribed precautions during self-care and functional transfers.              Learner Progress:  Not documented in this visit.          Point: Body mechanics (Not Started)     Description:   Instruct learner(s) on proper positioning and spine alignment during self-care, functional mobility activities and/or exercises.              Learner Progress:  Not documented in this visit.                      User Key     Initials Effective Dates Name Provider Type Discipline    SD 06/16/21 -  Deepthi Barnes OT Occupational Therapist OT              OT Recommendation and Plan  Planned Therapy Interventions (OT): activity tolerance training, adaptive equipment training, BADL retraining, patient/caregiver education/training, ROM/therapeutic exercise, strengthening exercise, transfer/mobility retraining  Therapy Frequency (OT): 3 times/wk (5 times if indicated)  Plan of Care Review  Plan of Care Reviewed With: patient  Progress: no change  Outcome Evaluation: OT eval completed. Patient sitting in chair on 3L O2 satting 97%. Patient requesting urinal, encouraging walking to bathroom. Patient completed tf and mobility using RW 6' at which point patient stumbled and required therapist assist to regain balance and sit at foot of bed to use urinal. Patient completed sit to stand and walked 6' back to chair. Patient requires mod A for ADLs.  Patient is expected to benefit from continued OT services prior to DC and may benefit from STR.     Time Calculation:    Time Calculation- OT     Row Name 10/31/22 1157             Time Calculation- OT    OT Start Time 0959  -SD      OT Received On 10/31/22  -SD      OT Goal Re-Cert Due Date 11/10/22  -SD         Untimed Charges    OT Eval/Re-eval Minutes 45  -SD         Total Minutes    Untimed Charges Total Minutes 45  -SD       Total Minutes 45  -SD            User Key  (r) = Recorded By, (t) = Taken By, (c) = Cosigned By    Initials Name Provider Type    Deepthi Vera OT Occupational Therapist              Therapy Charges for Today     Code Description Service Date Service Provider Modifiers Qty    01912231612 HC OT EVAL LOW COMPLEXITY 3 10/31/2022 Deepthi Barnes OT GO 1               Deepthi Barnes OT  10/31/2022    Electronically signed by Deepthi Barnes OT at 10/31/22 7580

## 2022-11-01 NOTE — PLAN OF CARE
Goal Outcome Evaluation:  Plan of Care Reviewed With: patient        Progress: improving  Outcome Evaluation: Pt treatment completed this date. Pt was able to perform mobility with vc 's and  min a . Pt transferred to EOB and was able to perform  sts with rw.  PPt was able to ambulate min a with rw 12' and 24'  to and from bathroom with toilet transfer of min a with handrails. See flowsheet for details.

## 2022-11-01 NOTE — CASE MANAGEMENT/SOCIAL WORK
CM met with pt. [facility] for [facility].    Delivered food bag to pt.

## 2022-11-01 NOTE — THERAPY TREATMENT NOTE
Patient Name: Ed Spear  : 1935    MRN: [id number]                              Today's Date: 2022       Admit Date: 10/28/2022    Visit Dx:     ICD-10-CM ICD-9-CM   1. Influenza A  J10.1 487.1   2. Acute respiratory failure with hypoxia (HCC)  J96.01 518.81   3. COPD with acute exacerbation (HCC)  J44.1 491.21   4. COPD exacerbation (HCC)  J44.1 491.21   5. Acute respiratory failure with hypoxia and hypercapnia (HCC)  J96.01 518.81    J96.02      Patient Active Problem List   Diagnosis   • Pulmonary emphysema (HCC)   • Hyperlipidemia   • Hypertension   • Malignant neoplasm of skin   • Vitamin D deficiency   • Arthritis, degenerative   • Enlarged prostate without lower urinary tract symptoms (luts)   • Skin cancer   • Chronic bilateral low back pain without sciatica   • Atrial fibrillation (HCC)   • Vitamin B 12 deficiency   • Callus of heel   • Influenza A   • COPD exacerbation (HCC)   • Acute respiratory failure with hypoxia and hypercapnia (HCC)     Past Medical History:   Diagnosis Date   • A-fib (HCC)    • Acute sinusitis    • Allergic rhinitis    • Arthritis, degenerative    • BMI 28.0-28.9,adult    • COPD (chronic obstructive pulmonary disease) (HCC)    • Cough    • Enlarged prostate without lower urinary tract symptoms (luts)    • Hyperlipidemia    • Hypertension    • Obstructive chronic bronchitis with exacerbation (HCC)    • Shortness of breath    • Skin cancer    • Tinnitus of both ears    • Vitamin D deficiency      Past Surgical History:   Procedure Laterality Date   • CATARACT EXTRACTION        General Information     Row Name 22 1228          OT Time and Intention    Document Type therapy note (daily note)  -SD     Mode of Treatment occupational therapy  -SD     Row Name 22 1228          General Information    Patient Profile Reviewed yes  -SD           User Key  (r) = Recorded By, (t) = Taken By, (c) = Cosigned By    Initials Name Provider Type    Deepthi Vera OT  Occupational Therapist                 Mobility/ADL's     Row Name 11/01/22 1228          Bed Mobility    Bed Mobility supine-sit  -SD     Supine-Sit [place] (Bed Mobility) contact guard  -SD     Assistive Device (Bed Mobility) bed rails;head of bed elevated  -SD     Row Name 11/01/22 1228          Transfers    Transfers sit-stand transfer;toilet transfer  -SD     Row Name 11/01/22 1228          Sit-Stand Transfer    Sit-Stand [place] (Transfers) minimum assist (75% patient effort)  -SD     Assistive Device (Sit-Stand Transfers) walker, front-wheeled  -SD     Row Name 11/01/22 1228          Toilet Transfer    Type (Toilet Transfer) sit-stand;stand-sit  -SD     [place] Level (Toilet Transfer) minimum assist (75% patient effort)  -SD     Assistive Device (Toilet Transfer) commode;grab bars/safety frame  -SD     Row Name 11/01/22 1228          Functional Mobility    Functional Mobility- [place]. Level minimum assist (75% patient effort)  -SD     Functional Mobility- Device walker, front-wheeled  -SD     Functional Mobility-Distance (Feet) 18  23  -SD     Functional Mobility- Safety Issues supplemental O2  -SD     Row Name 11/01/22 1228          Upper Body Dressing Assessment/Training    [place] Level (Upper Body Dressing) don;pull-over garment;contact guard assist  -SD     Position (Upper Body Dressing) edge of bed sitting  -SD     Row Name 11/01/22 1228          Lower Body Dressing Assessment/Training    [place] Level (Lower Body Dressing) don;pants/bottoms;minimum assist (75% patient effort)  -SD     Row Name 11/01/22 1228          Grooming Assessment/Training    [place] Level (Grooming) oral care regimen;wash face, hands;minimum assist (75% patient effort)  -SD     Row Name 11/01/22 1228          Toileting Assessment/Training    [place] Level (Toileting) change pad/brief;perform perineal hygiene;adjust/manage clothing;minimum assist (75% patient effort)  -SD     Assistive Devices  (Toileting) commode;grab bar/safety frame  -SD           User Key  (r) = Recorded By, (t) = Taken By, (c) = Cosigned By    Initials Name Provider Type    Deepthi Vera OT Occupational Therapist               Obj/Interventions     Row Name 11/01/22 1230          Shoulder (Therapeutic Exercise)    Shoulder (Therapeutic Exercise) AROM (active range of motion)  -SD     Shoulder AROM (Therapeutic Exercise) bilateral;flexion;extension;aBduction;aDduction;10 repetitions  -SD     Row Name 11/01/22 1230          Elbow/Forearm (Therapeutic Exercise)    Elbow/Forearm (Therapeutic Exercise) AROM (active range of motion)  -SD     Elbow/Forearm AROM (Therapeutic Exercise) bilateral;flexion;extension;10 repetitions  -SD     Row Name 11/01/22 1230          Motor Skills    Therapeutic Exercise shoulder;elbow/forearm  -SD           User Key  (r) = Recorded By, (t) = Taken By, (c) = Cosigned By    Initials Name Provider Type    Deepthi Vera OT Occupational Therapist               Goals/Plan    No documentation.                Clinical Impression     Row Name 11/01/22 1232          Pain Assessment    Pretreatment Pain Rating 0/10 - no pain  -SD     Posttreatment Pain Rating 0/10 - no pain  -SD     Row Name 11/01/22 1232          Plan of Care Review    Plan of Care Reviewed With patient  -SD     Progress improving  -SD     Outcome Evaluation OT tx completed. Patient supine in bed, completed supine to sit with CGA. Patient completed tf and functional mobility using RW 18' + 23' with min A. Completed LBD and toileting with min A. Returned to chair and completed grooming tasks with min A and UB AROM. O2 on 2-3L maintained 93-95. Continue OT POC  -SD     Row Name 11/01/22 1232          Vital Signs    Pre SpO2 (%) 95  -SD     O2 Delivery Pre Treatment supplemental O2  -SD     Intra SpO2 (%) 93  -SD     O2 Delivery Intra Treatment supplemental O2  -SD     Post SpO2 (%) 95  -SD     O2 Delivery Post Treatment supplemental O2  -SD      Row Name 11/01/22 1232          Positioning and Restraints    Pre-Treatment Position in bed  -SD     Post Treatment Position chair  -SD     In Chair sitting;call light within reach;encouraged to call for assist  -SD           User Key  (r) = Recorded By, (t) = Taken By, (c) = Cosigned By    Initials Name Provider Type    Deepthi Vera OT Occupational Therapist               Outcome Measures     Row Name 11/01/22 1235          How much help from another is currently needed...    Putting on and taking off regular lower body clothing? 3  -SD     Bathing (including washing, rinsing, and drying) 2  -SD     Toileting (which includes using toilet bed pan or urinal) 3  -SD     Putting on and taking off regular upper body clothing 3  -SD     Taking care of personal grooming (such as brushing teeth) 3  -SD     Eating meals 4  -SD     AM-PAC 6 Clicks Score (OT) 18  -SD     Row Name 11/01/22 1235          Functional Assessment    Outcome Measure Options AM-PAC 6 Clicks Daily Activity (OT)  -SD           User Key  (r) = Recorded By, (t) = Taken By, (c) = Cosigned By    Initials Name Provider Type    Deepthi Vera OT Occupational Therapist                Occupational Therapy Education     Title: PT OT SLP Therapies (Done)     Topic: Occupational Therapy (Done)     Point: ADL training (Done)     Description:   Instruct learner(s) on proper safety adaptation and remediation techniques during self care or transfers.   Instruct in proper use of assistive devices.              Learning Progress Summary           Patient Acceptance, E,TB, VU by SD at 11/1/2022 1235    Comment: Safety and sequencing during tf and mobility    Acceptance, E,TB, VU by MD at 10/31/2022 1651    Acceptance, E,TB, VU by SD at 10/31/2022 1156    Comment: OT POC                   Point: Home exercise program (Done)     Description:   Instruct learner(s) on appropriate technique for monitoring, assisting and/or progressing therapeutic  exercises/activities.              Learning Progress Summary           Patient Acceptance, E,TB, VU by MD at 10/31/2022 1651                   Point: Precautions (Done)     Description:   Instruct learner(s) on prescribed precautions during self-care and functional transfers.              Learning Progress Summary           Patient Acceptance, E,TB, VU by MD at 10/31/2022 1651                   Point: Body mechanics (Done)     Description:   Instruct learner(s) on proper positioning and spine alignment during self-care, functional mobility activities and/or exercises.              Learning Progress Summary           Patient Acceptance, E,TB, VU by MD at 10/31/2022 1651                               User Key     Initials Effective Dates Name Provider Type Discipline    SD 06/16/21 -  Deepthi Barnes OT Occupational Therapist OT    MD 06/16/21 -  Mark Samaniego RN Registered Nurse Nurse              OT Recommendation and Plan  Planned Therapy Interventions (OT): activity tolerance training, adaptive equipment training, BADL retraining, patient/caregiver education/training, ROM/therapeutic exercise, strengthening exercise, transfer/mobility retraining  Therapy Frequency (OT): 3 times/wk (5 times if indicated)  Plan of Care Review  Plan of Care Reviewed With: patient  Progress: improving  Outcome Evaluation: OT tx completed. Patient supine in bed, completed supine to sit with CGA. Patient completed tf and functional mobility using RW 18' + 23' with min A. Completed LBD and toileting with min A. Returned to chair and completed grooming tasks with min A and UB AROM. O2 on 2-3L maintained 93-95. Continue OT POC     Time Calculation:    Time Calculation- OT     Row Name 11/01/22 1236             Time Calculation- OT    OT Start Time 1111  -SD      OT Stop Time 1138  -SD      OT Time Calculation (min) 27 min  -SD      OT Received On 11/01/22  -SD      OT Goal Re-Cert Due Date 11/10/22  -SD         Timed Charges     04669 - OT Therapeutic Activity Minutes 12  -SD      48265 - OT Self Care/Mgmt Minutes 15  -SD         Total Minutes    Timed Charges Total Minutes 27  -SD       Total Minutes 27  -SD            User Key  (r) = Recorded By, (t) = Taken By, (c) = Cosigned By    Initials Name Provider Type    Deepthi Vera OT Occupational Therapist              Therapy Charges for Today     Code Description Service Date Service Provider Modifiers Qty    27694483619  OT EVAL LOW COMPLEXITY 3 10/31/2022 Deepthi Barnes OT GO 1    [id number]  OT THERAPEUTIC ACT EA 15 MIN 11/1/2022 Deepthi Barnes OT GO 1    [id number]  OT SELF CARE/MGMT/TRAIN EA 15 MIN 11/1/2022 Deepthi Barnes OT GO 1               Deepthi Barnes OT  11/1/2022

## 2022-11-01 NOTE — CASE MANAGEMENT/SOCIAL WORK
Case Management Discharge Note                Selected Continued Care - Admitted Since 10/28/2022     Destination    No services have been selected for the patient.              Durable Medical Equipment     Service Provider Selected Services Address Phone Fax Patient Preferred    [facility] [address] [id number] [phone] --       Internal Comment last updated by Shana Hardin RN 11/1/2022 1135    New O2 set up 2L                     Dialysis/Infusion    No services have been selected for the patient.              Home Medical Care     Service Provider Selected Services Address Phone Fax Patient Preferred    [facility] [address], [address], [place] [address] [phone] --          Therapy    No services have been selected for the patient.              Community Resources    No services have been selected for the patient.              Community & DME    No services have been selected for the patient.                  Transportation Services  Private: Car    Final Discharge Disposition Code: 06 - home with home health care

## 2022-11-01 NOTE — PLAN OF CARE
Goal Outcome Evaluation:  Plan of Care Reviewed With: patient         Interventions implemented as appropriate.

## 2022-11-01 NOTE — PLAN OF CARE
Goal Outcome Evaluation:  Plan of Care Reviewed With: patient        Progress: improving  Outcome Evaluation: OT tx completed. Patient supine in bed, completed supine to sit with CGA. Patient completed tf and functional mobility using RW 18' + 23' with min A. Completed LBD and toileting with min A. Returned to chair and completed grooming tasks with min A and UB AROM. O2 on 2-3L maintained 93-95. Continue OT POC

## 2022-11-02 ENCOUNTER — TRANSITIONAL CARE MANAGEMENT TELEPHONE ENCOUNTER (OUTPATIENT)
Dept: CALL CENTER | Facility: HOSPITAL | Age: 87
End: 2022-11-02

## 2022-11-02 DIAGNOSIS — E78.2 MIXED HYPERLIPIDEMIA: ICD-10-CM

## 2022-11-02 NOTE — OUTREACH NOTE
Prep Survey    Flowsheet Row Responses   [facility] patient discharged from? [place]   Is LACE score < 7 ? No   Emergency Room discharge w/ pulse ox? No   Eligibility [facility]   Date of Admission 10/28/22   Date of Discharge 11/01/22   Discharge Disposition Home or Self Care   Discharge diagnosis Influenza A infection,    COPD with exacerbation   Does the patient have one of the following disease processes/diagnoses(primary or secondary)? COPD   Does the patient have Home health ordered? No   Is there a DME ordered? No   Prep survey completed? Yes          BREONNA DELAROSA - Registered Nurse

## 2022-11-02 NOTE — OUTREACH NOTE
Call Center TCM Note    Flowsheet Row Responses   [facility] patient discharged from? [facility]   Does the patient have one of the following disease processes/diagnoses(primary or secondary)? COPD   TCM attempt successful? Yes  [VR lists - dtr]   Call start time 1223   Call end time 1229   Discharge diagnosis Influenza A infection,    COPD with exacerbation   Is patient permission given to speak with other caregiver? Yes   List who call center can speak with dtr   Person spoke with today (if not patient) and relationship Farida   Meds reviewed with patient/caregiver? Yes   Is the patient having any side effects they believe may be caused by any medication additions or changes? No   Does the patient have all medications ordered at discharge? No   Nursing Interventions No intervention needed   Prescription comments picking up from pharmacy today   Is the patient taking all medications as directed (includes completed medication regime)? No   What is preventing the patient from taking all medications as directed? Other   Nursing Interventions Nurse provided patient education   Comments Hosp f/u appt 11-7-22   Does the patient have an appointment with their PCP within 7 days of discharge? Yes   Has all DME been delivered? Yes   DME comments had home nebs   Pulse Ox monitoring None   Psychosocial issues? No   Comments Pt continues having cough, SOA, weakness. No issues voiding, eating or fever.   Did the patient receive a copy of their discharge instructions? Yes   Nursing interventions Reviewed instructions with patient   What is the patient's perception of their health status since discharge? Improving   Nursing Interventions Nurse provided patient education   If the patient is a current smoker, are they able to teach back resources for cessation? --  [smoking cig 1pk/day]   Is the patient/caregiver able to teach back the hierarchy of who to call/visit for symptoms/problems? PCP, Specialist, Home health nurse, Urgent  [facility], [place], ED, 911 Yes   Additional teach back comments Educated on importance of safety with O2 in house and continued smoking   Is the patient able to teach back COPD zones? Yes   Patient reports what zone on this call? Yellow Zone   Yellow Zone Reports having a bad day or a COPD flare, I have less energy for my daily activities, More breathless than usual   Yellow interventions Continue taking daily medications, Use quick relief inhaler   Is the patient/caregiver able to teach back signs and symptoms of worsening condition: Chest pain, Shortness of breath   TCM call completed? Yes   Call end time 1229   Would this patient benefit from a Referral to [facility] Social Work? No   Is the patient interested in additional calls from an ambulatory ?  NOTE:  applies to high risk patients requiring additional follow-up. No          Lisha Albarado RN    11/2/2022, 12:29 EDT

## 2022-11-02 NOTE — PAYOR COMM NOTE
"  CLINICALS REQUESTED  UR MANAGER; ROBERTO SANTOS [phone] AND [phone]    Ed Spear (87 y.o. Male)     Date of Birth   1935    Social Security Number       Address   [address] [place] [address]    Home Phone   [phone]    MRN   [id number]       Nondenominational   None    Marital Status                               Admission Date   10/28/22    Admission Type   Emergency    Admitting Provider   Shayla Belle DO    Attending Provider       Department, Room/Bed   [facility] MED SURG  3, 311/1       Discharge Date   2022    Discharge Disposition   Home-Health Care Svc    Discharge Destination                               Attending Provider: (none)   Allergies: No Known Allergies    Isolation: None   Infection: Influenza (10/28/22)   Code Status: Prior    Ht: 185.4 cm (72.99\")   Wt: 91 kg (200 lb 9.9 oz)    Admission Cmt: None   Principal Problem: Acute respiratory failure with hypoxia and hypercapnia (HCC) [J96.01,J96.02]                 Active Insurance as of 10/28/2022     Primary Coverage     Payor Plan Insurance Group Employer/Plan Group    [facility] MEDICARE REPLACEMENT [employer] MEDICARE REPLACEMENT      Payor Plan Address Payor Plan Phone Number Payor Plan Fax Number Effective Dates    [address] [address] [phone]  2022 - None Entered    [facility] [address]       Subscriber Name Subscriber Birth Date Member ID       ED SPEAR 1935 [id number]                 Emergency Contacts      (Rel.) Home Phone Work Phone Mobile Phone    Farida Cano (Daughter) [phone] -- --    NATALIYA BROOKS (Daughter) -- -- --    NICOLAS CORDERO (Daughter) [phone] -- --               History & Physical      Shayla Belle DO at 10/28/22 1950            [facility] HOSPITALIST   HISTORY AND PHYSICAL      Name:  Ed Spear   Age:  86 y.o.  Sex:  male  :  1935  MRN:  [id number]   Visit Number:  " [id number]  Admission Date:  10/28/2022  Date Of Service:  10/28/22  Primary Care Physician:  Vermeesch, Marilyn K, MD    Chief Complaint:     Shortness of breath, cough, fever    History Of Presenting Illness:      The patient is a chronically ill 86-year-old gentleman with history of arthritis, severe COPD, BPH, hypertension, atrial fibrillation, chronic tobacco abuse, who had presented from home with family due to complaints of increased shortness of breath and cough.  Patient lives with his daughter.  She notes multiple other family members have been sick with a viral type illness.  He started becoming sick yesterday and she noticed his breathing getting worse.  He notes he is feeling some better after treatments in the emergency room already.  He is usually fairly active, however daughter had noticed a decline over the last year and his overall health.  He continues to smoke.  He does use inhalers and breathing treatments at home.  He has not received his flu shot yet.  He denies any chest pains.  Daughter also noted patient has had some blood in his stools over the last 6 months or so when he wipes, thought it was due to hemorrhoids.    In the ER, patient was hypoxic on arrival and placed on nasal cannula oxygen.  Initial blood gas demonstrated pH 7.297 PCO2 55.  Respiratory panel positive for flu A.  BNP 3200.  Creatinine 1.16 sodium 132.  Troponin 0.017.  White count of 7 hemoglobin 9.2 and platelets of 310.  Procalcitonin 0.09.  Patient was given a DuoNeb treatment magnesium, Solu-Medrol, dose of Tamiflu.  We are asked to admit    Review Of Systems:    All systems were reviewed and negative except as mentioned in history of presenting illness, assessment and plan.    Past Medical History: Patient  has a past medical history of A-fib (HCC), Acute sinusitis, Allergic rhinitis, Arthritis, degenerative, BMI 28.0-28.9,adult, COPD (chronic obstructive pulmonary disease) (HCC), Cough, Enlarged prostate without  lower urinary tract symptoms (luts), Hyperlipidemia, Hypertension, Obstructive chronic bronchitis with exacerbation (HCC), Shortness of breath, Skin cancer, Tinnitus of both ears, and Vitamin D deficiency.    Past Surgical History: Patient  has a past surgical history that includes Cataract extraction.    Social History: Patient  reports that he has been smoking. He has never used smokeless tobacco. He reports that he does not drink alcohol and does not use drugs.    Family History:  Patient's family history has been reviewed and found to be non-contributory.     Allergies:      Patient has no known allergies.    Home Medications:    Prior to Admission Medications     Prescriptions Last Dose Informant Patient Reported? Taking?    acetaminophen (TYLENOL) 325 MG tablet   Yes No    Take  by mouth.    albuterol sulfate  (90 Base) MCG/ACT inhaler   No No    INHALE TWO PUFFS BY MOUTH EVERY 4 HOURS AS NEEDED FOR WHEEZING OR SHORTNESS OF AIR    bisoprolol (ZEBeta) 10 MG tablet   No No    TAKE ONE TABLET BY MOUTH DAILY    budesonide-formoterol (Symbicort) 160-4.5 MCG/ACT inhaler   No No    Inhale 2 puffs 2 (Two) Times a Day.    Cartia  MG 24 hr capsule   No No    TAKE ONE CAPSULE BY MOUTH DAILY    DULoxetine (CYMBALTA) 60 MG capsule   No No    TAKE ONE CAPSULE BY MOUTH DAILY    Eliquis 2.5 MG tablet tablet   No No    TAKE ONE TABLET BY MOUTH EVERY 12 HOURS    famotidine (PEPCID) 40 MG tablet   No No    TAKE ONE TABLET BY MOUTH ONCE NIGHTLY AS NEEDED FOR HEARTBURN    ipratropium-albuterol (DUO-NEB) 0.5-2.5 mg/3 ml nebulizer   No No    INHALE THREE MILLILITERS ( ONE VIAL )  VIA NEBULIZATION BY MOUTH FOUR TIMES A DAY    methocarbamol (ROBAXIN) 500 MG tablet   No No    TAKE ONE TABLET BY MOUTH THREE TIMES A DAY AS NEEDED FOR MUSCLE SPASMS    rOPINIRole (REQUIP) 0.25 MG tablet   No No    TAKE ONE TABLET IN THE MORNING AND ONE TABLET AT NIGHT 1 HOUR BEFORE BEDTIME.    simvastatin (ZOCOR) 40 MG tablet   No No    TAKE  "ONE TABLET BY MOUTH ONCE NIGHTLY    tamsulosin (FLOMAX) 0.4 MG capsule 24 hr capsule   No No    TAKE ONE CAPSULE BY MOUTH DAILY        ED Medications:    Medications   sodium chloride 0.9 % flush 10 mL (has no administration in time range)   oseltamivir (TAMIFLU) capsule 75 mg (has no administration in time range)   dexamethasone sodium phosphate injection 10 mg (10 mg Intravenous Given 10/28/22 1756)   magnesium sulfate 2g/50 mL (PREMIX) infusion (0 g Intravenous Stopped 10/28/22 1835)   ipratropium-albuterol (DUO-NEB) nebulizer solution 6 mL (6 mL Nebulization Given 10/28/22 1933)   albuterol sulfate HFA (PROVENTIL HFA;VENTOLIN HFA;PROAIR HFA) inhaler 2 puff (2 puffs Inhalation Given 10/28/22 1836)     Vital Signs:  Temp:  [97.7 °F (36.5 °C)] 97.7 °F (36.5 °C)  Heart Rate:  [] 87  Resp:  [20-40] 20  BP: (102-141)/() 102/73        10/28/22  1720   Weight: 101 kg (223 lb)     Body mass index is 28.63 kg/m².    Physical Exam:     Most recent vital Signs: /73   Pulse 87   Temp 97.7 °F (36.5 °C)   Resp 20   Ht 188 cm (74\")   Wt 101 kg (223 lb)   SpO2 99%   BMI 28.63 kg/m²     Physical Exam  Constitutional:       Appearance: He is ill-appearing.   HENT:      Head: Normocephalic.      Nose: Congestion and rhinorrhea present.      Mouth/Throat:      Mouth: Mucous membranes are moist.   Eyes:      Extraocular Movements: Extraocular movements intact.      Pupils: Pupils are equal, round, and reactive to light.   Cardiovascular:      Rate and Rhythm: Normal rate. Rhythm irregular.      Pulses: Normal pulses.      Heart sounds: No murmur heard.    No gallop.   Pulmonary:      Effort: Respiratory distress present.      Breath sounds: Wheezing and rales present.      Comments: Tachypneic at rest  Abdominal:      General: Abdomen is flat. Bowel sounds are normal. There is no distension.      Tenderness: There is no abdominal tenderness. There is no guarding.   Musculoskeletal:         General: Normal " range of motion.      Right lower leg: Edema present.      Left lower leg: Edema present.   Skin:     General: Skin is warm.      Capillary Refill: Capillary refill takes less than 2 seconds.      Findings: No bruising or lesion.   Neurological:      General: No focal deficit present.      Mental Status: He is alert and oriented to person, place, and time.      Sensory: No sensory deficit.      Motor: Weakness present.      Coordination: Coordination normal.   Psychiatric:         Mood and Affect: Mood normal.         Thought Content: Thought content normal.         Laboratory data:    I have reviewed the labs done in the emergency room.    Results from last 7 days   Lab Units 10/28/22  1737   SODIUM mmol/L 132*   POTASSIUM mmol/L 4.3   CHLORIDE mmol/L 96*   CO2 mmol/L 26.6   BUN mg/dL 18   CREATININE mg/dL 1.16   CALCIUM mg/dL 8.5*   BILIRUBIN mg/dL 0.4   ALK PHOS U/L 67   ALT (SGPT) U/L 12   AST (SGOT) U/L 17   GLUCOSE mg/dL 152*     Results from last 7 days   Lab Units 10/28/22  1737   WBC 10*3/mm3 7.84   HEMOGLOBIN g/dL 9.2*   HEMATOCRIT % 31.0*   PLATELETS 10*3/mm3 310         Results from last 7 days   Lab Units 10/28/22  1737   TROPONIN T ng/mL 0.017     Results from last 7 days   Lab Units 10/28/22  1737   PROBNP pg/mL 3,201.0*             Results from last 7 days   Lab Units 10/28/22  1800   PH, ARTERIAL pH units 7.297*   PO2 ART mm Hg 90.5   PCO2, ARTERIAL mm Hg 55.5*   HCO3 ART mmol/L 27.1           Invalid input(s): USDES,  BLOODU, NITRITITE, BACT, EP    Pain Management Panel    There is no flowsheet data to display.         EKG:      Appears to be atrial fibrillation, rate in the 90s, no acute ST or T wave changes    Radiology:    No radiology results for the last 3 days    Assessment:    1. Acute respiratory failure with hypercapnia and hypoxia, present on admission  2. Influenza A infection  3. COPD with exacerbation  4. Chronic atrial fibrillation on Eliquis  5. Anemia, unsure etiology, work-up  pending  6. Chronic pain  7. Hypertension    Plan:    Respiratory failure with hypoxia/hypercapnia:  Recommend trial of BiPAP for patient tolerates.  Otherwise continue nasal cannula oxygen.  We will give 20 mg of Lasix since he does have some degree of volume overload on exam, unsure if history of CHF.  We will continue with Solu-Medrol and bronchodilators.  Placed on Tamiflu as he is high risk.  Procalcitonin within normal limits hold on any antibiotics for now.    A. Fib:  Resume home medications including Cardizem and Eliquis.    Anemia:  Unsure etiology.  Possibly due to chronic GI blood loss, daughter noted history of hemorrhoids. Chronically on Eliquis.  We will check iron stores.  We will hold on any transfusion    Patient otherwise meets inpatient level care anticipate stay greater than 2 midnights.  Further recommendation will be depend upon clinical course.  Patient is high risk.  Patient was agreeable to being a full code at this time, daughter will talk with him about this as well.  She notes her mother was a DNR prior to her passing.    Risk Assessment: High  DVT Prophylaxis: Eliquis  Code Status: Full  Diet: Cardiac    Advance Care Planning   ACP discussion was held with the patient during this visit. Patient does not have an advance directive, declines further assistance.          Shayla Belle DO  10/28/22  19:50 EDT    Dictated utilizing Dragon dictation.    Electronically signed by Shayla Belle DO at 10/28/22 2201          Emergency Department Notes      Svitlana Yang PA-C at 10/28/22 1748     Attestation signed by Mark Perez MD at 10/31/22 0716        NON FACE TO FACE: This visit was performed by BOTH a physician and an APC. I performed all aspects of the MDM as documented.  Mark Perez MD 10/31/2022 07:15 EDT                         Subjective   History of Present Illness  Patient is 86-year-old male with history of arthritis, COPD, enlarged prostate,  hyperlipidemia, hypertension, A. fib on Eliquis, and tobacco use presenting to the ER for evaluation of shortness of breath.  Patient's daughter is at bedside.  She states that patient began having rhinorrhea and cough last night and had progressively worsening shortness of breath.  She states he has been around family members who have had flulike symptoms.  Patient does not wear oxygen at home, does continue to smoke tobacco daily.  He does have inhalers that he uses at home.  Patient states he has had a productive cough, denies any hemoptysis.  He denies any known fever, headache, dizziness, syncopal episodes, abdominal pain, nausea, emesis, diarrhea, leg pain or swelling, chest pain, or any other symptoms.        Review of Systems   Constitutional: Negative for chills and fever.   HENT: Positive for rhinorrhea. Negative for congestion, ear pain, sore throat and trouble swallowing.    Eyes: Negative.    Respiratory: Positive for cough, shortness of breath and wheezing.    Cardiovascular: Negative for chest pain, palpitations and leg swelling.   Gastrointestinal: Negative.    Genitourinary: Negative.    Musculoskeletal: Negative.    Skin: Negative.    Allergic/Immunologic: Negative for immunocompromised state.   Neurological: Negative.    Psychiatric/Behavioral: Negative.        Past Medical History:   Diagnosis Date   • A-fib ([facility])    • Acute sinusitis    • Allergic rhinitis    • Arthritis, degenerative    • BMI 28.0-28.9,adult    • COPD (chronic obstructive pulmonary disease) ([facility])    • Cough    • Enlarged prostate without lower urinary tract symptoms (luts)    • Hyperlipidemia    • Hypertension    • Obstructive chronic bronchitis with exacerbation ([facility])    • Shortness of breath    • Skin cancer    • Tinnitus of both ears    • Vitamin D deficiency        No Known Allergies    Past Surgical History:   Procedure Laterality Date   • CATARACT EXTRACTION         Family History   Problem Relation Age of Onset   •  "Cancer Mother    • Kidney disease Mother    • Cancer Father    • Cancer Brother        Social History     Socioeconomic History   • Marital status:    Tobacco Use   • Smoking status: Every Day   • Smokeless tobacco: Never   Substance and Sexual Activity   • Alcohol use: No   • Drug use: Never   • Sexual activity: Defer           Objective   Physical Exam  Vitals and nursing note reviewed.     /72 (BP Location: Right arm, Patient Position: Lying)   Pulse 100   Temp 98.7 °F (37.1 °C) (Oral)   Resp 20   Ht 185.4 cm (73\")   Wt 97.1 kg (214 lb 1.1 oz)   SpO2 94%   BMI 28.24 kg/m²     GEN: Patient sitting upright in stretcher.  He is speaking in short sentences and is tachypneic with accessory muscle use.  He does answer questions appropriately  Head: Normocephalic, atraumatic  Eyes: EOM intact  ENT: Nares are patent, no facial asymmetry noted  Chest: Nontender to palpation  Cardiovascular: Tachycardia  Lungs: Breathing is even and tachypneic, patient does have mild to moderate respiratory distress with accessory muscle use.  He has wheezing throughout all lung fields and decreased air movement.  Abdomen: Soft, nontender, nondistended, no peritoneal signs, no guarding  Extremities: No edema, normal appearance, full range of motion, radial and posterior tibialis pulses 2+ and equal  Neuro: GCS 15  Psych: Mood and affect are appropriate    Procedures          ED Course  ED Course as of 10/29/22 0025   Fri Oct 28, 2022   1802 WBC: 7.84 [LA]   1802 Hemoglobin(!): 9.2 [LA]   1802 Hematocrit(!): 31.0 [LA]   1802 MCV(!): 75.8 [LA]   1802 MCH(!): 22.5 [LA]   1802 MCHC(!): 29.7 [LA]   1802 RDW(!): 20.9 [LA]   1802 RDW-SD(!): 56.4 [LA]   1802 MPV: 9.3 [LA]   1802 Platelets: 310 [LA]   1802 Neutrophil Rel %: 74.6 [LA]   1802 Lymphocyte Rel %(!): 13.5 [LA]   1802 Monocyte Rel %: 11.1 [LA]   1802 Eosinophil Rel %(!): 0.0 [LA]   1802 Basophil Rel %: 0.5 [LA]   1802 Immature Granulocyte Rel %: 0.3 [LA]   1802 " Neutrophils Absolute: 5.85 [LA]   1802 Lymphocytes Absolute: 1.06 [LA]   1802 Monocytes Absolute: 0.87 [LA]   1802 Eosinophils Absolute: 0.00 [LA]   1802 Basophils Absolute: 0.04 [LA]   1802 Immature Grans, Absolute: 0.02 [LA]   1802 nRBC: 0.0 [LA]   1802 pH, Arterial(!!): 7.297 [LA]   1802 pCO2, Arterial(!!): 55.5 [LA]   1802 pO2, Arterial: 90.5 [LA]   1802 HCO3, Arterial: 27.1 [LA]   1802 Base Excess: 0.2 [LA]   1802 O2 Saturation, Arterial: 95.8 [LA]   1802 Hematocrit, Blood Gas: 28.4 [LA]   1802 Oxyhemoglobin(!): 92.9 [LA]   1802 Methemoglobin: 0.50 [LA]   1803 Carboxyhemoglobin(!): 2.5 [LA]   1803 Barometric Pressure for Blood Gas: 741 [LA]   1803 Modality: Nasal Cannula [LA]   1803 Flow Rate: 5.0 [LA]   1803 Ventilator Mode: NA [LA]   1803 Glucose(!): 152 [LA]   1809 EKG interpreted by me: Atrial fibrillation, normal rate, leftward axis, no acute ST changes, this is an abnormal EKG [MP]   1821 BUN: 18 [LA]   1821 Creatinine: 1.16 [LA]   1821 Sodium(!): 132 [LA]   1821 Potassium: 4.3 [LA]   1821 Chloride(!): 96 [LA]   1821 CO2: 26.6 [LA]   1821 Calcium(!): 8.5 [LA]   1821 Total Protein: 7.4 [LA]   1821 Albumin: 4.10 [LA]   1821 ALT (SGPT): 12 [LA]   1821 AST (SGOT): 17 [LA]   1821 Alkaline Phosphatase: 67 [LA]   1821 Total Bilirubin: 0.4 [LA]   1821 Globulin: 3.3 [LA]   1821 A/G Ratio: 1.2 [LA]   1821 BUN/Creatinine Ratio: 15.5 [LA]   1821 Anion Gap: 9.4 [LA]   1821 eGFR: 62.1 [LA]   1821 proBNP(!): 3,201.0 [LA]   1909 Influenza A H3(!): Detected [LA]   1916 Chest xray read as unremarkable per radiologist. [LA]   1937 Spoke with Dr. Belle as well who accepted patient to telemetry.  We will start Tamiflu, add procalcitonin and place patient on BiPAP.  Spoke with RT Prajapati about placing patient on the BiPAP.  He is currently getting nebulized.   [LA]      ED Course User Index  [LA] Svitlana Yang PA-C  [MP] Mark Perez MD      Lab Results (last 24 hours)     Procedure Component Value Units  Date/Time    CBC & Differential [[id number]]  (Abnormal) Collected: 10/28/22 1737    Specimen: Blood Updated: 10/28/22 1823    Narrative:      The following orders were created for panel order CBC & Differential.  Procedure                               Abnormality         Status                     ---------                               -----------         ------                     CBC Auto Differential[[id number]]        Abnormal            Final result               Scan Slide[[id number]]                                       Final result                 Please view results for these tests on the individual orders.    Comprehensive Metabolic Panel [[id number]]  (Abnormal) Collected: 10/28/22 1737    Specimen: Blood Updated: 10/28/22 1816     Glucose 152 mg/dL      BUN 18 mg/dL      Creatinine 1.16 mg/dL      Sodium 132 mmol/L      Potassium 4.3 mmol/L      Chloride 96 mmol/L      CO2 26.6 mmol/L      Calcium 8.5 mg/dL      Total Protein 7.4 g/dL      Albumin 4.10 g/dL      ALT (SGPT) 12 U/L      AST (SGOT) 17 U/L      Alkaline Phosphatase 67 U/L      Total Bilirubin 0.4 mg/dL      Globulin 3.3 gm/dL      A/G Ratio 1.2 g/dL      BUN/Creatinine Ratio 15.5     Anion Gap 9.4 mmol/L      eGFR 62.1 mL/min/1.73      Comment: National Kidney Foundation and American Society of Nephrology (ASN) Task Force recommended calculation based on the Chronic Kidney Disease Epidemiology Collaboration (CKD-EPI) equation refit without adjustment for race.       Narrative:      GFR Normal >60  Chronic Kidney Disease <60  Kidney Failure <15    The GFR formula is only valid for adults with stable renal function between ages 18 and 70.    BNP [[id number]]  (Abnormal) Collected: 10/28/22 1737    Specimen: Blood Updated: 10/28/22 1816     proBNP 3,201.0 pg/mL     Narrative:      Among patients with dyspnea, NT-proBNP is highly sensitive for the detection of acute congestive heart failure. In addition NT-proBNP of <300 pg/ml effectively rules  out acute congestive heart failure with 99% negative predictive value.    Results may be falsely decreased if patient taking Biotin.      Troponin [[id number]]  (Normal) Collected: 10/28/22 1737    Specimen: Blood Updated: 10/28/22 1815     Troponin T 0.017 ng/mL     Narrative:      Troponin T Reference Range:  <= 0.03 ng/mL-   Negative for AMI  >0.03 ng/mL-     Abnormal for myocardial necrosis.  Clinicians would have to utilize clinical acumen, EKG, Troponin and serial changes to determine if it is an Acute Myocardial Infarction or myocardial injury due to an underlying chronic condition.       Results may be falsely decreased if patient taking Biotin.      CBC Auto Differential [[id number]]  (Abnormal) Collected: 10/28/22 1737    Specimen: Blood Updated: 10/28/22 1756     WBC 7.84 10*3/mm3      RBC 4.09 10*6/mm3      Hemoglobin 9.2 g/dL      Hematocrit 31.0 %      MCV 75.8 fL      MCH 22.5 pg      MCHC 29.7 g/dL      RDW 20.9 %      RDW-SD 56.4 fl      MPV 9.3 fL      Platelets 310 10*3/mm3      Neutrophil % 74.6 %      Lymphocyte % 13.5 %      Monocyte % 11.1 %      Eosinophil % 0.0 %      Basophil % 0.5 %      Immature Grans % 0.3 %      Neutrophils, Absolute 5.85 10*3/mm3      Lymphocytes, Absolute 1.06 10*3/mm3      Monocytes, Absolute 0.87 10*3/mm3      Eosinophils, Absolute 0.00 10*3/mm3      Basophils, Absolute 0.04 10*3/mm3      Immature Grans, Absolute 0.02 10*3/mm3      nRBC 0.0 /100 WBC     Scan Slide [[id number]] Collected: 10/28/22 1737    Specimen: Blood Updated: 10/28/22 1823     Anisocytosis Mod/2+     Hypochromia Slight/1+     WBC Morphology Normal     Platelet Estimate Adequate    Procalcitonin [[id number]]  (Normal) Collected: 10/28/22 1737    Specimen: Blood Updated: 10/28/22 2004     Procalcitonin 0.09 ng/mL     Narrative:      As a Marker for Sepsis (Non-Neonates):    1. <0.5 ng/mL represents a low risk of severe sepsis and/or septic shock.  2. >2 ng/mL represents a high risk of severe sepsis  "and/or septic shock.    As a Marker for Lower Respiratory Tract Infections that require antibiotic therapy:    PCT on Admission    Antibiotic Therapy       6-12 Hrs later    >0.5                Strongly Recommended  >0.25 - <0.5        Recommended   0.1 - 0.25          Discouraged              Remeasure/reassess PCT  <0.1                Strongly Discouraged     Remeasure/reassess PCT    As 28 day mortality risk marker: \"Change in Procalcitonin Result\" (>80% or <=80%) if Day 0 (or Day 1) and Day 4 values are available. Refer to http://www.[email or web address]-pct-calculator.com    Change in PCT <=80%  A decrease of PCT levels below or equal to 80% defines a positive change in PCT test result representing a higher risk for 28-day all-cause mortality of patients diagnosed with severe sepsis for septic shock.    Change in PCT >80%  A decrease of PCT levels of more than 80% defines a negative change in PCT result representing a lower risk for 28-day all-cause mortality of patients diagnosed with severe sepsis or septic shock.       Iron Profile [[id number]]  (Abnormal) Collected: 10/28/22 1737    Specimen: Blood Updated: 10/28/22 2117     Iron 15 mcg/dL      Iron Saturation 3 %      Transferrin 303 mg/dL      TIBC 451 mcg/dL     Respiratory Panel PCR w/COVID-19(SARS-CoV-2) RITA/ALEXYS/SREE/PAD/COR/MAD/KIM In-House, NP Swab in UTM/VTM, 3-4 HR TAT - Swab, Nasopharynx [[id number]]  (Abnormal) Collected: 10/28/22 1748    Specimen: Swab from Nasopharynx Updated: 10/28/22 1853     ADENOVIRUS, PCR Not Detected     Coronavirus 229E Not Detected     Coronavirus HKU1 Not Detected     Coronavirus NL63 Not Detected     Coronavirus OC43 Not Detected     COVID19 Not Detected     Human Metapneumovirus Not Detected     Human Rhinovirus/Enterovirus Not Detected     Influenza A H3 Detected     Influenza B PCR Not Detected     Parainfluenza Virus 1 Not Detected     Parainfluenza Virus 2 Not Detected     Parainfluenza Virus 3 Not Detected     Parainfluenza " Virus 4 Not Detected     RSV, PCR Not Detected     Bordetella pertussis pcr Not Detected     Bordetella parapertussis PCR Not Detected     Chlamydophila pneumoniae PCR Not Detected     Mycoplasma pneumo by PCR Not Detected    Narrative:      In the setting of a positive respiratory panel with a viral infection PLUS a negative procalcitonin without other underlying concern for bacterial infection, consider observing off antibiotics or discontinuation of antibiotics and continue supportive care. If the respiratory panel is positive for atypical bacterial infection (Bordetella pertussis, Chlamydophila pneumoniae, or Mycoplasma pneumoniae), consider antibiotic de-escalation to target atypical bacterial infection.    Blood Gas, Arterial With Co-Ox [[id number]]  (Abnormal) Collected: 10/28/22 1800    Specimen: Arterial Blood Updated: 10/28/22 1800     Site Left Radial     Elijah's Test N/A     pH, Arterial 7.297 pH units      Comment: 84 Value below reference range        pCO2, Arterial 55.5 mm Hg      Comment: 83 Value above reference range        pO2, Arterial 90.5 mm Hg      HCO3, Arterial 27.1 mmol/L      Comment: 83 Value above reference range        Base Excess, Arterial 0.2 mmol/L      O2 Saturation, Arterial 95.8 %      Hematocrit, Blood Gas 28.4 %      Comment: 84 Value below reference range        Oxyhemoglobin 92.9 %      Comment: 84 Value below reference range        Methemoglobin 0.50 %      Carboxyhemoglobin 2.5 %      A-a DO2 --     Comment: UNABLE TO CALCULATE        Barometric Pressure for Blood Gas 741 mmHg      Modality Nasal Cannula     Flow Rate 5.0 lpm      Ventilator Mode NA     Note --     Collected by [facility]     Comment: Meter: [id number]     :  [id number]        pH, Temp Corrected --     pCO2, Temperature Corrected --     pO2, Temperature Corrected --          XR Chest 1 View    Result Date: 10/28/2022  FINAL REPORT TECHNIQUE: An AP portable view of the chest was obtained. CLINICAL HISTORY:  [facility] Triage Protocol FINDINGS: The lungs are clear. The heart and vasculature are unremarkable. There is no pleural disease, adenopathy, or significant osseous abnormality.     Impression: Unremarkable. Authenticated and Electronically Signed by Ronan Velasco M.D. on 10/28/2022 10:16:27 PM                                         MDM  Number of Diagnoses or Management Options  Acute respiratory failure with hypoxia (HCC)  COPD with acute exacerbation (HCC)  Influenza A  Diagnosis management comments: On arrival, patient was 81% on room air, tachycardic at 108 with 40 respirations per minute.  His blood pressure is 105/69.  Differential could include COPD exacerbation, pulmonary edema, ACS, cardiac dysrhythmia, pneumonia, and other concerns.  Given the wheezing on auscultation, lower concern for pulmonary embolism but will start with respiratory panel, basic labs, troponin, proBNP, ABG and chest x-ray.  Patient is currently saturating in the high 90s on 5 L nasal cannula.  We will also give DuoNebs, magnesium and Decadron.    Patient's blood gas revealed pH of 7.297, CO2 of 55.5.  He had no leukocytosis.  He did have an anemia with a hemoglobin of 9.3 but his previous lab work for comparison was 11 months ago.  He had no elevation of troponin, proBNP was elevated at 3201. Chest x-ray was read as unremarkable per radiology read.  He did test positive for influenza A.  Given the oxygen requirement and tachypnea, discussed with Dr. Belle.  He asked that we get a procalcitonin, start Tamiflu.  He discussed placing patient on BiPAP, spoke with respiratory therapist about this.  He agreed to admit patient for further evaluation and management.       Amount and/or Complexity of Data Reviewed  Clinical lab tests: reviewed and ordered  Tests in the radiology section of CPT®: ordered and reviewed  Tests in the medicine section of CPT®: reviewed and ordered  Discussion of test results with the performing providers: yes  Decide to  obtain previous medical records or to obtain history from someone other than the patient: yes  Review and summarize past medical records: yes  Discuss the patient with other providers: yes    Risk of Complications, Morbidity, and/or Mortality  Presenting problems: moderate  Diagnostic procedures: moderate  Management options: moderate        Final diagnoses:   Influenza A   Acute respiratory failure with hypoxia (HCC)   COPD with acute exacerbation (HCC)       ED Disposition  ED Disposition     ED Disposition   Decision to Admit    Condition   --    Comment   Level of Care: Telemetry [5]   Diagnosis: Influenza A [372009]   Admitting Physician: ZENA BELLE [[id number]]   Attending Physician: ZENA BELLE [[id number]]   Certification: I Certify That Inpatient Hospital Services Are Medically Necessary For Greater Than 2 Midnights               No follow-up provider specified.       Medication List      No changes were made to your prescriptions during this visit.          Svitlana Yang PA-C  10/29/22 0025      Electronically signed by Mark Perez MD at 10/31/22 0716     Chidi Hearn at 10/28/22 1929        Dr. Belle called at this time per Svitlana PANDYA     Electronically signed by Chidi Hearn at 10/28/22 1933     Chidi Hearn at 10/28/22 1945        311 assigned at this time    Electronically signed by Chidi Hearn at 10/28/22 1945     Freida Gilliam, RN at 10/28/22 2009        Report given to KYMBERLY Noonan, on third floor.     Electronically signed by Freida Gilliam, RN at 10/28/22 2009         No current facility-administered medications for this encounter.     Current Outpatient Medications   Medication Sig Dispense Refill   • acetaminophen (TYLENOL) 325 MG tablet Take 1 tablet by mouth Every 6 (Six) Hours As Needed.     • albuterol sulfate  (90 Base) MCG/ACT inhaler INHALE TWO PUFFS BY MOUTH EVERY 4 HOURS AS NEEDED FOR WHEEZING OR SHORTNESS OF AIR 18 g 1   • benzonatate  (TESSALON) 200 MG capsule Take 1 capsule by mouth 3 (Three) Times a Day As Needed for Cough for up to 5 days. 15 capsule 0   • bisoprolol (ZEBeta) 10 MG tablet TAKE ONE TABLET BY MOUTH DAILY 90 tablet 1   • bisoprolol (ZEBeta) 5 MG tablet Take 1 tablet by mouth Daily for 30 days. 30 tablet 0   • budesonide-formoterol (Symbicort) 160-4.5 MCG/ACT inhaler Inhale 2 puffs 2 (Two) Times a Day. 10.2 g 6   • Cartia  MG 24 hr capsule TAKE ONE CAPSULE BY MOUTH DAILY 90 capsule 1   • DULoxetine (CYMBALTA) 60 MG capsule TAKE ONE CAPSULE BY MOUTH DAILY 90 capsule 0   • Eliquis 2.5 MG tablet tablet TAKE ONE TABLET BY MOUTH EVERY 12 HOURS 180 tablet 1   • ipratropium-albuterol (DUO-NEB) 0.5-2.5 mg/3 ml nebulizer INHALE THREE MILLILITERS ( ONE VIAL )  VIA NEBULIZATION BY MOUTH FOUR TIMES A DAY 3 mL 6   • methocarbamol (ROBAXIN) 500 MG tablet TAKE ONE TABLET BY MOUTH THREE TIMES A DAY AS NEEDED FOR MUSCLE SPASMS 60 tablet 2   • oseltamivir (TAMIFLU) 75 MG capsule Take 1 capsule by mouth Every 12 (Twelve) Hours for 2 doses. Indications: Influenza A 2 capsule 0   • rOPINIRole (REQUIP) 0.25 MG tablet TAKE ONE TABLET IN THE MORNING AND ONE TABLET AT NIGHT 1 HOUR BEFORE BEDTIME. 60 tablet 3   • simvastatin (ZOCOR) 40 MG tablet TAKE ONE TABLET BY MOUTH ONCE NIGHTLY 90 tablet 1   • tamsulosin (FLOMAX) 0.4 MG capsule 24 hr capsule TAKE ONE CAPSULE BY MOUTH DAILY 90 capsule 1   • famotidine (PEPCID) 40 MG tablet TAKE ONE TABLET BY MOUTH ONCE NIGHTLY AS NEEDED FOR HEARTBURN 90 tablet 1   • predniSONE (DELTASONE) 20 MG tablet Take 2 tablets by mouth Daily for 3 days. 6 tablet 0     Physician Progress Notes (last 24 hours)  Notes from 11/01/22 1103 through 11/02/22 1103   No notes of this type exist for this encounter.         Consult Notes (last 24 hours)  Notes from 11/01/22 1103 through 11/02/22 1103   No notes of this type exist for this encounter.

## 2022-11-03 RX ORDER — SIMVASTATIN 40 MG
TABLET ORAL
Qty: 90 TABLET | Refills: 1 | Status: SHIPPED | OUTPATIENT
Start: 2022-11-03

## 2022-11-03 RX ORDER — DILTIAZEM HYDROCHLORIDE 180 MG/1
CAPSULE, EXTENDED RELEASE ORAL
Qty: 90 CAPSULE | Refills: 1 | Status: SHIPPED | OUTPATIENT
Start: 2022-11-03

## 2022-11-04 NOTE — TELEPHONE ENCOUNTER
Caller: DRU    Relationship:     NURSE FROM [facility]     Best call back number:      [phone]     What orders are you requesting (i.e. lab or imaging)    CALLER REQUESTED A VERBAL ORDER TO CONTINUE SKILLED NURSING HOME HEALTH CARE FOR PATIENT    CALLER ALSO REQUESTED CONFIRMATION FROM DR VERMEESCH FOR PATIENT TO NOT TAKE LOVASTATIN SINCE IT IS A DRUG INTERACTION WITH SIMVASTATIN, WHICH IS ON PATIENT'S CHART OF MEDICATIONS    In what timeframe would the patient need to come in:     ASAP    DR VERMEESCH

## 2022-11-04 NOTE — TELEPHONE ENCOUNTER
Called and spoke with Reshma.  OLVIN wang.  States she mis-spoke.  Patient is taking lovastatin and cardizem, 's system flagged the meds as a possible interaction.  Please advise.

## 2022-11-04 NOTE — PROGRESS NOTES
"Enter Query Response Below      Query Response:       Acute diastolic congestive heart failure       If applicable, please update the problem list.      Patient: Ed Spear        : 1935  Account: [id number]           Admit Date:         How to Respond to this query:       a. Click New Note     b. Answer query within the yellow box.                c. Update the Problem List, if applicable.      If you have any questions about this query contact me at: [phone]    Dr. Hankins:     86 year old male admitted on 10/28/22 with Influenza A infection, and acute respiratory failure.  The Hospitalist progress note on 10/29/22 states, \"he does have some degree of volume overload on exam, unsure if history of CHF\".  The lab results on 10/28/22 include a proBNP of 3,201.  The chest x-ray from 10/28/22 includes findings of The lungs are clear. The heart and vasculature are unremarkable.   An echocardiogram was performed on 10/30/22 with LVEF of 65-70%.  He has been treated with IV Lasix this admission.      After study, can the patient's condition be further clarified as:     Acute diastolic congestive heart failure  Volume overload only  Other________________________  Unable to determine     By submitting this query, we are merely seeking further clarification of documentation to accurately reflect all conditions that you are monitoring, evaluating, treating or that extend the hospitalization or utilize additional resources of care. Please utilize your independent clinical judgment when addressing the question(s) above.     This query and your response, once completed, will be entered into the legal medical record.    Sincerely,  Reshma Stahl RN, BSN    [email or web address]  Clinical Documentation Integrity Program   "

## 2022-11-04 NOTE — TELEPHONE ENCOUNTER
Please tell patient to discontinue simvastatin and take only lovastatin.  They may continue home health care for patient.

## 2022-11-07 NOTE — TELEPHONE ENCOUNTER
Caller: LINDA LAMB    Relationship: [facility]    Best call back number: [phone]    What orders are you requesting (i.e. lab or imaging): ORDER FOR PHYSICAL THERAPY 2 TIMES A WEEK FOR TWO WEEKS AND 1 TIME A WEEK FOR FOUR WEEKS       In what timeframe would the patient need to come in: ASAP    Where will you receive your lab/imaging services: [facility]    Additional notes:

## 2022-11-09 DIAGNOSIS — J43.2 CENTRILOBULAR EMPHYSEMA: ICD-10-CM

## 2022-11-09 DIAGNOSIS — I48.0 PAROXYSMAL ATRIAL FIBRILLATION: ICD-10-CM

## 2022-11-09 RX ORDER — APIXABAN 2.5 MG/1
TABLET, FILM COATED ORAL
Qty: 60 TABLET | Refills: 1 | Status: SHIPPED | OUTPATIENT
Start: 2022-11-09 | End: 2023-01-03

## 2022-11-09 RX ORDER — ALBUTEROL SULFATE 90 UG/1
AEROSOL, METERED RESPIRATORY (INHALATION)
Qty: 18 G | Refills: 1 | Status: SHIPPED | OUTPATIENT
Start: 2022-11-09 | End: 2023-02-10

## 2022-11-09 NOTE — TELEPHONE ENCOUNTER
Pharmacy Name: [facility] [id number] - [facility] [address] AT [facility] - [phone] [facility] [phone]      Pharmacy representative name: [place]     Pharmacy representative phone number: [phone]    What medication are you calling in regards to: ipratropium-albuterol (DUO-NEB) 0.5-2.5 mg/3 ml nebulizer    What question does the pharmacy have: PHARMACY STATES PCP SENT THE PRESCRIPTION OVER FOR 3 ML HOWEVER THE BOX COMES AS A 180 ML. [place] WOULD LIKE TO CHECK IF PCP MEANT TO PRESCRIBE THE PATIENT 3 BOXES.     Who is the provider that prescribed the medication: PCP    Additional notes:

## 2022-11-11 ENCOUNTER — TELEPHONE (OUTPATIENT)
Dept: INTERNAL MEDICINE | Facility: CLINIC | Age: 87
End: 2022-11-11

## 2022-11-11 ENCOUNTER — READMISSION MANAGEMENT (OUTPATIENT)
Dept: CALL CENTER | Facility: HOSPITAL | Age: 87
End: 2022-11-11

## 2022-11-11 NOTE — OUTREACH NOTE
COPD/PN Week 2 Survey    Flowsheet Row Responses   [facility] facility patient discharged from? [facility]   Does the patient have one of the following disease processes/diagnoses(primary or secondary)? COPD   Week 2 attempt successful? No   Unsuccessful attempts Attempt 1          CARLITOS DRISCOLL - Registered Nurse

## 2022-11-11 NOTE — TELEPHONE ENCOUNTER
We haven't seen Pt since July and he's been in hospital since then. We do not have an updated list.

## 2022-11-11 NOTE — TELEPHONE ENCOUNTER
Caller: JERRY WITH [facility]    Relationship:     Best call back number: [phone]      What was the call regarding: CALL=ED TO REVIEW PT MEDICATIONS

## 2022-11-15 ENCOUNTER — READMISSION MANAGEMENT (OUTPATIENT)
Dept: CALL CENTER | Facility: HOSPITAL | Age: 87
End: 2022-11-15

## 2022-11-15 NOTE — OUTREACH NOTE
COPD/PN Week 2 Survey    Flowsheet Row Responses   [facility] facility patient discharged from? [facility]   Does the patient have one of the following disease processes/diagnoses(primary or secondary)? COPD   Week 2 attempt successful? No   Unsuccessful attempts Attempt 2          NILSON DRISCOLL - Registered Nurse

## 2022-11-16 ENCOUNTER — TELEPHONE (OUTPATIENT)
Dept: INTERNAL MEDICINE | Facility: CLINIC | Age: 87
End: 2022-11-16

## 2022-11-16 NOTE — TELEPHONE ENCOUNTER
Caller: LINDA SONI    Relationship: Provider    Best call back number: [phone]    What orders are you requesting (i.e. lab or imaging): ORDER FOR A BARIATRIC ROLLATOR    In what timeframe would the patient need to come in: AS SOON AS POSSIBLE    Where will you receive your lab/imaging services: [facility] [phone]    Additional notes: PLEASE CALL WHEN THIS IS COMPLETE.

## 2022-11-18 ENCOUNTER — TELEPHONE (OUTPATIENT)
Dept: INTERNAL MEDICINE | Facility: CLINIC | Age: 87
End: 2022-11-18

## 2022-11-18 NOTE — TELEPHONE ENCOUNTER
Caller: LINDA LAMB    Relationship: Home Health    Best call back number: [phone]    What is the best time to reach you: ANYTIME OF DAY DURING BUSINESS HOURS    Who are you requesting to speak with (clinical staff, provider,  specific staff member): DR VERMEESCH    Do you know the name of the person who called: LINDA LAMB    What was the call regarding:     CALLER ASKED DR VERMEESCH TO FAX OVER SPECIFIC ORDERS REGARDING A BARIATRIC ROLLATOR COUPLE OF DAYS AGO AND WANTED TO KNOW IF THIS HAS BEEN DONE.    ORDER NEEDS TO BE FAXED TO [facility].    PLEASE ADVISE.    Do you require a callback: YES

## 2022-11-22 ENCOUNTER — OUTSIDE FACILITY SERVICE (OUTPATIENT)
Dept: INTERNAL MEDICINE | Facility: CLINIC | Age: 87
End: 2022-11-22

## 2022-11-22 PROCEDURE — G0180 MD CERTIFICATION HHA PATIENT: HCPCS | Performed by: INTERNAL MEDICINE

## 2022-11-23 ENCOUNTER — READMISSION MANAGEMENT (OUTPATIENT)
Dept: CALL CENTER | Facility: HOSPITAL | Age: 87
End: 2022-11-23

## 2022-11-23 NOTE — OUTREACH NOTE
COPD/PN Week 3 Survey    Flowsheet Row Responses   [facility] facility patient discharged from? [facility]   Does the patient have one of the following disease processes/diagnoses(primary or secondary)? COPD   Week 3 attempt successful? No   Unsuccessful attempts Attempt 1          NORA STRONG - Registered Nurse

## 2022-11-28 ENCOUNTER — READMISSION MANAGEMENT (OUTPATIENT)
Dept: CALL CENTER | Facility: HOSPITAL | Age: 87
End: 2022-11-28

## 2022-11-28 NOTE — OUTREACH NOTE
COPD/PN Week 3 Survey    Flowsheet Row Responses   [facility] facility patient discharged from? [facility]   Does the patient have one of the following disease processes/diagnoses(primary or secondary)? COPD   Week 3 attempt successful? No   Unsuccessful attempts Attempt 2          LINDA SINGLETON - Registered Nurse

## 2022-12-05 ENCOUNTER — OFFICE VISIT (OUTPATIENT)
Dept: INTERNAL MEDICINE | Facility: CLINIC | Age: 87
End: 2022-12-05

## 2022-12-05 VITALS
HEIGHT: 73 IN | SYSTOLIC BLOOD PRESSURE: 116 MMHG | TEMPERATURE: 97.5 F | RESPIRATION RATE: 18 BRPM | WEIGHT: 213 LBS | DIASTOLIC BLOOD PRESSURE: 73 MMHG | BODY MASS INDEX: 28.23 KG/M2 | OXYGEN SATURATION: 94 % | HEART RATE: 108 BPM

## 2022-12-05 DIAGNOSIS — E78.2 MIXED HYPERLIPIDEMIA: ICD-10-CM

## 2022-12-05 DIAGNOSIS — Z00.00 ENCOUNTER FOR MEDICARE ANNUAL WELLNESS EXAM: Primary | ICD-10-CM

## 2022-12-05 DIAGNOSIS — I48.0 PAROXYSMAL ATRIAL FIBRILLATION: ICD-10-CM

## 2022-12-05 DIAGNOSIS — J96.02 ACUTE RESPIRATORY FAILURE WITH HYPOXIA AND HYPERCAPNIA: ICD-10-CM

## 2022-12-05 DIAGNOSIS — I10 PRIMARY HYPERTENSION: ICD-10-CM

## 2022-12-05 DIAGNOSIS — J96.01 ACUTE RESPIRATORY FAILURE WITH HYPOXIA AND HYPERCAPNIA: ICD-10-CM

## 2022-12-05 PROBLEM — J10.1 INFLUENZA A: Status: RESOLVED | Noted: 2022-10-28 | Resolved: 2022-12-05

## 2022-12-05 PROCEDURE — G0439 PPPS, SUBSEQ VISIT: HCPCS | Performed by: INTERNAL MEDICINE

## 2022-12-05 PROCEDURE — 90662 IIV NO PRSV INCREASED AG IM: CPT | Performed by: INTERNAL MEDICINE

## 2022-12-05 PROCEDURE — 1170F FXNL STATUS ASSESSED: CPT | Performed by: INTERNAL MEDICINE

## 2022-12-05 PROCEDURE — 1159F MED LIST DOCD IN RCRD: CPT | Performed by: INTERNAL MEDICINE

## 2022-12-05 PROCEDURE — G0008 ADMIN INFLUENZA VIRUS VAC: HCPCS | Performed by: INTERNAL MEDICINE

## 2022-12-05 RX ORDER — DOXYCYCLINE HYCLATE 50 MG/1
324 CAPSULE, GELATIN COATED ORAL
Qty: 30 TABLET | Refills: 3 | Status: SHIPPED | OUTPATIENT
Start: 2022-12-05

## 2022-12-05 NOTE — PROGRESS NOTES
The ABCs of the Annual Wellness Visit  Subsequent Medicare Wellness Visit    Chief Complaint   Patient presents with   • Medicare Wellness-subsequent     Rollator walker   • Annual Exam      Subjective    History of Present Illness:  Ed Spear is a 87 y.o. male who presents for a Subsequent Medicare Wellness Visit and annual PE.  PMH of HTN, HLD, atrial fibrillation, vitamin D and B12 deficiency, BPH, skin cancer, DJD, COPD.  No recent COVID booster or seasonal flu vaccine.  Patient was recently hospitalized 1 month ago for influenza with respiratory failure.  He was treated with Tamiflu, steroids, bronchodilators and BiPAP and discharged home.  Since he got home he is feeling better.  He is using nebulizer 4 times a day.  He is on 2 liters of oxygen, up to 4 liters with exertion.  He is SOA with any exertion.  He denies CP or palpitations.  Has had some edema. Has been taking both B12 and vit D daily.  He is not taking any iron. He was quite anemic at time of discharge.  Has a hemorrhoid that may bleed every few weeks.  No black stools.  No GERD sxs.  He continues to smoke, shuts off oxygen when he does this.  He is using a cane but loses balance and gets very SOA and is requesting a walker for prolonged ambulation.    The following portions of the patient's history were reviewed and   updated as appropriate: allergies, current medications, past family history, past medical history, past social history, past surgical history and problem list.    Compared to one year ago, the patient feels his physical   health is better.  He sleeps better and breathes better with use of oxygen    Compared to one year ago, the patient feels his mental   health is the same.    Recent Hospitalizations:  This patient has had a [facility] admission record on file within the last 365 days.    Current Medical Providers:  Patient Care Team:  Vermeesch, Marilyn K, MD as PCP - General (Internal Medicine & Pediatrics)    Outpatient  Medications Prior to Visit   Medication Sig Dispense Refill   • acetaminophen (TYLENOL) 325 MG tablet Take 1 tablet by mouth Every 6 (Six) Hours As Needed.     • albuterol sulfate  (90 Base) MCG/ACT inhaler INHALE TWO PUFFS BY MOUTH EVERY 4 HOURS AS NEEDED FOR WHEEZING OR FOR SHORTNESS OF BREATH 18 g 1   • bisoprolol (ZEBeta) 10 MG tablet TAKE ONE TABLET BY MOUTH DAILY 90 tablet 1   • budesonide-formoterol (Symbicort) 160-4.5 MCG/ACT inhaler Inhale 2 puffs 2 (Two) Times a Day. 10.2 g 6   • Cartia  MG 24 hr capsule TAKE ONE CAPSULE BY MOUTH DAILY 90 capsule 1   • DULoxetine (CYMBALTA) 60 MG capsule TAKE ONE CAPSULE BY MOUTH DAILY 90 capsule 0   • Eliquis 2.5 MG tablet tablet TAKE ONE TABLET BY MOUTH EVERY 12 HOURS 60 tablet 1   • famotidine (PEPCID) 40 MG tablet TAKE ONE TABLET BY MOUTH ONCE NIGHTLY AS NEEDED FOR HEARTBURN 90 tablet 1   • ipratropium-albuterol (DUO-NEB) 0.5-2.5 mg/3 ml nebulizer INHALE THREE MILLILITERS ( ONE VIAL )  VIA NEBULIZATION BY MOUTH FOUR TIMES A DAY 3 mL 6   • methocarbamol (ROBAXIN) 500 MG tablet TAKE ONE TABLET BY MOUTH THREE TIMES A DAY AS NEEDED FOR MUSCLE SPASMS 60 tablet 2   • rOPINIRole (REQUIP) 0.25 MG tablet TAKE ONE TABLET IN THE MORNING AND ONE TABLET AT NIGHT 1 HOUR BEFORE BEDTIME. 60 tablet 3   • simvastatin (ZOCOR) 40 MG tablet TAKE ONE TABLET BY MOUTH ONCE NIGHTLY 90 tablet 1   • tamsulosin (FLOMAX) 0.4 MG capsule 24 hr capsule TAKE ONE CAPSULE BY MOUTH DAILY 90 capsule 1   • bisoprolol (ZEBeta) 5 MG tablet Take 1 tablet by mouth Daily for 30 days. 30 tablet 0     No facility-administered medications prior to visit.       No opioid medication identified on active medication list. I have reviewed chart for other potential  high risk medication/s and harmful drug interactions in the elderly.          Aspirin is not on active medication list.  Aspirin use is not indicated based on review of current medical condition/s. Risk of harm outweighs potential benefits.   ".    Patient Active Problem List   Diagnosis   • Pulmonary emphysema (HCC)   • Hyperlipidemia   • Hypertension   • Malignant neoplasm of skin   • Vitamin D deficiency   • Arthritis, degenerative   • Enlarged prostate without lower urinary tract symptoms (luts)   • Skin cancer   • Chronic bilateral low back pain without sciatica   • Atrial fibrillation (HCC)   • Encounter for Medicare annual wellness exam   • Vitamin B 12 deficiency   • Callus of heel   • COPD exacerbation (HCC)   • Acute respiratory failure with hypoxia and hypercapnia (HCC)     Advance Care Planning  Advance Directive is not on file.  ACP discussion was held with the patient during this visit. Patient does not have an advance directive, information provided.    Review of Systems   Constitutional: Positive for fatigue.   HENT: Positive for tinnitus.    Eyes: Negative.    Respiratory: Positive for cough and shortness of breath.    Cardiovascular: Positive for leg swelling.   Gastrointestinal: Positive for nausea.   Endocrine: Negative.    Genitourinary:        Urinates twice at night   Musculoskeletal: Positive for gait problem and neck pain.   Skin:        Dry patches, has seen dermatologist   Allergic/Immunologic: Negative.  Negative for environmental allergies.   Neurological: Positive for weakness.   Hematological: Bruises/bleeds easily.   Psychiatric/Behavioral: Negative.         Objective    Vitals:    12/05/22 0937   BP: 116/73   Pulse: 108   Resp: 18   Temp: 97.5 °F (36.4 °C)   SpO2: 94%  Comment: 2 liters O2   Weight: 96.6 kg (213 lb)   Height: 185.4 cm (72.99\")     Estimated body mass index is 28.11 kg/m² as calculated from the following:    Height as of this encounter: 185.4 cm (72.99\").    Weight as of this encounter: 96.6 kg (213 lb).    BMI is >= 25 and <30. (Overweight) The following options were offered after discussion;: exercise counseling/recommendations and nutrition counseling/recommendations      Does the patient have evidence " of cognitive impairment? No    Physical Exam  Vitals and nursing note reviewed.   Constitutional:       General: He is not in acute distress.     Appearance: Normal appearance. He is well-developed. He is not ill-appearing.      Comments: Kind and pleasant elderly man, appears stated age and in NAD today, currently on oxygen at 2 L per nasal cannula and seated in transport chair   HENT:      Head: Normocephalic and atraumatic.      Right Ear: Tympanic membrane, ear canal and external ear normal.      Left Ear: Tympanic membrane, ear canal and external ear normal.      Nose: No congestion.      Mouth/Throat:      Pharynx: No posterior oropharyngeal erythema.   Eyes:      General:         Right eye: No discharge.         Left eye: No discharge.      Extraocular Movements: Extraocular movements intact.      Conjunctiva/sclera: Conjunctivae normal.      Pupils: Pupils are equal, round, and reactive to light.   Neck:      Thyroid: No thyromegaly.      Vascular: No carotid bruit.      Comments: No thyromegaly or mass  Cardiovascular:      Rate and Rhythm: Regular rhythm. Tachycardia present.      Pulses: Normal pulses.      Heart sounds: Normal heart sounds. No murmur heard.     Comments: Borderline tachycardic with distant heart sounds  Pulmonary:      Effort: Pulmonary effort is normal. No respiratory distress.      Breath sounds: No wheezing or rhonchi.      Comments: Diminished breath sounds throughout all lung fields  Abdominal:      General: Bowel sounds are normal. There is no distension.      Palpations: Abdomen is soft.      Tenderness: There is no abdominal tenderness.   Musculoskeletal:         General: Normal range of motion.      Cervical back: Normal range of motion and neck supple.      Right lower leg: No edema.      Left lower leg: No edema.   Lymphadenopathy:      Cervical: No cervical adenopathy.   Skin:     General: Skin is warm.      Findings: No rash.   Neurological:      General: No focal deficit  present.      Mental Status: He is alert and oriented to person, place, and time. Mental status is at baseline.      Cranial Nerves: No cranial nerve deficit.      Motor: Weakness present.      Coordination: Coordination normal.      Gait: Gait abnormal.      Comments: Patient is weak and currently in transport chair, unable to walk the length from waiting room to exam room due to shortness of breath and weakness   Psychiatric:         Mood and Affect: Mood normal.         Behavior: Behavior normal.         Thought Content: Thought content normal.         Judgment: Judgment normal.                 HEALTH RISK ASSESSMENT    Smoking Status:  Social History     Tobacco Use   Smoking Status Every Day   Smokeless Tobacco Never     Alcohol Consumption:  Social History     Substance and Sexual Activity   Alcohol Use No     Fall Risk Screen:    STEADI Fall Risk Assessment was completed, and patient is at MODERATE risk for falls. Assessment completed on:12/5/2022    Depression Screening:  PHQ-2/PHQ-9 Depression Screening 12/5/2022   Retired PHQ-9 Total Score -   Retired Total Score -   Little Interest or Pleasure in Doing Things 0-->not at all   Feeling Down, Depressed or Hopeless 0-->not at all   PHQ-9: Brief Depression Severity Measure Score 0       Health Habits and Functional and Cognitive Screening:  Functional & Cognitive Status 12/5/2022   Do you have difficulty preparing food and eating? Yes   Do you have difficulty bathing yourself, getting dressed or grooming yourself? Yes   Do you have difficulty using the toilet? No   Do you have difficulty moving around from place to place? No   Do you have trouble with steps or getting out of a bed or a chair? No   Current Diet Well Balanced Diet   Dental Exam Not up to date   Eye Exam Not up to date   Exercise (times per week) 4 times per week   Current Exercises Include Other;Walking   Current Exercise Activities Include -   Do you need help using the phone?  No   Are you  deaf or do you have serious difficulty hearing?  Yes   Do you need help with transportation? No   Do you need help shopping? Yes   Do you need help preparing meals?  Yes   Do you need help with housework?  Yes   Do you need help with laundry? Yes   Do you need help taking your medications? No   Do you need help managing money? Yes   Do you ever drive or ride in a car without wearing a seat belt? No   Have you felt unusual stress, anger or loneliness in the last month? No   Who do you live with? Child   If you need help, do you have trouble finding someone available to you? No   Have you been bothered in the last four weeks by sexual problems? No   Do you have difficulty concentrating, remembering or making decisions? No       Age-appropriate Screening Schedule:  Refer to the list below for future screening recommendations based on patient's age, sex and/or medical conditions. Orders for these recommended tests are listed in the plan section. The patient has been provided with a written plan.    Health Maintenance   Topic Date Due   • LIPID PANEL  12/01/2022   • INFLUENZA VACCINE  Completed   • TDAP/TD VACCINES  Discontinued   • ZOSTER VACCINE  Discontinued              Assessment & Plan   CMS Preventative Services Quick Reference  Risk Factors Identified During Encounter  Cardiovascular Disease  Fall Risk-High or Moderate  Hearing Problem  Immunizations Discussed/Encouraged (specific Immunizations; Influenza  Inactivity/Sedentary  Obesity/Overweight   The above risks/problems have been discussed with the patient.  Follow up actions/plans if indicated are seen below in the Assessment/Plan Section.  Pertinent information has been shared with the patient in the After Visit Summary.    Diagnoses and all orders for this visit:    1. Encounter for Medicare annual wellness exam (Primary)  -     CBC & Differential  -     Comprehensive Metabolic Panel  -     Lipid Panel With / Chol / HDL Ratio    2. Primary hypertension  -      CBC & Differential  -     Comprehensive Metabolic Panel  -     Lipid Panel With / Chol / HDL Ratio    3. Mixed hyperlipidemia  -     CBC & Differential  -     Comprehensive Metabolic Panel  -     Lipid Panel With / Chol / HDL Ratio    4. Paroxysmal atrial fibrillation (HCC)    5. Acute respiratory failure with hypoxia and hypercapnia (HCC)    Other orders  -     ferrous gluconate (FERGON) 324 MG tablet; Take 1 tablet by mouth Daily With Breakfast.  Dispense: 30 tablet; Refill: 3  -     Fluzone High-Dose 65+yrs (6980-4832)    Follow heart healthy/low salt/low-cholesterol diet  Avoid processed foods  Monitor blood pressure as discussed  Exercise as tolerated with chair exercises 15 minutes daily  Take all medications as prescribed  Labs as noted, recommend these be done in 1 month to allow iron to help with anemia  Continue Cartia and Eliquis for underlying atrial fibrillation  Continue oxygen at 2 to 4 L as needed for hypoxia from COPD  Patient declines offer for smoking cessation.  He states he shuts oxygen off while he smokes  Continue simvastatin for hyperlipidemia  Continue Symbicort and albuterol for underlying COPD  Initiate iron 324 mg daily due to iron deficiency anemia  Continue duloxetine for mood and underlying arthritis pain  Recommend COVID booster vaccine at local pharmacy in 1 to 2 weeks  Flu vaccine provided today  Patient is unable to stand for prolonged periods of time due to shortness of breath and chronic pain due to underlying arthritis with risk of fall and is a good candidate for Rollator walker.  He has difficulty standing for prolonged periods of time to shower or bathe, and is safely able to use a walker, his mobility deficit will be resolved with use of a walker.    Follow Up:   Return in about 3 months (around 3/5/2023) for Next scheduled follow up.     An After Visit Summary and PPPS were made available to the patient.

## 2022-12-06 RX ORDER — FAMOTIDINE 40 MG/1
TABLET, FILM COATED ORAL
Qty: 90 TABLET | Refills: 1 | Status: SHIPPED | OUTPATIENT
Start: 2022-12-06

## 2022-12-06 RX ORDER — TAMSULOSIN HYDROCHLORIDE 0.4 MG/1
CAPSULE ORAL
Qty: 90 CAPSULE | Refills: 1 | Status: SHIPPED | OUTPATIENT
Start: 2022-12-06

## 2022-12-06 RX ORDER — DULOXETIN HYDROCHLORIDE 60 MG/1
CAPSULE, DELAYED RELEASE ORAL
Qty: 90 CAPSULE | Refills: 1 | Status: SHIPPED | OUTPATIENT
Start: 2022-12-06

## 2022-12-08 ENCOUNTER — TELEPHONE (OUTPATIENT)
Dept: INTERNAL MEDICINE | Facility: CLINIC | Age: 87
End: 2022-12-08

## 2022-12-08 DIAGNOSIS — J96.01 ACUTE RESPIRATORY FAILURE WITH HYPOXIA AND HYPERCAPNIA: ICD-10-CM

## 2022-12-08 DIAGNOSIS — J44.1 COPD WITH EXACERBATION: Primary | ICD-10-CM

## 2022-12-08 DIAGNOSIS — J96.02 ACUTE RESPIRATORY FAILURE WITH HYPOXIA AND HYPERCAPNIA: ICD-10-CM

## 2022-12-08 NOTE — TELEPHONE ENCOUNTER
Caller: Farida Cano    Relationship: Emergency Contact    Best call back number: [phone]    What orders are you requesting (i.e. lab or imaging):     PORTABLE OXYGEN CONCENTRATOR    Additional notes:     PRO2    PHONE [phone]

## 2022-12-12 NOTE — TELEPHONE ENCOUNTER
Spoke with Pt's daughter, states they are wanting to get a oxygen concentrator for Pt. He was set up with oxygen at the hospital through [facility].     I attempted contacting company, received stating a referral can be faxed to [phone].

## 2022-12-15 ENCOUNTER — TELEPHONE (OUTPATIENT)
Dept: INTERNAL MEDICINE | Facility: CLINIC | Age: 87
End: 2022-12-15
Payer: MEDICARE

## 2022-12-15 NOTE — TELEPHONE ENCOUNTER
Caller: YANY    Relationship:     RESPIRATORY THERAPIST FROM [facility]    Best call back number:    [phone]    What is the best time to reach you:     AVOID BETWEEN 11:00 - 1:00    Who are you requesting to speak with (clinical staff, provider,  specific staff member):     SANAZ    What was the call regarding:     CALLER REQUESTED CALL BACK FROM [facility] TO DISCUSS PATIENT'S OXYGEN MEDICAL EQUIPMENT    Do you require a callback:     YES

## 2022-12-27 NOTE — TELEPHONE ENCOUNTER
Caller: Farida Cano    Relationship: Emergency Contact    Best call back number:   [phone]      Who are you requesting to speak with (clinical staff, provider,  specific staff member): SANAZ    What was the call regarding: PATIENT'S DAUGHTER CALLED TO SPEAK WITH SANAZ ABOUT THE PATIENT'S PORTABLE CONCENTRATOR.     Do you require a callback: YES

## 2022-12-28 NOTE — TELEPHONE ENCOUNTER
Unable to reach anyone.     HUB: Can you get more information as to what they are needing please?

## 2022-12-31 DIAGNOSIS — I48.0 PAROXYSMAL ATRIAL FIBRILLATION: ICD-10-CM

## 2023-01-01 ENCOUNTER — TELEPHONE (OUTPATIENT)
Dept: INTERNAL MEDICINE | Facility: CLINIC | Age: 88
End: 2023-01-01

## 2023-01-03 RX ORDER — APIXABAN 2.5 MG/1
TABLET, FILM COATED ORAL
Qty: 180 TABLET | Refills: 1 | Status: SHIPPED | OUTPATIENT
Start: 2023-01-03 | End: 2023-01-04

## 2023-01-04 DIAGNOSIS — I48.0 PAROXYSMAL ATRIAL FIBRILLATION: ICD-10-CM

## 2023-01-04 RX ORDER — APIXABAN 2.5 MG/1
TABLET, FILM COATED ORAL
Qty: 180 TABLET | Refills: 0 | Status: SHIPPED | OUTPATIENT
Start: 2023-01-04

## 2023-01-05 LAB
ALBUMIN SERPL-MCNC: 4 G/DL (ref 3.5–5.2)
ALBUMIN/GLOB SERPL: 1.5 G/DL
ALP SERPL-CCNC: 76 U/L (ref 39–117)
ALT SERPL-CCNC: 8 U/L (ref 1–41)
AST SERPL-CCNC: 11 U/L (ref 1–40)
BASOPHILS # BLD AUTO: 0.06 10*3/MM3 (ref 0–0.2)
BASOPHILS NFR BLD AUTO: 0.9 % (ref 0–1.5)
BILIRUB SERPL-MCNC: 0.4 MG/DL (ref 0–1.2)
BUN SERPL-MCNC: 15 MG/DL (ref 8–23)
BUN/CREAT SERPL: 15.3 (ref 7–25)
CALCIUM SERPL-MCNC: 9.7 MG/DL (ref 8.6–10.5)
CHLORIDE SERPL-SCNC: 99 MMOL/L (ref 98–107)
CHOLEST SERPL-MCNC: 140 MG/DL (ref 0–200)
CHOLEST/HDLC SERPL: 2.46 {RATIO}
CO2 SERPL-SCNC: 31.8 MMOL/L (ref 22–29)
CREAT SERPL-MCNC: 0.98 MG/DL (ref 0.76–1.27)
EGFRCR SERPLBLD CKD-EPI 2021: 75.6 ML/MIN/1.73
EOSINOPHIL # BLD AUTO: 0.31 10*3/MM3 (ref 0–0.4)
EOSINOPHIL NFR BLD AUTO: 4.4 % (ref 0.3–6.2)
ERYTHROCYTE [DISTWIDTH] IN BLOOD BY AUTOMATED COUNT: 20.8 % (ref 12.3–15.4)
GLOBULIN SER CALC-MCNC: 2.6 GM/DL
GLUCOSE SERPL-MCNC: 93 MG/DL (ref 65–99)
HCT VFR BLD AUTO: 38.2 % (ref 37.5–51)
HDLC SERPL-MCNC: 57 MG/DL (ref 40–60)
HGB BLD-MCNC: 11.3 G/DL (ref 13–17.7)
IMM GRANULOCYTES # BLD AUTO: 0.01 10*3/MM3 (ref 0–0.05)
IMM GRANULOCYTES NFR BLD AUTO: 0.1 % (ref 0–0.5)
LDLC SERPL CALC-MCNC: 65 MG/DL (ref 0–100)
LYMPHOCYTES # BLD AUTO: 1.74 10*3/MM3 (ref 0.7–3.1)
LYMPHOCYTES NFR BLD AUTO: 24.9 % (ref 19.6–45.3)
MCH RBC QN AUTO: 24.9 PG (ref 26.6–33)
MCHC RBC AUTO-ENTMCNC: 29.6 G/DL (ref 31.5–35.7)
MCV RBC AUTO: 84.1 FL (ref 79–97)
MONOCYTES # BLD AUTO: 0.71 10*3/MM3 (ref 0.1–0.9)
MONOCYTES NFR BLD AUTO: 10.1 % (ref 5–12)
NEUTROPHILS # BLD AUTO: 4.17 10*3/MM3 (ref 1.7–7)
NEUTROPHILS NFR BLD AUTO: 59.6 % (ref 42.7–76)
NRBC BLD AUTO-RTO: 0 /100 WBC (ref 0–0.2)
PLATELET # BLD AUTO: 271 10*3/MM3 (ref 140–450)
POTASSIUM SERPL-SCNC: 4.5 MMOL/L (ref 3.5–5.2)
PROT SERPL-MCNC: 6.6 G/DL (ref 6–8.5)
RBC # BLD AUTO: 4.54 10*6/MM3 (ref 4.14–5.8)
SODIUM SERPL-SCNC: 140 MMOL/L (ref 136–145)
TRIGL SERPL-MCNC: 96 MG/DL (ref 0–150)
VLDLC SERPL CALC-MCNC: 18 MG/DL (ref 5–40)
WBC # BLD AUTO: 7 10*3/MM3 (ref 3.4–10.8)

## 2023-01-06 NOTE — TELEPHONE ENCOUNTER
Spoke with daughter in regards to this, advised her I've been playing phone tag with the company but still working on this for her dad. Daughter advised me she spoke with the company and they do not supply the oxygen concentrator. Advised her we would need to do a 6 minute walk on patient so we can send the order to a different company. Pt scheduled for next week.

## 2023-01-13 ENCOUNTER — OFFICE VISIT (OUTPATIENT)
Dept: INTERNAL MEDICINE | Facility: CLINIC | Age: 88
End: 2023-01-13
Payer: MEDICARE

## 2023-01-13 VITALS
HEART RATE: 90 BPM | DIASTOLIC BLOOD PRESSURE: 64 MMHG | OXYGEN SATURATION: 94 % | HEIGHT: 73 IN | WEIGHT: 215 LBS | BODY MASS INDEX: 28.49 KG/M2 | TEMPERATURE: 97.4 F | SYSTOLIC BLOOD PRESSURE: 120 MMHG

## 2023-01-13 DIAGNOSIS — J43.2 CENTRILOBULAR EMPHYSEMA: Primary | ICD-10-CM

## 2023-01-13 PROCEDURE — 99213 OFFICE O/P EST LOW 20 MIN: CPT | Performed by: INTERNAL MEDICINE

## 2023-01-13 NOTE — PROGRESS NOTES
"Chief Complaint   Patient presents with   • Abstract     Needing documentation for portable O2 concentrator.      Subjective   Ed Spear is a 87 y.o. male.     History of Present Illness  Patient here today as he needs documentation for oxygen.  Patient was on oxygen in the waiting room and it was removed for 6-minute walk.  After removal of oxygen he dropped to 87% on room air.  Oxygen placed back at 2 L and increased to 94%.  He uses oxygen at 2 L per nasal cannula and with any exertion increases his oxygen to 4 L per nasal cannula.  He has known COPD due to chronic tobacco use.  Uses his nebulizer 4 times a day.    He is wanting to get portable oxygen that is lighter to carry for him.         The following portions of the patient's history were reviewed and updated as appropriate: allergies, current medications, past family history, past medical history, past social history, past surgical history and problem list.    Review of Systems   Constitutional: Negative for appetite change.   HENT: Negative.    Respiratory: Positive for shortness of breath and wheezing.    Cardiovascular: Positive for palpitations. Negative for chest pain and leg swelling.   Gastrointestinal: Negative.    Genitourinary: Negative.    Musculoskeletal: Positive for gait problem.   Neurological: Positive for weakness.       Objective   /64   Pulse 90   Temp 97.4 °F (36.3 °C)   Ht 185.4 cm (72.99\")   Wt 97.5 kg (215 lb)   SpO2 94% Comment: on 2L of O2  BMI 28.37 kg/m²   Body mass index is 28.37 kg/m².  Physical Exam  Vitals and nursing note reviewed.   Constitutional:       General: He is not in acute distress.     Comments: Kind man, chronically ill in appearance, on oxygen at 2 L per nasal cannula   HENT:      Head: Atraumatic.      Right Ear: External ear normal.      Left Ear: External ear normal.   Eyes:      General:         Right eye: No discharge.         Left eye: No discharge.      Extraocular Movements: Extraocular " movements intact.   Cardiovascular:      Rate and Rhythm: Normal rate. Rhythm irregular.      Heart sounds: Normal heart sounds. No murmur heard.     Comments: Irregularly irregular  Pulmonary:      Effort: Pulmonary effort is normal. No respiratory distress.      Comments: Decreased breath sounds throughout all lung field  Musculoskeletal:      Right lower leg: No edema.      Left lower leg: No edema.   Neurological:      Mental Status: He is alert and oriented to person, place, and time.      Motor: Weakness present.      Comments: Patient ambulates short distance to transport chair.  He is weak and short of breath and requires help of transport chair for ambulation   Psychiatric:         Mood and Affect: Mood normal.         Assessment & Plan   Ed Spear is here today and the following problems have been addressed:      Diagnoses and all orders for this visit:    1. Centrilobular emphysema (HCC) (Primary)    Patient on oxygen at 2 L per nasal cannula, on room air dropped to 87%  He requests portable oxygen for ambulation that is lighter due to his chronic weakness/debility with aging  Continue nebulizers 4 times daily    Return to clinic in 2 months for visit as scheduled    Please note that portions of this note were completed with a voice recognition program.  Efforts were made to edit dictation, but occasionally words are mistranscribed.

## 2023-02-10 DIAGNOSIS — J43.2 CENTRILOBULAR EMPHYSEMA: ICD-10-CM

## 2023-02-10 RX ORDER — ALBUTEROL SULFATE 90 UG/1
AEROSOL, METERED RESPIRATORY (INHALATION)
Qty: 18 G | Refills: 1 | Status: SHIPPED | OUTPATIENT
Start: 2023-02-10

## 2023-03-09 ENCOUNTER — OFFICE VISIT (OUTPATIENT)
Dept: INTERNAL MEDICINE | Facility: CLINIC | Age: 88
End: 2023-03-09
Payer: MEDICARE

## 2023-03-09 VITALS
HEART RATE: 97 BPM | BODY MASS INDEX: 27.96 KG/M2 | TEMPERATURE: 98.5 F | OXYGEN SATURATION: 97 % | SYSTOLIC BLOOD PRESSURE: 122 MMHG | HEIGHT: 73 IN | DIASTOLIC BLOOD PRESSURE: 60 MMHG | WEIGHT: 211 LBS

## 2023-03-09 DIAGNOSIS — I10 PRIMARY HYPERTENSION: Primary | ICD-10-CM

## 2023-03-09 DIAGNOSIS — L98.9 SKIN LESION OF HAND: ICD-10-CM

## 2023-03-09 DIAGNOSIS — E78.2 MIXED HYPERLIPIDEMIA: ICD-10-CM

## 2023-03-09 DIAGNOSIS — I48.0 PAROXYSMAL ATRIAL FIBRILLATION: ICD-10-CM

## 2023-03-09 DIAGNOSIS — M15.9 PRIMARY OSTEOARTHRITIS INVOLVING MULTIPLE JOINTS: ICD-10-CM

## 2023-03-09 DIAGNOSIS — J43.2 CENTRILOBULAR EMPHYSEMA: ICD-10-CM

## 2023-03-09 PROCEDURE — 3074F SYST BP LT 130 MM HG: CPT | Performed by: INTERNAL MEDICINE

## 2023-03-09 PROCEDURE — 99214 OFFICE O/P EST MOD 30 MIN: CPT | Performed by: INTERNAL MEDICINE

## 2023-03-09 PROCEDURE — 1160F RVW MEDS BY RX/DR IN RCRD: CPT | Performed by: INTERNAL MEDICINE

## 2023-03-09 PROCEDURE — 3078F DIAST BP <80 MM HG: CPT | Performed by: INTERNAL MEDICINE

## 2023-03-09 PROCEDURE — 1159F MED LIST DOCD IN RCRD: CPT | Performed by: INTERNAL MEDICINE

## 2023-03-09 NOTE — PROGRESS NOTES
Chief Complaint   Patient presents with   • Follow-up     For HLD and HTN.     Subjective   Ed Spear is a 87 y.o. male.     History of Present Illness  Here today for follow up of HTN, HLD, Afib, COPD, LBP, vit d/B12 def.  HTN/HLD/Afib- BP and HR are well controlled today.  He does not check BP.  He denies CP, palpitations.  He is having some edema.   He takes his zocor every PM.  He does not eat a HH diet.  Chronically short of breath due to COPD  COPD- he is using nebulizer 3 times a day with duoneb. He also uses Symbicort inhaler daily. He continues to smoke 1 pack of cigarettes daily and is not interested in quitting.  He is on 2 liters of oxygen daily.    LBP- he continues to have neck and back pain.  He takes tylenol 500 mg 2 tabs TID and is on 60 mg dose of duloxetine and states this is helpful for his pain.      Vit B12/D def- he is taking vit D 1000 u daily, B12 1000 u MWF  BPH-currently on Flomax.    He may awaken twice a night to urinate.   Skin cancer- he had skin cancer removed from dorsum of right hand last yr.  He has another skin lesion that recurred in past month.    HCM- immunizations are UTD except shingrix. He did get his COVID vaccines but no boosters.       The following portions of the patient's history were reviewed and updated as appropriate: allergies, current medications, past family history, past medical history, past social history, past surgical history and problem list.    Review of Systems   Constitutional: Negative for activity change, appetite change and unexpected weight change.   Eyes: Negative for visual disturbance.   Respiratory: Positive for shortness of breath and wheezing.    Cardiovascular: Negative for chest pain, palpitations and leg swelling.   Gastrointestinal: Negative for abdominal pain.   Genitourinary: Negative for difficulty urinating.   Musculoskeletal: Positive for back pain, gait problem and neck pain.   Skin:        Skin lesion over dorsum of left hand  "  Neurological: Negative for headaches.   Psychiatric/Behavioral: Negative for dysphoric mood and sleep disturbance. The patient is not nervous/anxious.        Objective   /60   Pulse 97   Temp 98.5 °F (36.9 °C)   Ht 185.4 cm (72.99\")   Wt 95.7 kg (211 lb)   SpO2 97%   BMI 27.85 kg/m²   Body mass index is 27.85 kg/m².  Physical Exam  Vitals and nursing note reviewed.   Constitutional:       General: He is not in acute distress.     Appearance: Normal appearance. He is well-developed. He is not ill-appearing.      Comments: Kind and pleasant elderly man, appears stated age and in NAD today, on portable oxygen at 2 L per nasal cannula and seated in transport chair   HENT:      Head: Normocephalic and atraumatic.      Right Ear: External ear normal.      Left Ear: External ear normal.   Eyes:      General:         Right eye: No discharge.         Left eye: No discharge.      Extraocular Movements: Extraocular movements intact.   Neck:      Thyroid: No thyromegaly.      Vascular: No carotid bruit.   Cardiovascular:      Rate and Rhythm: Normal rate and regular rhythm.      Pulses: Normal pulses.      Heart sounds: Normal heart sounds. No murmur heard.     Comments: Distant heart sounds, occasional ectopic beat  Pulmonary:      Effort: Pulmonary effort is normal. No respiratory distress.      Breath sounds: No wheezing.      Comments: Decreased breath sounds throughout all lung fields  Abdominal:      General: Bowel sounds are normal. There is no distension.      Palpations: Abdomen is soft.      Tenderness: There is no abdominal tenderness.   Musculoskeletal:         General: Normal range of motion.      Cervical back: Normal range of motion and neck supple.      Right lower leg: No edema.      Left lower leg: No edema.   Lymphadenopathy:      Cervical: No cervical adenopathy.   Skin:     Comments: Dorsum of left hand with approximate 2 cm large nodule with open central ulcer that is dry with no discharge, " surrounding dry skin around ulcer   Neurological:      General: No focal deficit present.      Mental Status: He is alert and oriented to person, place, and time. Mental status is at baseline.      Cranial Nerves: No cranial nerve deficit.      Motor: Weakness present.      Coordination: Coordination normal.      Gait: Gait abnormal.      Comments: Patient not ambulatory due to severe shortness of breath, currently in transport chair   Psychiatric:         Mood and Affect: Mood normal.         Behavior: Behavior normal.         Thought Content: Thought content normal.         Judgment: Judgment normal.         Assessment & Plan   Ed Spear is here today and the following problems have been addressed:      Diagnoses and all orders for this visit:    1. Primary hypertension (Primary)    2. Paroxysmal atrial fibrillation (HCC)    3. Mixed hyperlipidemia    4. Centrilobular emphysema (HCC)    5. Primary osteoarthritis involving multiple joints    6. Skin lesion of hand  -     Ambulatory Referral to Dermatology        Follow heart healthy/low salt/low-cholesterol diet  Avoid processed foods   Monitor blood pressure on occasion  Exercise as tolerated with chair exercises  Take all medications as prescribed  Continue Cartia, Zebeta and Eliquis due to underlying A-fib, currently in sinus rhythm today  Continue simvastatin for hyperlipidemia  Arthritis pain doing well with duloxetine and Tylenol  Continue DuoNebs for underlying COPD, patient states he is compliant with Symbicort also  He remains on Flomax and BPH symptoms are well controlled  Refer to dermatology for skin lesion over dorsum of left hand, suspicious for skin cancer recurrence    Return in about 4 months (around 7/9/2023) for Next scheduled follow up.      Marilyn K. Vermeesch, MD      Part of this note may be an electronic transcription/translation of spoken language to printed text using the Dragon Dictation System.

## 2023-06-09 DIAGNOSIS — J43.2 CENTRILOBULAR EMPHYSEMA: ICD-10-CM

## 2023-06-09 DIAGNOSIS — E78.2 MIXED HYPERLIPIDEMIA: ICD-10-CM

## 2023-06-09 RX ORDER — ALBUTEROL SULFATE 90 UG/1
2 AEROSOL, METERED RESPIRATORY (INHALATION) EVERY 4 HOURS PRN
Qty: 18 G | Refills: 0 | Status: SHIPPED | OUTPATIENT
Start: 2023-06-09

## 2023-06-09 RX ORDER — SIMVASTATIN 40 MG
40 TABLET ORAL NIGHTLY
Qty: 90 TABLET | Refills: 0 | Status: SHIPPED | OUTPATIENT
Start: 2023-06-09

## 2023-06-09 RX ORDER — FAMOTIDINE 40 MG/1
40 TABLET, FILM COATED ORAL NIGHTLY PRN
Qty: 90 TABLET | Refills: 0 | Status: SHIPPED | OUTPATIENT
Start: 2023-06-09

## 2023-06-09 RX ORDER — DILTIAZEM HYDROCHLORIDE 180 MG/1
180 CAPSULE, EXTENDED RELEASE ORAL DAILY
Qty: 90 CAPSULE | Refills: 0 | Status: SHIPPED | OUTPATIENT
Start: 2023-06-09

## 2023-06-09 RX ORDER — TAMSULOSIN HYDROCHLORIDE 0.4 MG/1
1 CAPSULE ORAL DAILY
Qty: 90 CAPSULE | Refills: 0 | Status: SHIPPED | OUTPATIENT
Start: 2023-06-09

## 2023-06-09 RX ORDER — DULOXETIN HYDROCHLORIDE 60 MG/1
60 CAPSULE, DELAYED RELEASE ORAL DAILY
Qty: 90 CAPSULE | Refills: 0 | Status: SHIPPED | OUTPATIENT
Start: 2023-06-09

## 2023-06-09 RX ORDER — BUDESONIDE AND FORMOTEROL FUMARATE DIHYDRATE 160; 4.5 UG/1; UG/1
2 AEROSOL RESPIRATORY (INHALATION) 2 TIMES DAILY
Qty: 10.2 G | Refills: 0 | Status: SHIPPED | OUTPATIENT
Start: 2023-06-09

## 2023-06-09 NOTE — TELEPHONE ENCOUNTER
Caller: Farida Cano    Relationship: Emergency Contact    Best call back number: [phone]     Requested Prescriptions:   Requested Prescriptions     Pending Prescriptions Disp Refills    Cartia  MG 24 hr capsule 90 capsule 1     Sig: Take 1 capsule by mouth Daily.    budesonide-formoterol (Symbicort) 160-4.5 MCG/ACT inhaler 10.2 g 6     Sig: Inhale 2 puffs 2 (Two) Times a Day.    tamsulosin (FLOMAX) 0.4 MG capsule 24 hr capsule 90 capsule 1     Sig: Take 1 capsule by mouth Daily.    albuterol sulfate  (90 Base) MCG/ACT inhaler 18 g 1    simvastatin (ZOCOR) 40 MG tablet 90 tablet 1     Sig: Take 1 tablet by mouth Every Night.    famotidine (PEPCID) 40 MG tablet 90 tablet 1     Sig: Take 1 tablet by mouth At Night As Needed for Heartburn.    DULoxetine (CYMBALTA) 60 MG capsule 90 capsule 1     Sig: Take 1 capsule by mouth Daily.        Pharmacy where request should be sent: [facility] [id number] [address] [facility] [address] [place] [facility] [phone] [facility] [phone]      Last office visit with prescribing clinician: Visit date not found   Last telemedicine visit with prescribing clinician: Visit date not found   Next office visit with prescribing clinician: Visit date not found     Additional details provided by patient: PATIENT OUT OF MEDICATION.      Does the patient have less than a 3 day supply:  [x] Yes  [] No    Would you like a call back once the refill request has been completed: [] Yes [x] No    If the office needs to give you a call back, can they leave a voicemail: [] Yes [x] No    Jess Robert   06/09/23 11:14 EDT

## 2023-07-27 ENCOUNTER — APPOINTMENT (OUTPATIENT)
Dept: CT IMAGING | Facility: HOSPITAL | Age: 88
End: 2023-07-27
Payer: MEDICARE

## 2023-07-27 ENCOUNTER — HOSPITAL ENCOUNTER (OUTPATIENT)
Facility: HOSPITAL | Age: 88
Setting detail: OBSERVATION
LOS: 1 days | Discharge: HOME HEALTH | End: 2023-07-29
Attending: STUDENT IN AN ORGANIZED HEALTH CARE EDUCATION/TRAINING PROGRAM | Admitting: STUDENT IN AN ORGANIZED HEALTH CARE EDUCATION/TRAINING PROGRAM
Payer: MEDICARE

## 2023-07-27 DIAGNOSIS — R93.5 ABNORMAL CT OF THE ABDOMEN: ICD-10-CM

## 2023-07-27 DIAGNOSIS — J43.2 CENTRILOBULAR EMPHYSEMA: ICD-10-CM

## 2023-07-27 DIAGNOSIS — K92.2 LOWER GI BLEED: Primary | ICD-10-CM

## 2023-07-27 DIAGNOSIS — I48.0 PAROXYSMAL ATRIAL FIBRILLATION: ICD-10-CM

## 2023-07-27 DIAGNOSIS — K92.1 HEMATOCHEZIA: ICD-10-CM

## 2023-07-27 DIAGNOSIS — D62 ACUTE BLOOD LOSS ANEMIA: ICD-10-CM

## 2023-07-27 PROBLEM — D64.9 CHRONIC ANEMIA: Status: ACTIVE | Noted: 2023-07-27

## 2023-07-27 LAB
ALBUMIN SERPL-MCNC: 3 G/DL (ref 3.5–5.2)
ALBUMIN/GLOB SERPL: 1 G/DL
ALP SERPL-CCNC: 75 U/L (ref 39–117)
ALT SERPL W P-5'-P-CCNC: <5 U/L (ref 1–41)
ANION GAP SERPL CALCULATED.3IONS-SCNC: 9.7 MMOL/L (ref 5–15)
AST SERPL-CCNC: 9 U/L (ref 1–40)
BASOPHILS # BLD AUTO: 0.06 10*3/MM3 (ref 0–0.2)
BASOPHILS NFR BLD AUTO: 0.9 % (ref 0–1.5)
BILIRUB SERPL-MCNC: 0.3 MG/DL (ref 0–1.2)
BILIRUB UR QL STRIP: NEGATIVE
BUN SERPL-MCNC: 10 MG/DL (ref 8–23)
BUN/CREAT SERPL: 9.9 (ref 7–25)
CALCIUM SPEC-SCNC: 9.6 MG/DL (ref 8.6–10.5)
CHLORIDE SERPL-SCNC: 98 MMOL/L (ref 98–107)
CLARITY UR: CLEAR
CO2 SERPL-SCNC: 27.3 MMOL/L (ref 22–29)
COLOR UR: YELLOW
CREAT SERPL-MCNC: 1.01 MG/DL (ref 0.76–1.27)
CRP SERPL-MCNC: 3.76 MG/DL (ref 0–0.5)
D-LACTATE SERPL-SCNC: 1.6 MMOL/L (ref 0.5–2)
DEPRECATED RDW RBC AUTO: 44.5 FL (ref 37–54)
EGFRCR SERPLBLD CKD-EPI 2021: 72.9 ML/MIN/1.73
EOSINOPHIL # BLD AUTO: 0.18 10*3/MM3 (ref 0–0.4)
EOSINOPHIL NFR BLD AUTO: 2.8 % (ref 0.3–6.2)
ERYTHROCYTE [DISTWIDTH] IN BLOOD BY AUTOMATED COUNT: 13.3 % (ref 12.3–15.4)
FLUAV RNA RESP QL NAA+PROBE: NOT DETECTED
FLUBV RNA RESP QL NAA+PROBE: NOT DETECTED
GLOBULIN UR ELPH-MCNC: 3 GM/DL
GLUCOSE SERPL-MCNC: 107 MG/DL (ref 65–99)
GLUCOSE UR STRIP-MCNC: NEGATIVE MG/DL
HCT VFR BLD AUTO: 32 % (ref 37.5–51)
HEMOCCULT STL QL: POSITIVE
HGB BLD-MCNC: 10.3 G/DL (ref 13–17.7)
HGB UR QL STRIP.AUTO: NEGATIVE
HOLD SPECIMEN: NORMAL
HOLD SPECIMEN: NORMAL
IMM GRANULOCYTES # BLD AUTO: 0.09 10*3/MM3 (ref 0–0.05)
IMM GRANULOCYTES NFR BLD AUTO: 1.4 % (ref 0–0.5)
INR PPP: 1.1 (ref 0.9–1.1)
KETONES UR QL STRIP: NEGATIVE
LEUKOCYTE ESTERASE UR QL STRIP.AUTO: NEGATIVE
LIPASE SERPL-CCNC: 14 U/L (ref 13–60)
LYMPHOCYTES # BLD AUTO: 1.18 10*3/MM3 (ref 0.7–3.1)
LYMPHOCYTES NFR BLD AUTO: 18.5 % (ref 19.6–45.3)
MAGNESIUM SERPL-MCNC: 1.8 MG/DL (ref 1.6–2.4)
MCH RBC QN AUTO: 29.4 PG (ref 26.6–33)
MCHC RBC AUTO-ENTMCNC: 32.2 G/DL (ref 31.5–35.7)
MCV RBC AUTO: 91.4 FL (ref 79–97)
MONOCYTES # BLD AUTO: 0.84 10*3/MM3 (ref 0.1–0.9)
MONOCYTES NFR BLD AUTO: 13.2 % (ref 5–12)
NEUTROPHILS NFR BLD AUTO: 4.03 10*3/MM3 (ref 1.7–7)
NEUTROPHILS NFR BLD AUTO: 63.2 % (ref 42.7–76)
NITRITE UR QL STRIP: NEGATIVE
NRBC BLD AUTO-RTO: 0 /100 WBC (ref 0–0.2)
PH UR STRIP.AUTO: 6 [PH] (ref 5–8)
PLATELET # BLD AUTO: 304 10*3/MM3 (ref 140–450)
PMV BLD AUTO: 9 FL (ref 6–12)
POTASSIUM SERPL-SCNC: 4 MMOL/L (ref 3.5–5.2)
PROT SERPL-MCNC: 6 G/DL (ref 6–8.5)
PROT UR QL STRIP: ABNORMAL
PROTHROMBIN TIME: 14.7 SECONDS (ref 12.3–15.1)
RBC # BLD AUTO: 3.5 10*6/MM3 (ref 4.14–5.8)
SARS-COV-2 RNA RESP QL NAA+PROBE: NOT DETECTED
SODIUM SERPL-SCNC: 135 MMOL/L (ref 136–145)
SP GR UR STRIP: 1.02 (ref 1–1.03)
UROBILINOGEN UR QL STRIP: ABNORMAL
WBC NRBC COR # BLD: 6.38 10*3/MM3 (ref 3.4–10.8)
WHOLE BLOOD HOLD COAG: NORMAL
WHOLE BLOOD HOLD SPECIMEN: NORMAL

## 2023-07-27 PROCEDURE — 83690 ASSAY OF LIPASE: CPT | Performed by: STUDENT IN AN ORGANIZED HEALTH CARE EDUCATION/TRAINING PROGRAM

## 2023-07-27 PROCEDURE — 82272 OCCULT BLD FECES 1-3 TESTS: CPT | Performed by: STUDENT IN AN ORGANIZED HEALTH CARE EDUCATION/TRAINING PROGRAM

## 2023-07-27 PROCEDURE — 87636 SARSCOV2 & INF A&B AMP PRB: CPT | Performed by: STUDENT IN AN ORGANIZED HEALTH CARE EDUCATION/TRAINING PROGRAM

## 2023-07-27 PROCEDURE — 83605 ASSAY OF LACTIC ACID: CPT | Performed by: STUDENT IN AN ORGANIZED HEALTH CARE EDUCATION/TRAINING PROGRAM

## 2023-07-27 PROCEDURE — 96374 THER/PROPH/DIAG INJ IV PUSH: CPT

## 2023-07-27 PROCEDURE — 25510000001 IOPAMIDOL 61 % SOLUTION: Performed by: STUDENT IN AN ORGANIZED HEALTH CARE EDUCATION/TRAINING PROGRAM

## 2023-07-27 PROCEDURE — 85025 COMPLETE CBC W/AUTO DIFF WBC: CPT | Performed by: STUDENT IN AN ORGANIZED HEALTH CARE EDUCATION/TRAINING PROGRAM

## 2023-07-27 PROCEDURE — 86140 C-REACTIVE PROTEIN: CPT | Performed by: STUDENT IN AN ORGANIZED HEALTH CARE EDUCATION/TRAINING PROGRAM

## 2023-07-27 PROCEDURE — 80053 COMPREHEN METABOLIC PANEL: CPT | Performed by: STUDENT IN AN ORGANIZED HEALTH CARE EDUCATION/TRAINING PROGRAM

## 2023-07-27 PROCEDURE — 94640 AIRWAY INHALATION TREATMENT: CPT

## 2023-07-27 PROCEDURE — 83735 ASSAY OF MAGNESIUM: CPT | Performed by: STUDENT IN AN ORGANIZED HEALTH CARE EDUCATION/TRAINING PROGRAM

## 2023-07-27 PROCEDURE — 99222 1ST HOSP IP/OBS MODERATE 55: CPT | Performed by: INTERNAL MEDICINE

## 2023-07-27 PROCEDURE — 74177 CT ABD & PELVIS W/CONTRAST: CPT

## 2023-07-27 PROCEDURE — 81003 URINALYSIS AUTO W/O SCOPE: CPT | Performed by: STUDENT IN AN ORGANIZED HEALTH CARE EDUCATION/TRAINING PROGRAM

## 2023-07-27 PROCEDURE — 85610 PROTHROMBIN TIME: CPT | Performed by: INTERNAL MEDICINE

## 2023-07-27 PROCEDURE — 94799 UNLISTED PULMONARY SVC/PX: CPT

## 2023-07-27 PROCEDURE — 96361 HYDRATE IV INFUSION ADD-ON: CPT

## 2023-07-27 PROCEDURE — 99285 EMERGENCY DEPT VISIT HI MDM: CPT

## 2023-07-27 PROCEDURE — 93005 ELECTROCARDIOGRAM TRACING: CPT | Performed by: INTERNAL MEDICINE

## 2023-07-27 RX ORDER — ACETAMINOPHEN 650 MG/1
650 SUPPOSITORY RECTAL EVERY 4 HOURS PRN
Status: DISCONTINUED | OUTPATIENT
Start: 2023-07-27 | End: 2023-07-29 | Stop reason: HOSPADM

## 2023-07-27 RX ORDER — BISOPROLOL FUMARATE 5 MG/1
10 TABLET, FILM COATED ORAL DAILY
Status: DISCONTINUED | OUTPATIENT
Start: 2023-07-28 | End: 2023-07-29 | Stop reason: HOSPADM

## 2023-07-27 RX ORDER — GUAIFENESIN/DEXTROMETHORPHAN 100-10MG/5
5 SYRUP ORAL EVERY 4 HOURS PRN
Status: DISCONTINUED | OUTPATIENT
Start: 2023-07-27 | End: 2023-07-29 | Stop reason: HOSPADM

## 2023-07-27 RX ORDER — ACETAMINOPHEN 325 MG/1
650 TABLET ORAL EVERY 4 HOURS PRN
Status: DISCONTINUED | OUTPATIENT
Start: 2023-07-27 | End: 2023-07-29 | Stop reason: HOSPADM

## 2023-07-27 RX ORDER — DULOXETIN HYDROCHLORIDE 30 MG/1
60 CAPSULE, DELAYED RELEASE ORAL DAILY
Status: DISCONTINUED | OUTPATIENT
Start: 2023-07-28 | End: 2023-07-29 | Stop reason: HOSPADM

## 2023-07-27 RX ORDER — NICOTINE 21 MG/24HR
1 PATCH, TRANSDERMAL 24 HOURS TRANSDERMAL EVERY 24 HOURS
Status: DISCONTINUED | OUTPATIENT
Start: 2023-07-27 | End: 2023-07-29 | Stop reason: HOSPADM

## 2023-07-27 RX ORDER — AMOXICILLIN 250 MG
2 CAPSULE ORAL 2 TIMES DAILY
Status: DISCONTINUED | OUTPATIENT
Start: 2023-07-27 | End: 2023-07-29 | Stop reason: HOSPADM

## 2023-07-27 RX ORDER — POLYETHYLENE GLYCOL 3350 17 G/17G
17 POWDER, FOR SOLUTION ORAL DAILY PRN
Status: DISCONTINUED | OUTPATIENT
Start: 2023-07-27 | End: 2023-07-29 | Stop reason: HOSPADM

## 2023-07-27 RX ORDER — SODIUM CHLORIDE 9 MG/ML
40 INJECTION, SOLUTION INTRAVENOUS AS NEEDED
Status: DISCONTINUED | OUTPATIENT
Start: 2023-07-27 | End: 2023-07-29 | Stop reason: HOSPADM

## 2023-07-27 RX ORDER — TAMSULOSIN HYDROCHLORIDE 0.4 MG/1
0.4 CAPSULE ORAL DAILY
Status: DISCONTINUED | OUTPATIENT
Start: 2023-07-28 | End: 2023-07-29 | Stop reason: HOSPADM

## 2023-07-27 RX ORDER — SODIUM CHLORIDE 0.9 % (FLUSH) 0.9 %
10 SYRINGE (ML) INJECTION AS NEEDED
Status: DISCONTINUED | OUTPATIENT
Start: 2023-07-27 | End: 2023-07-29 | Stop reason: HOSPADM

## 2023-07-27 RX ORDER — ATORVASTATIN CALCIUM 20 MG/1
20 TABLET, FILM COATED ORAL NIGHTLY
Status: DISCONTINUED | OUTPATIENT
Start: 2023-07-27 | End: 2023-07-29 | Stop reason: HOSPADM

## 2023-07-27 RX ORDER — ACETAMINOPHEN 160 MG/5ML
650 SOLUTION ORAL EVERY 4 HOURS PRN
Status: DISCONTINUED | OUTPATIENT
Start: 2023-07-27 | End: 2023-07-29 | Stop reason: HOSPADM

## 2023-07-27 RX ORDER — SODIUM CHLORIDE 0.9 % (FLUSH) 0.9 %
10 SYRINGE (ML) INJECTION EVERY 12 HOURS SCHEDULED
Status: DISCONTINUED | OUTPATIENT
Start: 2023-07-27 | End: 2023-07-29 | Stop reason: HOSPADM

## 2023-07-27 RX ORDER — BISACODYL 5 MG/1
5 TABLET, DELAYED RELEASE ORAL DAILY PRN
Status: DISCONTINUED | OUTPATIENT
Start: 2023-07-27 | End: 2023-07-29 | Stop reason: HOSPADM

## 2023-07-27 RX ORDER — BISACODYL 10 MG
10 SUPPOSITORY, RECTAL RECTAL DAILY PRN
Status: DISCONTINUED | OUTPATIENT
Start: 2023-07-27 | End: 2023-07-29 | Stop reason: HOSPADM

## 2023-07-27 RX ORDER — BUDESONIDE 0.5 MG/2ML
0.5 INHALANT ORAL
Status: DISCONTINUED | OUTPATIENT
Start: 2023-07-27 | End: 2023-07-29 | Stop reason: HOSPADM

## 2023-07-27 RX ORDER — ONDANSETRON 2 MG/ML
4 INJECTION INTRAMUSCULAR; INTRAVENOUS EVERY 6 HOURS PRN
Status: DISCONTINUED | OUTPATIENT
Start: 2023-07-27 | End: 2023-07-29 | Stop reason: HOSPADM

## 2023-07-27 RX ORDER — IPRATROPIUM BROMIDE AND ALBUTEROL SULFATE 2.5; .5 MG/3ML; MG/3ML
3 SOLUTION RESPIRATORY (INHALATION) EVERY 4 HOURS PRN
Status: DISCONTINUED | OUTPATIENT
Start: 2023-07-27 | End: 2023-07-29 | Stop reason: HOSPADM

## 2023-07-27 RX ORDER — IPRATROPIUM BROMIDE AND ALBUTEROL SULFATE 2.5; .5 MG/3ML; MG/3ML
3 SOLUTION RESPIRATORY (INHALATION)
Status: DISCONTINUED | OUTPATIENT
Start: 2023-07-27 | End: 2023-07-29 | Stop reason: HOSPADM

## 2023-07-27 RX ORDER — SODIUM CHLORIDE 9 MG/ML
100 INJECTION, SOLUTION INTRAVENOUS CONTINUOUS
Status: DISCONTINUED | OUTPATIENT
Start: 2023-07-27 | End: 2023-07-29 | Stop reason: HOSPADM

## 2023-07-27 RX ORDER — DILTIAZEM HYDROCHLORIDE 180 MG/1
180 CAPSULE, COATED, EXTENDED RELEASE ORAL DAILY
Status: DISCONTINUED | OUTPATIENT
Start: 2023-07-27 | End: 2023-07-29 | Stop reason: HOSPADM

## 2023-07-27 RX ORDER — PANTOPRAZOLE SODIUM 40 MG/10ML
80 INJECTION, POWDER, LYOPHILIZED, FOR SOLUTION INTRAVENOUS ONCE
Status: COMPLETED | OUTPATIENT
Start: 2023-07-27 | End: 2023-07-27

## 2023-07-27 RX ADMIN — SODIUM CHLORIDE 100 ML/HR: 9 INJECTION, SOLUTION INTRAVENOUS at 15:21

## 2023-07-27 RX ADMIN — PANTOPRAZOLE SODIUM 80 MG: 40 INJECTION, POWDER, FOR SOLUTION INTRAVENOUS at 10:24

## 2023-07-27 RX ADMIN — Medication 10 ML: at 20:26

## 2023-07-27 RX ADMIN — BUDESONIDE 0.5 MG: 0.5 INHALANT RESPIRATORY (INHALATION) at 18:21

## 2023-07-27 RX ADMIN — Medication 1 PATCH: at 15:21

## 2023-07-27 RX ADMIN — SENNOSIDES AND DOCUSATE SODIUM 2 TABLET: 50; 8.6 TABLET ORAL at 20:25

## 2023-07-27 RX ADMIN — ATORVASTATIN CALCIUM 20 MG: 20 TABLET, FILM COATED ORAL at 20:26

## 2023-07-27 RX ADMIN — DILTIAZEM HYDROCHLORIDE 180 MG: 180 CAPSULE, COATED, EXTENDED RELEASE ORAL at 15:21

## 2023-07-27 RX ADMIN — ACETAMINOPHEN 650 MG: 325 TABLET, FILM COATED ORAL at 20:25

## 2023-07-27 RX ADMIN — POLYETHYLENE GLYCOL 3350, SODIUM SULFATE ANHYDROUS, SODIUM BICARBONATE, SODIUM CHLORIDE, POTASSIUM CHLORIDE 4000 ML: 236; 22.74; 6.74; 5.86; 2.97 POWDER, FOR SOLUTION ORAL at 17:30

## 2023-07-27 RX ADMIN — IOPAMIDOL 100 ML: 612 INJECTION, SOLUTION INTRAVENOUS at 10:01

## 2023-07-27 RX ADMIN — IPRATROPIUM BROMIDE AND ALBUTEROL SULFATE 3 ML: .5; 3 SOLUTION RESPIRATORY (INHALATION) at 18:21

## 2023-07-27 NOTE — H&P
[facility]   HISTORY AND PHYSICAL      Name:  Ed Spear   Age:  87 y.o.  Sex:  male  :  1935  MRN:  [id number]   Visit Number:  [id number]  Admission Date:  2023  Date Of Service:  23  Primary Care Physician:  Chelo Nixon DO    Chief Complaint:     Hematochezia since 2 weeks.    History Of Presenting Illness:      Mr. Spear is an 87-year-old male with history of atrial fibrillation on Eliquis, COPD on home oxygen at 3 L, BPH, hypertension was brought to the emergency room by her daughter with symptoms of intermittent bright red blood per rectum that has been going on for 2 weeks.  Patient apparently did not want to come to the emergency room and when he developed progressive generalized weakness he subsequently agreed to come to the emergency room today.  No history of any hematemesis.  Patient denies any abdominal pain, chest pain.  He does have chronic baseline shortness of breath.  He lives with his daughter and uses a walker to ambulate.  He has never had prior colonoscopies.  He does not take aspirin and is only on Eliquis.    In the emergency room, he was afebrile but tachycardic at 120.  Initial blood pressure 106/65 and pulse oxygen saturation of 99% on 3 L of nasal cannula oxygen.  CMP was unremarkable except for a sodium of 135 and an albumin of 3.  Lactic acid and lipase levels were within normal limits.  CBC was unremarkable except for hemoglobin of 10.3 (previous from 2023 was 11.3).  Urine analysis was within normal limits.  CT of the abdomen and pelvis with contrast showed moderate sigmoid colon diverticulosis without evidence of acute diverticulitis.  Hepatic steatosis and probable mild chronic cholecystitis.  Bilateral renal cystic lesions with polycystic morphology noted.  Stable cystic lesions noted in the spleen.  Patient's condition was discussed with Dr. Pham from gastroenterology who recommended admission and possible  colonoscopy in the next day.  Patient is currently lying down on the bed and is comfortable at rest.    Review Of Systems:    All systems were reviewed and negative except as mentioned in history of presenting illness, assessment and plan.    Past Medical History: Patient  has a past medical history of A-fib, Acute sinusitis, Allergic rhinitis, Arthritis, degenerative, BMI 28.0-28.9,adult, COPD (chronic obstructive pulmonary disease), Cough, Enlarged prostate without lower urinary tract symptoms (luts), Hyperlipidemia, Hypertension, Obstructive chronic bronchitis with exacerbation, Shortness of breath, Skin cancer, Tinnitus of both ears, and Vitamin D deficiency.    Past Surgical History: Patient  has a past surgical history that includes Cataract extraction.    Social History: Patient  reports that he has been smoking. He has never used smokeless tobacco. He reports that he does not drink alcohol and does not use drugs.    Family History:  Patient family history includes Cancer in his brother, father, and mother; Kidney disease in his mother.    Allergies:      Patient has no known allergies.    Home Medications:    Prior to Admission Medications       Prescriptions Last Dose Informant Patient Reported? Taking?    acetaminophen (TYLENOL) 325 MG tablet   Yes No    Take 1 tablet by mouth Every 6 (Six) Hours As Needed.    albuterol sulfate  (90 Base) MCG/ACT inhaler   No No    Inhale 2 puffs Every 4 (Four) Hours As Needed for Wheezing or Shortness of Air.    bisoprolol (ZEBeta) 10 MG tablet   No No    TAKE ONE TABLET BY MOUTH DAILY    budesonide-formoterol (Symbicort) 160-4.5 MCG/ACT inhaler   No No    Inhale 2 puffs 2 (Two) Times a Day.    Cartia  MG 24 hr capsule   No No    Take 1 capsule by mouth Daily.    DULoxetine (CYMBALTA) 60 MG capsule   No No    Take 1 capsule by mouth Daily.    Eliquis 2.5 MG tablet tablet   No No    TAKE ONE TABLET BY MOUTH EVERY 12 HOURS    famotidine (PEPCID) 40 MG tablet    "No No    Take 1 tablet by mouth At Night As Needed for Heartburn.    ferrous gluconate (FERGON) 324 MG tablet   No No    Take 1 tablet by mouth Daily With Breakfast.    ipratropium-albuterol (DUO-NEB) 0.5-2.5 mg/3 ml nebulizer   No No    INHALE THREE MILLILITERS ( ONE VIAL )  VIA NEBULIZATION BY MOUTH FOUR TIMES A DAY    methocarbamol (ROBAXIN) 500 MG tablet   No No    TAKE ONE TABLET BY MOUTH THREE TIMES A DAY AS NEEDED FOR MUSCLE SPASMS    rOPINIRole (REQUIP) 0.25 MG tablet   No No    TAKE ONE TABLET IN THE MORNING AND ONE TABLET AT NIGHT 1 HOUR BEFORE BEDTIME.    simvastatin (ZOCOR) 40 MG tablet   No No    Take 1 tablet by mouth Every Night.    tamsulosin (FLOMAX) 0.4 MG capsule 24 hr capsule   No No    Take 1 capsule by mouth Daily.          ED Medications:    Medications   sodium chloride 0.9 % flush 10 mL (has no administration in time range)   pantoprazole (PROTONIX) injection 80 mg (80 mg Intravenous Given 7/27/23 1024)   iopamidol (ISOVUE-300) 61 % injection 100 mL (100 mL Intravenous Given 7/27/23 1001)     Vital Signs:  Temp:  [98.1 °F (36.7 °C)] 98.1 °F (36.7 °C)  Heart Rate:  [] 115  Resp:  [16-18] 18  BP: ()/(65-78) 121/76        07/27/23  0747   Weight: 97.5 kg (215 lb)     Body mass index is 27.6 kg/m².    Physical Exam:     Most recent vital Signs: /76   Pulse 115   Temp 98.1 °F (36.7 °C)   Resp 18   Ht 188 cm (74\")   Wt 97.5 kg (215 lb)   SpO2 100%   BMI 27.60 kg/m²     Physical Exam  Constitutional:       General: He is not in acute distress.     Appearance: Normal appearance. He is ill-appearing.   HENT:      Head: Normocephalic and atraumatic.      Right Ear: External ear normal.      Left Ear: External ear normal.      Nose: Nose normal.      Mouth/Throat:      Mouth: Mucous membranes are moist.   Eyes:      Extraocular Movements: Extraocular movements intact.      Conjunctiva/sclera: Conjunctivae normal.   Cardiovascular:      Rate and Rhythm: Normal rate. Rhythm " irregular.      Pulses: Normal pulses.      Heart sounds: Normal heart sounds. No murmur heard.  Pulmonary:      Effort: Pulmonary effort is normal.      Breath sounds: Wheezing present. No rales.   Abdominal:      General: Bowel sounds are normal.      Palpations: Abdomen is soft.      Tenderness: There is no abdominal tenderness. There is no guarding or rebound.   Musculoskeletal:         General: Normal range of motion.      Cervical back: Neck supple.      Right lower leg: No edema.      Left lower leg: No edema.   Skin:     General: Skin is dry.      Findings: No bruising, erythema or rash.   Neurological:      General: No focal deficit present.      Mental Status: He is alert and oriented to person, place, and time. Mental status is at baseline.   Psychiatric:         Mood and Affect: Mood normal.         Behavior: Behavior normal.     Laboratory data:    I have reviewed the labs done in the emergency room.    Results from last 7 days   Lab Units 07/27/23  0857   SODIUM mmol/L 135*   POTASSIUM mmol/L 4.0   CHLORIDE mmol/L 98   CO2 mmol/L 27.3   BUN mg/dL 10   CREATININE mg/dL 1.01   CALCIUM mg/dL 9.6   BILIRUBIN mg/dL 0.3   ALK PHOS U/L 75   ALT (SGPT) U/L <5   AST (SGOT) U/L 9   GLUCOSE mg/dL 107*     Results from last 7 days   Lab Units 07/27/23  0857   WBC 10*3/mm3 6.38   HEMOGLOBIN g/dL 10.3*   HEMATOCRIT % 32.0*   PLATELETS 10*3/mm3 304       Results from last 7 days   Lab Units 07/27/23  0857   LIPASE U/L 14       Results from last 7 days   Lab Units 07/27/23  1000   COLOR UA  Yellow   GLUCOSE UA  Negative   KETONES UA  Negative   LEUKOCYTES UA  Negative   PH, URINE  6.0   BILIRUBIN UA  Negative   UROBILINOGEN UA  0.2 E.U./dL     EKG:      EKG done in the emergency room was reviewed by me.  It shows atrial fibrillation with a ventricular rate of 89 bpm.  Normal axis.  No significant ST-T changes were noted.    Radiology:    CT Abdomen Pelvis With Contrast    Result Date: 7/27/2023  CT SCAN OF THE ABDOMEN  AND PELVIS WITH CONTRAST    7/27/2023 10:45 AM  HISTORY: hematochezia, eval active bleedAbdominal pain.  PROCEDURE: Axial CT images were obtained from the lung bases to the pubic symphysis following IV contrast administration. Coronal and sagittal reformatted images were generated from the axial data set and provided for interpretation. This study was performed with techniques to keep radiation doses as low as reasonably achievable, (ALARA). Individualized dose reduction techniques using automated exposure control or adjustment of mA and/or kV according to the patient size were employed.  COMPARISON: CT abdomen pelvis 2/26/2021.  FINDINGS: LOWER CHEST: The heart is normal in size. Coronary artery disease present. Severe centrilobular emphysema. Bibasilar paraseptal emphysema and cystic changes.  ABDOMEN/PELVIS: Liver, gallbladder and bile ducts: The liver enhances homogenously without suspicious focal hepatic lesion. Hepatic steatosis. Cholelithiasis. Nondistended gallbladder with mild diffuse wall thickening. No evidence of active inflammation. Findings suggestive of mild chronic cholecystitis. No evidence of acute cholecystitis. No significant biliary ductal dilatation.  Adrenal glands: The adrenal glands are morphologically unremarkable without suspicious lesion.  Kidneys, ureters and urinary bladder: No suspicious renal lesions. Multiple varying size bilateral nonenhancing simple appearing renal cysts, compatible with a Bosniak type I cyst. No hydronephrosis. Mild diffuse urinary bladder wall thickening, likely sequela of chronic urinary bladder outlet obstruction.  Spleen: The spleen is normal in size. Stable 2.4 cm hypodense/cystic lesions in the spleen, probable benign finding such as hemangioma/lymphangioma or mildly complex cyst.  Pancreas: The pancreas is unremarkable.  Gastrointestinal system and mesentery: There is no evidence of bowel obstruction. The appendix is visualized and unremarkable. No  significant mesenteric inflammation. Moderate sigmoid colon diverticulosis without evidence of acute diverticulitis. No definite colonic mass identified. Mild distal esophageal wall thickening, suggestive of esophagitis.  Lymph nodes: No pathologically enlarged abdominal or pelvic lymph nodes are present.  Vessels: The abdominal aorta is normal in caliber. Moderate calcific and noncalcific aortic atherosclerotic disease is present. The celiac trunk, superior mesenteric artery, inferior mesenteric artery and their branch vessels appear grossly patent. The superior mesenteric vein, splenic vein and main portal veins are patent. The inferior vena cava and hepatic veins are unremarkable.  Peritoneum: No free intraperitoneal fluid or pneumoperitoneum.  Pelvic viscera: No acute findings. Enlarged prostate with central calcifications. Enlarged bilateral seminal vesicles without discrete mass.  Body wall: No acute findings. Small fat-containing upper abdominal ventral hernia (series 2, image 41). Tiny fat-containing bilateral inguinal hernias. There is a 2.6 x 1.7 x 3.1 cm subcutaneous mass in the posterior midline at the level of L5, increased in size in comparison the prior exam (series 2, image 46; series 3, image 99).  Bones: No acute fracture. Moderate multilevel degenerative changes of the lumbar spine.      No evidence of active GI bleeding within the limitations of the study. Moderate sigmoid colon diverticulosis without evidence of acute diverticulitis. No definite colonic mass or source of GI bleeding identified.  Hepatic steatosis. Probable mild chronic cholecystitis. Bilateral renal cystic lesions with polycystic morphology. Stable cystic lesion in the spleen.  Posterior midline subcutaneous soft tissue at the level of L5, most likely a benign finding such as sebaceous cyst/epidermal inclusion cyst, increased in size. Correlate clinically. Other differential considerations include subcutaneous metastasis or skin  malignancy, felt less likely.  Other chronic findings as above.  Images personally reviewed, interpreted and dictated by DILSHAD Ball.      This study was performed with techniques to keep radiation doses as low as reasonably achievable (ALARA). Individualized dose reduction techniques using automated exposure control or adjustment of mA and/or kV according to the patient size were employed.        This report was signed and finalized on 7/27/2023 11:13 AM by DILSHAD Ball.       Assessment:    Subacute lower GI bleeding likely secondary to diverticulosis versus AVMs, POA.  Permanent atrial fibrillation on apixaban.  COPD, no evidence of exacerbation.  Chronic respiratory failure secondary to #3 on home oxygen.  Essential hypertension.  Benign prostatic hyperplasia.  Chronic anemia.    Plan:    Lower GI bleeding/anemia.  - Patient has had hematochezia in the differential includes diverticulosis, AVMs, polyps, malignancy as well as internal hemorrhoids.  - We will continue to monitor hemoglobin and transfuse if necessary.  - We will keep him n.p.o. after midnight and consult Dr. Pham from gastroenterology.  - We will hold Eliquis at this time.    Permanent atrial fibrillation.  - Continue bisoprolol but hold Eliquis.    COPD.  - Continue nasal cannula oxygen to keep saturations above 90%.  - Continue bronchodilators and budesonide.    We will consult physical and occupational therapy.    I have discussed the patient's advanced directives with him and he wants to be full code.  I have discussed the patient's treatment plan with his daughter as well as his son who are at the bedside.    Risk Assessment: Moderate  DVT Prophylaxis: SCDs  Code Status: Full  Diet: Clears    Advance Care Planning   ACP discussion was held with the patient during this visit. Patient does not have an advance directive, information provided.         Viet Phipps MD  07/27/23  11:33 EDT    Dictated utilizing Dragon  dictation.

## 2023-07-27 NOTE — H&P (VIEW-ONLY)
In Patient Consult      Date of Consultation: 2023  Patient Name: Ed Spear  MRN: [id number]  : 1935     Referring provider: Viet Phipps MD    Primary care provider:  Chelo Nixon DO    Reason for consultation: Hematochezia    History of Present Illness: This is a 87-year-old male patient with a prior history of hypertension, hyperlipidemia, chronic atrial fibrillation on Eliquis, COPD on home oxygen 3 L nasal cannula, presents to emergency room today on 2023 with complaints of passing bright red per rectum intermittently for the past 2 weeks.    He lives with his daughter and ambulates with walker.  Patient is a poor historian.  He has stage COPD and is on oxygen for many years now.  As per his daughter he has been constipated recently.  He also had some flareup of his hemorrhoids.  His daughter got some over-the-counter medication for him which helped for a while however he continued to have a intermittent red blood bleeding with the minimal clots.  Yesterday he had a large bowel movement with a blood and she brought him to emergency room today for further evaluation.  He is on Eliquis for chronic atrial fibrillation and he continued to take the medications.  He does get intermittent abdominal cramps.  Denies any nausea vomiting no reflux symptoms.  No prior EGD or colonoscopy.  Prior history of any GI bleed.    In the emergency room he had blood work done which revealed hemoglobin of 10.3 g/dL compared to his baseline hemoglobin 11.3 in 2023.  CMP was largely unremarkable.  CT abdomen pelvis done with contrast showed sigmoid diverticulosis without signs of any current bleeding.  Hepatic steatosis and possible distal esophageal thickening.  He has been admitted for further evaluation management of his symptoms.    Subjective     Past Medical History:   Diagnosis Date    A-fib     Acute sinusitis     Allergic rhinitis     Arthritis, degenerative     BMI  28.0-28.9,adult     COPD (chronic obstructive pulmonary disease)     Cough     Enlarged prostate without lower urinary tract symptoms (luts)     Hyperlipidemia     Hypertension     Obstructive chronic bronchitis with exacerbation     Shortness of breath     Skin cancer     Tinnitus of both ears     Vitamin D deficiency        Past Surgical History:   Procedure Laterality Date    CATARACT EXTRACTION         Family History   Problem Relation Age of Onset    Cancer Mother     Kidney disease Mother     Cancer Father     Cancer Brother        Social History     Socioeconomic History    Marital status:    Tobacco Use    Smoking status: Every Day     Packs/day: 1.50     Years: 60.00     Pack years: 90.00     Types: Cigarettes     Passive exposure: Current    Smokeless tobacco: Never   Vaping Use    Vaping Use: Never used   Substance and Sexual Activity    Alcohol use: No    Drug use: Never    Sexual activity: Defer         Current Facility-Administered Medications:     sodium chloride 0.9 % flush 10 mL, 10 mL, Intravenous, PRN, Kwasi Velez MD    No Known Allergies    Review of Systems   Constitutional:  Negative for fever.   HENT:  Negative for sore throat and trouble swallowing.    Eyes:  Negative for visual disturbance.   Respiratory:  Positive for shortness of breath. Negative for cough and chest tightness.    Cardiovascular:  Negative for chest pain, palpitations and leg swelling.   Gastrointestinal:  Positive for blood in stool and constipation. Negative for abdominal pain, diarrhea, nausea, vomiting and indigestion.   Endocrine: Negative for polyphagia.   Genitourinary:  Negative for dysuria and hematuria.   Musculoskeletal:  Negative for back pain, joint swelling and neck pain.   Skin:  Negative for rash, skin lesions and wound.   Neurological:  Negative for dizziness, seizures, speech difficulty, weakness, numbness and confusion.   Hematological:  Negative for adenopathy. Does not bruise/bleed  "easily.   Psychiatric/Behavioral:  Negative for hallucinations and depressed mood.       The following portions of the patient's history were reviewed and updated as appropriate: allergies, current medications, past family history, past medical history, past social history, past surgical history and problem list.    Objective     Vitals:    07/27/23 0900 07/27/23 0928 07/27/23 1130 07/27/23 1230   BP: 110/68 121/76  112/80   BP Location:    Left arm   Patient Position:    Sitting   Pulse: 99 115 91 101   Resp:  18  18   Temp:    98.6 °F (37 °C)   TempSrc:    Oral   SpO2: 100% 100% 100% 100%   Weight:    90.3 kg (199 lb 1.2 oz)   Height:    188 cm (74.02\")       Physical Exam  Vitals and nursing note reviewed.   Constitutional:       Appearance: He is well-developed.      Comments: Looks ill on oxygen nasal cannula   HENT:      Head: Normocephalic and atraumatic.      Right Ear: External ear normal.      Left Ear: External ear normal.   Eyes:      Comments: Mild pallor noted   Neck:      Thyroid: No thyromegaly.      Trachea: No tracheal deviation.   Cardiovascular:      Rate and Rhythm: Normal rate and regular rhythm.      Heart sounds: No murmur heard.  Pulmonary:      Effort: Respiratory distress (Mild tachypnea) present.      Breath sounds: Normal breath sounds.      Comments: On oxygen 3 L nasal cannula  Abdominal:      General: Abdomen is flat. Bowel sounds are normal. There is no distension.      Palpations: Abdomen is soft. There is no mass.      Tenderness: There is no abdominal tenderness. There is no guarding or rebound.      Hernia: No hernia is present.   Musculoskeletal:         General: Normal range of motion.      Cervical back: Normal range of motion and neck supple.   Skin:     General: Skin is warm and dry.   Neurological:      Mental Status: He is alert and oriented to person, place, and time.      Cranial Nerves: No cranial nerve deficit.      Sensory: No sensory deficit.   Psychiatric:      " Comments: Does not communicate well       Results from last 7 days   Lab Units 07/27/23  0857   SODIUM mmol/L 135*   POTASSIUM mmol/L 4.0   CHLORIDE mmol/L 98   CO2 mmol/L 27.3   BUN mg/dL 10   CREATININE mg/dL 1.01   CALCIUM mg/dL 9.6   ALBUMIN g/dL 3.0*   BILIRUBIN mg/dL 0.3   ALK PHOS U/L 75   ALT (SGPT) U/L <5   AST (SGOT) U/L 9   GLUCOSE mg/dL 107*   WBC 10*3/mm3 6.38   HEMOGLOBIN g/dL 10.3*   PLATELETS 10*3/mm3 304       Imaging Results (Last 24 Hours)       Procedure Component Value Units Date/Time    CT Abdomen Pelvis With Contrast [[id number]] Collected: 07/27/23 1006     Updated: 07/27/23 1115    Narrative:      CT SCAN OF THE ABDOMEN AND PELVIS WITH CONTRAST    7/27/2023 10:45 AM      HISTORY:  hematochezia, eval active bleedAbdominal pain.     PROCEDURE:  Axial CT images were obtained from the lung bases to the pubic symphysis  following IV contrast administration. Coronal and sagittal reformatted  images were generated from the axial data set and provided for  interpretation. This study was performed with techniques to keep  radiation doses as low as reasonably achievable, (ALARA). Individualized  dose reduction techniques using automated exposure control or adjustment  of mA and/or kV according to the patient size were employed.     COMPARISON:  CT abdomen pelvis 2/26/2021.     FINDINGS:  LOWER CHEST:  The heart is normal in size. Coronary artery disease present. Severe  centrilobular emphysema. Bibasilar paraseptal emphysema and cystic  changes.     ABDOMEN/PELVIS:  Liver, gallbladder and bile ducts:  The liver enhances homogenously without suspicious focal hepatic lesion.  Hepatic steatosis. Cholelithiasis. Nondistended gallbladder with mild  diffuse wall thickening. No evidence of active inflammation. Findings  suggestive of mild chronic cholecystitis. No evidence of acute  cholecystitis. No significant biliary ductal dilatation.      Adrenal glands:  The adrenal glands are morphologically  unremarkable without suspicious  lesion.     Kidneys, ureters and urinary bladder:  No suspicious renal lesions. Multiple varying size bilateral  nonenhancing simple appearing renal cysts, compatible with a Bosniak  type I cyst. No hydronephrosis. Mild diffuse urinary bladder wall  thickening, likely sequela of chronic urinary bladder outlet  obstruction.     Spleen:  The spleen is normal in size. Stable 2.4 cm hypodense/cystic lesions in  the spleen, probable benign finding such as hemangioma/lymphangioma or  mildly complex cyst.     Pancreas:  The pancreas is unremarkable.      Gastrointestinal system and mesentery:  There is no evidence of bowel obstruction. The appendix is visualized  and unremarkable. No significant mesenteric inflammation. Moderate  sigmoid colon diverticulosis without evidence of acute diverticulitis.  No definite colonic mass identified. Mild distal esophageal wall  thickening, suggestive of esophagitis.     Lymph nodes:  No pathologically enlarged abdominal or pelvic lymph nodes are present.     Vessels:  The abdominal aorta is normal in caliber. Moderate calcific and  noncalcific aortic atherosclerotic disease is present. The celiac trunk,  superior mesenteric artery, inferior mesenteric artery and their branch  vessels appear grossly patent. The superior mesenteric vein, splenic  vein and main portal veins are patent. The inferior vena cava and  hepatic veins are unremarkable.     Peritoneum:  No free intraperitoneal fluid or pneumoperitoneum.     Pelvic viscera:  No acute findings. Enlarged prostate with central calcifications.  Enlarged bilateral seminal vesicles without discrete mass.     Body wall:  No acute findings. Small fat-containing upper abdominal ventral hernia  (series 2, image 41). Tiny fat-containing bilateral inguinal hernias.  There is a 2.6 x 1.7 x 3.1 cm subcutaneous mass in the posterior midline  at the level of L5, increased in size in comparison the prior  exam  (series 2, image 46; series 3, image 99).     Bones:  No acute fracture. Moderate multilevel degenerative changes of the  lumbar spine.       Impression:      No evidence of active GI bleeding within the limitations of the study.  Moderate sigmoid colon diverticulosis without evidence of acute  diverticulitis. No definite colonic mass or source of GI bleeding  identified.     Hepatic steatosis. Probable mild chronic cholecystitis. Bilateral renal  cystic lesions with polycystic morphology. Stable cystic lesion in the  spleen.     Posterior midline subcutaneous soft tissue at the level of L5, most  likely a benign finding such as sebaceous cyst/epidermal inclusion cyst,  increased in size. Correlate clinically. Other differential  considerations include subcutaneous metastasis or skin malignancy, felt  less likely.     Other chronic findings as above.     Images personally reviewed, interpreted and dictated by DILSHAD Ball.                 This study was performed with techniques to keep radiation doses as low  as reasonably achievable (ALARA). Individualized dose reduction  techniques using automated exposure control or adjustment of mA and/or  kV according to the patient size were employed.                       This report was signed and finalized on 7/27/2023 11:13 AM by DILSHAD Ball.               Assessment / Plan      Assessment/Recommendations:     Hematochezia  Suspected lower GI diverticular bleed  Acute blood loss anemia  Suspected erosive esophagitis  Chronic history of anemia  Patient history is more suggestive for diverticular bleed.  His CT scan abdomen done also showed distal esophageal wall thickening.  This could be erosive esophagitis however other etiology need to be ruled out.  No current signs of overt bleeding.  Last bowel movement was yesterday as per his daughter.  His hemoglobin dropped from 11.3 to 10.3 g/dL compared to January 2023.    Patient needs both EGD and  colonoscopy for further evaluation due to abnormal esophageal findings.  He is slightly high risk for any cardiopulmonary decompensation during the procedure that has been discussed with his daughter today.  He is agreeable to proceed with the procedure.    Clear liquids today  Keep n.p.o. after midnight  GoLytely split bowel prep  Monitor H&H  Transfuse to keep the Hb more than 7 g/dL  Hold Eliquis      6. COPD on home oxygen  7.  Permanent atrial fibrillation on Eliquis      Thank you very much for letting me participate in the care of this patient.  Please do not hesitate to call me if you have any questions.    Dontrell Pham MD  [facility]  7/27/2023  14:00 EDT    Please note that portions of this note may have been completed with a voice recognition program.

## 2023-07-27 NOTE — ED PROVIDER NOTES
Subjective:  History of Present Illness:    Patient is an 87-year-old male history of A-fib on Eliquis, COPD on 3 L nasal cannula at baseline, hypertension, hyperlipidemia who presents today with lower GI bleed.  Patient reports 2 weeks of hematochezia with stools.  Denies any chest pain or shortness of breath.  Has been having increasing hematochezia and now presents to our emergency department for evaluation.  Denies any history of diverticulitis.  States he has never had a colonoscopy.  No fever or chills.  Denies any abdominal pain.  No urinary symptoms.      Nurses Notes reviewed and agree, including vitals, allergies, social history and prior medical history.     REVIEW OF SYSTEMS: All systems reviewed and not pertinent unless noted.  Review of Systems   Constitutional:  Negative for activity change, appetite change, chills, fatigue and fever.   HENT:  Negative for congestion, sinus pressure, sneezing and trouble swallowing.    Eyes:  Negative for discharge and itching.   Respiratory:  Negative for cough and shortness of breath.    Cardiovascular:  Negative for chest pain and palpitations.   Gastrointestinal:  Positive for blood in stool. Negative for abdominal distention and abdominal pain.   Endocrine: Negative for cold intolerance and heat intolerance.   Genitourinary:  Negative for decreased urine volume, dysuria and urgency.   Musculoskeletal:  Negative for gait problem, neck pain and neck stiffness.   Skin:  Negative for color change and rash.   Allergic/Immunologic: Negative for immunocompromised state.   Neurological:  Negative for facial asymmetry and headaches.   Hematological:  Negative for adenopathy.   Psychiatric/Behavioral:  Negative for self-injury and suicidal ideas.      Past Medical History:   Diagnosis Date    A-fib     Acute sinusitis     Allergic rhinitis     Arthritis, degenerative     BMI 28.0-28.9,adult     COPD (chronic obstructive pulmonary disease)     Cough     Enlarged prostate  "without lower urinary tract symptoms (luts)     Hyperlipidemia     Hypertension     Obstructive chronic bronchitis with exacerbation     Shortness of breath     Skin cancer     Tinnitus of both ears     Vitamin D deficiency        Allergies:    Patient has no known allergies.      Past Surgical History:   Procedure Laterality Date    CATARACT EXTRACTION           Social History     Socioeconomic History    Marital status:    Tobacco Use    Smoking status: Every Day    Smokeless tobacco: Never   Substance and Sexual Activity    Alcohol use: No    Drug use: Never    Sexual activity: Defer         Family History   Problem Relation Age of Onset    Cancer Mother     Kidney disease Mother     Cancer Father     Cancer Brother        Objective  Physical Exam:  /76   Pulse 115   Temp 98.1 °F (36.7 °C)   Resp 18   Ht 188 cm (74\")   Wt 97.5 kg (215 lb)   SpO2 100%   BMI 27.60 kg/m²      Physical Exam  Constitutional:       General: He is not in acute distress.     Appearance: Normal appearance. He is normal weight. He is not ill-appearing.   HENT:      Head: Normocephalic and atraumatic.      Nose: Nose normal. No congestion or rhinorrhea.      Mouth/Throat:      Mouth: Mucous membranes are moist.      Pharynx: Oropharynx is clear.   Eyes:      Extraocular Movements: Extraocular movements intact.      Conjunctiva/sclera: Conjunctivae normal.      Pupils: Pupils are equal, round, and reactive to light.   Cardiovascular:      Rate and Rhythm: Regular rhythm. Tachycardia present.      Pulses: Normal pulses.   Pulmonary:      Effort: Pulmonary effort is normal. No respiratory distress.      Breath sounds: Normal breath sounds.   Abdominal:      General: Abdomen is flat. Bowel sounds are normal. There is no distension.      Palpations: Abdomen is soft.      Tenderness: There is no abdominal tenderness. There is no guarding or rebound.   Genitourinary:     Comments: Patient with nonbleeding nonthrombosed " hemorrhoid at 8:00, dark melanotic stool seen on rectal exam, no internal hemorrhoids appreciated.  Musculoskeletal:         General: No swelling or tenderness. Normal range of motion.      Cervical back: Normal range of motion and neck supple. No rigidity or tenderness.   Skin:     General: Skin is warm and dry.      Capillary Refill: Capillary refill takes less than 2 seconds.   Neurological:      General: No focal deficit present.      Mental Status: He is alert and oriented to person, place, and time. Mental status is at baseline.      Cranial Nerves: No cranial nerve deficit.      Sensory: No sensory deficit.      Motor: No weakness.   Psychiatric:         Mood and Affect: Mood normal.         Behavior: Behavior normal.         Thought Content: Thought content normal.         Judgment: Judgment normal.       Procedures    ED Course:    ED Course as of 07/28/23 0744   Thu Jul 27, 2023   1153 EKG interpreted by me, atrial fibrillation with normal rate, no concerning ST changes noted, rate of 89, abnormal EKG [JE]      ED Course User Index  [JE] Kwasi Velez MD       Lab Results (last 24 hours)       Procedure Component Value Units Date/Time    COVID-19 and FLU A/B PCR - Swab, Nasopharynx [[id number]]  (Normal) Collected: 07/27/23 0838    Specimen: Swab from Nasopharynx Updated: 07/27/23 0908     COVID19 Not Detected     Influenza A PCR Not Detected     Influenza B PCR Not Detected    Narrative:      Fact sheet for providers: https://www.fda.gov/media/892157/download    Fact sheet for patients: https://www.fda.gov/media/749785/download    Test performed by PCR.    Occult Blood X 1, Stool - Stool, Per Rectum [[id number]]  (Abnormal) Collected: 07/27/23 0841    Specimen: Stool from Per Rectum Updated: 07/27/23 0846     Fecal Occult Blood Positive    CBC & Differential [[id number]]  (Abnormal) Collected: 07/27/23 0857    Specimen: Blood Updated: 07/27/23 0903    Narrative:      The following orders were created  for panel order CBC & Differential.  Procedure                               Abnormality         Status                     ---------                               -----------         ------                     CBC Auto Differential[[id number]]        Abnormal            Final result                 Please view results for these tests on the individual orders.    Comprehensive Metabolic Panel [[id number]]  (Abnormal) Collected: 07/27/23 0857    Specimen: Blood Updated: 07/27/23 0929     Glucose 107 mg/dL      BUN 10 mg/dL      Creatinine 1.01 mg/dL      Sodium 135 mmol/L      Potassium 4.0 mmol/L      Chloride 98 mmol/L      CO2 27.3 mmol/L      Calcium 9.6 mg/dL      Total Protein 6.0 g/dL      Albumin 3.0 g/dL      ALT (SGPT) <5 U/L      AST (SGOT) 9 U/L      Alkaline Phosphatase 75 U/L      Total Bilirubin 0.3 mg/dL      Globulin 3.0 gm/dL      A/G Ratio 1.0 g/dL      BUN/Creatinine Ratio 9.9     Anion Gap 9.7 mmol/L      eGFR 72.9 mL/min/1.73     Narrative:      GFR Normal >60  Chronic Kidney Disease <60  Kidney Failure <15    The GFR formula is only valid for adults with stable renal function between ages 18 and 70.    Lactic Acid, Plasma [[id number]]  (Normal) Collected: 07/27/23 0857    Specimen: Blood Updated: 07/27/23 0921     Lactate 1.6 mmol/L     CBC Auto Differential [[id number]]  (Abnormal) Collected: 07/27/23 0857    Specimen: Blood Updated: 07/27/23 0903     WBC 6.38 10*3/mm3      RBC 3.50 10*6/mm3      Hemoglobin 10.3 g/dL      Hematocrit 32.0 %      MCV 91.4 fL      MCH 29.4 pg      MCHC 32.2 g/dL      RDW 13.3 %      RDW-SD 44.5 fl      MPV 9.0 fL      Platelets 304 10*3/mm3      Neutrophil % 63.2 %      Lymphocyte % 18.5 %      Monocyte % 13.2 %      Eosinophil % 2.8 %      Basophil % 0.9 %      Immature Grans % 1.4 %      Neutrophils, Absolute 4.03 10*3/mm3      Lymphocytes, Absolute 1.18 10*3/mm3      Monocytes, Absolute 0.84 10*3/mm3      Eosinophils, Absolute 0.18 10*3/mm3      Basophils,  Absolute 0.06 10*3/mm3      Immature Grans, Absolute 0.09 10*3/mm3      nRBC 0.0 /100 WBC     Lipase [[id number]]  (Normal) Collected: 07/27/23 0857    Specimen: Blood Updated: 07/27/23 0926     Lipase 14 U/L     C-reactive Protein [[id number]]  (Abnormal) Collected: 07/27/23 0857    Specimen: Blood Updated: 07/27/23 0926     C-Reactive Protein 3.76 mg/dL     Magnesium [[id number]]  (Normal) Collected: 07/27/23 0857    Specimen: Blood Updated: 07/27/23 0929     Magnesium 1.8 mg/dL     Urinalysis With Culture If Indicated - Urine, Clean Catch [[id number]]  (Abnormal) Collected: 07/27/23 1000    Specimen: Urine, Clean Catch Updated: 07/27/23 1009     Color, UA Yellow     Appearance, UA Clear     pH, UA 6.0     Specific Gravity, UA 1.016     Glucose, UA Negative     Ketones, UA Negative     Bilirubin, UA Negative     Blood, UA Negative     Protein, UA Trace     Leuk Esterase, UA Negative     Nitrite, UA Negative     Urobilinogen, UA 0.2 E.U./dL    Narrative:      In absence of clinical symptoms, the presence of pyuria, bacteria, and/or nitrites on the urinalysis result does not correlate with infection.  Urine microscopic not indicated.             CT Abdomen Pelvis With Contrast    Result Date: 7/27/2023  CT SCAN OF THE ABDOMEN AND PELVIS WITH CONTRAST    7/27/2023 10:45 AM  HISTORY: hematochezia, eval active bleedAbdominal pain.  PROCEDURE: Axial CT images were obtained from the lung bases to the pubic symphysis following IV contrast administration. Coronal and sagittal reformatted images were generated from the axial data set and provided for interpretation. This study was performed with techniques to keep radiation doses as low as reasonably achievable, (ALARA). Individualized dose reduction techniques using automated exposure control or adjustment of mA and/or kV according to the patient size were employed.  COMPARISON: CT abdomen pelvis 2/26/2021.  FINDINGS: LOWER CHEST: The heart is normal in size. Coronary artery  disease present. Severe centrilobular emphysema. Bibasilar paraseptal emphysema and cystic changes.  ABDOMEN/PELVIS: Liver, gallbladder and bile ducts: The liver enhances homogenously without suspicious focal hepatic lesion. Hepatic steatosis. Cholelithiasis. Nondistended gallbladder with mild diffuse wall thickening. No evidence of active inflammation. Findings suggestive of mild chronic cholecystitis. No evidence of acute cholecystitis. No significant biliary ductal dilatation.  Adrenal glands: The adrenal glands are morphologically unremarkable without suspicious lesion.  Kidneys, ureters and urinary bladder: No suspicious renal lesions. Multiple varying size bilateral nonenhancing simple appearing renal cysts, compatible with a Bosniak type I cyst. No hydronephrosis. Mild diffuse urinary bladder wall thickening, likely sequela of chronic urinary bladder outlet obstruction.  Spleen: The spleen is normal in size. Stable 2.4 cm hypodense/cystic lesions in the spleen, probable benign finding such as hemangioma/lymphangioma or mildly complex cyst.  Pancreas: The pancreas is unremarkable.  Gastrointestinal system and mesentery: There is no evidence of bowel obstruction. The appendix is visualized and unremarkable. No significant mesenteric inflammation. Moderate sigmoid colon diverticulosis without evidence of acute diverticulitis. No definite colonic mass identified. Mild distal esophageal wall thickening, suggestive of esophagitis.  Lymph nodes: No pathologically enlarged abdominal or pelvic lymph nodes are present.  Vessels: The abdominal aorta is normal in caliber. Moderate calcific and noncalcific aortic atherosclerotic disease is present. The celiac trunk, superior mesenteric artery, inferior mesenteric artery and their branch vessels appear grossly patent. The superior mesenteric vein, splenic vein and main portal veins are patent. The inferior vena cava and hepatic veins are unremarkable.  Peritoneum: No free  intraperitoneal fluid or pneumoperitoneum.  Pelvic viscera: No acute findings. Enlarged prostate with central calcifications. Enlarged bilateral seminal vesicles without discrete mass.  Body wall: No acute findings. Small fat-containing upper abdominal ventral hernia (series 2, image 41). Tiny fat-containing bilateral inguinal hernias. There is a 2.6 x 1.7 x 3.1 cm subcutaneous mass in the posterior midline at the level of L5, increased in size in comparison the prior exam (series 2, image 46; series 3, image 99).  Bones: No acute fracture. Moderate multilevel degenerative changes of the lumbar spine.      Impression: No evidence of active GI bleeding within the limitations of the study. Moderate sigmoid colon diverticulosis without evidence of acute diverticulitis. No definite colonic mass or source of GI bleeding identified.  Hepatic steatosis. Probable mild chronic cholecystitis. Bilateral renal cystic lesions with polycystic morphology. Stable cystic lesion in the spleen.  Posterior midline subcutaneous soft tissue at the level of L5, most likely a benign finding such as sebaceous cyst/epidermal inclusion cyst, increased in size. Correlate clinically. Other differential considerations include subcutaneous metastasis or skin malignancy, felt less likely.  Other chronic findings as above.  Images personally reviewed, interpreted and dictated by DILSHAD Ball.      This study was performed with techniques to keep radiation doses as low as reasonably achievable (ALARA). Individualized dose reduction techniques using automated exposure control or adjustment of mA and/or kV according to the patient size were employed.        This report was signed and finalized on 7/27/2023 11:13 AM by DILSHAD Ball.          MDM     Amount and/or Complexity of Data Reviewed  Decide to obtain previous medical records or to obtain history from someone other than the patient: yes        Initial impression of presenting  illness: Hematochezia    DDX: includes but is not limited to: Upper GI bleed, lower GI bleed, diverticulitis    Patient arrives guarded with vitals interpreted by myself.     Pertinent features from physical exam: Tachycardic with no murmur appreciated, clear to auscultation, nontender to abdominal palpation, melanotic stool on rectal.    Initial diagnostic plan: CBC, CMP, lipase, UA, CRP, lactic acid, CT abdomen pelvis, stool guaiac    Results from initial plan were reviewed and interpreted by me revealing no concern for active GI bleed on CT, patient with one-point lower than baseline hemoglobin, hemodynamically stable in the emergency department    Diagnostic information from other sources: Discussed with wife at bedside and reviewed past medical records    Interventions / Re-evaluation: Given Protonix for concern for GI bleed, no need for Rocephin or octreotide given no concern for liver failure    Results/clinical rationale were discussed with patient at bedside    Consultations/Discussion of results with other physicians: Discussed case with gastroenterology who will prep the patient and scope tomorrow.  Discussed with hospitalist service and admitted to their service for further management.    Disposition plan: Admit  -----        Final diagnoses:   Lower GI bleed          Kwasi Velez MD  07/27/23 1131       Kwasi Velez MD  07/28/23 9366

## 2023-07-27 NOTE — H&P (VIEW-ONLY)
In Patient Consult      Date of Consultation: 2023  Patient Name: Ed Spear  MRN: [id number]  : 1935     Referring provider: Viet Phipps MD    Primary care provider:  Chelo Nixon DO    Reason for consultation: Hematochezia    History of Present Illness: This is a 87-year-old male patient with a prior history of hypertension, hyperlipidemia, chronic atrial fibrillation on Eliquis, COPD on home oxygen 3 L nasal cannula, presents to emergency room today on 2023 with complaints of passing bright red per rectum intermittently for the past 2 weeks.    He lives with his daughter and ambulates with walker.  Patient is a poor historian.  He has stage COPD and is on oxygen for many years now.  As per his daughter he has been constipated recently.  He also had some flareup of his hemorrhoids.  His daughter got some over-the-counter medication for him which helped for a while however he continued to have a intermittent red blood bleeding with the minimal clots.  Yesterday he had a large bowel movement with a blood and she brought him to emergency room today for further evaluation.  He is on Eliquis for chronic atrial fibrillation and he continued to take the medications.  He does get intermittent abdominal cramps.  Denies any nausea vomiting no reflux symptoms.  No prior EGD or colonoscopy.  Prior history of any GI bleed.    In the emergency room he had blood work done which revealed hemoglobin of 10.3 g/dL compared to his baseline hemoglobin 11.3 in 2023.  CMP was largely unremarkable.  CT abdomen pelvis done with contrast showed sigmoid diverticulosis without signs of any current bleeding.  Hepatic steatosis and possible distal esophageal thickening.  He has been admitted for further evaluation management of his symptoms.    Subjective     Past Medical History:   Diagnosis Date    A-fib     Acute sinusitis     Allergic rhinitis     Arthritis, degenerative     BMI  28.0-28.9,adult     COPD (chronic obstructive pulmonary disease)     Cough     Enlarged prostate without lower urinary tract symptoms (luts)     Hyperlipidemia     Hypertension     Obstructive chronic bronchitis with exacerbation     Shortness of breath     Skin cancer     Tinnitus of both ears     Vitamin D deficiency        Past Surgical History:   Procedure Laterality Date    CATARACT EXTRACTION         Family History   Problem Relation Age of Onset    Cancer Mother     Kidney disease Mother     Cancer Father     Cancer Brother        Social History     Socioeconomic History    Marital status:    Tobacco Use    Smoking status: Every Day     Packs/day: 1.50     Years: 60.00     Pack years: 90.00     Types: Cigarettes     Passive exposure: Current    Smokeless tobacco: Never   Vaping Use    Vaping Use: Never used   Substance and Sexual Activity    Alcohol use: No    Drug use: Never    Sexual activity: Defer         Current Facility-Administered Medications:     sodium chloride 0.9 % flush 10 mL, 10 mL, Intravenous, PRN, Kwasi Velez MD    No Known Allergies    Review of Systems   Constitutional:  Negative for fever.   HENT:  Negative for sore throat and trouble swallowing.    Eyes:  Negative for visual disturbance.   Respiratory:  Positive for shortness of breath. Negative for cough and chest tightness.    Cardiovascular:  Negative for chest pain, palpitations and leg swelling.   Gastrointestinal:  Positive for blood in stool and constipation. Negative for abdominal pain, diarrhea, nausea, vomiting and indigestion.   Endocrine: Negative for polyphagia.   Genitourinary:  Negative for dysuria and hematuria.   Musculoskeletal:  Negative for back pain, joint swelling and neck pain.   Skin:  Negative for rash, skin lesions and wound.   Neurological:  Negative for dizziness, seizures, speech difficulty, weakness, numbness and confusion.   Hematological:  Negative for adenopathy. Does  "not bruise/bleed easily.   Psychiatric/Behavioral:  Negative for hallucinations and depressed mood.       The following portions of the patient's history were reviewed and updated as appropriate: allergies, current medications, past family history, past medical history, past social history, past surgical history and problem list.    Objective     Vitals:    07/27/23 0900 07/27/23 0928 07/27/23 1130 07/27/23 1230   BP: 110/68 121/76  112/80   BP Location:    Left arm   Patient Position:    Sitting   Pulse: 99 115 91 101   Resp:  18  18   Temp:    98.6 øF (37 øC)   TempSrc:    Oral   SpO2: 100% 100% 100% 100%   Weight:    90.3 kg (199 lb 1.2 oz)   Height:    188 cm (74.02\")       Physical Exam  Vitals and nursing note reviewed.   Constitutional:       Appearance: He is well-developed.      Comments: Looks ill on oxygen nasal cannula   HENT:      Head: Normocephalic and atraumatic.      Right Ear: External ear normal.      Left Ear: External ear normal.   Eyes:      Comments: Mild pallor noted   Neck:      Thyroid: No thyromegaly.      Trachea: No tracheal deviation.   Cardiovascular:      Rate and Rhythm: Normal rate and regular rhythm.      Heart sounds: No murmur heard.  Pulmonary:      Effort: Respiratory distress (Mild tachypnea) present.      Breath sounds: Normal breath sounds.      Comments: On oxygen 3 L nasal cannula  Abdominal:      General: Abdomen is flat. Bowel sounds are normal. There is no distension.      Palpations: Abdomen is soft. There is no mass.      Tenderness: There is no abdominal tenderness. There is no guarding or rebound.      Hernia: No hernia is present.   Musculoskeletal:         General: Normal range of motion.      Cervical back: Normal range of motion and neck supple.   Skin:     General: Skin is warm and dry.   Neurological:      Mental Status: He is alert and oriented to person, place, and time.      Cranial Nerves: No cranial nerve deficit.      Sensory: No sensory deficit. "   Psychiatric:      Comments: Does not communicate well       Results from last 7 days   Lab Units 07/27/23  0857   SODIUM mmol/L 135*   POTASSIUM mmol/L 4.0   CHLORIDE mmol/L 98   CO2 mmol/L 27.3   BUN mg/dL 10   CREATININE mg/dL 1.01   CALCIUM mg/dL 9.6   ALBUMIN g/dL 3.0*   BILIRUBIN mg/dL 0.3   ALK PHOS U/L 75   ALT (SGPT) U/L <5   AST (SGOT) U/L 9   GLUCOSE mg/dL 107*   WBC 10*3/mm3 6.38   HEMOGLOBIN g/dL 10.3*   PLATELETS 10*3/mm3 304       Imaging Results (Last 24 Hours)       Procedure Component Value Units Date/Time    CT Abdomen Pelvis With Contrast [[id number]] Collected: 07/27/23 1006     Updated: 07/27/23 1115    Narrative:      CT SCAN OF THE ABDOMEN AND PELVIS WITH CONTRAST    7/27/2023 10:45 AM      HISTORY:  hematochezia, eval active bleedAbdominal pain.     PROCEDURE:  Axial CT images were obtained from the lung bases to the pubic symphysis  following IV contrast administration. Coronal and sagittal reformatted  images were generated from the axial data set and provided for  interpretation. This study was performed with techniques to keep  radiation doses as low as reasonably achievable, (ALARA). Individualized  dose reduction techniques using automated exposure control or adjustment  of mA and/or kV according to the patient size were employed.     COMPARISON:  CT abdomen pelvis 2/26/2021.     FINDINGS:  LOWER CHEST:  The heart is normal in size. Coronary artery disease present. Severe  centrilobular emphysema. Bibasilar paraseptal emphysema and cystic  changes.     ABDOMEN/PELVIS:  Liver, gallbladder and bile ducts:  The liver enhances homogenously without suspicious focal hepatic lesion.  Hepatic steatosis. Cholelithiasis. Nondistended gallbladder with mild  diffuse wall thickening. No evidence of active inflammation. Findings  suggestive of mild chronic cholecystitis. No evidence of acute  cholecystitis. No significant biliary ductal dilatation.      Adrenal glands:  The adrenal glands are  morphologically unremarkable without suspicious  lesion.     Kidneys, ureters and urinary bladder:  No suspicious renal lesions. Multiple varying size bilateral  nonenhancing simple appearing renal cysts, compatible with a Bosniak  type I cyst. No hydronephrosis. Mild diffuse urinary bladder wall  thickening, likely sequela of chronic urinary bladder outlet  obstruction.     Spleen:  The spleen is normal in size. Stable 2.4 cm hypodense/cystic lesions in  the spleen, probable benign finding such as hemangioma/lymphangioma or  mildly complex cyst.     Pancreas:  The pancreas is unremarkable.      Gastrointestinal system and mesentery:  There is no evidence of bowel obstruction. The appendix is visualized  and unremarkable. No significant mesenteric inflammation. Moderate  sigmoid colon diverticulosis without evidence of acute diverticulitis.  No definite colonic mass identified. Mild distal esophageal wall  thickening, suggestive of esophagitis.     Lymph nodes:  No pathologically enlarged abdominal or pelvic lymph nodes are present.     Vessels:  The abdominal aorta is normal in caliber. Moderate calcific and  noncalcific aortic atherosclerotic disease is present. The celiac trunk,  superior mesenteric artery, inferior mesenteric artery and their branch  vessels appear grossly patent. The superior mesenteric vein, splenic  vein and main portal veins are patent. The inferior vena cava and  hepatic veins are unremarkable.     Peritoneum:  No free intraperitoneal fluid or pneumoperitoneum.     Pelvic viscera:  No acute findings. Enlarged prostate with central calcifications.  Enlarged bilateral seminal vesicles without discrete mass.     Body wall:  No acute findings. Small fat-containing upper abdominal ventral hernia  (series 2, image 41). Tiny fat-containing bilateral inguinal hernias.  There is a 2.6 x 1.7 x 3.1 cm subcutaneous mass in the posterior midline  at the level of L5, increased in size in comparison the  prior exam  (series 2, image 46; series 3, image 99).     Bones:  No acute fracture. Moderate multilevel degenerative changes of the  lumbar spine.       Impression:      No evidence of active GI bleeding within the limitations of the study.  Moderate sigmoid colon diverticulosis without evidence of acute  diverticulitis. No definite colonic mass or source of GI bleeding  identified.     Hepatic steatosis. Probable mild chronic cholecystitis. Bilateral renal  cystic lesions with polycystic morphology. Stable cystic lesion in the  spleen.     Posterior midline subcutaneous soft tissue at the level of L5, most  likely a benign finding such as sebaceous cyst/epidermal inclusion cyst,  increased in size. Correlate clinically. Other differential  considerations include subcutaneous metastasis or skin malignancy, felt  less likely.     Other chronic findings as above.     Images personally reviewed, interpreted and dictated by DILSHAD Ball.                 This study was performed with techniques to keep radiation doses as low  as reasonably achievable (ALARA). Individualized dose reduction  techniques using automated exposure control or adjustment of mA and/or  kV according to the patient size were employed.                       This report was signed and finalized on 7/27/2023 11:13 AM by DILSHAD Ball.               Assessment / Plan      Assessment/Recommendations:     Hematochezia  Suspected lower GI diverticular bleed  Acute blood loss anemia  Suspected erosive esophagitis  Chronic history of anemia  Patient history is more suggestive for diverticular bleed.  His CT scan abdomen done also showed distal esophageal wall thickening.  This could be erosive esophagitis however other etiology need to be ruled out.  No current signs of overt bleeding.  Last bowel movement was yesterday as per his daughter.  His hemoglobin dropped from 11.3 to 10.3 g/dL compared to January 2023.    Patient needs both EGD  and colonoscopy for further evaluation due to abnormal esophageal findings.  He is slightly high risk for any cardiopulmonary decompensation during the procedure that has been discussed with his daughter today.  He is agreeable to proceed with the procedure.    Clear liquids today  Keep n.p.o. after midnight  GoLytely split bowel prep  Monitor H&H  Transfuse to keep the Hb more than 7 g/dL  Hold Eliquis      6. COPD on home oxygen  7.  Permanent atrial fibrillation on Eliquis      Thank you very much for letting me participate in the care of this patient.  Please do not hesitate to call me if you have any questions.    Dontrell Pham MD  [facility] [place]  7/27/2023  14:00 EDT    Please note that portions of this note may have been completed with a voice recognition program.

## 2023-07-27 NOTE — CONSULTS
In Patient Consult      Date of Consultation: 2023  Patient Name: Ed Spear  MRN: [id number]  : 1935     Referring provider: Viet Phipps MD    Primary care provider:  Chelo Nixon DO    Reason for consultation: Hematochezia    History of Present Illness: This is a 87-year-old male patient with a prior history of hypertension, hyperlipidemia, chronic atrial fibrillation on Eliquis, COPD on home oxygen 3 L nasal cannula, presents to emergency room today on 2023 with complaints of passing bright red per rectum intermittently for the past 2 weeks.    He lives with his daughter and ambulates with walker.  Patient is a poor historian.  He has stage COPD and is on oxygen for many years now.  As per his daughter he has been constipated recently.  He also had some flareup of his hemorrhoids.  His daughter got some over-the-counter medication for him which helped for a while however he continued to have a intermittent red blood bleeding with the minimal clots.  Yesterday he had a large bowel movement with a blood and she brought him to emergency room today for further evaluation.  He is on Eliquis for chronic atrial fibrillation and he continued to take the medications.  He does get intermittent abdominal cramps.  Denies any nausea vomiting no reflux symptoms.  No prior EGD or colonoscopy.  Prior history of any GI bleed.    In the emergency room he had blood work done which revealed hemoglobin of 10.3 g/dL compared to his baseline hemoglobin 11.3 in 2023.  CMP was largely unremarkable.  CT abdomen pelvis done with contrast showed sigmoid diverticulosis without signs of any current bleeding.  Hepatic steatosis and possible distal esophageal thickening.  He has been admitted for further evaluation management of his symptoms.    Subjective     Past Medical History:   Diagnosis Date    A-fib     Acute sinusitis     Allergic rhinitis     Arthritis, degenerative     BMI  28.0-28.9,adult     COPD (chronic obstructive pulmonary disease)     Cough     Enlarged prostate without lower urinary tract symptoms (luts)     Hyperlipidemia     Hypertension     Obstructive chronic bronchitis with exacerbation     Shortness of breath     Skin cancer     Tinnitus of both ears     Vitamin D deficiency        Past Surgical History:   Procedure Laterality Date    CATARACT EXTRACTION         Family History   Problem Relation Age of Onset    Cancer Mother     Kidney disease Mother     Cancer Father     Cancer Brother        Social History     Socioeconomic History    Marital status:    Tobacco Use    Smoking status: Every Day     Packs/day: 1.50     Years: 60.00     Pack years: 90.00     Types: Cigarettes     Passive exposure: Current    Smokeless tobacco: Never   Vaping Use    Vaping Use: Never used   Substance and Sexual Activity    Alcohol use: No    Drug use: Never    Sexual activity: Defer         Current Facility-Administered Medications:     sodium chloride 0.9 % flush 10 mL, 10 mL, Intravenous, PRN, Kwasi Velez MD    No Known Allergies    Review of Systems   Constitutional:  Negative for fever.   HENT:  Negative for sore throat and trouble swallowing.    Eyes:  Negative for visual disturbance.   Respiratory:  Positive for shortness of breath. Negative for cough and chest tightness.    Cardiovascular:  Negative for chest pain, palpitations and leg swelling.   Gastrointestinal:  Positive for blood in stool and constipation. Negative for abdominal pain, diarrhea, nausea, vomiting and indigestion.   Endocrine: Negative for polyphagia.   Genitourinary:  Negative for dysuria and hematuria.   Musculoskeletal:  Negative for back pain, joint swelling and neck pain.   Skin:  Negative for rash, skin lesions and wound.   Neurological:  Negative for dizziness, seizures, speech difficulty, weakness, numbness and confusion.   Hematological:  Negative for adenopathy. Does not bruise/bleed  "easily.   Psychiatric/Behavioral:  Negative for hallucinations and depressed mood.       The following portions of the patient's history were reviewed and updated as appropriate: allergies, current medications, past family history, past medical history, past social history, past surgical history and problem list.    Objective     Vitals:    07/27/23 0900 07/27/23 0928 07/27/23 1130 07/27/23 1230   BP: 110/68 121/76  112/80   BP Location:    Left arm   Patient Position:    Sitting   Pulse: 99 115 91 101   Resp:  18  18   Temp:    98.6 °F (37 °C)   TempSrc:    Oral   SpO2: 100% 100% 100% 100%   Weight:    90.3 kg (199 lb 1.2 oz)   Height:    188 cm (74.02\")       Physical Exam  Vitals and nursing note reviewed.   Constitutional:       Appearance: He is well-developed.      Comments: Looks ill on oxygen nasal cannula   HENT:      Head: Normocephalic and atraumatic.      Right Ear: External ear normal.      Left Ear: External ear normal.   Eyes:      Comments: Mild pallor noted   Neck:      Thyroid: No thyromegaly.      Trachea: No tracheal deviation.   Cardiovascular:      Rate and Rhythm: Normal rate and regular rhythm.      Heart sounds: No murmur heard.  Pulmonary:      Effort: Respiratory distress (Mild tachypnea) present.      Breath sounds: Normal breath sounds.      Comments: On oxygen 3 L nasal cannula  Abdominal:      General: Abdomen is flat. Bowel sounds are normal. There is no distension.      Palpations: Abdomen is soft. There is no mass.      Tenderness: There is no abdominal tenderness. There is no guarding or rebound.      Hernia: No hernia is present.   Musculoskeletal:         General: Normal range of motion.      Cervical back: Normal range of motion and neck supple.   Skin:     General: Skin is warm and dry.   Neurological:      Mental Status: He is alert and oriented to person, place, and time.      Cranial Nerves: No cranial nerve deficit.      Sensory: No sensory deficit.   Psychiatric:      " Comments: Does not communicate well       Results from last 7 days   Lab Units 07/27/23  0857   SODIUM mmol/L 135*   POTASSIUM mmol/L 4.0   CHLORIDE mmol/L 98   CO2 mmol/L 27.3   BUN mg/dL 10   CREATININE mg/dL 1.01   CALCIUM mg/dL 9.6   ALBUMIN g/dL 3.0*   BILIRUBIN mg/dL 0.3   ALK PHOS U/L 75   ALT (SGPT) U/L <5   AST (SGOT) U/L 9   GLUCOSE mg/dL 107*   WBC 10*3/mm3 6.38   HEMOGLOBIN g/dL 10.3*   PLATELETS 10*3/mm3 304       Imaging Results (Last 24 Hours)       Procedure Component Value Units Date/Time    CT Abdomen Pelvis With Contrast [[id number]] Collected: 07/27/23 1006     Updated: 07/27/23 1115    Narrative:      CT SCAN OF THE ABDOMEN AND PELVIS WITH CONTRAST    7/27/2023 10:45 AM      HISTORY:  hematochezia, eval active bleedAbdominal pain.     PROCEDURE:  Axial CT images were obtained from the lung bases to the pubic symphysis  following IV contrast administration. Coronal and sagittal reformatted  images were generated from the axial data set and provided for  interpretation. This study was performed with techniques to keep  radiation doses as low as reasonably achievable, (ALARA). Individualized  dose reduction techniques using automated exposure control or adjustment  of mA and/or kV according to the patient size were employed.     COMPARISON:  CT abdomen pelvis 2/26/2021.     FINDINGS:  LOWER CHEST:  The heart is normal in size. Coronary artery disease present. Severe  centrilobular emphysema. Bibasilar paraseptal emphysema and cystic  changes.     ABDOMEN/PELVIS:  Liver, gallbladder and bile ducts:  The liver enhances homogenously without suspicious focal hepatic lesion.  Hepatic steatosis. Cholelithiasis. Nondistended gallbladder with mild  diffuse wall thickening. No evidence of active inflammation. Findings  suggestive of mild chronic cholecystitis. No evidence of acute  cholecystitis. No significant biliary ductal dilatation.      Adrenal glands:  The adrenal glands are morphologically  unremarkable without suspicious  lesion.     Kidneys, ureters and urinary bladder:  No suspicious renal lesions. Multiple varying size bilateral  nonenhancing simple appearing renal cysts, compatible with a Bosniak  type I cyst. No hydronephrosis. Mild diffuse urinary bladder wall  thickening, likely sequela of chronic urinary bladder outlet  obstruction.     Spleen:  The spleen is normal in size. Stable 2.4 cm hypodense/cystic lesions in  the spleen, probable benign finding such as hemangioma/lymphangioma or  mildly complex cyst.     Pancreas:  The pancreas is unremarkable.      Gastrointestinal system and mesentery:  There is no evidence of bowel obstruction. The appendix is visualized  and unremarkable. No significant mesenteric inflammation. Moderate  sigmoid colon diverticulosis without evidence of acute diverticulitis.  No definite colonic mass identified. Mild distal esophageal wall  thickening, suggestive of esophagitis.     Lymph nodes:  No pathologically enlarged abdominal or pelvic lymph nodes are present.     Vessels:  The abdominal aorta is normal in caliber. Moderate calcific and  noncalcific aortic atherosclerotic disease is present. The celiac trunk,  superior mesenteric artery, inferior mesenteric artery and their branch  vessels appear grossly patent. The superior mesenteric vein, splenic  vein and main portal veins are patent. The inferior vena cava and  hepatic veins are unremarkable.     Peritoneum:  No free intraperitoneal fluid or pneumoperitoneum.     Pelvic viscera:  No acute findings. Enlarged prostate with central calcifications.  Enlarged bilateral seminal vesicles without discrete mass.     Body wall:  No acute findings. Small fat-containing upper abdominal ventral hernia  (series 2, image 41). Tiny fat-containing bilateral inguinal hernias.  There is a 2.6 x 1.7 x 3.1 cm subcutaneous mass in the posterior midline  at the level of L5, increased in size in comparison the prior  exam  (series 2, image 46; series 3, image 99).     Bones:  No acute fracture. Moderate multilevel degenerative changes of the  lumbar spine.       Impression:      No evidence of active GI bleeding within the limitations of the study.  Moderate sigmoid colon diverticulosis without evidence of acute  diverticulitis. No definite colonic mass or source of GI bleeding  identified.     Hepatic steatosis. Probable mild chronic cholecystitis. Bilateral renal  cystic lesions with polycystic morphology. Stable cystic lesion in the  spleen.     Posterior midline subcutaneous soft tissue at the level of L5, most  likely a benign finding such as sebaceous cyst/epidermal inclusion cyst,  increased in size. Correlate clinically. Other differential  considerations include subcutaneous metastasis or skin malignancy, felt  less likely.     Other chronic findings as above.     Images personally reviewed, interpreted and dictated by DILSHAD Ball.                 This study was performed with techniques to keep radiation doses as low  as reasonably achievable (ALARA). Individualized dose reduction  techniques using automated exposure control or adjustment of mA and/or  kV according to the patient size were employed.                       This report was signed and finalized on 7/27/2023 11:13 AM by DILSHAD Ball.               Assessment / Plan      Assessment/Recommendations:     Hematochezia  Suspected lower GI diverticular bleed  Acute blood loss anemia  Suspected erosive esophagitis  Chronic history of anemia  Patient history is more suggestive for diverticular bleed.  His CT scan abdomen done also showed distal esophageal wall thickening.  This could be erosive esophagitis however other etiology need to be ruled out.  No current signs of overt bleeding.  Last bowel movement was yesterday as per his daughter.  His hemoglobin dropped from 11.3 to 10.3 g/dL compared to January 2023.    Patient needs both EGD and  colonoscopy for further evaluation due to abnormal esophageal findings.  He is slightly high risk for any cardiopulmonary decompensation during the procedure that has been discussed with his daughter today.  He is agreeable to proceed with the procedure.    Clear liquids today  Keep n.p.o. after midnight  GoLytely split bowel prep  Monitor H&H  Transfuse to keep the Hb more than 7 g/dL  Hold Eliquis      6. COPD on home oxygen  7.  Permanent atrial fibrillation on Eliquis      Thank you very much for letting me participate in the care of this patient.  Please do not hesitate to call me if you have any questions.    Dontrell Pham MD  [facility] [place]  7/27/2023  14:00 EDT    Please note that portions of this note may have been completed with a voice recognition program.

## 2023-07-27 NOTE — PLAN OF CARE
Goal Outcome Evaluation:     Pt is a new admission to the 3rd flood, admitted to  312.

## 2023-07-28 ENCOUNTER — ANESTHESIA EVENT (OUTPATIENT)
Dept: GASTROENTEROLOGY | Facility: HOSPITAL | Age: 88
End: 2023-07-28
Payer: MEDICARE

## 2023-07-28 ENCOUNTER — ANESTHESIA (OUTPATIENT)
Dept: GASTROENTEROLOGY | Facility: HOSPITAL | Age: 88
End: 2023-07-28
Payer: MEDICARE

## 2023-07-28 ENCOUNTER — PREP FOR SURGERY (OUTPATIENT)
Dept: OTHER | Facility: HOSPITAL | Age: 88
End: 2023-07-28
Payer: MEDICARE

## 2023-07-28 DIAGNOSIS — D12.6 ADENOMATOUS POLYP OF COLON, UNSPECIFIED PART OF COLON: Primary | ICD-10-CM

## 2023-07-28 PROBLEM — K92.1 HEMATOCHEZIA: Status: ACTIVE | Noted: 2023-07-27

## 2023-07-28 PROBLEM — D62 ACUTE BLOOD LOSS ANEMIA: Status: ACTIVE | Noted: 2023-07-27

## 2023-07-28 PROBLEM — R93.5 ABNORMAL CT OF THE ABDOMEN: Status: ACTIVE | Noted: 2023-07-27

## 2023-07-28 LAB
ANION GAP SERPL CALCULATED.3IONS-SCNC: 9.8 MMOL/L (ref 5–15)
BUN SERPL-MCNC: 8 MG/DL (ref 8–23)
BUN/CREAT SERPL: 9.5 (ref 7–25)
CALCIUM SPEC-SCNC: 9.2 MG/DL (ref 8.6–10.5)
CHLORIDE SERPL-SCNC: 100 MMOL/L (ref 98–107)
CO2 SERPL-SCNC: 25.2 MMOL/L (ref 22–29)
CREAT SERPL-MCNC: 0.84 MG/DL (ref 0.76–1.27)
DEPRECATED RDW RBC AUTO: 44.9 FL (ref 37–54)
EGFRCR SERPLBLD CKD-EPI 2021: 85.5 ML/MIN/1.73
ERYTHROCYTE [DISTWIDTH] IN BLOOD BY AUTOMATED COUNT: 13.2 % (ref 12.3–15.4)
GLUCOSE BLDC GLUCOMTR-MCNC: 88 MG/DL (ref 70–130)
GLUCOSE SERPL-MCNC: 93 MG/DL (ref 65–99)
HCT VFR BLD AUTO: 29.1 % (ref 37.5–51)
HCT VFR BLD AUTO: 29.5 % (ref 37.5–51)
HGB BLD-MCNC: 9.4 G/DL (ref 13–17.7)
HGB BLD-MCNC: 9.4 G/DL (ref 13–17.7)
MCH RBC QN AUTO: 29.6 PG (ref 26.6–33)
MCHC RBC AUTO-ENTMCNC: 32.3 G/DL (ref 31.5–35.7)
MCV RBC AUTO: 91.5 FL (ref 79–97)
PLATELET # BLD AUTO: 270 10*3/MM3 (ref 140–450)
PMV BLD AUTO: 9 FL (ref 6–12)
POTASSIUM SERPL-SCNC: 3.9 MMOL/L (ref 3.5–5.2)
RBC # BLD AUTO: 3.18 10*6/MM3 (ref 4.14–5.8)
SODIUM SERPL-SCNC: 135 MMOL/L (ref 136–145)
WBC NRBC COR # BLD: 5.66 10*3/MM3 (ref 3.4–10.8)

## 2023-07-28 PROCEDURE — 63710000001 ACETAMINOPHEN 325 MG TABLET: Performed by: INTERNAL MEDICINE

## 2023-07-28 PROCEDURE — 94761 N-INVAS EAR/PLS OXIMETRY MLT: CPT

## 2023-07-28 PROCEDURE — 85018 HEMOGLOBIN: CPT | Performed by: INTERNAL MEDICINE

## 2023-07-28 PROCEDURE — 94664 DEMO&/EVAL PT USE INHALER: CPT

## 2023-07-28 PROCEDURE — 97161 PT EVAL LOW COMPLEX 20 MIN: CPT

## 2023-07-28 PROCEDURE — 25010000002 ONDANSETRON PER 1 MG: Performed by: INTERNAL MEDICINE

## 2023-07-28 PROCEDURE — 80048 BASIC METABOLIC PNL TOTAL CA: CPT | Performed by: INTERNAL MEDICINE

## 2023-07-28 PROCEDURE — G0378 HOSPITAL OBSERVATION PER HR: HCPCS

## 2023-07-28 PROCEDURE — 25010000002 PROPOFOL 10 MG/ML EMULSION: Performed by: NURSE ANESTHETIST, CERTIFIED REGISTERED

## 2023-07-28 PROCEDURE — 43235 EGD DIAGNOSTIC BRUSH WASH: CPT | Performed by: INTERNAL MEDICINE

## 2023-07-28 PROCEDURE — A9270 NON-COVERED ITEM OR SERVICE: HCPCS | Performed by: INTERNAL MEDICINE

## 2023-07-28 PROCEDURE — 45378 DIAGNOSTIC COLONOSCOPY: CPT | Performed by: INTERNAL MEDICINE

## 2023-07-28 PROCEDURE — 63710000001 SENNOSIDES-DOCUSATE 8.6-50 MG TABLET: Performed by: INTERNAL MEDICINE

## 2023-07-28 PROCEDURE — 96375 TX/PRO/DX INJ NEW DRUG ADDON: CPT

## 2023-07-28 PROCEDURE — 97165 OT EVAL LOW COMPLEX 30 MIN: CPT

## 2023-07-28 PROCEDURE — 96361 HYDRATE IV INFUSION ADD-ON: CPT

## 2023-07-28 PROCEDURE — 94799 UNLISTED PULMONARY SVC/PX: CPT

## 2023-07-28 PROCEDURE — 63710000001 ATORVASTATIN 20 MG TABLET: Performed by: INTERNAL MEDICINE

## 2023-07-28 PROCEDURE — 82948 REAGENT STRIP/BLOOD GLUCOSE: CPT

## 2023-07-28 PROCEDURE — 85027 COMPLETE CBC AUTOMATED: CPT | Performed by: INTERNAL MEDICINE

## 2023-07-28 PROCEDURE — 85014 HEMATOCRIT: CPT | Performed by: INTERNAL MEDICINE

## 2023-07-28 RX ORDER — PROPOFOL 10 MG/ML
VIAL (ML) INTRAVENOUS AS NEEDED
Status: DISCONTINUED | OUTPATIENT
Start: 2023-07-28 | End: 2023-07-28 | Stop reason: SURG

## 2023-07-28 RX ORDER — KETAMINE HYDROCHLORIDE 50 MG/ML
INJECTION, SOLUTION, CONCENTRATE INTRAMUSCULAR; INTRAVENOUS AS NEEDED
Status: DISCONTINUED | OUTPATIENT
Start: 2023-07-28 | End: 2023-07-28 | Stop reason: SURG

## 2023-07-28 RX ORDER — SODIUM CHLORIDE 9 MG/ML
70 INJECTION, SOLUTION INTRAVENOUS CONTINUOUS PRN
OUTPATIENT
Start: 2023-07-28

## 2023-07-28 RX ORDER — SODIUM CHLORIDE 9 MG/ML
70 INJECTION, SOLUTION INTRAVENOUS CONTINUOUS PRN
Status: DISCONTINUED | OUTPATIENT
Start: 2023-07-28 | End: 2023-07-29 | Stop reason: HOSPADM

## 2023-07-28 RX ORDER — SIMETHICONE 20 MG/.3ML
EMULSION ORAL AS NEEDED
Status: DISCONTINUED | OUTPATIENT
Start: 2023-07-28 | End: 2023-07-28 | Stop reason: HOSPADM

## 2023-07-28 RX ORDER — EPHEDRINE SULFATE 5 MG/ML
INJECTION INTRAVENOUS AS NEEDED
Status: DISCONTINUED | OUTPATIENT
Start: 2023-07-28 | End: 2023-07-28 | Stop reason: SURG

## 2023-07-28 RX ORDER — SODIUM, POTASSIUM,MAG SULFATES 17.5-3.13G
2 SOLUTION, RECONSTITUTED, ORAL ORAL ONCE
Qty: 354 ML | Refills: 0 | Status: SHIPPED | OUTPATIENT
Start: 2023-07-28 | End: 2023-07-29

## 2023-07-28 RX ORDER — LIDOCAINE HYDROCHLORIDE 20 MG/ML
INJECTION, SOLUTION INTRAVENOUS AS NEEDED
Status: DISCONTINUED | OUTPATIENT
Start: 2023-07-28 | End: 2023-07-28 | Stop reason: SURG

## 2023-07-28 RX ADMIN — BISOPROLOL FUMARATE 10 MG: 5 TABLET ORAL at 08:00

## 2023-07-28 RX ADMIN — IPRATROPIUM BROMIDE AND ALBUTEROL SULFATE 3 ML: .5; 3 SOLUTION RESPIRATORY (INHALATION) at 18:52

## 2023-07-28 RX ADMIN — IPRATROPIUM BROMIDE AND ALBUTEROL SULFATE 3 ML: .5; 3 SOLUTION RESPIRATORY (INHALATION) at 12:53

## 2023-07-28 RX ADMIN — ACETAMINOPHEN 650 MG: 325 TABLET, FILM COATED ORAL at 11:53

## 2023-07-28 RX ADMIN — ACETAMINOPHEN 650 MG: 325 TABLET, FILM COATED ORAL at 04:44

## 2023-07-28 RX ADMIN — BUDESONIDE 0.5 MG: 0.5 INHALANT RESPIRATORY (INHALATION) at 07:04

## 2023-07-28 RX ADMIN — EPHEDRINE SULFATE 5 MG: 5 INJECTION INTRAVENOUS at 14:47

## 2023-07-28 RX ADMIN — BUDESONIDE 0.5 MG: 0.5 INHALANT RESPIRATORY (INHALATION) at 18:53

## 2023-07-28 RX ADMIN — TAMSULOSIN HYDROCHLORIDE 0.4 MG: 0.4 CAPSULE ORAL at 08:00

## 2023-07-28 RX ADMIN — DILTIAZEM HYDROCHLORIDE 180 MG: 180 CAPSULE, COATED, EXTENDED RELEASE ORAL at 08:00

## 2023-07-28 RX ADMIN — Medication 10 ML: at 20:35

## 2023-07-28 RX ADMIN — DULOXETINE HYDROCHLORIDE 60 MG: 30 CAPSULE, DELAYED RELEASE ORAL at 08:00

## 2023-07-28 RX ADMIN — KETAMINE HYDROCHLORIDE 10 MG: 50 INJECTION, SOLUTION INTRAMUSCULAR; INTRAVENOUS at 14:27

## 2023-07-28 RX ADMIN — ATORVASTATIN CALCIUM 20 MG: 20 TABLET, FILM COATED ORAL at 20:34

## 2023-07-28 RX ADMIN — PROPOFOL 30 MG: 10 INJECTION, EMULSION INTRAVENOUS at 14:27

## 2023-07-28 RX ADMIN — IPRATROPIUM BROMIDE AND ALBUTEROL SULFATE 3 ML: .5; 3 SOLUTION RESPIRATORY (INHALATION) at 18:53

## 2023-07-28 RX ADMIN — BISACODYL 5 MG: 5 TABLET, COATED ORAL at 08:00

## 2023-07-28 RX ADMIN — SODIUM CHLORIDE 100 ML/HR: 9 INJECTION, SOLUTION INTRAVENOUS at 08:58

## 2023-07-28 RX ADMIN — SODIUM CHLORIDE 500 ML: 9 INJECTION, SOLUTION INTRAVENOUS at 12:34

## 2023-07-28 RX ADMIN — SODIUM CHLORIDE 100 ML/HR: 9 INJECTION, SOLUTION INTRAVENOUS at 00:24

## 2023-07-28 RX ADMIN — SODIUM CHLORIDE 70 ML/HR: 9 INJECTION, SOLUTION INTRAVENOUS at 13:52

## 2023-07-28 RX ADMIN — ONDANSETRON 4 MG: 2 INJECTION INTRAMUSCULAR; INTRAVENOUS at 12:05

## 2023-07-28 RX ADMIN — Medication 10 ML: at 08:00

## 2023-07-28 RX ADMIN — SENNOSIDES AND DOCUSATE SODIUM 2 TABLET: 50; 8.6 TABLET ORAL at 20:34

## 2023-07-28 RX ADMIN — IPRATROPIUM BROMIDE AND ALBUTEROL SULFATE 3 ML: .5; 3 SOLUTION RESPIRATORY (INHALATION) at 00:25

## 2023-07-28 RX ADMIN — IPRATROPIUM BROMIDE AND ALBUTEROL SULFATE 3 ML: .5; 3 SOLUTION RESPIRATORY (INHALATION) at 07:04

## 2023-07-28 RX ADMIN — SODIUM CHLORIDE 500 ML: 9 INJECTION, SOLUTION INTRAVENOUS at 11:21

## 2023-07-28 RX ADMIN — LIDOCAINE HYDROCHLORIDE 60 MG: 20 INJECTION, SOLUTION INTRAVENOUS at 14:27

## 2023-07-28 RX ADMIN — ACETAMINOPHEN 650 MG: 325 TABLET, FILM COATED ORAL at 20:34

## 2023-07-28 RX ADMIN — EPHEDRINE SULFATE 5 MG: 5 INJECTION INTRAVENOUS at 14:50

## 2023-07-28 RX ADMIN — EPHEDRINE SULFATE 5 MG: 5 INJECTION INTRAVENOUS at 14:53

## 2023-07-28 RX ADMIN — PROPOFOL 100 MCG/KG/MIN: 10 INJECTION, EMULSION INTRAVENOUS at 14:27

## 2023-07-28 NOTE — CONSULTS
"Adult Nutrition  Assessment/PES    Patient Name:  Ed Spear  YOB: 1935  MRN: [id number]  Admit Date:  7/27/2023    Assessment Date:  7/28/2023    Comments:    Pt with PMHx significant for COPD, HTN, A-fib presented to [facility] 7/27 with c/o hematochezia.  lbs, normal weight for age per BMI 25.75. Currently NPO for surgery with Dr. Pham. L hip PI noted, will monitor for diet advancement and will order ONS for wound healing. Available PRN.      Reason for Assessment       Row Name 07/28/23 1341          Reason for Assessment    Reason For Assessment nurse/nurse practitioner consult                      Labs/Tests/Procedures/Meds       Row Name 07/28/23 1341          Labs/Procedures/Meds    Lab Results Reviewed reviewed, pertinent     Lab Results Comments Low: Na+        Medications    Pertinent Medications Reviewed reviewed                    Physical Findings       Row Name 07/28/23 1342          Physical Findings    Overall Physical Appearance normal weight, L hip PI                    Estimated/Assessed Needs - Anthropometrics       Row Name 07/28/23 1342          Anthropometrics    Height for Calculation 1.88 m (6' 2.02\")     Weight for Calculation 91 kg (200 lb 9.9 oz)  CBW        Estimated/Assessed Needs    Additional Documentation Fluid Requirements (Group);KCAL/KG (Group);Estimated Calorie Needs (Group);Protein Requirements (Group)        Estimated Calorie Needs    Estimated Calorie Requirement (kcal/day) 2,275-2,730     Estimated Calorie Need Method kcal/kg        KCAL/KG    KCAL/KG 25 Kcal/Kg (kcal);30 Kcal/Kg (kcal)     25 Kcal/Kg (kcal) 2275     30 Kcal/Kg (kcal) 2730        Protein Requirements    Weight Used For Protein Calculations 91 kg (200 lb 9.9 oz)  CBW     Est Protein Requirement Amount (gms/kg) 1.2 gm protein     Estimated Protein Requirements (gms/day) 109.2        Fluid Requirements    Fluid Requirements (mL/day) 2275  1 mL/kcal     RDA Method (mL) 2275               "      Nutrition Prescription Ordered       Row Name 07/28/23 1343          Nutrition Prescription PO    Current PO Diet NPO                    Evaluation of Received Nutrient/Fluid Intake       Row Name 07/28/23 1343          PO Evaluation    Number of Days PO Intake Evaluated Other (comment)  NPO                       Problem/Interventions:   Problem 1       Row Name 07/28/23 1343          Nutrition Diagnoses Problem 1    Problem 1 Altered GI Function     Etiology (related to) Medical Diagnosis  GI bleed     Signs/Symptoms (evidenced by) NPO                    Problem 2       Row Name 07/28/23 1344          Nutrition Diagnoses Problem 2    Problem 2 Increased Nutrient Needs     Macronutrient Protein     Micronutrient Vitamin;Mineral     Etiology (related to) Medical Diagnosis     Signs/Symptoms (evidenced by) Report/Observation  COPD, PI                        Intervention Goal       Row Name 07/28/23 1344          Intervention Goal    General Maintain nutrition;Meet nutritional needs for age/condition;Disease management/therapy;Improved nutrition related lab(s)     PO Initiate feeding     Weight Maintain weight                    Nutrition Intervention       Row Name 07/28/23 1340          Nutrition Intervention    RD/Tech Action Follow Tx progress;Care plan reviewd;Await begin PO                    Nutrition Prescription       Row Name 07/28/23 1348          Other Orders    Obtain Weight Daily     Obtain Weight Ordered? No, recommended     Supplement Vitamin mineral supplement     Supplement Ordered? No, recommended     Labs K+;Phos;Mg++;Na+     Labs Ordered? No, recommended                    Education/Evaluation       Row Name 07/28/23 1341          Education    Education Will Instruct as appropriate        Monitor/Evaluation    Monitor Per protocol;Weight;Skin status;Symptoms;Pertinent labs                     Electronically signed by:  Estefanía Roberts RD  07/28/23 13:45 EDT

## 2023-07-28 NOTE — PLAN OF CARE
Goal Outcome Evaluation:  Plan of Care Reviewed With: patient        Progress: no change  Outcome Evaluation: VSS. No acute change during this shift no ditress noted at this time. Patient rested well this shift.

## 2023-07-28 NOTE — PLAN OF CARE
Goal Outcome Evaluation:  Plan of Care Reviewed With: patient, daughter        Progress: no change  Outcome Evaluation: OT eval completed. Patient supine in bed, c/o bilateral shoulder pain. BP is 99/63. Patient moved to EOB with CGA, sitting EOB c/o dizziness initially that resolved. Patient requires min A overall for ADLs, mod A for bathing. Patient performed sit to stand and side steps toward HOB CGA using RW. Patient is expected to benefit from continued OT services prior to DC and plans to return home with his daughter and  services.      Anticipated Discharge Disposition (OT): home with assist, home with home health

## 2023-07-28 NOTE — THERAPY EVALUATION
Patient Name: Ed Spear  : 1935    MRN: [id number]                              Today's Date: 2023       Admit Date: 2023    Visit Dx:     ICD-10-CM ICD-9-CM   1. Lower GI bleed  K92.2 578.9   2. Acute blood loss anemia  D62 285.1   3. Hematochezia  K92.1 578.1   4. Abnormal CT of the abdomen  R93.5 793.6     Patient Active Problem List   Diagnosis    Pulmonary emphysema    Hyperlipidemia    Hypertension    Malignant neoplasm of skin    Vitamin D deficiency    Arthritis, degenerative    Enlarged prostate without lower urinary tract symptoms (luts)    Skin cancer    Chronic bilateral low back pain without sciatica    Atrial fibrillation    Encounter for Medicare annual wellness exam    Vitamin B 12 deficiency    Callus of heel    COPD exacerbation    Acute respiratory failure with hypoxia and hypercapnia    Skin lesion of hand    Lower GI bleed    Chronic anemia    Acute blood loss anemia    Hematochezia    Abnormal CT of the abdomen     Past Medical History:   Diagnosis Date    A-fib     Acute sinusitis     Allergic rhinitis     Arthritis, degenerative     BMI 28.0-28.9,adult     COPD (chronic obstructive pulmonary disease)     Cough     Enlarged prostate without lower urinary tract symptoms (luts)     Hyperlipidemia     Hypertension     Obstructive chronic bronchitis with exacerbation     Shortness of breath     Skin cancer     Tinnitus of both ears     Vitamin D deficiency      Past Surgical History:   Procedure Laterality Date    CATARACT EXTRACTION        General Information       Row Name 23 1134          Physical Therapy Time and Intention    Document Type evaluation (P)   -NL     Mode of Treatment physical therapy (P)   -NL       Row Name 23 1134          General Information    Patient Profile Reviewed yes (P)   -NL     Barriers to Rehab none identified (P)   -NL       Row Name 23 1134          Living Environment    People in Home child(magali), adult (P)   -NL       Row  Name 07/28/23 1134          Home Main Entrance    Number of Stairs, Main Entrance two (P)   2 seperate steps  -NL     Stair Railings, Main Entrance other (see comments) (P)   Post on right side that he grabs.  -NL       Row Name 07/28/23 1134          Stairs Within Home, Primary    Number of Stairs, Within Home, Primary none (P)   -NL     Stair Railings, Within Home, Primary none (P)   -NL       Row Name 07/28/23 1134          Cognition    Orientation Status (Cognition) oriented x 4;verbal cues/prompts needed for orientation (P)   -NL       Row Name 07/28/23 1134          Safety Issues, Functional Mobility    Impairments Affecting Function (Mobility) pain;strength;endurance/activity tolerance (P)   -NL               User Key  (r) = Recorded By, (t) = Taken By, (c) = Cosigned By      Initials Name Provider Type    Hamlet Miller, PT Student PT Student                   Mobility       Row Name 07/28/23 1134          Bed Mobility    Bed Mobility supine-sit (P)   -NL     Supine-Sit [place] (Bed Mobility) contact guard (P)   -NL       Row Name 07/28/23 1134          Sit-Stand Transfer    Sit-Stand [place] (Transfers) contact guard (P)   -NL     Assistive Device (Sit-Stand Transfers) walker, front-wheeled (P)   -NL       Row Name 07/28/23 1134          Gait/Stairs (Locomotion)    [place] Level (Gait) contact guard (P)   -NL     Assistive Device (Gait) walker, front-wheeled (P)   -NL     Distance in Feet (Gait) 3 feet side stepping to head of bed. (P)   -NL     [place] Level (Stairs) not tested (P)   -NL               User Key  (r) = Recorded By, (t) = Taken By, (c) = Cosigned By      Initials Name Provider Type    Hamlet Miller, PT Student PT Student                   Obj/Interventions       Row Name 07/28/23 1134          Strength Comprehensive (MMT)    General Manual Muscle Testing (MMT) Assessment lower extremity strength deficits identified (P)   -NL     Comment, General Manual Muscle Testing  (MMT) Assessment BLE functional strength 3/5 (P)   -NL       Row Name 07/28/23 1134          Balance    Balance Assessment sitting static balance;sitting dynamic balance;sit to stand dynamic balance;standing static balance;standing dynamic balance (P)   -NL     Static Sitting Balance contact guard (P)   -NL     Dynamic Sitting Balance contact guard (P)   -NL     Position, Sitting Balance sitting edge of bed (P)   -NL     Sit to Stand Dynamic Balance contact guard (P)   -NL     Static Standing Balance contact guard (P)   -NL     Dynamic Standing Balance contact guard (P)   -NL     Position/Device Used, Standing Balance walker, front-wheeled (P)   -NL       Row Name 07/28/23 1134          Sensory Assessment (Somatosensory)    Sensory Assessment (Somatosensory) not tested (P)   -NL               User Key  (r) = Recorded By, (t) = Taken By, (c) = Cosigned By      Initials Name Provider Type    Hamlet Miller, PT Student PT Student                   Goals/Plan       Row Name 07/28/23 4967          Gait Training Goal 1 (PT)    Activity/Assistive Device (Gait Training Goal 1, PT) increase endurance/gait distance (P)   -NL     [place] Level (Gait Training Goal 1, PT) contact guard required (P)   -NL     Distance (Gait Training Goal 1, PT) 50 feet (P)   -NL     Time Frame (Gait Training Goal 1, PT) short term goal (STG);2 days (P)   -NL     Progress/Outcome (Gait Training Goal 1, PT) goal ongoing (P)   -NL       Row Name 07/28/23 2330          Patient Education Goal (PT)    Activity (Patient Education Goal, PT) Patient will perform BLE [id number] each for HEP (P)   -NL     [place]/Cues/Accuracy (Memory Goal 2, PT) verbalizes understanding;demonstrates adequately (P)   -NL     Time Frame (Patient Education Goal, PT) long term goal (LTG);10 days (P)   -NL     Progress/Outcome (Patient Education Goal, PT) goal ongoing (P)   -NL       Row Name 07/28/23 3328          Therapy Assessment/Plan (PT)    Planned  Therapy Interventions (PT) strengthening;gait training;home exercise program (P)   -NL               User Key  (r) = Recorded By, (t) = Taken By, (c) = Cosigned By      Initials Name Provider Type    Hamlet Miller, PT Student PT Student                   Clinical Impression       Row Name 07/28/23 1134          Pain    Pretreatment Pain Rating 10/10 (P)   -NL     Posttreatment Pain Rating 10/10 (P)   -NL     Pain Location - Side/Orientation Bilateral (P)   -NL     Pain Location - shoulder (P)   -NL     Additional Documentation Pain Scale: Numbers Pre/Post-Treatment (Group) (P)   -NL       Row Name 07/28/23 1133          Plan of Care Review    Plan of Care Reviewed With patient;friend (P)   -NL     Outcome Evaluation Patient completed PT evaluation. He was A&Ox4 with cues, had 10/10 pain in bilateral shoulders, and is on 3L of O2. Patient was found in the supine position and transfered with CGA supine to sit, sit to stand, and with 3 feet side stepping to head of bed. Patient is expected to benefit from PT in order to improve activity tolerance. (P)   -NL       Row Name 07/28/23 1135          Therapy Assessment/Plan (PT)    Patient/Family Therapy Goals Statement (PT) Patient would like to return home. (P)   -NL     Rehab Potential (PT) good, to achieve stated therapy goals (P)   -NL     Criteria for Skilled Interventions Met (PT) yes;skilled treatment is necessary (P)   -NL     Therapy Frequency (PT) 5 times/wk (P)   -NL       Row Name 07/28/23 1132          Vital Signs    Post Systolic BP Rehab 99 (P)   -NL     Post Treatment Diastolic BP 63 (P)   -NL     O2 Delivery Pre Treatment nasal cannula (P)   -NL     O2 Delivery Intra Treatment nasal cannula (P)   -NL     Post SpO2 (%) 97 (P)   -NL     O2 Delivery Post Treatment nasal cannula (P)   -NL     Post Patient Position Sitting (P)   -NL       Row Name 07/28/23 1130          Positioning and Restraints    Pre-Treatment Position in bed (P)   -NL     Post Treatment  Position bed (P)   -NL     In Bed call light within reach;encouraged to call for assist;sitting (P)   -NL               User Key  (r) = Recorded By, (t) = Taken By, (c) = Cosigned By      Initials Name Provider Type    Hamlet Miller, PT Student PT Student                   Outcome Measures       Row Name 07/28/23 1134 07/28/23 0800       How much help from another person do you currently need...    Turning from your back to your side while in flat bed without using bedrails? 4 (P)   -NL 4  -LH    Moving from lying on back to sitting on the side of a flat bed without bedrails? 4 (P)   -NL 4  -LH    Moving to and from a bed to a chair (including a wheelchair)? 3 (P)   -NL 3  -LH    Standing up from a chair using your arms (e.g., wheelchair, bedside chair)? 3 (P)   -NL 3  -LH    Climbing 3-5 steps with a railing? 2 (P)   -NL 3  -LH    To walk in hospital room? 3 (P)   -NL 3  -LH    AM-PAC 6 Clicks Score (PT) 19 (P)   -NL 20  -LH    Highest level of mobility 6 --> Walked 10 steps or more (P)   -NL 6 --> Walked 10 steps or more  -      Row Name 07/28/23 1134          Functional Assessment    Outcome Measure Options AM-PAC 6 Clicks Basic Mobility (PT) (P)   -NL               User Key  (r) = Recorded By, (t) = Taken By, (c) = Cosigned By      Initials Name Provider Type    Kala Prince, RN Registered Nurse    Hamlet Miller, PT Student PT Student                                 Physical Therapy Education       Title: PT OT SLP Therapies (In Progress)       Topic: Physical Therapy (In Progress)       Point: Mobility training (Done)       Learning Progress Summary             Patient Acceptance, E, DU by NL at 7/28/2023 1306    Comment: Patient educated on the importance of movement and therapy plan explained to pt.                         Point: Home exercise program (Not Started)       Learner Progress:  Not documented in this visit.              Point: Body mechanics (Not Started)       Learner Progress:   Not documented in this visit.                              User Key       Initials Effective Dates Name Provider Type Discipline    NL 07/13/23 -  Hamlet Arellano, PT Student PT Student PT                  PT Recommendation and Plan  Planned Therapy Interventions (PT): (P) strengthening, gait training, home exercise program  Plan of Care Reviewed With: (P) patient, friend  Outcome Evaluation: (P) Patient completed PT evaluation. He was A&Ox4 with cues, had 10/10 pain in bilateral shoulders, and is on 3L of O2. Patient was found in the supine position and transfered with [facility] supine to sit, sit to stand, and with 3 feet side stepping to head of bed. Patient is expected to benefit from PT in order to improve activity tolerance.     Time Calculation:   PT Evaluation Complexity  History, PT Evaluation Complexity: (P) 1-2 personal factors and/or comorbidities  Examination of Body Systems (PT Eval Complexity): (P) 1-2 elements  Clinical Presentation (PT Evaluation Complexity): (P) evolving  Clinical Decision Making (PT Evaluation Complexity): (P) low complexity  Overall Complexity (PT Evaluation Complexity): (P) low complexity     PT Charges       Row Name 07/28/23 1134             Time Calculation    Start Time 1134 (P)   -NL      PT Received On 07/28/23 (P)   -NL      PT Goal Re-Cert Due Date 08/07/23 (P)   -NL         Untimed Charges    PT Eval/Re-eval Minutes 40 (P)   -NL         Total Minutes    Untimed Charges Total Minutes 40 (P)   -NL       Total Minutes 40 (P)   -NL                User Key  (r) = Recorded By, (t) = Taken By, (c) = Cosigned By      Initials Name Provider Type    NL Hamlet Arellano, PT Student PT Student                  Therapy Charges for Today       Code Description Service Date Service Provider Modifiers Qty    [id number]  PT EVAL LOW COMPLEXITY 3 7/28/2023 Hamlet Arellano, PT Student GP 1            PT G-Codes  Outcome Measure Options: (P) AM-PAC 6 Clicks Basic Mobility (PT)  AM-PAC 6 Clicks Score  (PT): (P) 19  PT Discharge Summary  Anticipated Discharge Disposition (PT): (P) home with outpatient therapy services, home with home health    Hamlet Arellano, PT Student  7/28/2023

## 2023-07-28 NOTE — PLAN OF CARE
Goal Outcome Evaluation:  Plan of Care Reviewed With: (P) patient, friend           Outcome Evaluation: (P) Patient completed PT evaluation. He was A&Ox4 with cues, had 10/10 pain in bilateral shoulders, and is on 3L of O2. Patient was found in the supine position and transfered with CGA supine to sit, sit to stand, and with 3 feet side stepping to head of bed. Patient is expected to benefit from PT in order to improve activity tolerance.      Anticipated Discharge Disposition (PT): (P) home with outpatient therapy services, home with home health

## 2023-07-28 NOTE — ANESTHESIA POSTPROCEDURE EVALUATION
Patient: Ed Spear    Procedure Summary       Date: 07/28/23 Room / Location: [facility] ENDOSCOPY 1 / [facility] ENDOSCOPY    Anesthesia Start: 1421 Anesthesia Stop: 1509    Procedures:       ESOPHAGOGASTRODUODENOSCOPY (Esophagus)      COLONOSCOPY (Anus) Diagnosis:       Acute blood loss anemia      Hematochezia      Abnormal CT of the abdomen      (Acute blood loss anemia [D62])      (Hematochezia [K92.1])      (Abnormal CT of the abdomen [R93.5])    Surgeons: Dontrell Pham MD Provider: Oscar Rodriguez CRNA    Anesthesia Type: MAC ASA Status: 3 - Emergent            Anesthesia Type: MAC    Vitals  No vitals data found for the desired time range.          Post Anesthesia Care and Evaluation    Patient location during evaluation: PACU  Patient participation: complete - patient participated  Level of consciousness: awake and alert  Pain score: 0  Pain management: satisfactory to patient    Airway patency: patent  Anesthetic complications: No anesthetic complications  PONV Status: none  Cardiovascular status: acceptable and stable  Respiratory status: acceptable  Hydration status: acceptable    Comments: Vitals signs as noted in nursing documentation as per protocol.

## 2023-07-28 NOTE — NURSING NOTE
Pt. Off the floor at this time for procedure via stretcher in stable condition accompanied by OR staff.

## 2023-07-28 NOTE — THERAPY EVALUATION
Patient Name: Ed Spear  : 1935    MRN: [id number]                              Today's Date: 2023       Admit Date: 2023    Visit Dx:     ICD-10-CM ICD-9-CM   1. Lower GI bleed  K92.2 578.9   2. Acute blood loss anemia  D62 285.1   3. Hematochezia  K92.1 578.1   4. Abnormal CT of the abdomen  R93.5 793.6     Patient Active Problem List   Diagnosis    Pulmonary emphysema    Hyperlipidemia    Hypertension    Malignant neoplasm of skin    Vitamin D deficiency    Arthritis, degenerative    Enlarged prostate without lower urinary tract symptoms (luts)    Skin cancer    Chronic bilateral low back pain without sciatica    Atrial fibrillation    Encounter for Medicare annual wellness exam    Vitamin B 12 deficiency    Callus of heel    COPD exacerbation    Acute respiratory failure with hypoxia and hypercapnia    Skin lesion of hand    Lower GI bleed    Chronic anemia    Acute blood loss anemia    Hematochezia    Abnormal CT of the abdomen     Past Medical History:   Diagnosis Date    A-fib     Acute sinusitis     Allergic rhinitis     Arthritis, degenerative     BMI 28.0-28.9,adult     COPD (chronic obstructive pulmonary disease)     Cough     Enlarged prostate without lower urinary tract symptoms (luts)     Hyperlipidemia     Hypertension     Obstructive chronic bronchitis with exacerbation     Shortness of breath     Skin cancer     Tinnitus of both ears     Vitamin D deficiency      Past Surgical History:   Procedure Laterality Date    CATARACT EXTRACTION        General Information       Row Name 23 1254          OT Time and Intention    Document Type evaluation  -SD     Mode of Treatment occupational therapy  -SD       Row Name 23 1254          General Information    Patient Profile Reviewed yes  -SD     Prior Level of Function independent:;all household mobility;min assist:;ADL's  Lives with his daughter, ind with HH mobility using a cane, also has a rollator, BSC and SC but normally  sponge bathes with daughter helping him.  -SD     Barriers to Rehab none identified  -SD       Row Name 07/28/23 1254          Living Environment    People in Home child(magali), adult  -SD       Row Name 07/28/23 1254          Home Main Entrance    Number of Stairs, Main Entrance two  -SD     Stair Railings, Main Entrance other (see comments)  holds to post  -SD       Row Name 07/28/23 1254          Stairs Within Home, Primary    Number of Stairs, Within Home, Primary none  -SD       Row Name 07/28/23 1254          Cognition    Orientation Status (Cognition) oriented x 4;verbal cues/prompts needed for orientation  -SD       Row Name 07/28/23 1254          Safety Issues, Functional Mobility    Safety Issues Affecting Function (Mobility) safety precautions follow-through/compliance;safety precaution awareness  -SD     Impairments Affecting Function (Mobility) endurance/activity tolerance;strength;pain;shortness of breath  -SD               User Key  (r) = Recorded By, (t) = Taken By, (c) = Cosigned By      Initials Name Provider Type    SD Deepthi Barnes OT Occupational Therapist                     Mobility/ADL's       Row Name 07/28/23 1340          Bed Mobility    Bed Mobility supine-sit;sit-supine  -SD     Supine-Sit [place] (Bed Mobility) contact guard  -SD     Sit-Supine [place] (Bed Mobility) contact guard  -SD     Assistive Device (Bed Mobility) bed rails;head of bed elevated  -SD       Row Name 07/28/23 1340          Sit-Stand Transfer    Sit-Stand [place] (Transfers) contact guard  -SD     Assistive Device (Sit-Stand Transfers) walker, front-wheeled  -SD       Row Name 07/28/23 1340          Functional Mobility    Functional Mobility- Ind. Level contact guard assist  -SD     Functional Mobility- Device walker, front-wheeled  -SD     Functional Mobility-Distance (Feet) 3  -SD     Functional Mobility- Safety Issues balance decreased during turns;sequencing ability decreased;step length  decreased;weight-shifting ability decreased;supplemental O2  -SD     Functional Mobility- Comment side steps toward head of bed d/t low BP and dizziness  -SD       [facility] Name 07/28/23 1340          Activities of Daily Living    BADL Assessment/Intervention bathing;upper body dressing;lower body dressing;grooming;feeding;toileting  -SD       Row Name 07/28/23 1340          Bathing Assessment/Intervention    [place] Level (Bathing) moderate assist (50% patient effort)  -SD       Row Name 07/28/23 1340          Upper Body Dressing Assessment/Training    [place] Level (Upper Body Dressing) standby assist  -SD       Row Name 07/28/23 1340          Lower Body Dressing Assessment/Training    [place] Level (Lower Body Dressing) minimum assist (75% patient effort)  -SD       Row Name 07/28/23 1340          Grooming Assessment/Training    [place] Level (Grooming) standby assist  -SD       Row Name 07/28/23 1340          Self-Feeding Assessment/Training    [place] Level (Feeding) supervision  -SD       Row Name 07/28/23 1340          Toileting Assessment/Training    [place] Level (Toileting) minimum assist (75% patient effort)  -SD     Assistive Devices (Toileting) commode, bedside without drop arms  -SD               User Key  (r) = Recorded By, (t) = Taken By, (c) = Cosigned By      Initials Name Provider Type    Deepthi Vera OT Occupational Therapist                   Obj/Interventions       Row Name 07/28/23 1342          Range of Motion Comprehensive    General Range of Motion bilateral upper extremity ROM WFL  -SD       Row Name 07/28/23 1342          Strength Comprehensive (MMT)    General Manual Muscle Testing (MMT) Assessment upper extremity strength deficits identified  -SD     Comment, General Manual Muscle Testing (MMT) Assessment UB 4-/5  -SD               User Key  (r) = Recorded By, (t) = Taken By, (c) = Cosigned By      Initials Name Provider Type    Deepthi Vera OT  Occupational Therapist                   Goals/Plan       Row Name 07/28/23 1345          Transfer Goal 1 (OT)    Activity/Assistive Device (Transfer Goal 1, OT) sit-to-stand/stand-to-sit;walker, rolling  -SD     [place] Level/Cues Needed (Transfer Goal 1, OT) modified independence  -SD     Time Frame (Transfer Goal 1, OT) long term goal (LTG)  -SD     Progress/Outcome (Transfer Goal 1, OT) goal ongoing  -SD       Row Name 07/28/23 1345          Dressing Goal 1 (OT)    Activity/Device (Dressing Goal 1, OT) lower body dressing  -SD     [place]/Cues Needed (Dressing Goal 1, OT) contact guard required  -SD     Time Frame (Dressing Goal 1, OT) 2 weeks  -SD     Progress/Outcome (Dressing Goal 1, OT) goal ongoing  -SD       Row Name 07/28/23 1345          Toileting Goal 1 (OT)    Activity/Device (Toileting Goal 1, OT) toileting skills, all;commode, bedside without drop arms;commode  -SD     [place] Level/Cues Needed (Toileting Goal 1, OT) contact guard required  -SD     Time Frame (Toileting Goal 1, OT) 2 weeks  -SD     Progress/Outcome (Toileting Goal 1, OT) goal ongoing  -SD       Row Name 07/28/23 1345          Strength Goal 1 (OT)    Strength Goal 1 (OT) Patient to perform UB ther ex as tolerated  -SD     Time Frame (Strength Goal 1, OT) long term goal (LTG)  -SD     Progress/Outcome (Strength Goal 1, OT) goal ongoing  -SD       Row Name 07/28/23 1341          Therapy Assessment/Plan (OT)    Planned Therapy Interventions (OT) activity tolerance training;adaptive equipment training;BADL retraining;patient/caregiver education/training;ROM/therapeutic exercise;strengthening exercise;transfer/mobility retraining  -SD               User Key  (r) = Recorded By, (t) = Taken By, (c) = Cosigned By      Initials Name Provider Type    Deepthi Vera OT Occupational Therapist                   Clinical Impression       Row Name 07/28/23 1344          Pain Assessment    Pretreatment Pain Rating 10/10  -SD      Posttreatment Pain Rating 10/10  -SD     Pain Location - Side/Orientation Bilateral  -SD     Pain Location - shoulder  -SD     Pain Intervention(s) Repositioned;Ambulation/increased activity  -SD       Row Name 07/28/23 1348          Plan of Care Review    Plan of Care Reviewed With patient;daughter  -SD     Progress no change  -SD     Outcome Evaluation OT eval completed. Patient supine in bed, c/o bilateral shoulder pain. BP is 99/63. Patient moved to EOB with CGA, sitting EOB c/o dizziness initially that resolved. Patient requires min A overall for ADLs, mod A for bathing. Patient performed sit to stand and side steps toward HOB CGA using RW. Patient is expected to benefit from continued OT services prior to DC and plans to return home with his daughter and  services.  -SD       Row Name 07/28/23 5538          Therapy Assessment/Plan (OT)    Patient/Family Therapy Goal Statement (OT) home  -SD     Rehab Potential (OT) good, to achieve stated therapy goals  -SD     Criteria for Skilled Therapeutic Interventions Met (OT) skilled treatment is necessary  -SD     Therapy Frequency (OT) 3 times/wk  5 times if indicated  -SD       Row Name 07/28/23 1460          Therapy Plan Review/Discharge Plan (OT)    Anticipated Discharge Disposition (OT) home with assist;home with home health  -SD       Row Name 07/28/23 1343          Vital Signs    Post Systolic BP Rehab 99  -SD     Post Treatment Diastolic BP 63  -SD     O2 Delivery Pre Treatment supplemental O2  -SD     O2 Delivery Intra Treatment supplemental O2  -SD     Post SpO2 (%) 97  -SD     O2 Delivery Post Treatment supplemental O2  -SD       Row Name 07/28/23 1341          Positioning and Restraints    Pre-Treatment Position in bed  -SD     Post Treatment Position bed  -SD     In Bed sitting;call light within reach;encouraged to call for assist;with family/caregiver  -SD               User Key  (r) = Recorded By, (t) = Taken By, (c) = Cosigned By      Initials Name  Provider Type    Deepthi Vera OT Occupational Therapist                   Outcome Measures       Row Name 07/28/23 1346          How much help from another is currently needed...    Putting on and taking off regular lower body clothing? 3  -SD     Bathing (including washing, rinsing, and drying) 2  -SD     Toileting (which includes using toilet bed pan or urinal) 3  -SD     Putting on and taking off regular upper body clothing 3  -SD     Taking care of personal grooming (such as brushing teeth) 3  -SD     Eating meals 3  -SD     AM-PAC 6 Clicks Score (OT) 17  -SD       Row Name 07/28/23 1134 07/28/23 0800       How much help from another person do you currently need...    Turning from your back to your side while in flat bed without using bedrails? 4  -MS (r) NL (t) MS (c) 4  -LH    Moving from lying on back to sitting on the side of a flat bed without bedrails? 4  -MS (r) NL (t) MS (c) 4  -LH    Moving to and from a bed to a chair (including a wheelchair)? 3  -MS (r) NL (t) MS (c) 3  -LH    Standing up from a chair using your arms (e.g., wheelchair, bedside chair)? 3  -MS (r) NL (t) MS (c) 3  -LH    Climbing 3-5 steps with a railing? 2  -MS (r) NL (t) MS (c) 3  -LH    To walk in hospital room? 3  -MS (r) NL (t) MS (c) 3  -LH    AM-PAC 6 Clicks Score (PT) 19  -MS (r) NL (t) 20  -LH    Highest level of mobility 6 --> Walked 10 steps or more  -MS (r) NL (t) 6 --> Walked 10 steps or more  -LH      Row Name 07/28/23 1346 07/28/23 1134       Functional Assessment    Outcome Measure Options AM-PAC 6 Clicks Daily Activity (OT)  -SD AM-PAC 6 Clicks Basic Mobility (PT)  -MS (r) NL (t) MS (c)              User Key  (r) = Recorded By, (t) = Taken By, (c) = Cosigned By      Initials Name Provider Type    Deepthi Vera, OT Occupational Therapist    Antoine Novoa, PT Physical Therapist    Kala Prince, RN Registered Nurse    Hamlet Miller, PT Student PT Student                    Occupational  Therapy Education       Title: PT OT SLP Therapies (In Progress)       Topic: Occupational Therapy (In Progress)       Point: ADL training (Done)       Description:   Instruct learner(s) on proper safety adaptation and remediation techniques during self care or transfers.   Instruct in proper use of assistive devices.                  Learning Progress Summary             Patient Acceptance, E,TB, VU by SD at 7/28/2023 8014    Comment: OT POC                         Point: Home exercise program (Not Started)       Description:   Instruct learner(s) on appropriate technique for monitoring, assisting and/or progressing therapeutic exercises/activities.                  Learner Progress:  Not documented in this visit.              Point: Precautions (Not Started)       Description:   Instruct learner(s) on prescribed precautions during self-care and functional transfers.                  Learner Progress:  Not documented in this visit.              Point: Body mechanics (Not Started)       Description:   Instruct learner(s) on proper positioning and spine alignment during self-care, functional mobility activities and/or exercises.                  Learner Progress:  Not documented in this visit.                              User Key       Initials Effective Dates Name Provider Type Discipline    SD 06/16/21 -  Deepthi Barnes OT Occupational Therapist OT                  OT Recommendation and Plan  Planned Therapy Interventions (OT): activity tolerance training, adaptive equipment training, BADL retraining, patient/caregiver education/training, ROM/therapeutic exercise, strengthening exercise, transfer/mobility retraining  Therapy Frequency (OT): 3 times/wk (5 times if indicated)  Plan of Care Review  Plan of Care Reviewed With: patient, daughter  Progress: no change  Outcome Evaluation: OT eval completed. Patient supine in bed, c/o bilateral shoulder pain. BP is 99/63. Patient moved to EOB with CGA, sitting EOB c/o  dizziness initially that resolved. Patient requires min A overall for ADLs, mod A for bathing. Patient performed sit to stand and side steps toward HOB CGA using RW. Patient is expected to benefit from continued OT services prior to DC and plans to return home with his daughter and  services.     Time Calculation:   Evaluation Complexity (OT)  Review Occupational Profile/Medical/Therapy History Complexity: brief/low complexity  Assessment, Occupational Performance/Identification of Deficit Complexity: 1-3 performance deficits  Clinical Decision Making Complexity (OT): problem focused assessment/low complexity  Overall Complexity of Evaluation (OT): low complexity     Time Calculation- OT       Row Name 07/28/23 1347             Time Calculation- OT    OT Start Time 1133  -SD      OT Received On 07/28/23  -SD      OT Goal Re-Cert Due Date 08/09/23  -SD         Untimed Charges    OT Eval/Re-eval Minutes 45  -SD         Total Minutes    Untimed Charges Total Minutes 45  -SD       Total Minutes 45  -SD                User Key  (r) = Recorded By, (t) = Taken By, (c) = Cosigned By      Initials Name Provider Type    SD Deepthi Barnes OT Occupational Therapist                  Therapy Charges for Today       Code Description Service Date Service Provider Modifiers Qty    [id number]  OT EVAL LOW COMPLEXITY 3 7/28/2023 Deepthi Barnes OT GO 1                 Deepthi Barnes OT  7/28/2023

## 2023-07-28 NOTE — CASE MANAGEMENT/SOCIAL WORK
Discharge Planning Assessment   [place]     Patient Name: Ed Spear  MRN: [id number]  Today's Date: 7/28/2023    Admit Date: 7/27/2023    Plan: DCP   Discharge Needs Assessment       Row Name 07/28/23 1330       Living Environment    People in Home child(magali), adult    Current Living Arrangements home    Potentially Unsafe Housing Conditions none    Primary Care Provided by self    Provides Primary Care For no one    Family Caregiver if Needed child(magali), adult    Quality of Family Relationships supportive;involved;helpful    Able to Return to Prior Arrangements yes       Resource/Environmental Concerns    Resource/Environmental Concerns none    Transportation Concerns none       Food Insecurity    Within the past 12 months, you worried that your food would run out before you got the money to buy more. Sometimes    Within the past 12 months, the food you bought just didn't last and you didn't have money to get more. Never true       Transition Planning    Patient/Family Anticipates Transition to home with family    Patient/Family Anticipated Services at Transition none    Transportation Anticipated family or friend will provide       Discharge Needs Assessment    Readmission Within the Last 30 Days no previous admission in last 30 days    Equipment Currently Used at Home cane, straight;oxygen    Concerns to be Addressed no discharge needs identified    Anticipated Changes Related to Illness none    Equipment Needed After Discharge none    Outpatient/Agency/Support Group Needs homecare agency    Discharge Facility/Level of Care Needs home with home health    Provided Post Acute Provider List? N/A    Provided Post Acute Provider Quality & Resource List? N/A                   Discharge Plan       Row Name 07/28/23 9431       Plan    Plan DCP    Patient/Family in Agreement with Plan yes    Provided Post Acute Provider List? N/A    Provided Post Acute Provider Quality & Resource List? N/A    Plan Comments SW met  with Pt. his daughter and son at bedside to complete DCP. Pt stated that he lives at home with his daughter and uses a cane for mobility. Pt. has a walker, cane, BSC, SC, and oxygen at home. Pt. and his family are not sure of the current oxygen vendor. Pt. stated that he has used HH services prior but he is unsure of the company. Pt. uses [facility] pharmacy. Pt. does not have a POA. Pt. plans to return home with his daughter and continued therapy services. SW/CM will continue to follow for discharge planning.                  Continued Care and Services - Admitted Since 7/27/2023    Coordination has not been started for this encounter.          Demographic Summary       Row Name 07/28/23 132       General Information    Admission Type observation;inpatient    Arrived From emergency department    Required Notices Provided Important Message from Medicare;Observation Status Notice    Referral Source admission list    Reason for Consult discharge planning    Preferred Language English       Contact Information    Permission Granted to Share Info With     Contact Information Obtained for                    Functional Status       Row Name 07/28/23 1329       Functional Status    Usual Activity Tolerance fair    Current Activity Tolerance poor       Physical Activity    On average, how many days per week do you engage in moderate to strenuous exercise (like a brisk walk)? 0 days    On average, how many minutes do you engage in exercise at this level? 0 min    Number of minutes of exercise per week 0       Assessment of Health Literacy    How often do you have someone help you read hospital materials? Often    How often do you have problems learning about your medical condition because of difficulty understanding written information? Often    How often do you have a problem understanding what is told to you about your medical condition? Often    How confident are you filling out medical forms by  yourself? A little bit    Health Literacy Moderate       Functional Status, IADL    Medications independent    Meal Preparation assistive person    Housekeeping assistive person    Laundry assistive person    Shopping assistive person       Mental Status Summary    Recent Changes in Mental Status/Cognitive Functioning no changes       Employment/    Employment Status retired    Current or Previous Occupation not applicable                   Psychosocial       Row Name 07/28/23 1329       Values/Beliefs    Spiritual, Cultural Beliefs, Tenriism Practices, Values that Affect Care no       Mental Health    Little Interest or Pleasure in Doing Things 0-->not at all    Feeling Down, Depressed or Hopeless 0-->not at all       Coping/Stress    Major Change/Loss/Stressor illness    Patient Personal Strengths resilient;strong support system    Sources of Support adult child(magali)    Techniques to [place] with Loss/Stress/Change none    Reaction to Health Status realistic    Understanding of Condition and Treatment adequate understanding of treatment       Developmental Stage (Zaidon's)    Developmental Stage Stage 8 (67 years-death/Late Adulthood) Integrity vs. Despair       C-SSRS (Recent)    Q1 Wished to be Dead (Past Month) no    Q2 Suicidal Thoughts (Past Month) no    Q6 Suicide Behavior (Lifetime) no       Violence Risk    Feels Like Hurting Others no    Previous Attempt to Harm Others no                   Abuse/Neglect       Row Name 07/28/23 1330       Personal Safety    Feels Unsafe at Home or Work/School no    Feels Threatened by Someone no    Does Anyone Try to Keep You From Having Contact with Others or Doing Things Outside Your Home? no    Physical Signs of Abuse Present no                   Legal    No documentation.                  Substance Abuse    No documentation.                  Patient Forms    No documentation.                     Elmer Patten

## 2023-07-28 NOTE — PROGRESS NOTES
[facility]    Name:  Ed Spear   Age:  87 y.o.  Sex:  male  :  1935  MRN:  [id number]   Visit Number:  [id number]  Admission Date:  2023  Date Of Service:  23  Primary Care Physician:  Chelo Nixon DO     LOS: 1 day :    Chief Complaint:      Follow-up of hematochezia.    Subjective:    Mr. Spear was seen and examined this morning.  He is currently lying down on the bed and is comfortable at rest.  He is currently n.p.o. awaiting upper GI endoscopy and colonoscopy.  He denies further episodes of hematochezia.  Denies any chest pain or abdominal pain.  He was seen by Dr. Pham from gastroenterology yesterday.    Hospital Course:    Mr. Spear is an 87-year-old male with history of atrial fibrillation on Eliquis, COPD on home oxygen at 3 L, BPH, hypertension was brought to the emergency room by her daughter with symptoms of intermittent bright red blood per rectum that has been going on for 2 weeks.  He lives with his daughter and uses a walker to ambulate.  He has never had prior colonoscopies.  He does not take aspirin and is only on Eliquis.     In the emergency room, he was afebrile but tachycardic at 120.  Initial blood pressure 106/65 and pulse oxygen saturation of 99% on 3 L of nasal cannula oxygen.  CMP was unremarkable except for a sodium of 135 and an albumin of 3.  Lactic acid and lipase levels were within normal limits.  CBC was unremarkable except for hemoglobin of 10.3 (previous from 2023 was 11.3).  Urine analysis was within normal limits.  CT of the abdomen and pelvis with contrast showed moderate sigmoid colon diverticulosis without evidence of acute diverticulitis.  Hepatic steatosis and probable mild chronic cholecystitis.  Bilateral renal cystic lesions with polycystic morphology noted.  Stable cystic lesions noted in the spleen.  Patient's condition was discussed with Dr. Pham from gastroenterology who  recommended admission and possible colonoscopy in the next day.      Review of Systems:     All systems were reviewed and negative except as mentioned in subjective, assessment and plan.    Vital Signs:    Temp:  [97.5 °F (36.4 °C)-98.6 °F (37 °C)] 97.7 °F (36.5 °C)  Heart Rate:  [] 56  Resp:  [18-20] 20  BP: ()/(63-80) 99/63    Intake and output:    I/O last 3 completed shifts:  In: 1585 [P.O.:300; I.V.:1285]  Out: 650 [Urine:650]  I/O this shift:  In: 992 [P.O.:600; I.V.:392]  Out: 100 [Urine:100]    Physical Examination:    General Appearance:  Alert and cooperative.    Head:  Atraumatic and normocephalic.   Eyes: Conjunctivae and sclerae normal, no icterus. No pallor.   Throat: No oral lesions, no thrush, oral mucosa moist.   Neck: Supple, trachea midline, no thyromegaly.   Lungs:   Breath sounds heard bilaterally equally with prolonged expiration.  No wheezing or crackles. No Pleural rub or bronchial breathing.   Heart:  Irregular S1 and S2, no murmur, no gallop, no rub. No JVD.   Abdomen:   Normal bowel sounds, no masses, no organomegaly. Soft, nontender, nondistended, no rebound tenderness.   Extremities: Supple, no edema, no cyanosis, no clubbing.   Skin: No bleeding or rash.  Sebaceous cyst with puncta noted in the lower back.   Neurologic: Alert and oriented x 3. No facial asymmetry. Moves all four limbs. No tremors.      Laboratory results:    Results from last 7 days   Lab Units 07/28/23  0551 07/27/23  0857   SODIUM mmol/L 135* 135*   POTASSIUM mmol/L 3.9 4.0   CHLORIDE mmol/L 100 98   CO2 mmol/L 25.2 27.3   BUN mg/dL 8 10   CREATININE mg/dL 0.84 1.01   CALCIUM mg/dL 9.2 9.6   BILIRUBIN mg/dL  --  0.3   ALK PHOS U/L  --  75   ALT (SGPT) U/L  --  <5   AST (SGOT) U/L  --  9   GLUCOSE mg/dL 93 107*     Results from last 7 days   Lab Units 07/28/23  0551 07/27/23  0857   WBC 10*3/mm3 5.66 6.38   HEMOGLOBIN g/dL 9.4* 10.3*   HEMATOCRIT % 29.1* 32.0*   PLATELETS 10*3/mm3 270 304     Results from  last 7 days   Lab Units 07/27/23  0857   INR  1.10       Recent Labs     10/28/22  1800   PHART 7.297*   [id number] 55.5*   PO2ART 90.5   LNH5VIY 27.1   BASEEXCESS 0.2      I have reviewed the patient's laboratory results.    Radiology results:    CT Abdomen Pelvis With Contrast    Result Date: 7/27/2023  CT SCAN OF THE ABDOMEN AND PELVIS WITH CONTRAST    7/27/2023 10:45 AM  HISTORY: hematochezia, eval active bleedAbdominal pain.  PROCEDURE: Axial CT images were obtained from the lung bases to the pubic symphysis following IV contrast administration. Coronal and sagittal reformatted images were generated from the axial data set and provided for interpretation. This study was performed with techniques to keep radiation doses as low as reasonably achievable, (ALARA). Individualized dose reduction techniques using automated exposure control or adjustment of mA and/or kV according to the patient size were employed.  COMPARISON: CT abdomen pelvis 2/26/2021.  FINDINGS: LOWER CHEST: The heart is normal in size. Coronary artery disease present. Severe centrilobular emphysema. Bibasilar paraseptal emphysema and cystic changes.  ABDOMEN/PELVIS: Liver, gallbladder and bile ducts: The liver enhances homogenously without suspicious focal hepatic lesion. Hepatic steatosis. Cholelithiasis. Nondistended gallbladder with mild diffuse wall thickening. No evidence of active inflammation. Findings suggestive of mild chronic cholecystitis. No evidence of acute cholecystitis. No significant biliary ductal dilatation.  Adrenal glands: The adrenal glands are morphologically unremarkable without suspicious lesion.  Kidneys, ureters and urinary bladder: No suspicious renal lesions. Multiple varying size bilateral nonenhancing simple appearing renal cysts, compatible with a Bosniak type I cyst. No hydronephrosis. Mild diffuse urinary bladder wall thickening, likely sequela of chronic urinary bladder outlet obstruction.  Spleen: The spleen is  normal in size. Stable 2.4 cm hypodense/cystic lesions in the spleen, probable benign finding such as hemangioma/lymphangioma or mildly complex cyst.  Pancreas: The pancreas is unremarkable.  Gastrointestinal system and mesentery: There is no evidence of bowel obstruction. The appendix is visualized and unremarkable. No significant mesenteric inflammation. Moderate sigmoid colon diverticulosis without evidence of acute diverticulitis. No definite colonic mass identified. Mild distal esophageal wall thickening, suggestive of esophagitis.  Lymph nodes: No pathologically enlarged abdominal or pelvic lymph nodes are present.  Vessels: The abdominal aorta is normal in caliber. Moderate calcific and noncalcific aortic atherosclerotic disease is present. The celiac trunk, superior mesenteric artery, inferior mesenteric artery and their branch vessels appear grossly patent. The superior mesenteric vein, splenic vein and main portal veins are patent. The inferior vena cava and hepatic veins are unremarkable.  Peritoneum: No free intraperitoneal fluid or pneumoperitoneum.  Pelvic viscera: No acute findings. Enlarged prostate with central calcifications. Enlarged bilateral seminal vesicles without discrete mass.  Body wall: No acute findings. Small fat-containing upper abdominal ventral hernia (series 2, image 41). Tiny fat-containing bilateral inguinal hernias. There is a 2.6 x 1.7 x 3.1 cm subcutaneous mass in the posterior midline at the level of L5, increased in size in comparison the prior exam (series 2, image 46; series 3, image 99).  Bones: No acute fracture. Moderate multilevel degenerative changes of the lumbar spine.      Impression: No evidence of active GI bleeding within the limitations of the study. Moderate sigmoid colon diverticulosis without evidence of acute diverticulitis. No definite colonic mass or source of GI bleeding identified.  Hepatic steatosis. Probable mild chronic cholecystitis. Bilateral renal  cystic lesions with polycystic morphology. Stable cystic lesion in the spleen.  Posterior midline subcutaneous soft tissue at the level of L5, most likely a benign finding such as sebaceous cyst/epidermal inclusion cyst, increased in size. Correlate clinically. Other differential considerations include subcutaneous metastasis or skin malignancy, felt less likely.  Other chronic findings as above.  Images personally reviewed, interpreted and dictated by DILSHAD Ball.      This study was performed with techniques to keep radiation doses as low as reasonably achievable (ALARA). Individualized dose reduction techniques using automated exposure control or adjustment of mA and/or kV according to the patient size were employed.        This report was signed and finalized on 7/27/2023 11:13 AM by DILSHAD Ball.     I have reviewed the patient's radiology reports.    Medication Review:     I have reviewed the patient's active and prn medications.     Problem List:      Lower GI bleed    Pulmonary emphysema    Hypertension    Enlarged prostate without lower urinary tract symptoms (luts)    Atrial fibrillation    Chronic anemia    Acute blood loss anemia    Hematochezia    Abnormal CT of the abdomen    Assessment:    Subacute lower GI bleeding likely secondary to diverticulosis versus AVMs, POA.  Permanent atrial fibrillation-apixaban on hold.  COPD, no evidence of exacerbation.  Chronic respiratory failure secondary to #3 on home oxygen.  Essential hypertension.  Benign prostatic hyperplasia.  Chronic anemia.    Plan:    Lower GI bleeding/anemia.  - Patient has had hematochezia in the differential includes diverticulosis, AVMs, polyps, malignancy as well as internal hemorrhoids.  - Hemoglobin has remained stable suggesting the possibility of bleeding from internal hemorrhoids.  - He is currently n.p.o. awaiting upper and lower GI endoscopies by Dr. Pham.  - Hold Eliquis at this time.     Permanent atrial  fibrillation.  - Continue bisoprolol but hold Eliquis.  - Low normal blood pressures noted this morning and we will give him a bolus of 500 mL normal saline.     COPD.  - Continue nasal cannula oxygen to keep saturations above 90%.  - Continue bronchodilators and budesonide.    Continue physical therapy.  Discussed with nursing staff at the bedside.  Discussed with the patient's daughter who is at the bedside.    DVT Prophylaxis: SCDs  Code Status: Full  Diet: N.p.o.  Discharge Plan: Hopefully, home tomorrow.    Viet Phipps MD  07/28/23  11:20 EDT    Dictated utilizing Dragon dictation.

## 2023-07-28 NOTE — PLAN OF CARE
Problem: Adult Inpatient Plan of Care  Goal: Plan of Care Review  Outcome: Ongoing, Progressing  Flowsheets (Taken 7/28/2023 1758)  Progress: improving  Plan of Care Reviewed With:   patient   family  Outcome Evaluation: 3L NC, BP managed with bolus per MD order. SLP consulted. Patient had EGD and colonoscopy this day. Mechanical soft diet initiated per order. NS continued. Nausea and pain managed with PRN medications. Discharge to home pending for tomorrow, patient and family agreeable to POC.  Goal: Patient-Specific Goal (Individualized)  Outcome: Ongoing, Progressing  Goal: Absence of Hospital-Acquired Illness or Injury  Outcome: Ongoing, Progressing  Intervention: Identify and Manage Fall Risk  Recent Flowsheet Documentation  Taken 7/28/2023 1600 by Kala Mcintyre RN  Safety Promotion/Fall Prevention: patient off unit  Taken 7/28/2023 0800 by Kala Mcintyre RN  Safety Promotion/Fall Prevention:   safety round/check completed   room organization consistent   activity supervised  Intervention: Prevent Skin Injury  Recent Flowsheet Documentation  Taken 7/28/2023 0800 by Kala Mcintyre RN  Body Position:   supine, legs elevated   weight shifting  Intervention: Prevent and Manage VTE (Venous Thromboembolism) Risk  Recent Flowsheet Documentation  Taken 7/28/2023 0800 by Kala Mcintyre RN  Activity Management: activity encouraged  VTE Prevention/Management: sequential compression devices off  Intervention: Prevent Infection  Recent Flowsheet Documentation  Taken 7/28/2023 0800 by Kala Mcintyre RN  Infection Prevention: rest/sleep promoted  Goal: Optimal Comfort and Wellbeing  Outcome: Ongoing, Progressing  Intervention: Provide Person-Centered Care  Recent Flowsheet Documentation  Taken 7/28/2023 0800 by Kala Mcintyre RN  Trust Relationship/Rapport:   care explained   thoughts/feelings acknowledged  Goal: Readiness for Transition of Care  Outcome: Ongoing, Progressing     Problem: Skin  Injury Risk Increased  Goal: Skin Health and Integrity  Outcome: Ongoing, Progressing  Intervention: Optimize Skin Protection  Recent Flowsheet Documentation  Taken 7/28/2023 0800 by Kala Mcintyre RN  Head of Bed (HOB) Positioning: HOB elevated     Problem: COPD (Chronic Obstructive Pulmonary Disease) Comorbidity  Goal: Maintenance of COPD Symptom Control  Outcome: Ongoing, Progressing  Intervention: Maintain COPD-Symptom Control  Recent Flowsheet Documentation  Taken 7/28/2023 0800 by Kala Mcintyre RN  Medication Review/Management: medications reviewed     Problem: Hypertension Comorbidity  Goal: Blood Pressure in Desired Range  Outcome: Ongoing, Progressing  Intervention: Maintain Blood Pressure Management  Recent Flowsheet Documentation  Taken 7/28/2023 0800 by Kala Mcintyre RN  Medication Review/Management: medications reviewed     Problem: Fall Injury Risk  Goal: Absence of Fall and Fall-Related Injury  Outcome: Ongoing, Progressing  Intervention: Identify and Manage Contributors  Recent Flowsheet Documentation  Taken 7/28/2023 0800 by Kala Mcintyre RN  Medication Review/Management: medications reviewed  Intervention: Promote Injury-Free Environment  Recent Flowsheet Documentation  Taken 7/28/2023 1600 by Kala Mcintyre RN  Safety Promotion/Fall Prevention: patient off unit  Taken 7/28/2023 0800 by Kala Mcintyre RN  Safety Promotion/Fall Prevention:   safety round/check completed   room organization consistent   activity supervised     Problem: Impaired Wound Healing  Goal: Optimal Wound Healing  Outcome: Ongoing, Progressing  Intervention: Promote Wound Healing  Recent Flowsheet Documentation  Taken 7/28/2023 0800 by Kala Mcintyre RN  Activity Management: activity encouraged     Problem: Adjustment to Illness (Gastrointestinal Bleeding)  Goal: Optimal Coping with Acute Illness  Outcome: Ongoing, Progressing     Problem: Bleeding (Gastrointestinal Bleeding)  Goal:  Hemostasis  Outcome: Ongoing, Progressing     Problem: Anemia  Goal: Anemia Symptom Improvement  Outcome: Ongoing, Progressing  Intervention: Monitor and Manage Anemia  Recent Flowsheet Documentation  Taken 7/28/2023 1600 by Kala Mcintyre RN  Safety Promotion/Fall Prevention: patient off unit  Taken 7/28/2023 0800 by Kala Mcintyre RN  Safety Promotion/Fall Prevention:   safety round/check completed   room organization consistent   activity supervised   Goal Outcome Evaluation:  Plan of Care Reviewed With: patient, family        Progress: improving  Outcome Evaluation: 3L NC, BP managed with bolus per MD order. SLP consulted. Patient had EGD and colonoscopy this day. Mechanical soft diet initiated per order. NS continued. Nausea and pain managed with PRN medications. Discharge to home pending for tomorrow, patient and family agreeable to POC.

## 2023-07-28 NOTE — ANESTHESIA PREPROCEDURE EVALUATION
Anesthesia Evaluation     Patient summary reviewed and Nursing notes reviewed   NPO Solid Status: > 8 hours  NPO Liquid Status: > 4 hours           Airway   Mallampati: II  TM distance: >3 FB  Neck ROM: full  Possible difficult intubation  Dental    (+) poor dentition    Pulmonary    (+) a smoker Current, COPD,home oxygen (3l nc), shortness of breath, decreased breath sounds  Cardiovascular   Exercise tolerance: poor (<4 METS)    ECG reviewed  PT is on anticoagulation therapy  Patient on routine beta blocker  Rhythm: irregular    (+) hypertensiondysrhythmias Atrial Fib, hyperlipidemia    ROS comment: 7/27/23 EKG- afib    10/31/22 echo-   1.  Normal left ventricular size and systolic function, LVEF 65-70%.  2.  Grade 1 diastolic dysfunction.  3.  Mild right ventricular dilation with normal RV systolic function by TAPSE.  4.  Mild left atrial dilation.  5.  Mild calcification of aortic valve without significant stenosis.      Neuro/Psych- negative ROS  GI/Hepatic/Renal/Endo    (+) GI bleeding lower     Musculoskeletal     Abdominal    Substance History - negative use     OB/GYN negative ob/gyn ROS         Other   arthritis, blood dyscrasia anemia,   history of cancer (slin)    ROS/Med Hx Other: Eliquis LD 7/27                  Anesthesia Plan    ASA 3 - emergent     MAC     (Risks and benefits discussed including risk of aspiration, recall and dental damage. All patient questions answered.    Will continue with plan of care.)  intravenous induction     Anesthetic plan, risks, benefits, and alternatives have been provided, discussed and informed consent has been obtained with: patient.  Pre-procedure education provided  Plan discussed with CRNA.    CODE STATUS:    Level Of Support Discussed With: Patient; Health Care Surrogate  Code Status (Patient has no pulse and is not breathing): CPR (Attempt to Resuscitate)  Medical Interventions (Patient has pulse or is breathing): Full Support  Release to patient: Routine  Release

## 2023-07-28 NOTE — NURSING NOTE
Patient refused to sign consent form for his procedure that he is having tomorrow, patient stated he didn't know he was having a procedure tomorrow and wanted to know more about it before he signed the consent form.

## 2023-07-29 ENCOUNTER — READMISSION MANAGEMENT (OUTPATIENT)
Dept: CALL CENTER | Facility: HOSPITAL | Age: 88
End: 2023-07-29
Payer: MEDICARE

## 2023-07-29 VITALS
BODY MASS INDEX: 26.82 KG/M2 | SYSTOLIC BLOOD PRESSURE: 116 MMHG | TEMPERATURE: 98.3 F | HEIGHT: 74 IN | RESPIRATION RATE: 18 BRPM | HEART RATE: 69 BPM | WEIGHT: 209 LBS | OXYGEN SATURATION: 97 % | DIASTOLIC BLOOD PRESSURE: 82 MMHG

## 2023-07-29 PROBLEM — K92.1 HEMATOCHEZIA: Status: RESOLVED | Noted: 2023-07-27 | Resolved: 2023-07-29

## 2023-07-29 LAB
DEPRECATED RDW RBC AUTO: 45.1 FL (ref 37–54)
ERYTHROCYTE [DISTWIDTH] IN BLOOD BY AUTOMATED COUNT: 13.4 % (ref 12.3–15.4)
HCT VFR BLD AUTO: 28.3 % (ref 37.5–51)
HGB BLD-MCNC: 9 G/DL (ref 13–17.7)
MCH RBC QN AUTO: 29.2 PG (ref 26.6–33)
MCHC RBC AUTO-ENTMCNC: 31.8 G/DL (ref 31.5–35.7)
MCV RBC AUTO: 91.9 FL (ref 79–97)
PLATELET # BLD AUTO: 269 10*3/MM3 (ref 140–450)
PMV BLD AUTO: 9.1 FL (ref 6–12)
RBC # BLD AUTO: 3.08 10*6/MM3 (ref 4.14–5.8)
WBC NRBC COR # BLD: 6.76 10*3/MM3 (ref 3.4–10.8)

## 2023-07-29 PROCEDURE — 94664 DEMO&/EVAL PT USE INHALER: CPT

## 2023-07-29 PROCEDURE — 63710000001 TAMSULOSIN 0.4 MG CAPSULE: Performed by: INTERNAL MEDICINE

## 2023-07-29 PROCEDURE — A9270 NON-COVERED ITEM OR SERVICE: HCPCS | Performed by: INTERNAL MEDICINE

## 2023-07-29 PROCEDURE — 94761 N-INVAS EAR/PLS OXIMETRY MLT: CPT

## 2023-07-29 PROCEDURE — 63710000001 BISOPROLOL 5 MG TABLET: Performed by: INTERNAL MEDICINE

## 2023-07-29 PROCEDURE — 63710000001 DILTIAZEM CD 180 MG CAPSULE SUSTAINED-RELEASE 24 HR: Performed by: INTERNAL MEDICINE

## 2023-07-29 PROCEDURE — 85027 COMPLETE CBC AUTOMATED: CPT | Performed by: INTERNAL MEDICINE

## 2023-07-29 PROCEDURE — 94799 UNLISTED PULMONARY SVC/PX: CPT

## 2023-07-29 PROCEDURE — G0378 HOSPITAL OBSERVATION PER HR: HCPCS

## 2023-07-29 PROCEDURE — 63710000001 DULOXETINE 30 MG CAPSULE DELAYED-RELEASE PARTICLES: Performed by: INTERNAL MEDICINE

## 2023-07-29 RX ORDER — SODIUM, POTASSIUM,MAG SULFATES 17.5-3.13G
2 SOLUTION, RECONSTITUTED, ORAL ORAL ONCE
Qty: 354 ML | Refills: 0
Start: 2023-07-29 | End: 2023-07-29

## 2023-07-29 RX ORDER — AMOXICILLIN 250 MG
1 CAPSULE ORAL 2 TIMES DAILY
Qty: 60 TABLET | Refills: 0 | Status: SHIPPED | OUTPATIENT
Start: 2023-07-29

## 2023-07-29 RX ADMIN — IPRATROPIUM BROMIDE AND ALBUTEROL SULFATE 3 ML: .5; 3 SOLUTION RESPIRATORY (INHALATION) at 06:59

## 2023-07-29 RX ADMIN — BISOPROLOL FUMARATE 10 MG: 5 TABLET ORAL at 09:02

## 2023-07-29 RX ADMIN — SODIUM CHLORIDE 100 ML/HR: 9 INJECTION, SOLUTION INTRAVENOUS at 03:28

## 2023-07-29 RX ADMIN — DILTIAZEM HYDROCHLORIDE 180 MG: 180 CAPSULE, COATED, EXTENDED RELEASE ORAL at 09:01

## 2023-07-29 RX ADMIN — BUDESONIDE 0.5 MG: 0.5 INHALANT RESPIRATORY (INHALATION) at 07:00

## 2023-07-29 RX ADMIN — TAMSULOSIN HYDROCHLORIDE 0.4 MG: 0.4 CAPSULE ORAL at 09:01

## 2023-07-29 RX ADMIN — DULOXETINE HYDROCHLORIDE 60 MG: 30 CAPSULE, DELAYED RELEASE ORAL at 09:02

## 2023-07-29 NOTE — DISCHARGE SUMMARY
[facility]   DISCHARGE SUMMARY      Name:  Ed Spear   Age:  87 y.o.  Sex:  male  :  1935  MRN:  [id number]   Visit Number:  [id number]    Admission Date:  2023  Date of Discharge:  2023  Primary Care Physician:  Chelo Nixon,     Important issues to note:    1.  Patient was admitted with symptoms of hematochezia but his hemoglobin did not drop significantly and he did not require any blood transfusions.  2.  Patient underwent upper GI endoscopy and colonoscopy on 2023 by Dr. Pham.  Colonoscopy revealed numerous colon polyps which the patient will need staged colonoscopies for removal.  His next colonoscopy has been arranged on 2023.  3.  Hold Eliquis until cleared to take by Dr. Pham.  4.  Follow-up with primary care provider as scheduled.    Discharge Diagnoses:     Subacute lower GI bleeding likely secondary to bleeding from colonic polyps, POA.  Permanent atrial fibrillation-apixaban on hold.  COPD, no evidence of exacerbation.  Chronic respiratory failure secondary to #3 on home oxygen.  Essential hypertension.  Benign prostatic hyperplasia.  Chronic anemia.    Problem List:     Active Hospital Problems    Diagnosis  POA    **Lower GI bleed [K92.2]  Yes    Chronic anemia [D64.9]  Yes    Acute blood loss anemia [D62]  Yes    Abnormal CT of the abdomen [R93.5]  Yes    Atrial fibrillation [I48.91]  Yes    Enlarged prostate without lower urinary tract symptoms (luts) [N40.0]  Yes    Hypertension [I10]  Yes    Pulmonary emphysema [J43.9]  Yes      Resolved Hospital Problems    Diagnosis Date Resolved POA    Hematochezia [K92.1] 2023 Yes     Presenting Problem:    Chief Complaint   Patient presents with    Rectal Bleeding      Consults:     Consulting Physician(s)       Provider Relationship Specialty    Dontrell Pham MD Consulting Physician Gastroenterology          Procedures Performed:    1.  Upper GI endoscopy on  7/28/2023.  - Normal oropharynx.  - Z-line irregular, 37 cm from the incisors.  - Low-grade of narrowing and moderate Schatzki ring.  - 3 cm hiatal hernia.  - Erosive esophagitis GEJ  - Erosive gastropathy with stigmata of recent bleeding.  - Normal duodenal bulb, first portion of the duodenum, second portion of the duodenum and third portion of the  duodenum.  - No specimens collected.  - No signs of overt bleeding. Iintermittent mild upper GI mucosal bleeding suspected    2.  Colonoscopy on 7/28/2023.  - Preparation of the colon was inadequate.  - Perianal skin tags found on perianal exam.  - Five 4 to 6 mm polyps in the cecum. Resection not attempted.  - Five 6 to 15 mm polyps in the proximal ascending colon, in the mid ascending colon and in the distal ascending  colon. Resection not attempted.  - Seven 8 to 15 mm polyps in the proximal transverse colon, in the mid transverse colon and in the distal  transverse colon. Resection not attempted.  - Five 8 to 12 mm polyps in the proximal descending colon, in the mid descending colon and in the distal  descending colon. Resection not attempted.  - Two 4 to 8 mm polyps in the proximal sigmoid colon, in the mid sigmoid colon and in the distal sigmoid colon.  Resection not attempted.  - One 15 to 18 mm polyp at the recto-sigmoid colon and has ulceration with recent stigmata of bleeding.  Resection not attempted.  - One 10 to 12 mm polyp in the proximal rectum. Resection not attempted.  - One 10 to 12 mm polyp in the mid rectum. Resection not attempted.  - The examined portion of the ileum was normal.  - Diverticulosis in the sigmoid colon. No sigsn of diverticular bleeding  - Non-bleeding external and internal hemorrhoids.  - No specimens collected.  - Patient likely had intermittent bleeding from ulcerated recto sigmoid polyp. Malignant trasformation suspected    History of presenting illness/Hospital Course:    Mr. Spear is an 87-year-old male with history of atrial  fibrillation on Eliquis, COPD on home oxygen at 3 L, BPH, hypertension was brought to the emergency room by her daughter with symptoms of intermittent bright red blood per rectum that has been going on for 2 weeks.  He lives with his daughter and uses a walker to ambulate.  He has never had prior colonoscopies.  He does not take aspirin and is only on Eliquis.     In the emergency room, he was afebrile but tachycardic at 120.  Initial blood pressure 106/65 and pulse oxygen saturation of 99% on 3 L of nasal cannula oxygen.  CMP was unremarkable except for a sodium of 135 and an albumin of 3.  Lactic acid and lipase levels were within normal limits.  CBC was unremarkable except for hemoglobin of 10.3 (previous from January 2023 was 11.3).  Urine analysis was within normal limits.  CT of the abdomen and pelvis with contrast showed moderate sigmoid colon diverticulosis without evidence of acute diverticulitis.  Hepatic steatosis and probable mild chronic cholecystitis.  Bilateral renal cystic lesions with polycystic morphology noted.  Stable cystic lesions noted in the spleen.  Patient's condition was discussed with Dr. Pham from gastroenterology.      Patient underwent upper GI endoscopy and colonoscopy on 7/28/2023.  Upper GI endoscopy showed moderate Schatzki ring, erosive esophagitis and erosive gastropathy.  Colonoscopy revealed numerous colon polyps no resection was attempted.  Dr. Pham recommended that the patient would need staged colonoscopies to resect all of these polyps.  Patient remained stable with regards to his hemoglobin and did not require transfusion.  He has been advised to hold his Eliquis until cleared to take by Dr. Pham.  Patient has been scheduled for a repeat colonoscopy on 8/2/2023 by Dr. Pham for polypectomies.  During the hospital stay, his respiratory status was stable and he was continued on his home oxygen at 3 L which is his baseline.  No change has been made to his home  medication regimen except holding the Eliquis.  Patient will be arranged home health services.  Follow-up with primary care provider as scheduled.  I discussed the patient's condition and discharge plan including holding of Eliquis, with his daughter and over the phone today.    Vital Signs:    Temp:  [97.7 °F (36.5 °C)-98.4 °F (36.9 °C)] 98.3 °F (36.8 °C)  Heart Rate:  [48-78] 69  Resp:  [16-48] 18  BP: ()/(52-82) 116/82    Physical Exam:    General Appearance:  Alert and cooperative.    Head:  Atraumatic and normocephalic.   Eyes: Conjunctivae and sclerae normal, no icterus. No pallor.   Ears:  Ears with no abnormalities noted.   Throat: No oral lesions, no thrush, oral mucosa moist.   Neck: Supple, trachea midline, no thyromegaly.   Back:   No kyphoscoliosis present. No tenderness to palpation.  Sebaceous cyst noted in the lower back.   Lungs:   Breath sounds heard bilaterally equally with prolonged expiration.  No crackles or wheezing. No Pleural rub or bronchial breathing.   Heart:  Normal S1 and S2, no murmur, no gallop, no rub. No JVD.   Abdomen:   Normal bowel sounds, no masses, no organomegaly. Soft, nontender, nondistended, no rebound tenderness.   Extremities: Supple, no edema, no cyanosis, no clubbing.   Pulses: Pulses palpable bilaterally.   Skin: No bleeding or rash.   Neurologic: Alert and oriented x 3. No facial asymmetry. Moves all four limbs. No tremors.     Pertinent Lab Results:     Results from last 7 days   Lab Units 07/28/23  0551 07/27/23  0857   SODIUM mmol/L 135* 135*   POTASSIUM mmol/L 3.9 4.0   CHLORIDE mmol/L 100 98   CO2 mmol/L 25.2 27.3   BUN mg/dL 8 10   CREATININE mg/dL 0.84 1.01   CALCIUM mg/dL 9.2 9.6   BILIRUBIN mg/dL  --  0.3   ALK PHOS U/L  --  75   ALT (SGPT) U/L  --  <5   AST (SGOT) U/L  --  9   GLUCOSE mg/dL 93 107*     Results from last 7 days   Lab Units 07/29/23  0645 07/28/23  1212 07/28/23  0551 07/27/23  0857   WBC 10*3/mm3 6.76  --  5.66 6.38   HEMOGLOBIN g/dL  9.0* 9.4* 9.4* 10.3*   HEMATOCRIT % 28.3* 29.5* 29.1* 32.0*   PLATELETS 10*3/mm3 269  --  270 304     Results from last 7 days   Lab Units 07/27/23  0857   INR  1.10       Results from last 7 days   Lab Units 07/27/23  0857   LIPASE U/L 14     Pertinent Radiology Results:    Imaging Results (All)       Procedure Component Value Units Date/Time    CT Abdomen Pelvis With Contrast [[id number]] Collected: 07/27/23 1006     Updated: 07/27/23 1115    Narrative:      CT SCAN OF THE ABDOMEN AND PELVIS WITH CONTRAST    7/27/2023 10:45 AM      HISTORY:  hematochezia, eval active bleedAbdominal pain.     PROCEDURE:  Axial CT images were obtained from the lung bases to the pubic symphysis  following IV contrast administration. Coronal and sagittal reformatted  images were generated from the axial data set and provided for  interpretation. This study was performed with techniques to keep  radiation doses as low as reasonably achievable, (ALARA). Individualized  dose reduction techniques using automated exposure control or adjustment  of mA and/or kV according to the patient size were employed.     COMPARISON:  CT abdomen pelvis 2/26/2021.     FINDINGS:  LOWER CHEST:  The heart is normal in size. Coronary artery disease present. Severe  centrilobular emphysema. Bibasilar paraseptal emphysema and cystic  changes.     ABDOMEN/PELVIS:  Liver, gallbladder and bile ducts:  The liver enhances homogenously without suspicious focal hepatic lesion.  Hepatic steatosis. Cholelithiasis. Nondistended gallbladder with mild  diffuse wall thickening. No evidence of active inflammation. Findings  suggestive of mild chronic cholecystitis. No evidence of acute  cholecystitis. No significant biliary ductal dilatation.      Adrenal glands:  The adrenal glands are morphologically unremarkable without suspicious  lesion.     Kidneys, ureters and urinary bladder:  No suspicious renal lesions. Multiple varying size bilateral  nonenhancing simple appearing  renal cysts, compatible with a Bosniak  type I cyst. No hydronephrosis. Mild diffuse urinary bladder wall  thickening, likely sequela of chronic urinary bladder outlet  obstruction.     Spleen:  The spleen is normal in size. Stable 2.4 cm hypodense/cystic lesions in  the spleen, probable benign finding such as hemangioma/lymphangioma or  mildly complex cyst.     Pancreas:  The pancreas is unremarkable.      Gastrointestinal system and mesentery:  There is no evidence of bowel obstruction. The appendix is visualized  and unremarkable. No significant mesenteric inflammation. Moderate  sigmoid colon diverticulosis without evidence of acute diverticulitis.  No definite colonic mass identified. Mild distal esophageal wall  thickening, suggestive of esophagitis.     Lymph nodes:  No pathologically enlarged abdominal or pelvic lymph nodes are present.     Vessels:  The abdominal aorta is normal in caliber. Moderate calcific and  noncalcific aortic atherosclerotic disease is present. The celiac trunk,  superior mesenteric artery, inferior mesenteric artery and their branch  vessels appear grossly patent. The superior mesenteric vein, splenic  vein and main portal veins are patent. The inferior vena cava and  hepatic veins are unremarkable.     Peritoneum:  No free intraperitoneal fluid or pneumoperitoneum.     Pelvic viscera:  No acute findings. Enlarged prostate with central calcifications.  Enlarged bilateral seminal vesicles without discrete mass.     Body wall:  No acute findings. Small fat-containing upper abdominal ventral hernia  (series 2, image 41). Tiny fat-containing bilateral inguinal hernias.  There is a 2.6 x 1.7 x 3.1 cm subcutaneous mass in the posterior midline  at the level of L5, increased in size in comparison the prior exam  (series 2, image 46; series 3, image 99).     Bones:  No acute fracture. Moderate multilevel degenerative changes of the  lumbar spine.       Impression:      No evidence of active  GI bleeding within the limitations of the study.  Moderate sigmoid colon diverticulosis without evidence of acute  diverticulitis. No definite colonic mass or source of GI bleeding  identified.     Hepatic steatosis. Probable mild chronic cholecystitis. Bilateral renal  cystic lesions with polycystic morphology. Stable cystic lesion in the  spleen.     Posterior midline subcutaneous soft tissue at the level of L5, most  likely a benign finding such as sebaceous cyst/epidermal inclusion cyst,  increased in size. Correlate clinically. Other differential  considerations include subcutaneous metastasis or skin malignancy, felt  less likely.     Other chronic findings as above.     Images personally reviewed, interpreted and dictated by DILSHAD Ball.      This report was signed and finalized on 7/27/2023 11:13 AM by DILSHAD Ball.        Condition on Discharge:      Stable.    Code status during the hospital stay:    Code Status and Medical Interventions:   Ordered at: 07/27/23 1433     Level Of Support Discussed With:    Patient    Health Care Surrogate     Code Status (Patient has no pulse and is not breathing):    CPR (Attempt to Resuscitate)     Medical Interventions (Patient has pulse or is breathing):    Full Support     Release to patient:    Routine Release     Discharge Disposition:    Home-Health Care [facility]    Discharge Medications:       Discharge Medications        New Medications        Instructions Start Date   sennosides-docusate 8.6-50 MG per tablet  Commonly known as: PERICOLACE   1 tablet, Oral, 2 Times Daily      sodium-potassium-magnesium sulfates 17.5-3.13-1.6 GM/177ML solution oral solution  Commonly known as: Suprep Bowel Prep Kit   2 bottles, Oral, Once, Please see prep instructions from office.             Changes to Medications        Instructions Start Date   apixaban 2.5 MG tablet tablet  Commonly known as: Eliquis  What changed:   how much to take  These instructions start on  August 4, 2023. If you are unsure what to do until then, ask your doctor or other care provider.   2.5 mg, Oral, Every 12 Hours   Start Date: August 4, 2023            Continue These Medications        Instructions Start Date   acetaminophen 325 MG tablet  Commonly known as: TYLENOL   325 mg, Oral, Every 6 Hours PRN      albuterol sulfate  (90 Base) MCG/ACT inhaler  Commonly known as: PROVENTIL HFA;VENTOLIN HFA;PROAIR HFA   2 puffs, Inhalation, Every 4 Hours PRN      bisoprolol 10 MG tablet  Commonly known as: ZEBeta   TAKE ONE TABLET BY MOUTH DAILY      budesonide-formoterol 160-4.5 MCG/ACT inhaler  Commonly known as: Symbicort   2 puffs, Inhalation, 2 Times Daily      Cartia  MG 24 hr capsule  Generic drug: dilTIAZem CD   180 mg, Oral, Daily      DULoxetine 60 MG capsule  Commonly known as: CYMBALTA   60 mg, Oral, Daily      famotidine 40 MG tablet  Commonly known as: PEPCID   40 mg, Oral, Nightly PRN      ferrous gluconate 324 MG tablet  Commonly known as: FERGON   324 mg, Oral, Daily With Breakfast      ipratropium-albuterol 0.5-2.5 mg/3 ml nebulizer  Commonly known as: DUO-NEB   INHALE THREE MILLILITERS ( ONE VIAL )  VIA NEBULIZATION BY MOUTH FOUR TIMES A DAY      simvastatin 40 MG tablet  Commonly known as: ZOCOR   40 mg, Oral, Nightly      tamsulosin 0.4 MG capsule 24 hr capsule  Commonly known as: FLOMAX   0.4 mg, Oral, Daily             Stop These Medications      methocarbamol 500 MG tablet  Commonly known as: ROBAXIN     rOPINIRole 0.25 MG tablet  Commonly known as: REQUIP            Discharge Diet:     Diet Instructions       Diet: Cardiac Diets; Healthy Heart (2-3 Na+); Regular Texture (IDDSI 7); Thin (IDDSI 0)      Discharge Diet: Cardiac Diets    Cardiac Diet: Healthy Heart (2-3 Na+)    Texture: Regular Texture (IDDSI 7)    Fluid Consistency: Thin (IDDSI 0)          Activity at Discharge:     Activity Instructions       Activity as Tolerated            Follow-up  Appointments:    Additional Instructions for the Follow-ups that You Need to Schedule       Ambulatory Referral to Home Health (Hospital)   As directed      Face to Face Visit Date: 7/28/2023   Follow-up provider for Plan of Care?: I treated the patient in an acute care facility and will not continue treatment after discharge.   Follow-up provider: CHELO NIXON [[id number]]   Reason/Clinical Findings: Lower GI bleeding, COPD, A-fib, hypertension   Describe mobility limitations that make leaving home difficult: Generalized weakness, fall risk   Nursing/Therapeutic Services Requested: Skilled Nursing Physical Therapy Occupational Therapy   Skilled nursing orders: Medication education O2 instruction Mini-nebs COPD management Cardiopulmonary assessments   PT orders: Therapeutic exercise Gait Training Transfer training   Weight Bearing Status: As Tolerated   Occupational orders: Activities of daily living Energy conservation Strengthening   Frequency: 1 Week 1               Contact information for follow-up providers       Chelo Nixon DO .    Specialty: Family Medicine  Contact information:  [address]  [facility] [address]  [phone]                       Contact information for after-discharge care       Home Medical Care       [facility] [place] .    Service: Home Health Services  Contact information:  [address] [place] [address] [phone]                                 Future Appointments   Date Time Provider Department Center   8/3/2023  9:00 AM Chelo Nixon DO [facility]     Test Results Pending at Discharge:    None.       Viet Phipps MD  07/29/23  09:00 EDT    Time: I spent 25 minutes on this discharge activity which included: face-to-face encounter with the patient, reviewing the data in the system, coordination of the care with the nursing staff as well as consultants, documentation, and entering orders.     Dictated utilizing Yumiko  dictation.

## 2023-07-29 NOTE — CASE MANAGEMENT/SOCIAL WORK
Continued Stay Note  [facility]     Patient Name: Ed Spear  MRN: [id number]  Today's Date: 7/29/2023    Admit Date: 7/27/2023    Plan: DCP   Discharge Plan       Row Name 07/29/23 0935       Plan    Plan Comments Home with his daughter and caretenders HH has been set up already. per [facility] [place] note                   Discharge Codes    No documentation.                 Expected Discharge Date and Time       Expected Discharge Date Expected Discharge Time    Jul 29, 2023               Bobbi Langley RN

## 2023-07-29 NOTE — PLAN OF CARE
Goal Outcome Evaluation:         Pt. Adequate and stable to discharge home per Dr. Phipps orders.

## 2023-07-29 NOTE — OUTREACH NOTE
Prep Survey      Flowsheet Row Responses   [facility] patient discharged from? [facility]   Is LACE score < 7 ? Yes   Eligibility [facility]   Date of Admission 07/27/23   Date of Discharge 07/29/23   Discharge Disposition Home-Health Care [facility]   Discharge diagnosis Subacute lower GI bleeding likely secondary to bleeding from colonic polyps,   Does the patient have one of the following disease processes/diagnoses(primary or secondary)? Other   Does the patient have Home health ordered? Yes   What is the Home health agency?  [facility] [address] [address]   Is there a DME ordered? No   Prep survey completed? Yes            SAULO CAST - Registered Nurse

## 2023-07-29 NOTE — PLAN OF CARE
Goal Outcome Evaluation:  Plan of Care Reviewed With: patient        Progress: no change  Outcome Evaluation: VSS. No acute change during this shift. No distress noted at this time. Patient rested well this shift.

## 2023-07-29 NOTE — CASE MANAGEMENT/SOCIAL WORK
Case Management Discharge Note           Provided Post Acute Provider List?: N/A  Provided Post Acute Provider Quality & Resource List?: N/A    Selected Continued Care - Admitted Since 7/27/2023       Destination    No services have been selected for the patient.                Durable Medical Equipment    No services have been selected for the patient.                Dialysis/Infusion    No services have been selected for the patient.                Home Medical Care Coordination complete.      Service Provider Selected Services Address Phone Fax Patient Preferred    [facility] [place] [facility] [address] [place] [address] [phone] --              Therapy    No services have been selected for the patient.                Community Resources    No services have been selected for the patient.                Community & DME    No services have been selected for the patient.                    Transportation Services  Private: Car    Final Discharge Disposition Code: 06 - home with home health care

## 2023-07-31 ENCOUNTER — TRANSITIONAL CARE MANAGEMENT TELEPHONE ENCOUNTER (OUTPATIENT)
Dept: CALL CENTER | Facility: HOSPITAL | Age: 88
End: 2023-07-31
Payer: MEDICARE

## 2023-08-01 NOTE — PRE-PROCEDURE INSTRUCTIONS
PAT phone history completed with pt for upcoming procedure on 8/2/23    PAT PASS GIVEN/REVIEWED WITH PT.  VERBALIZED UNDERSTANDING OF THE FOLLOWING:  DO NOT EAT, DRINK, SMOKE, USE SMOKELESS TOBACCO OR CHEW GUM AFTER MIDNIGHT THE NIGHT BEFORE SURGERY.  THIS ALSO INCLUDES HARD CANDIES AND MINTS.    DO NOT SHAVE THE AREA TO BE OPERATED ON AT LEAST 48 HOURS PRIOR TO THE PROCEDURE.  DO NOT WEAR MAKE UP OR NAIL POLISH.  DO NOT LEAVE IN ANY PIERCING OR WEAR JEWELRY THE DAY OF SURGERY.      DO NOT USE ADHESIVES IF YOU WEAR DENTURES.    DO NOT WEAR EYE CONTACTS; BRING IN YOUR GLASSES.    ONLY TAKE MEDICATION THE MORNING OF YOUR PROCEDURE IF INSTRUCTED BY YOUR SURGEON WITH ENOUGH WATER TO SWALLOW THE MEDICATION.  IF YOUR SURGEON DID NOT SPECIFY WHICH MEDICATIONS TO TAKE, YOU WILL NEED TO CALL THEIR OFFICE FOR FURTHER INSTRUCTIONS AND DO AS THEY INSTRUCT.    LEAVE ANYTHING YOU CONSIDER VALUABLE AT HOME.    YOU WILL NEED TO ARRANGE FOR SOMEONE TO DRIVE YOU HOME AFTER SURGERY.  IT IS RECOMMENDED THAT YOU DO NOT DRIVE, WORK, DRINK ALCOHOL OR MAKE MAJOR DECISIONS FOR AT LEAST 24 HOURS AFTER YOUR PROCEDURE IS COMPLETE.      THE DAY OF YOUR PROCEDURE, BRING IN THE FOLLOWING IF APPLICABLE:   PICTURE ID AND INSURANCE/MEDICARE OR MEDICAID CARDS/ANY CO-PAY THAT MAY BE DUE   COPY OF ADVANCED DIRECTIVE/LIVING WILL/POWER OR    CPAP/BIPAP/INHALERS   SKIN PREP SHEET   YOUR PREADMISSION TESTING PASS (IF NOT A PHONE HISTORY)

## 2023-08-02 ENCOUNTER — ANESTHESIA EVENT (OUTPATIENT)
Dept: GASTROENTEROLOGY | Facility: HOSPITAL | Age: 88
End: 2023-08-02
Payer: MEDICARE

## 2023-08-02 ENCOUNTER — HOSPITAL ENCOUNTER (OUTPATIENT)
Facility: HOSPITAL | Age: 88
Setting detail: HOSPITAL OUTPATIENT SURGERY
Discharge: HOME | End: 2023-08-02
Attending: INTERNAL MEDICINE | Admitting: INTERNAL MEDICINE
Payer: MEDICARE

## 2023-08-02 ENCOUNTER — ANESTHESIA (OUTPATIENT)
Dept: GASTROENTEROLOGY | Facility: HOSPITAL | Age: 88
End: 2023-08-02
Payer: MEDICARE

## 2023-08-02 VITALS
DIASTOLIC BLOOD PRESSURE: 89 MMHG | TEMPERATURE: 98.9 F | OXYGEN SATURATION: 98 % | RESPIRATION RATE: 18 BRPM | SYSTOLIC BLOOD PRESSURE: 135 MMHG | HEART RATE: 86 BPM

## 2023-08-02 DIAGNOSIS — D12.6 ADENOMATOUS POLYP OF COLON, UNSPECIFIED PART OF COLON: ICD-10-CM

## 2023-08-02 PROCEDURE — 45381 COLONOSCOPY SUBMUCOUS NJX: CPT | Performed by: INTERNAL MEDICINE

## 2023-08-02 PROCEDURE — 45385 COLONOSCOPY W/LESION REMOVAL: CPT | Performed by: INTERNAL MEDICINE

## 2023-08-02 PROCEDURE — 88305 TISSUE EXAM BY PATHOLOGIST: CPT

## 2023-08-02 PROCEDURE — 25010000002 PROPOFOL 10 MG/ML EMULSION: Performed by: NURSE ANESTHETIST, CERTIFIED REGISTERED

## 2023-08-02 DEVICE — DEV CLIP ENDO RESOLUTION360 CONTRL ROT 235CM: Type: IMPLANTABLE DEVICE | Site: COLON | Status: FUNCTIONAL

## 2023-08-02 RX ORDER — LIDOCAINE HYDROCHLORIDE 20 MG/ML
INJECTION, SOLUTION INTRAVENOUS AS NEEDED
Status: DISCONTINUED | OUTPATIENT
Start: 2023-08-02 | End: 2023-08-02 | Stop reason: SURG

## 2023-08-02 RX ORDER — SODIUM CHLORIDE 9 MG/ML
70 INJECTION, SOLUTION INTRAVENOUS CONTINUOUS PRN
Status: DISCONTINUED | OUTPATIENT
Start: 2023-08-02 | End: 2023-08-02 | Stop reason: HOSPADM

## 2023-08-02 RX ORDER — IPRATROPIUM BROMIDE AND ALBUTEROL SULFATE 2.5; .5 MG/3ML; MG/3ML
3 SOLUTION RESPIRATORY (INHALATION) ONCE
Status: COMPLETED | OUTPATIENT
Start: 2023-08-02 | End: 2023-08-02

## 2023-08-02 RX ORDER — SIMETHICONE 20 MG/.3ML
EMULSION ORAL AS NEEDED
Status: DISCONTINUED | OUTPATIENT
Start: 2023-08-02 | End: 2023-08-02 | Stop reason: HOSPADM

## 2023-08-02 RX ORDER — IPRATROPIUM BROMIDE AND ALBUTEROL SULFATE 2.5; .5 MG/3ML; MG/3ML
3 SOLUTION RESPIRATORY (INHALATION) ONCE
Status: CANCELLED | OUTPATIENT
Start: 2023-08-02 | End: 2023-08-02

## 2023-08-02 RX ORDER — BUPIVACAINE HCL/0.9 % NACL/PF 0.125 %
PLASTIC BAG, INJECTION (ML) EPIDURAL AS NEEDED
Status: DISCONTINUED | OUTPATIENT
Start: 2023-08-02 | End: 2023-08-02 | Stop reason: SURG

## 2023-08-02 RX ORDER — SODIUM CHLORIDE 9 MG/ML
INJECTION, SOLUTION INTRAVENOUS CONTINUOUS PRN
Status: DISCONTINUED | OUTPATIENT
Start: 2023-08-02 | End: 2023-08-02 | Stop reason: SURG

## 2023-08-02 RX ADMIN — SODIUM CHLORIDE 70 ML/HR: 9 INJECTION, SOLUTION INTRAVENOUS at 07:32

## 2023-08-02 RX ADMIN — PROPOFOL 160 MCG/KG/MIN: 10 INJECTION, EMULSION INTRAVENOUS at 08:48

## 2023-08-02 RX ADMIN — LIDOCAINE HYDROCHLORIDE 60 MG: 20 INJECTION, SOLUTION INTRAVENOUS at 08:48

## 2023-08-02 RX ADMIN — Medication 200 MCG: at 09:15

## 2023-08-02 RX ADMIN — Medication 100 MCG: at 09:29

## 2023-08-02 RX ADMIN — Medication 100 MCG: at 09:42

## 2023-08-02 RX ADMIN — Medication 100 MCG: at 09:22

## 2023-08-02 RX ADMIN — SODIUM CHLORIDE: 9 INJECTION, SOLUTION INTRAVENOUS at 08:37

## 2023-08-02 RX ADMIN — Medication 100 MCG: at 09:35

## 2023-08-02 RX ADMIN — IPRATROPIUM BROMIDE AND ALBUTEROL SULFATE 3 ML: .5; 3 SOLUTION RESPIRATORY (INHALATION) at 07:32

## 2023-08-02 RX ADMIN — Medication 100 MCG: at 09:49

## 2023-08-02 RX ADMIN — SODIUM CHLORIDE: 9 INJECTION, SOLUTION INTRAVENOUS at 09:53

## 2023-08-02 RX ADMIN — Medication 200 MCG: at 09:09

## 2023-08-02 NOTE — DISCHARGE INSTRUCTIONS
- Discharge patient to home (ambulatory).   - High fiber diet.   - Hold eliquis for 1 week  - Await pathology results.   - Repeat colonoscopy in 6 months for surveillance.   - Return to GI office in 8 weeks.    No pushing, pulling, tugging,  heavy lifting, or strenuous activity.  No major decision making, driving, or drinking alcoholic beverages for 24 hours. ( due to the medications you have  received)  Always use good hand hygiene/washing techniques.  NO driving while taking pain medications.    * if you have an incision:  Check your incision area every day for signs of infection.   Check for:  * more redness, swelling, or pain  *more fluid or blood  *warmth  *pus or bad smellTo assist you in voiding:  Drink plenty of fluids  Listen to running water while attempting to void.    If you are unable to urinate and you have an uncomfortable urge to void or it has been   6 hours since you were discharged, return to the Emergency Room

## 2023-08-03 LAB — REF LAB TEST METHOD: NORMAL

## 2023-08-09 DIAGNOSIS — J43.2 CENTRILOBULAR EMPHYSEMA: ICD-10-CM

## 2023-08-09 RX ORDER — ALBUTEROL SULFATE 90 UG/1
AEROSOL, METERED RESPIRATORY (INHALATION)
Qty: 18 G | Refills: 0 | Status: SHIPPED | OUTPATIENT
Start: 2023-08-09

## 2023-08-09 RX ORDER — BUDESONIDE AND FORMOTEROL FUMARATE DIHYDRATE 160; 4.5 UG/1; UG/1
AEROSOL RESPIRATORY (INHALATION)
Qty: 10.2 G | Refills: 0 | Status: SHIPPED | OUTPATIENT
Start: 2023-08-09

## 2023-08-15 ENCOUNTER — TELEPHONE (OUTPATIENT)
Dept: GASTROENTEROLOGY | Facility: CLINIC | Age: 88
End: 2023-08-15
Payer: MEDICARE

## 2023-08-15 NOTE — TELEPHONE ENCOUNTER
Pts emergency contact was reached. Was advised that pt could resume xarelto use and was given upcoming appointment date and time.

## 2023-08-15 NOTE — TELEPHONE ENCOUNTER
Caller: Farida Cano    Relationship to patient: Emergency Contact    Best call back number: [phone]    Patient is needing: PT'S DAUGHTER CALLED TO CHECK ON PT'S NEXT APPT. SHE ALSO WANTS TO KNOW IF PT CAN GO BACK ON HIS BLOOD THINNERS NOW. PLEASE CALL BACK AND ADVISE.

## 2023-08-18 NOTE — TELEPHONE ENCOUNTER
Caller: RHIANNA    Relationship: Home Health    Best call back number: [phone]     What is the best time to reach you: ANY    Who are you requesting to speak with (clinical staff, provider,  specific staff member): CLINICAL STAFF    What was the call regarding: RHIANNA FROM [facility] WOULD LIKE TO LET  KNOW THAT THEY HAVE ATTEMPTED TO REACH OUT TO PATIENT REGARDING TREATMENT MULTIPLE TIMES AND HAS BEEN UNABLE TO REACH PATIENT

## 2023-08-22 ENCOUNTER — TELEPHONE (OUTPATIENT)
Dept: INTERNAL MEDICINE | Facility: CLINIC | Age: 88
End: 2023-08-22

## 2023-08-22 ENCOUNTER — OFFICE VISIT (OUTPATIENT)
Dept: INTERNAL MEDICINE | Facility: CLINIC | Age: 88
End: 2023-08-22
Payer: MEDICARE

## 2023-08-22 VITALS
BODY MASS INDEX: 26.82 KG/M2 | DIASTOLIC BLOOD PRESSURE: 60 MMHG | SYSTOLIC BLOOD PRESSURE: 110 MMHG | HEIGHT: 74 IN | HEART RATE: 105 BPM | OXYGEN SATURATION: 100 % | TEMPERATURE: 97.3 F

## 2023-08-22 DIAGNOSIS — K92.2 LOWER GI BLEED: Primary | ICD-10-CM

## 2023-08-22 LAB
ALBUMIN SERPL-MCNC: 3.5 G/DL (ref 3.5–5.2)
ALBUMIN/GLOB SERPL: 1.2 G/DL
ALP SERPL-CCNC: 88 U/L (ref 39–117)
ALT SERPL-CCNC: 13 U/L (ref 1–41)
AST SERPL-CCNC: 12 U/L (ref 1–40)
BASOPHILS # BLD AUTO: 0.06 10*3/MM3 (ref 0–0.2)
BASOPHILS NFR BLD AUTO: 0.9 % (ref 0–1.5)
BILIRUB SERPL-MCNC: 0.4 MG/DL (ref 0–1.2)
BUN SERPL-MCNC: 12 MG/DL (ref 8–23)
BUN/CREAT SERPL: 10.2 (ref 7–25)
CALCIUM SERPL-MCNC: 11.7 MG/DL (ref 8.6–10.5)
CHLORIDE SERPL-SCNC: 94 MMOL/L (ref 98–107)
CO2 SERPL-SCNC: 28.5 MMOL/L (ref 22–29)
CREAT SERPL-MCNC: 1.18 MG/DL (ref 0.76–1.27)
EGFRCR SERPLBLD CKD-EPI 2021: 60.5 ML/MIN/1.73
EOSINOPHIL # BLD AUTO: 0.31 10*3/MM3 (ref 0–0.4)
EOSINOPHIL NFR BLD AUTO: 4.8 % (ref 0.3–6.2)
ERYTHROCYTE [DISTWIDTH] IN BLOOD BY AUTOMATED COUNT: 12.7 % (ref 12.3–15.4)
GLOBULIN SER CALC-MCNC: 3 GM/DL
GLUCOSE SERPL-MCNC: 94 MG/DL (ref 65–99)
HCT VFR BLD AUTO: 31 % (ref 37.5–51)
HGB BLD-MCNC: 9.8 G/DL (ref 13–17.7)
IMM GRANULOCYTES # BLD AUTO: 0.02 10*3/MM3 (ref 0–0.05)
IMM GRANULOCYTES NFR BLD AUTO: 0.3 % (ref 0–0.5)
LYMPHOCYTES # BLD AUTO: 1.21 10*3/MM3 (ref 0.7–3.1)
LYMPHOCYTES NFR BLD AUTO: 18.6 % (ref 19.6–45.3)
MCH RBC QN AUTO: 27.5 PG (ref 26.6–33)
MCHC RBC AUTO-ENTMCNC: 31.6 G/DL (ref 31.5–35.7)
MCV RBC AUTO: 86.8 FL (ref 79–97)
MONOCYTES # BLD AUTO: 0.73 10*3/MM3 (ref 0.1–0.9)
MONOCYTES NFR BLD AUTO: 11.2 % (ref 5–12)
NEUTROPHILS # BLD AUTO: 4.16 10*3/MM3 (ref 1.7–7)
NEUTROPHILS NFR BLD AUTO: 64.2 % (ref 42.7–76)
NRBC BLD AUTO-RTO: 0.2 /100 WBC (ref 0–0.2)
PLATELET # BLD AUTO: 373 10*3/MM3 (ref 140–450)
POTASSIUM SERPL-SCNC: 4.7 MMOL/L (ref 3.5–5.2)
PROT SERPL-MCNC: 6.5 G/DL (ref 6–8.5)
RBC # BLD AUTO: 3.57 10*6/MM3 (ref 4.14–5.8)
SODIUM SERPL-SCNC: 136 MMOL/L (ref 136–145)
WBC # BLD AUTO: 6.49 10*3/MM3 (ref 3.4–10.8)

## 2023-08-22 PROCEDURE — 1160F RVW MEDS BY RX/DR IN RCRD: CPT | Performed by: STUDENT IN AN ORGANIZED HEALTH CARE EDUCATION/TRAINING PROGRAM

## 2023-08-22 PROCEDURE — 99213 OFFICE O/P EST LOW 20 MIN: CPT | Performed by: STUDENT IN AN ORGANIZED HEALTH CARE EDUCATION/TRAINING PROGRAM

## 2023-08-22 PROCEDURE — 3078F DIAST BP <80 MM HG: CPT | Performed by: STUDENT IN AN ORGANIZED HEALTH CARE EDUCATION/TRAINING PROGRAM

## 2023-08-22 PROCEDURE — 1159F MED LIST DOCD IN RCRD: CPT | Performed by: STUDENT IN AN ORGANIZED HEALTH CARE EDUCATION/TRAINING PROGRAM

## 2023-08-22 PROCEDURE — 3074F SYST BP LT 130 MM HG: CPT | Performed by: STUDENT IN AN ORGANIZED HEALTH CARE EDUCATION/TRAINING PROGRAM
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
## 2023-08-29 ENCOUNTER — APPOINTMENT (OUTPATIENT)
Dept: GENERAL RADIOLOGY | Facility: HOSPITAL | Age: 88
DRG: 378 | End: 2023-08-29
Payer: MEDICARE

## 2023-08-29 ENCOUNTER — HOSPITAL ENCOUNTER (INPATIENT)
Facility: HOSPITAL | Age: 88
LOS: 4 days | Discharge: NURSING, REHAB OR LONG-TERM CARE | DRG: 378 | End: 2023-09-02
Attending: STUDENT IN AN ORGANIZED HEALTH CARE EDUCATION/TRAINING PROGRAM
Payer: MEDICARE

## 2023-08-29 ENCOUNTER — APPOINTMENT (OUTPATIENT)
Dept: CT IMAGING | Facility: HOSPITAL | Age: 88
DRG: 378 | End: 2023-08-29
Payer: MEDICARE

## 2023-08-29 DIAGNOSIS — R41.82 ALTERED MENTAL STATUS, UNSPECIFIED ALTERED MENTAL STATUS TYPE: ICD-10-CM

## 2023-08-29 DIAGNOSIS — K57.92 DIVERTICULITIS: Primary | ICD-10-CM

## 2023-08-29 DIAGNOSIS — R33.8 ACUTE URINARY RETENTION: Chronic | ICD-10-CM

## 2023-08-29 PROBLEM — K57.32 DIVERTICULITIS LARGE INTESTINE: Status: ACTIVE | Noted: 2023-08-29

## 2023-08-29 LAB
A-A DO2: 9.7 MMHG
ALBUMIN SERPL-MCNC: 3.4 G/DL (ref 3.5–5.2)
ALBUMIN/GLOB SERPL: 1 G/DL
ALP SERPL-CCNC: 93 U/L (ref 39–117)
ALT SERPL W P-5'-P-CCNC: 10 U/L (ref 1–41)
ANION GAP SERPL CALCULATED.3IONS-SCNC: 11.3 MMOL/L (ref 5–15)
ARTERIAL PATENCY WRIST A: POSITIVE
AST SERPL-CCNC: 15 U/L (ref 1–40)
ATMOSPHERIC PRESS: 730 MMHG
BACTERIA UR QL AUTO: ABNORMAL /HPF
BASE EXCESS BLDA CALC-SCNC: 5.5 MMOL/L (ref 0–2)
BASOPHILS # BLD AUTO: 0.04 10*3/MM3 (ref 0–0.2)
BASOPHILS NFR BLD AUTO: 0.5 % (ref 0–1.5)
BDY SITE: ABNORMAL
BILIRUB SERPL-MCNC: 0.4 MG/DL (ref 0–1.2)
BILIRUB UR QL STRIP: NEGATIVE
BUN SERPL-MCNC: 16 MG/DL (ref 8–23)
BUN/CREAT SERPL: 14 (ref 7–25)
CALCIUM SPEC-SCNC: 11.6 MG/DL (ref 8.6–10.5)
CHLORIDE SERPL-SCNC: 95 MMOL/L (ref 98–107)
CLARITY UR: CLEAR
CO2 SERPL-SCNC: 28.7 MMOL/L (ref 22–29)
COHGB MFR BLD: 2.5 % (ref 0–2)
COLOR UR: YELLOW
CREAT SERPL-MCNC: 1.14 MG/DL (ref 0.76–1.27)
CRP SERPL-MCNC: 4.31 MG/DL (ref 0–0.5)
D-LACTATE SERPL-SCNC: 1.5 MMOL/L (ref 0.5–2)
DEPRECATED RDW RBC AUTO: 43.4 FL (ref 37–54)
EGFRCR SERPLBLD CKD-EPI 2021: 63 ML/MIN/1.73
EOSINOPHIL # BLD AUTO: 0.12 10*3/MM3 (ref 0–0.4)
EOSINOPHIL NFR BLD AUTO: 1.5 % (ref 0.3–6.2)
ERYTHROCYTE [DISTWIDTH] IN BLOOD BY AUTOMATED COUNT: 13.6 % (ref 12.3–15.4)
FLUAV SUBTYP SPEC NAA+PROBE: NOT DETECTED
FLUBV RNA ISLT QL NAA+PROBE: NOT DETECTED
GAS FLOW AIRWAY: 2 LPM
GEN 5 2HR TROPONIN T REFLEX: 56 NG/L
GLOBULIN UR ELPH-MCNC: 3.4 GM/DL
GLUCOSE SERPL-MCNC: 119 MG/DL (ref 65–99)
GLUCOSE UR STRIP-MCNC: NEGATIVE MG/DL
HCO3 BLDA-SCNC: 30.7 MMOL/L (ref 22–28)
HCT VFR BLD AUTO: 31.6 % (ref 37.5–51)
HCT VFR BLD CALC: 32.2 %
HEMOCCULT STL QL: POSITIVE
HGB BLD-MCNC: 10.1 G/DL (ref 13–17.7)
HGB UR QL STRIP.AUTO: ABNORMAL
HYALINE CASTS UR QL AUTO: ABNORMAL /LPF
IMM GRANULOCYTES # BLD AUTO: 0.03 10*3/MM3 (ref 0–0.05)
IMM GRANULOCYTES NFR BLD AUTO: 0.4 % (ref 0–0.5)
KETONES UR QL STRIP: NEGATIVE
LEUKOCYTE ESTERASE UR QL STRIP.AUTO: NEGATIVE
LIPASE SERPL-CCNC: 11 U/L (ref 13–60)
LYMPHOCYTES # BLD AUTO: 0.88 10*3/MM3 (ref 0.7–3.1)
LYMPHOCYTES NFR BLD AUTO: 10.8 % (ref 19.6–45.3)
Lab: ABNORMAL
MAGNESIUM SERPL-MCNC: 1.8 MG/DL (ref 1.6–2.4)
MCH RBC QN AUTO: 27.9 PG (ref 26.6–33)
MCHC RBC AUTO-ENTMCNC: 32 G/DL (ref 31.5–35.7)
MCV RBC AUTO: 87.3 FL (ref 79–97)
METHGB BLD QL: 0.3 % (ref 0–1.5)
MODALITY: ABNORMAL
MONOCYTES # BLD AUTO: 0.96 10*3/MM3 (ref 0.1–0.9)
MONOCYTES NFR BLD AUTO: 11.8 % (ref 5–12)
NEUTROPHILS NFR BLD AUTO: 6.1 10*3/MM3 (ref 1.7–7)
NEUTROPHILS NFR BLD AUTO: 75 % (ref 42.7–76)
NITRITE UR QL STRIP: NEGATIVE
NRBC BLD AUTO-RTO: 0 /100 WBC (ref 0–0.2)
NT-PROBNP SERPL-MCNC: 701.9 PG/ML (ref 0–1800)
OXYHGB MFR BLDV: 94.1 % (ref 94–99)
PCO2 BLDA: 46.9 MM HG (ref 35–45)
PCO2 TEMP ADJ BLD: ABNORMAL MM[HG]
PH BLDA: 7.42 PH UNITS (ref 7.35–7.45)
PH UR STRIP.AUTO: 5.5 [PH] (ref 5–8)
PH, TEMP CORRECTED: ABNORMAL
PLATELET # BLD AUTO: 293 10*3/MM3 (ref 140–450)
PMV BLD AUTO: 9.8 FL (ref 6–12)
PO2 BLDA: 80.8 MM HG (ref 75–100)
PO2 TEMP ADJ BLD: ABNORMAL MM[HG]
POTASSIUM SERPL-SCNC: 3.7 MMOL/L (ref 3.5–5.2)
PROT SERPL-MCNC: 6.8 G/DL (ref 6–8.5)
PROT UR QL STRIP: ABNORMAL
RBC # BLD AUTO: 3.62 10*6/MM3 (ref 4.14–5.8)
RBC # UR STRIP: ABNORMAL /HPF
REF LAB TEST METHOD: ABNORMAL
SAO2 % BLDCOA: 96.8 % (ref 94–100)
SARS-COV-2 RNA RESP QL NAA+PROBE: NOT DETECTED
SODIUM SERPL-SCNC: 135 MMOL/L (ref 136–145)
SP GR UR STRIP: 1.02 (ref 1–1.03)
SQUAMOUS #/AREA URNS HPF: ABNORMAL /HPF
TROPONIN T DELTA: -1 NG/L
TROPONIN T SERPL HS-MCNC: 57 NG/L
UROBILINOGEN UR QL STRIP: ABNORMAL
VENTILATOR MODE: ABNORMAL
WBC # UR STRIP: ABNORMAL /HPF
WBC NRBC COR # BLD: 8.13 10*3/MM3 (ref 3.4–10.8)

## 2023-08-29 PROCEDURE — 25010000002 METRONIDAZOLE 500 MG/100ML SOLUTION: Performed by: STUDENT IN AN ORGANIZED HEALTH CARE EDUCATION/TRAINING PROGRAM

## 2023-08-29 PROCEDURE — 83605 ASSAY OF LACTIC ACID: CPT | Performed by: STUDENT IN AN ORGANIZED HEALTH CARE EDUCATION/TRAINING PROGRAM

## 2023-08-29 PROCEDURE — 99223 1ST HOSP IP/OBS HIGH 75: CPT | Performed by: STUDENT IN AN ORGANIZED HEALTH CARE EDUCATION/TRAINING PROGRAM

## 2023-08-29 PROCEDURE — 36415 COLL VENOUS BLD VENIPUNCTURE: CPT

## 2023-08-29 PROCEDURE — 99285 EMERGENCY DEPT VISIT HI MDM: CPT

## 2023-08-29 PROCEDURE — 82272 OCCULT BLD FECES 1-3 TESTS: CPT | Performed by: STUDENT IN AN ORGANIZED HEALTH CARE EDUCATION/TRAINING PROGRAM

## 2023-08-29 PROCEDURE — 82375 ASSAY CARBOXYHB QUANT: CPT

## 2023-08-29 PROCEDURE — 83050 HGB METHEMOGLOBIN QUAN: CPT

## 2023-08-29 PROCEDURE — 84484 ASSAY OF TROPONIN QUANT: CPT | Performed by: STUDENT IN AN ORGANIZED HEALTH CARE EDUCATION/TRAINING PROGRAM

## 2023-08-29 PROCEDURE — 82805 BLOOD GASES W/O2 SATURATION: CPT

## 2023-08-29 PROCEDURE — 25510000001 IOPAMIDOL 61 % SOLUTION: Performed by: STUDENT IN AN ORGANIZED HEALTH CARE EDUCATION/TRAINING PROGRAM

## 2023-08-29 PROCEDURE — 83880 ASSAY OF NATRIURETIC PEPTIDE: CPT | Performed by: STUDENT IN AN ORGANIZED HEALTH CARE EDUCATION/TRAINING PROGRAM

## 2023-08-29 PROCEDURE — 86140 C-REACTIVE PROTEIN: CPT | Performed by: STUDENT IN AN ORGANIZED HEALTH CARE EDUCATION/TRAINING PROGRAM

## 2023-08-29 PROCEDURE — 36600 WITHDRAWAL OF ARTERIAL BLOOD: CPT

## 2023-08-29 PROCEDURE — 25010000002 CEFTRIAXONE SODIUM-DEXTROSE 1-3.74 GM-%(50ML) RECONSTITUTED SOLUTION: Performed by: STUDENT IN AN ORGANIZED HEALTH CARE EDUCATION/TRAINING PROGRAM

## 2023-08-29 PROCEDURE — 87636 SARSCOV2 & INF A&B AMP PRB: CPT | Performed by: STUDENT IN AN ORGANIZED HEALTH CARE EDUCATION/TRAINING PROGRAM

## 2023-08-29 PROCEDURE — 93005 ELECTROCARDIOGRAM TRACING: CPT | Performed by: STUDENT IN AN ORGANIZED HEALTH CARE EDUCATION/TRAINING PROGRAM

## 2023-08-29 PROCEDURE — 83735 ASSAY OF MAGNESIUM: CPT | Performed by: STUDENT IN AN ORGANIZED HEALTH CARE EDUCATION/TRAINING PROGRAM

## 2023-08-29 PROCEDURE — 74177 CT ABD & PELVIS W/CONTRAST: CPT

## 2023-08-29 PROCEDURE — 83690 ASSAY OF LIPASE: CPT | Performed by: STUDENT IN AN ORGANIZED HEALTH CARE EDUCATION/TRAINING PROGRAM

## 2023-08-29 PROCEDURE — 85025 COMPLETE CBC W/AUTO DIFF WBC: CPT | Performed by: STUDENT IN AN ORGANIZED HEALTH CARE EDUCATION/TRAINING PROGRAM

## 2023-08-29 PROCEDURE — 81001 URINALYSIS AUTO W/SCOPE: CPT | Performed by: STUDENT IN AN ORGANIZED HEALTH CARE EDUCATION/TRAINING PROGRAM

## 2023-08-29 PROCEDURE — 71045 X-RAY EXAM CHEST 1 VIEW: CPT

## 2023-08-29 PROCEDURE — 70450 CT HEAD/BRAIN W/O DYE: CPT

## 2023-08-29 PROCEDURE — 80053 COMPREHEN METABOLIC PANEL: CPT | Performed by: STUDENT IN AN ORGANIZED HEALTH CARE EDUCATION/TRAINING PROGRAM

## 2023-08-29 RX ORDER — METRONIDAZOLE 500 MG/100ML
500 INJECTION, SOLUTION INTRAVENOUS ONCE
Status: COMPLETED | OUTPATIENT
Start: 2023-08-29 | End: 2023-08-30

## 2023-08-29 RX ORDER — CEFTRIAXONE 1 G/50ML
1000 INJECTION, SOLUTION INTRAVENOUS ONCE
Status: COMPLETED | OUTPATIENT
Start: 2023-08-29 | End: 2023-08-29

## 2023-08-29 RX ADMIN — METRONIDAZOLE 500 MG: 5 INJECTION, SOLUTION INTRAVENOUS at 23:01

## 2023-08-29 RX ADMIN — CEFTRIAXONE 1000 MG: 1 INJECTION, SOLUTION INTRAVENOUS at 22:56

## 2023-08-29 RX ADMIN — IOPAMIDOL 100 ML: 612 INJECTION, SOLUTION INTRAVENOUS at 21:53

## 2023-08-30 PROCEDURE — 25010000002 METRONIDAZOLE 500 MG/100ML SOLUTION: Performed by: INTERNAL MEDICINE

## 2023-08-30 PROCEDURE — 25010000002 PIPERACILLIN SOD-TAZOBACTAM PER 1 G: Performed by: STUDENT IN AN ORGANIZED HEALTH CARE EDUCATION/TRAINING PROGRAM

## 2023-08-30 PROCEDURE — 25010000002 CEFTRIAXONE SODIUM-DEXTROSE 1-3.74 GM-%(50ML) RECONSTITUTED SOLUTION: Performed by: INTERNAL MEDICINE

## 2023-08-30 PROCEDURE — 99232 SBSQ HOSP IP/OBS MODERATE 35: CPT | Performed by: INTERNAL MEDICINE

## 2023-08-30 PROCEDURE — 97166 OT EVAL MOD COMPLEX 45 MIN: CPT

## 2023-08-30 PROCEDURE — 97162 PT EVAL MOD COMPLEX 30 MIN: CPT

## 2023-08-30 PROCEDURE — 99221 1ST HOSP IP/OBS SF/LOW 40: CPT | Performed by: PHYSICIAN ASSISTANT

## 2023-08-30 RX ORDER — ACETAMINOPHEN 650 MG/1
650 SUPPOSITORY RECTAL EVERY 4 HOURS PRN
Status: DISCONTINUED | OUTPATIENT
Start: 2023-08-30 | End: 2023-09-02 | Stop reason: HOSPADM

## 2023-08-30 RX ORDER — CEFTRIAXONE 1 G/50ML
1000 INJECTION, SOLUTION INTRAVENOUS EVERY 24 HOURS
Status: DISCONTINUED | OUTPATIENT
Start: 2023-08-30 | End: 2023-09-02 | Stop reason: HOSPADM

## 2023-08-30 RX ORDER — SODIUM CHLORIDE 9 MG/ML
125 INJECTION, SOLUTION INTRAVENOUS CONTINUOUS
Status: DISCONTINUED | OUTPATIENT
Start: 2023-08-30 | End: 2023-08-30

## 2023-08-30 RX ORDER — ACETAMINOPHEN 160 MG/5ML
650 SOLUTION ORAL EVERY 4 HOURS PRN
Status: DISCONTINUED | OUTPATIENT
Start: 2023-08-30 | End: 2023-09-02 | Stop reason: HOSPADM

## 2023-08-30 RX ORDER — NITROGLYCERIN 0.4 MG/1
0.4 TABLET SUBLINGUAL
Status: DISCONTINUED | OUTPATIENT
Start: 2023-08-30 | End: 2023-09-02 | Stop reason: HOSPADM

## 2023-08-30 RX ORDER — BISACODYL 5 MG/1
5 TABLET, DELAYED RELEASE ORAL DAILY PRN
Status: DISCONTINUED | OUTPATIENT
Start: 2023-08-30 | End: 2023-09-02 | Stop reason: HOSPADM

## 2023-08-30 RX ORDER — SODIUM CHLORIDE 0.9 % (FLUSH) 0.9 %
10 SYRINGE (ML) INJECTION EVERY 12 HOURS SCHEDULED
Status: DISCONTINUED | OUTPATIENT
Start: 2023-08-30 | End: 2023-09-02 | Stop reason: HOSPADM

## 2023-08-30 RX ORDER — ACETAMINOPHEN 325 MG/1
650 TABLET ORAL EVERY 4 HOURS PRN
Status: DISCONTINUED | OUTPATIENT
Start: 2023-08-30 | End: 2023-09-02 | Stop reason: HOSPADM

## 2023-08-30 RX ORDER — BISOPROLOL FUMARATE 5 MG/1
10 TABLET, FILM COATED ORAL DAILY
Status: DISCONTINUED | OUTPATIENT
Start: 2023-08-30 | End: 2023-09-02 | Stop reason: HOSPADM

## 2023-08-30 RX ORDER — SODIUM CHLORIDE 9 MG/ML
40 INJECTION, SOLUTION INTRAVENOUS AS NEEDED
Status: DISCONTINUED | OUTPATIENT
Start: 2023-08-30 | End: 2023-09-02 | Stop reason: HOSPADM

## 2023-08-30 RX ORDER — SODIUM CHLORIDE 0.9 % (FLUSH) 0.9 %
10 SYRINGE (ML) INJECTION AS NEEDED
Status: DISCONTINUED | OUTPATIENT
Start: 2023-08-30 | End: 2023-09-02 | Stop reason: HOSPADM

## 2023-08-30 RX ORDER — BISACODYL 10 MG
10 SUPPOSITORY, RECTAL RECTAL DAILY PRN
Status: DISCONTINUED | OUTPATIENT
Start: 2023-08-30 | End: 2023-09-02 | Stop reason: HOSPADM

## 2023-08-30 RX ORDER — ONDANSETRON 2 MG/ML
4 INJECTION INTRAMUSCULAR; INTRAVENOUS EVERY 6 HOURS PRN
Status: DISCONTINUED | OUTPATIENT
Start: 2023-08-30 | End: 2023-09-02 | Stop reason: HOSPADM

## 2023-08-30 RX ORDER — METRONIDAZOLE 500 MG/100ML
500 INJECTION, SOLUTION INTRAVENOUS EVERY 8 HOURS
Status: DISCONTINUED | OUTPATIENT
Start: 2023-08-30 | End: 2023-09-02 | Stop reason: HOSPADM

## 2023-08-30 RX ORDER — AMOXICILLIN 250 MG
2 CAPSULE ORAL 2 TIMES DAILY
Status: DISCONTINUED | OUTPATIENT
Start: 2023-08-30 | End: 2023-09-02 | Stop reason: HOSPADM

## 2023-08-30 RX ORDER — DILTIAZEM HYDROCHLORIDE 180 MG/1
180 CAPSULE, COATED, EXTENDED RELEASE ORAL DAILY
Status: DISCONTINUED | OUTPATIENT
Start: 2023-08-30 | End: 2023-09-02 | Stop reason: HOSPADM

## 2023-08-30 RX ORDER — POLYETHYLENE GLYCOL 3350 17 G/17G
17 POWDER, FOR SOLUTION ORAL DAILY PRN
Status: DISCONTINUED | OUTPATIENT
Start: 2023-08-30 | End: 2023-09-02 | Stop reason: HOSPADM

## 2023-08-30 RX ADMIN — ACETAMINOPHEN 650 MG: 325 TABLET, FILM COATED ORAL at 11:54

## 2023-08-30 RX ADMIN — BISOPROLOL FUMARATE 10 MG: 5 TABLET ORAL at 13:01

## 2023-08-30 RX ADMIN — DILTIAZEM HYDROCHLORIDE 180 MG: 180 CAPSULE, COATED, EXTENDED RELEASE ORAL at 11:54

## 2023-08-30 RX ADMIN — ACETAMINOPHEN 650 MG: 325 TABLET, FILM COATED ORAL at 16:57

## 2023-08-30 RX ADMIN — Medication 10 ML: at 19:46

## 2023-08-30 RX ADMIN — Medication 10 ML: at 02:22

## 2023-08-30 RX ADMIN — SODIUM CHLORIDE 125 ML/HR: 9 INJECTION, SOLUTION INTRAVENOUS at 02:22

## 2023-08-30 RX ADMIN — SENNOSIDES AND DOCUSATE SODIUM 2 TABLET: 50; 8.6 TABLET ORAL at 19:45

## 2023-08-30 RX ADMIN — CEFTRIAXONE 1000 MG: 1 INJECTION, SOLUTION INTRAVENOUS at 16:42

## 2023-08-30 RX ADMIN — METRONIDAZOLE 500 MG: 5 INJECTION, SOLUTION INTRAVENOUS at 23:40

## 2023-08-30 RX ADMIN — BISACODYL 5 MG: 5 TABLET, COATED ORAL at 19:45

## 2023-08-30 RX ADMIN — PIPERACILLIN SODIUM AND TAZOBACTAM SODIUM 3.38 G: 3; .375 INJECTION, SOLUTION INTRAVENOUS at 08:27

## 2023-08-30 RX ADMIN — PIPERACILLIN SODIUM AND TAZOBACTAM SODIUM 3.38 G: 3; .375 INJECTION, SOLUTION INTRAVENOUS at 02:22

## 2023-08-30 RX ADMIN — METRONIDAZOLE 500 MG: 5 INJECTION, SOLUTION INTRAVENOUS at 16:57

## 2023-08-30 NOTE — H&P
[facility]   HISTORY AND [facility]YS[facility]      Name:  Ed Spear   Age:  87 y.o.  Sex:  male  :  1935  MRN:  [id number]   Visit Number:  [id number]  Admission Date:  2023  Date Of Service:  23  Primary Care Physician:  Chelo Nixon DO    Chief Complaint:     Bright red blood per rectum    History Of Presenting Illness:      Patient is an 87-year-old man with past medical history of atrial fibrillation on Eliquis, COPD on 3 L baseline, BPH, hypertension, diverticulosis.  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  Presented to [facility] ED on 2023 with concern for mitten bright red bloody stools per rectum.  Family says that they were going to follow-up with a primary care doctor, but decided to come to the ED because he was acting intermittently confused.  He says he does have right lower quadrant abdominal pain.  Denied fevers or chills, shortness of air, chest pain, nausea or vomiting.    ED summary: Afebrile, vital signs stable on room air.  EKG atrial fibrillation, no ST elevations or depressions.  High-sensitivity troponin 57, 56, ACS not suspected.  Hemoglobin 10.1, most recent 9.8 on .  COVID/flu negative.  FOBT positive.  CT abdomen/pelvis inflammatory changes distal sigmoid colon, suspect diverticulitis, significant fecal impaction of the rectosigmoid colon.  For diverticulitis he was was provided Rocephin, metronidazole.    Review Of Systems:    All systems were reviewed and negative except as mentioned in history of presenting illness, assessment and plan.    Past Medical History: Patient  has a past medical history of A-fib, Acute sinusitis, Allergic rhinitis, Ankle fracture, Arthritis, degenerative, BMI 28.0-28.9,adult, COPD (chronic obstructive pulmonary disease), Cough, Enlarged prostate without lower urinary tract  symptoms (luts), Hyperlipidemia, Hypertension, Obstructive chronic bronchitis with exacerbation, Shortness of breath, Skin cancer, Tinnitus of both ears, Vitamin D deficiency, and Wears dentures.    Past Surgical History: Patient  has a past surgical history that includes Cataract extraction; Esophagogastroduodenoscopy (N/A, 7/28/2023); Colonoscopy (N/A, 7/28/2023); and Colonoscopy (N/A, 8/2/2023).    Social History: Patient  reports that he has been smoking cigarettes. He has a 90.00 pack-year smoking history. He has been exposed to tobacco smoke. He has never used smokeless tobacco. He reports that he does not drink alcohol and does not use drugs.    Family History:  Patient's family history has been reviewed and found to be noncontributory.     Allergies:      Patient has no known allergies.    Home Medications:    Prior to Admission Medications       Prescriptions Last Dose Informant Patient Reported? Taking?    acetaminophen (TYLENOL) 325 MG tablet   Yes No    Take 1 tablet by mouth Every 6 (Six) Hours As Needed.    albuterol sulfate  (90 Base) MCG/ACT inhaler   No No    INHALE 2 PUFFS BY MOUTH EVERY 4 HOURS AS NEEDED FOR WHEEZING OR SHORTNESS OF AIR    apixaban (Eliquis) 2.5 MG tablet tablet  Self No No    Take 1 tablet by mouth Every 12 (Twelve) Hours.    bisoprolol (ZEBeta) 10 MG tablet   No No    TAKE ONE TABLET BY MOUTH DAILY    Cartia  MG 24 hr capsule   No No    Take 1 capsule by mouth Daily.    DULoxetine (CYMBALTA) 60 MG capsule   No No    Take 1 capsule by mouth Daily.    famotidine (PEPCID) 40 MG tablet   No No    Take 1 tablet by mouth At Night As Needed for Heartburn.    ipratropium-albuterol (DUO-NEB) 0.5-2.5 mg/3 ml nebulizer   No No    INHALE THREE MILLILITERS ( ONE VIAL )  VIA NEBULIZATION BY MOUTH FOUR TIMES A DAY    sennosides-docusate (PERICOLACE) 8.6-50 MG per tablet   No No    Take 1 tablet by mouth 2 (Two) Times a Day.    simvastatin (ZOCOR) 40 MG tablet   No No    Take 1  "tablet by mouth Every Night.    Symbicort 160-4.5 MCG/ACT inhaler   No No    INHALE 2 PUFFS BY MOUTH TWICE A DAY    tamsulosin (FLOMAX) 0.4 MG capsule 24 hr capsule   No No    Take 1 capsule by mouth Daily.          ED Medications:    Medications   metroNIDAZOLE (FLAGYL) IVPB 500 mg (500 mg Intravenous New Bag 8/29/23 2301)   iopamidol (ISOVUE-300) 61 % injection 100 mL (100 mL Intravenous Given 8/29/23 2153)   cefTRIAXone (ROCEPHIN) IVPB 1000 mg/50ml dextrose (premix) (0 mg Intravenous Stopped 8/29/23 2301)     Vital Signs:  Temp:  [97.8 °F (36.6 °C)] 97.8 °F (36.6 °C)  Heart Rate:  [85-98] 98  Resp:  [16] 16  BP: (108-124)/(54-78) 124/68        08/29/23 2039   Weight: 94.3 kg (208 lb)     Body mass index is 26.71 kg/m².    Physical Exam:     Most recent vital Signs: /68   Pulse 98   Temp 97.8 °F (36.6 °C)   Resp 16   Ht 188 cm (74\")   Wt 94.3 kg (208 lb)   SpO2 99%   BMI 26.71 kg/m²     Physical Exam  Constitutional:       General: He is not in acute distress.     Appearance: He is not ill-appearing.   HENT:      Mouth/Throat:      Mouth: Mucous membranes are moist.   Eyes:      Extraocular Movements: Extraocular movements intact.   Cardiovascular:      Rate and Rhythm: Normal rate. Rhythm irregular.      Pulses: Normal pulses.      Heart sounds: Normal heart sounds.   Pulmonary:      Effort: Pulmonary effort is normal.      Breath sounds: Normal breath sounds.   Abdominal:      Palpations: Abdomen is soft.      Tenderness: There is no abdominal tenderness.   Musculoskeletal:      Right lower leg: No edema.      Left lower leg: No edema.   Skin:     General: Skin is warm.   Neurological:      General: No focal deficit present.      Mental Status: He is alert.      Comments: Oriented to self and place   Psychiatric:         Mood and Affect: Mood normal.         Thought Content: Thought content normal.       Laboratory data:    I have reviewed the labs done in the emergency room.    Results from last 7 " days   Lab Units 08/29/23 2036   SODIUM mmol/L 135*   POTASSIUM mmol/L 3.7   CHLORIDE mmol/L 95*   CO2 mmol/L 28.7   BUN mg/dL 16   CREATININE mg/dL 1.14   CALCIUM mg/dL 11.6*   BILIRUBIN mg/dL 0.4   ALK PHOS U/L 93   ALT (SGPT) U/L 10   AST (SGOT) U/L 15   GLUCOSE mg/dL 119*     Results from last 7 days   Lab Units 08/29/23 2036   WBC 10*3/mm3 8.13   HEMOGLOBIN g/dL 10.1*   HEMATOCRIT % 31.6*   PLATELETS 10*3/mm3 293         Results from last 7 days   Lab Units 08/29/23 2237 08/29/23 2036   HSTROP T ng/L 56* 57*     Results from last 7 days   Lab Units 08/29/23 2036   PROBNP pg/mL 701.9         Results from last 7 days   Lab Units 08/29/23 2036   LIPASE U/L 11*     Results from last 7 days   Lab Units 08/29/23 2046   PH, ARTERIAL pH units 7.424   PO2 ART mm Hg 80.8   PCO2, ARTERIAL mm Hg 46.9*   HCO3 ART mmol/L 30.7*     Results from last 7 days   Lab Units 08/29/23 2115   COLOR UA  Yellow   GLUCOSE UA  Negative   KETONES UA  Negative   LEUKOCYTES UA  Negative   PH, URINE  5.5   BILIRUBIN UA  Negative   UROBILINOGEN UA  1.0 E.U./dL   RBC UA /HPF 13-20*   WBC UA /HPF None Seen       Pain Management Panel           No data to display                EKG:      EKG atrial fibrillation, no ST elevations or depressions.      Radiology:    CT Head Without Contrast    Result Date: 8/29/2023  FINAL REPORT CLINICAL HISTORY: Mental status change, unknown cause FINDINGS: Moderate atrophy and chronic ischemic white matter changes are noted.     No cortical edema is present.  There is no mass or hemorrhage.  Ventricles are normal.  Bone windows show no skull fracture or obvious obstructive lesion.     1.  No acute intracranial abnormality or obvious mass.  2. Atrophy and chronic ischemic white matter changes as above. Authenticated and Electronically Signed by KEYONNA Smith MD on 08/29/2023 10:21:40 PM    CT Abdomen Pelvis With Contrast    Result Date: 8/29/2023  FINAL REPORT TECHNIQUE: Oral and IV contrast enhanced  exam CLINICAL HISTORY: RLQ abdominal pain (Age >= 14y) FINDINGS: Abdomen: Lung bases are clear.  There is probable cholelithiasis.  Liver has an unremarkable CT appearance.  There is a mass in the posterior spleen which is nonspecific but measures up to 23 mm.  The  pancreas and adrenal glands are unremarkable.  There are bilateral renal cysts.  Kidneys show no mass or obstruction. No bowel obstruction or fluid collection is seen.  Pelvis: The appendix is normal.  There is significant fecal impaction of the rectal vault.  There is mild inflammatory change of the distal sigmoid colon with stranding extending into the sigmoid mesial colon, probably due to diverticulitis.  No fluid collection or adenopathy is seen.     1.  No evidence of appendicitis or bowel obstruction.  2. Inflammatory change of the distal sigmoid colon, suspect diverticulitis.  3.  Significant fecal impaction of the rectosigmoid colon.  4.  Cholelithiasis. Authenticated and Electronically Signed by KEYONNA Smith MD on 08/29/2023 10:21:13 PM     Assessment/Plan:    Inpatient general floor admission 8/29/2023 with rectal bleeding secondary to diverticulitis, elevated troponin demand ischemia ACS not suspected.  After admission gross hematuria noticed by nursing.    At time of admission patient resting comfortably in bed, pleasantly conversational.    Attempted to contact family via phone, no answer.    GI bleed  Diverticulitis  Fecal impaction  Zosyn started 8/30.  IVF.  Gastroenterology consultation, recommendations appreciated.   Hemoglobin stable.  Caution regarding fecal disimpaction due to bleed.    Elevated troponin  [place] demand ischemia.    Hematuria  Nursing noted clots from urethra.  Urology consultation.    Chronic:  atrial fibrillation on Eliquis, COPD on 3 L baseline, BPH, hypertension, diverticulosis.    Hold Eliquis due to lower GI bleed.    Continue other home medications.    Risk Assessment: High  DVT Prophylaxis: SCDs  Code  Status: DNR/DNI  Diet: N.p.o.    Advance Care Planning   ACP discussion was held with the patient during this visit. Patient does not have an advance directive, declines further assistance.           Brian Joseph Kerley, DO  08/29/23  23:47 EDT    Dictated utilizing Dragon dictation.

## 2023-08-30 NOTE — PLAN OF CARE
Goal Outcome Evaluation:  Plan of Care Reviewed With: patient, daughter        Progress: no change  Outcome Evaluation: OT eval completed. Patient supine in bed, Ox3 with VCs, c/o abdominal pain, HR is noted to be tachy. Daughter provides most history. Patient performed supine to sit with mod A x 2, sat EOB briefly, HR increased to 153. Returned to supine, required max A x 2 for rolling in bed, TD for perineal hygiene and brief change. Patient scooted up in bed max A x 2 and placed in fowlers. Notified RN when brief was changed urine was blood tinged,  following activity. Patient is expected to benefit from continued OT services prior to DC. Patient may benefit from STR as his daughter is having more difficulty taking care of him d/t her own health issues.      Anticipated Discharge Disposition (OT): inpatient rehabilitation facility

## 2023-08-30 NOTE — PAYOR COMM NOTE
"TO:[facility]  FROM:ROBERTO SANTOS, RN PHONE [phone]  CLINICALS    Ed Spear (87 y.o. Male)       Date of Birth   1935    Social Security Number       Address   [address] [place] [address]    Home Phone   [phone]    MRN   [id number]       Advent   None    Marital Status                               Admission Date   23    Admission Type   Emergency    Admitting Provider       Attending Provider   Ottoniel Alfaro DO    Department, Room/Bed   Psychiatric TELEMETRY SDS OVERFLOW, T01       Discharge Date       Discharge Disposition       Discharge Destination                                 Attending Provider: Ottoniel Alfaro DO    Allergies: No Known Allergies    Isolation: None   Infection: None   Code Status: No CPR    Ht: 188 cm (74\")   Wt: 84.6 kg (186 lb 8.2 oz)    Admission Cmt: None   Principal Problem: Diverticulitis large intestine [K57.32]                   Active Insurance as of 2023       Primary Coverage       Payor Plan Insurance Group Employer/Plan Group    [facility] MEDICARE REPLACEMENT        Payor Plan Address Payor Plan Phone Number Payor Plan Fax Number Effective Dates    [address] [address] [phone]  2022 - None Entered    [facility] [address]         Subscriber Name Subscriber Birth Date Member ID       ED SPEAR [id number]                     Emergency Contacts        (Rel.) Home Phone Work Phone Mobile Phone    Farida Cano (Daughter) -- -- [phone]    NATALIYA BROOKS (Daughter) -- -- --    Mike Spear (Son) [phone] -- --                 History & Physical        Kerley, Brian Joseph, DO at 23 2347            Psychiatric HOSPITALIST   HISTORY AND PHYSICAL      Name:  Ed Spear   Age:  87 y.o.  Sex:  male  :  1935  MRN:  [id number]   Visit Number:  [id number]  Admission Date:  2023  Date Of Service:  " 08/29/23  Primary Care Physician:  Chelo Nixon DO    Chief Complaint:     Bright red blood per rectum    History Of Presenting Illness:      Patient is an 87-year-old man with past medical history of atrial fibrillation on Eliquis, COPD on 3 L baseline, BPH, hypertension, diverticulosis.  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  Presented to [facility] ED on 8/29/2023 with concern for mitten bright red bloody stools per rectum.  Family says that they were going to follow-up with a primary care doctor, but decided to come to the ED because he was acting intermittently confused.  He says he does have right lower quadrant abdominal pain.  Denied fevers or chills, shortness of air, chest pain, nausea or vomiting.    ED summary: Afebrile, vital signs stable on room air.  EKG atrial fibrillation, no ST elevations or depressions.  High-sensitivity troponin 57, 56, ACS not suspected.  Hemoglobin 10.1, most recent 9.8 on 8/22.  COVID/flu negative.  FOBT positive.  CT abdomen/pelvis inflammatory changes distal sigmoid colon, suspect diverticulitis, significant fecal impaction of the rectosigmoid colon.  For diverticulitis he was was provided Rocephin, metronidazole.    Review Of Systems:    All systems were reviewed and negative except as mentioned in history of presenting illness, assessment and plan.    Past Medical History: Patient  has a past medical history of A-fib, Acute sinusitis, Allergic rhinitis, Ankle fracture, Arthritis, degenerative, BMI 28.0-28.9,adult, COPD (chronic obstructive pulmonary disease), Cough, Enlarged prostate without lower urinary tract symptoms (luts), Hyperlipidemia, Hypertension, Obstructive chronic bronchitis with exacerbation, Shortness of breath, Skin cancer, Tinnitus of both ears, Vitamin D deficiency, and Wears dentures.    Past Surgical History:  Patient  has a past surgical history that includes Cataract extraction; Esophagogastroduodenoscopy (N/A, 7/28/2023); Colonoscopy (N/A, 7/28/2023); and Colonoscopy (N/A, 8/2/2023).    Social History: Patient  reports that he has been smoking cigarettes. He has a 90.00 pack-year smoking history. He has been exposed to tobacco smoke. He has never used smokeless tobacco. He reports that he does not drink alcohol and does not use drugs.    Family History:  Patient's family history has been reviewed and found to be noncontributory.     Allergies:      Patient has no known allergies.    Home Medications:    Prior to Admission Medications       Prescriptions Last Dose Informant Patient Reported? Taking?    acetaminophen (TYLENOL) 325 MG tablet   Yes No    Take 1 tablet by mouth Every 6 (Six) Hours As Needed.    albuterol sulfate  (90 Base) MCG/ACT inhaler   No No    INHALE 2 PUFFS BY MOUTH EVERY 4 HOURS AS NEEDED FOR WHEEZING OR SHORTNESS OF AIR    apixaban (Eliquis) 2.5 MG tablet tablet  Self No No    Take 1 tablet by mouth Every 12 (Twelve) Hours.    bisoprolol (ZEBeta) 10 MG tablet   No No    TAKE ONE TABLET BY MOUTH DAILY    Cartia  MG 24 hr capsule   No No    Take 1 capsule by mouth Daily.    DULoxetine (CYMBALTA) 60 MG capsule   No No    Take 1 capsule by mouth Daily.    famotidine (PEPCID) 40 MG tablet   No No    Take 1 tablet by mouth At Night As Needed for Heartburn.    ipratropium-albuterol (DUO-NEB) 0.5-2.5 mg/3 ml nebulizer   No No    INHALE THREE MILLILITERS ( ONE VIAL )  VIA NEBULIZATION BY MOUTH FOUR TIMES A DAY    sennosides-docusate (PERICOLACE) 8.6-50 MG per tablet   No No    Take 1 tablet by mouth 2 (Two) Times a Day.    simvastatin (ZOCOR) 40 MG tablet   No No    Take 1 tablet by mouth Every Night.    Symbicort 160-4.5 MCG/ACT inhaler   No No    INHALE 2 PUFFS BY MOUTH TWICE A DAY    tamsulosin (FLOMAX) 0.4 MG capsule 24 hr capsule   No No    Take 1 capsule by mouth Daily.          ED  "Medications:    Medications   metroNIDAZOLE (FLAGYL) IVPB 500 mg (500 mg Intravenous New Bag 8/29/23 2301)   iopamidol (ISOVUE-300) 61 % injection 100 mL (100 mL Intravenous Given 8/29/23 2153)   cefTRIAXone (ROCEPHIN) IVPB 1000 mg/50ml dextrose (premix) (0 mg Intravenous Stopped 8/29/23 2301)     Vital Signs:  Temp:  [97.8 °F (36.6 °C)] 97.8 °F (36.6 °C)  Heart Rate:  [85-98] 98  Resp:  [16] 16  BP: (108-124)/(54-78) 124/68        08/29/23 2039   Weight: 94.3 kg (208 lb)     Body mass index is 26.71 kg/m².    Physical Exam:     Most recent vital Signs: /68   Pulse 98   Temp 97.8 °F (36.6 °C)   Resp 16   Ht 188 cm (74\")   Wt 94.3 kg (208 lb)   SpO2 99%   BMI 26.71 kg/m²     Physical Exam  Constitutional:       General: He is not in acute distress.     Appearance: He is not ill-appearing.   HENT:      Mouth/Throat:      Mouth: Mucous membranes are moist.   Eyes:      Extraocular Movements: Extraocular movements intact.   Cardiovascular:      Rate and Rhythm: Normal rate. Rhythm irregular.      Pulses: Normal pulses.      Heart sounds: Normal heart sounds.   Pulmonary:      Effort: Pulmonary effort is normal.      Breath sounds: Normal breath sounds.   Abdominal:      Palpations: Abdomen is soft.      Tenderness: There is no abdominal tenderness.   Musculoskeletal:      Right lower leg: No edema.      Left lower leg: No edema.   Skin:     General: Skin is warm.   Neurological:      General: No focal deficit present.      Mental Status: He is alert.      Comments: Oriented to self and place   Psychiatric:         Mood and Affect: Mood normal.         Thought Content: Thought content normal.       Laboratory data:    I have reviewed the labs done in the emergency room.    Results from last 7 days   Lab Units 08/29/23 2036   SODIUM mmol/L 135*   POTASSIUM mmol/L 3.7   CHLORIDE mmol/L 95*   CO2 mmol/L 28.7   BUN mg/dL 16   CREATININE mg/dL 1.14   CALCIUM mg/dL 11.6*   BILIRUBIN mg/dL 0.4   ALK PHOS U/L 93 "   ALT (SGPT) U/L 10   AST (SGOT) U/L 15   GLUCOSE mg/dL 119*     Results from last 7 days   Lab Units 08/29/23 2036   WBC 10*3/mm3 8.13   HEMOGLOBIN g/dL 10.1*   HEMATOCRIT % 31.6*   PLATELETS 10*3/mm3 293         Results from last 7 days   Lab Units 08/29/23  2237 08/29/23 2036   HSTROP T ng/L 56* 57*     Results from last 7 days   Lab Units 08/29/23 2036   PROBNP pg/mL 701.9         Results from last 7 days   Lab Units 08/29/23 2036   LIPASE U/L 11*     Results from last 7 days   Lab Units 08/29/23  2046   PH, ARTERIAL pH units 7.424   PO2 ART mm Hg 80.8   PCO2, ARTERIAL mm Hg 46.9*   HCO3 ART mmol/L 30.7*     Results from last 7 days   Lab Units 08/29/23 2115   COLOR UA  Yellow   GLUCOSE UA  Negative   KETONES UA  Negative   LEUKOCYTES UA  Negative   PH, URINE  5.5   BILIRUBIN UA  Negative   UROBILINOGEN UA  1.0 E.U./dL   RBC UA /HPF 13-20*   WBC UA /HPF None Seen       Pain Management Panel           No data to display                EKG:      EKG atrial fibrillation, no ST elevations or depressions.      Radiology:    CT Head Without Contrast    Result Date: 8/29/2023  FINAL REPORT CLINICAL HISTORY: Mental status change, unknown cause FINDINGS: Moderate atrophy and chronic ischemic white matter changes are noted.     No cortical edema is present.  There is no mass or hemorrhage.  Ventricles are normal.  Bone windows show no skull fracture or obvious obstructive lesion.     1.  No acute intracranial abnormality or obvious mass.  2. Atrophy and chronic ischemic white matter changes as above. Authenticated and Electronically Signed by KEYONNA Smith MD on 08/29/2023 10:21:40 PM    CT Abdomen Pelvis With Contrast    Result Date: 8/29/2023  FINAL REPORT TECHNIQUE: Oral and IV contrast enhanced exam CLINICAL HISTORY: RLQ abdominal pain (Age >= 14y) FINDINGS: Abdomen: Lung bases are clear.  There is probable cholelithiasis.  Liver has an unremarkable CT appearance.  There is a mass in the posterior spleen which  is nonspecific but measures up to 23 mm.  The  pancreas and adrenal glands are unremarkable.  There are bilateral renal cysts.  Kidneys show no mass or obstruction. No bowel obstruction or fluid collection is seen.  Pelvis: The appendix is normal.  There is significant fecal impaction of the rectal vault.  There is mild inflammatory change of the distal sigmoid colon with stranding extending into the sigmoid mesial colon, probably due to diverticulitis.  No fluid collection or adenopathy is seen.     1.  No evidence of appendicitis or bowel obstruction.  2. Inflammatory change of the distal sigmoid colon, suspect diverticulitis.  3.  Significant fecal impaction of the rectosigmoid colon.  4.  Cholelithiasis. Authenticated and Electronically Signed by KEYONNA Smith MD on 08/29/2023 10:21:13 PM     Assessment/Plan:    Inpatient general floor admission 8/29/2023 with rectal bleeding secondary to diverticulitis, elevated troponin demand ischemia ACS not suspected.  After admission gross hematuria noticed by nursing.    At time of admission patient resting comfortably in bed, pleasantly conversational.    Attempted to contact family via phone, no answer.    GI bleed  Diverticulitis  Fecal impaction  Zosyn started 8/30.  IVF.  Gastroenterology consultation, recommendations appreciated.   Hemoglobin stable.  Caution regarding fecal disimpaction due to bleed.    Elevated troponin  Amilcar demand ischemia.    Hematuria  Nursing noted clots from urethra.  Urology consultation.    Chronic:  atrial fibrillation on Eliquis, COPD on 3 L baseline, BPH, hypertension, diverticulosis.    Hold Eliquis due to lower GI bleed.    Continue other home medications.    Risk Assessment: High  DVT Prophylaxis: SCDs  Code Status: DNR/DNI  Diet: N.p.o.    Advance Care Planning   ACP discussion was held with the patient during this visit. Patient does not have an advance directive, declines further assistance.           Brian Joseph Kerley,    08/29/23  23:47 EDT    Dictated utilizing Dragon dictation.    Electronically signed by Kerley, Brian Joseph, DO at 08/30/23 0505          Emergency Department Notes        Tona Mcgarry RN at 08/30/23 0043          Report given to Keiko in overflow.    Electronically signed by Tona Mcgarry RN at 08/30/23 0043       Edge, Kwasi Donahue MD at 08/29/23 2034          Subjective:  History of Present Illness:    Patient is an 87-year-old male with history of A-fib, COPD, hypertension, hyperlipidemia, COPD on 2 L nasal cannula who presents today with altered mental status.  Reports that he has been having intermittent bright red bloody stools per rectum.  Has history of hemorrhoids.  Had been waiting to follow-up with primary care doctor for this, today, has been acting intermittently confused.  He complains of right lower quadrant pain.  Denies any dysuria.  No preceding fevers.  Denies chest pain at this time.  No increased work of breathing.  Stable on his home O2.  Normoglycemic.  No known trauma.      Nurses Notes reviewed and agree, including vitals, allergies, social history and prior medical history.     REVIEW OF SYSTEMS: All systems reviewed and not pertinent unless noted.  Review of Systems   Constitutional:  Positive for activity change. Negative for appetite change, chills, fatigue and fever.   HENT:  Negative for congestion, sinus pressure, sneezing and trouble swallowing.    Eyes:  Negative for discharge and itching.   Respiratory:  Negative for cough and shortness of breath.    Cardiovascular:  Negative for chest pain and palpitations.   Gastrointestinal:  Positive for abdominal pain and anal bleeding. Negative for abdominal distention, diarrhea, nausea and vomiting.   Endocrine: Negative for cold intolerance and heat intolerance.   Genitourinary:  Negative for decreased urine volume, dysuria and urgency.   Musculoskeletal:  Negative for gait problem, neck pain and neck stiffness.   Skin:   Negative for color change and rash.   Allergic/Immunologic: Negative for immunocompromised state.   Neurological:  Negative for facial asymmetry and headaches.   Hematological:  Negative for adenopathy.   Psychiatric/Behavioral:  Positive for confusion. Negative for self-injury and suicidal ideas.      Past Medical History:   Diagnosis Date    A-fib     Acute sinusitis     Allergic rhinitis     Ankle fracture     right    Arthritis, degenerative     BMI 28.0-28.9,adult     COPD (chronic obstructive pulmonary disease)     Cough     Enlarged prostate without lower urinary tract symptoms (luts)     Hyperlipidemia     Hypertension     Obstructive chronic bronchitis with exacerbation     Shortness of breath     Skin cancer     Tinnitus of both ears     Vitamin D deficiency     Wears dentures     full upper/lower - instructed on no adhesive DOS       Allergies:    Patient has no known allergies.      Past Surgical History:   Procedure Laterality Date    CATARACT EXTRACTION      COLONOSCOPY N/A 7/28/2023    Procedure: COLONOSCOPY;  Surgeon: Dontrell Pham MD;  Location: [facility] ENDOSCOPY;  Service: Gastroenterology;  Laterality: N/A;    COLONOSCOPY N/A 8/2/2023    Procedure: COLONOSCOPY WITH TATTOOING, CLIPPING X1, AND POLYPECTOMY X24;  Surgeon: Dontrell Pham MD;  Location: [facility] ENDOSCOPY;  Service: Gastroenterology;  Laterality: N/A;    ENDOSCOPY N/A 7/28/2023    Procedure: ESOPHAGOGASTRODUODENOSCOPY;  Surgeon: Dontrell Pham MD;  Location: [facility] ENDOSCOPY;  Service: Gastroenterology;  Laterality: N/A;         Social History     Socioeconomic History    Marital status:    Tobacco Use    Smoking status: Every Day     Packs/day: 1.50     Years: 60.00     Pack years: 90.00     Types: Cigarettes     Passive exposure: Current    Smokeless tobacco: Never   Vaping Use    Vaping Use: Never used   Substance and Sexual Activity    Alcohol use: No    Drug use: Never    Sexual activity: Defer  "        Family History   Problem Relation Age of Onset    Cancer Mother     Kidney disease Mother     Cancer Father     Cancer Brother        Objective  Physical Exam:  /78 (BP Location: Left arm, Patient Position: Lying)   Pulse 95   Temp 98 °F (36.7 °C) (Oral)   Resp 18   Ht 188 cm (74\")   Wt 84.6 kg (186 lb 8.2 oz)   SpO2 100%   BMI 23.95 kg/m²      Physical Exam  Constitutional:       General: He is not in acute distress.     Appearance: Normal appearance. He is normal weight. He is not ill-appearing.   HENT:      Head: Normocephalic and atraumatic.      Nose: Nose normal. No congestion or rhinorrhea.      Mouth/Throat:      Mouth: Mucous membranes are moist.      Pharynx: Oropharynx is clear.   Eyes:      Extraocular Movements: Extraocular movements intact.      Conjunctiva/sclera: Conjunctivae normal.      Pupils: Pupils are equal, round, and reactive to light.   Cardiovascular:      Rate and Rhythm: Normal rate and regular rhythm.      Pulses: Normal pulses.   Pulmonary:      Effort: Pulmonary effort is normal. No respiratory distress.      Breath sounds: Normal breath sounds.   Abdominal:      General: Abdomen is flat. Bowel sounds are normal. There is no distension.      Palpations: Abdomen is soft.      Tenderness: There is no abdominal tenderness.   Musculoskeletal:         General: No swelling or tenderness. Normal range of motion.      Cervical back: Normal range of motion and neck supple. No rigidity or tenderness.   Skin:     General: Skin is warm and dry.      Capillary Refill: Capillary refill takes less than 2 seconds.   Neurological:      General: No focal deficit present.      Mental Status: He is alert and oriented to person, place, and time. Mental status is at baseline.      Cranial Nerves: No cranial nerve deficit.      Sensory: No sensory deficit.      Motor: No weakness.   Psychiatric:         Mood and Affect: Mood normal.         Behavior: Behavior normal.         Thought " Content: Thought content normal.         Judgment: Judgment normal.       Procedures    ED Course:    ED Course as of 08/30/23 0730   Tue Aug 29, 2023   2054 EKG interpreted by me, atrial fibrillation with right axis deviation, no concerning ST changes noted, rate of 94, abnormal EKG [JE]      ED Course User Index  [JE] Kwasi Velez MD       Lab Results (last 24 hours)       Procedure Component Value Units Date/Time    COVID-19 and FLU A/B PCR - Swab, Nasopharynx [[id number]]  (Normal) Collected: 08/29/23 2036    Specimen: Swab from Nasopharynx Updated: 08/29/23 2114     COVID19 Not Detected     Influenza A PCR Not Detected     Influenza B PCR Not Detected    Narrative:      Fact sheet for providers: https://www.fda.gov/media/949940/download    Fact sheet for patients: https://www.fda.gov/media/350753/download    Test performed by PCR.    CBC & Differential [[id number]]  (Abnormal) Collected: 08/29/23 2036    Specimen: Blood Updated: 08/29/23 2043    Narrative:      The following orders were created for panel order CBC & Differential.  Procedure                               Abnormality         Status                     ---------                               -----------         ------                     CBC Auto Differential[[id number]]        Abnormal            Final result                 Please view results for these tests on the individual orders.    Comprehensive Metabolic Panel [[id number]]  (Abnormal) Collected: 08/29/23 2036    Specimen: Blood Updated: 08/29/23 2100     Glucose 119 mg/dL      BUN 16 mg/dL      Creatinine 1.14 mg/dL      Sodium 135 mmol/L      Potassium 3.7 mmol/L      Chloride 95 mmol/L      CO2 28.7 mmol/L      Calcium 11.6 mg/dL      Total Protein 6.8 g/dL      Albumin 3.4 g/dL      ALT (SGPT) 10 U/L      AST (SGOT) 15 U/L      Alkaline Phosphatase 93 U/L      Total Bilirubin 0.4 mg/dL      Globulin 3.4 gm/dL      A/G Ratio 1.0 g/dL      BUN/Creatinine Ratio 14.0     Anion Gap 11.3  mmol/L      eGFR 63.0 mL/min/1.73     Narrative:      GFR Normal >60  Chronic Kidney Disease <60  Kidney Failure <15    The GFR formula is only valid for adults with stable renal function between ages 18 and 70.    Lipase [[id number]]  (Abnormal) Collected: 08/29/23 2036    Specimen: Blood Updated: 08/29/23 2100     Lipase 11 U/L     Single High Sensitivity Troponin T [[id number]]  (Abnormal) Collected: 08/29/23 2036    Specimen: Blood Updated: 08/29/23 2116     HS Troponin T 57 ng/L     Narrative:      High Sensitive Troponin T Reference Range:  <10.0 ng/L- Negative Female for AMI  <15.0 ng/L- Negative Male for AMI  >=10 - Abnormal Female indicating possible myocardial injury.  >=15 - Abnormal Male indicating possible myocardial injury.   Clinicians would have to utilize clinical acumen, EKG, Troponin, and serial changes to determine if it is an Acute Myocardial Infarction or myocardial injury due to an underlying chronic condition.         BNP [[id number]]  (Normal) Collected: 08/29/23 2036    Specimen: Blood Updated: 08/29/23 2103     proBNP 701.9 pg/mL     Narrative:      Among patients with dyspnea, NT-proBNP is highly sensitive for the detection of acute congestive heart failure. In addition NT-proBNP of <300 pg/ml effectively rules out acute congestive heart failure with 99% negative predictive value.      C-reactive Protein [[id number]]  (Abnormal) Collected: 08/29/23 2036    Specimen: Blood Updated: 08/29/23 2100     C-Reactive Protein 4.31 mg/dL     Lactic Acid, Plasma [[id number]]  (Normal) Collected: 08/29/23 2036    Specimen: Blood Updated: 08/29/23 2059     Lactate 1.5 mmol/L     Magnesium [[id number]]  (Normal) Collected: 08/29/23 2036    Specimen: Blood Updated: 08/29/23 2100     Magnesium 1.8 mg/dL     CBC Auto Differential [[id number]]  (Abnormal) Collected: 08/29/23 2036    Specimen: Blood Updated: 08/29/23 2043     WBC 8.13 10*3/mm3      RBC 3.62 10*6/mm3      Hemoglobin 10.1 g/dL      Hematocrit 31.6 %       MCV 87.3 fL      MCH 27.9 pg      MCHC 32.0 g/dL      RDW 13.6 %      RDW-SD 43.4 fl      MPV 9.8 fL      Platelets 293 10*3/mm3      Neutrophil % 75.0 %      Lymphocyte % 10.8 %      Monocyte % 11.8 %      Eosinophil % 1.5 %      Basophil % 0.5 %      Immature Grans % 0.4 %      Neutrophils, Absolute 6.10 10*3/mm3      Lymphocytes, Absolute 0.88 10*3/mm3      Monocytes, Absolute 0.96 10*3/mm3      Eosinophils, Absolute 0.12 10*3/mm3      Basophils, Absolute 0.04 10*3/mm3      Immature Grans, Absolute 0.03 10*3/mm3      nRBC 0.0 /100 WBC     Blood Gas, Arterial With Co-Ox [[id number]]  (Abnormal) Collected: 08/29/23 2046    Specimen: Arterial Blood Updated: 08/29/23 2047     Site Right Radial     Elijah's Test Positive     pH, Arterial 7.424 pH units      pCO2, Arterial 46.9 mm Hg      Comment: 83 Value above reference range        pO2, Arterial 80.8 mm Hg      Comment: 84 Value below reference range        HCO3, Arterial 30.7 mmol/L      Comment: 83 Value above reference range        Base Excess, Arterial 5.5 mmol/L      Comment: 83 Value above reference range        O2 Saturation, Arterial 96.8 %      Hematocrit, Blood Gas 32.2 %      Comment: 84 Value below reference range        Oxyhemoglobin 94.1 %      Methemoglobin 0.30 %      Carboxyhemoglobin 2.5 %      A-a DO2 9.7 mmHg      Barometric Pressure for Blood Gas 730 mmHg      Modality Nasal Cannula     Flow Rate 2.0 lpm      Ventilator Mode NA     Collected by ASIM     Comment: Meter: [id number]     :  [id number]        pH, Temp Corrected --     pCO2, Temperature Corrected --     pO2, Temperature Corrected --    Urinalysis With Culture If Indicated - Urine, Clean Catch [[id number]]  (Abnormal) Collected: 08/29/23 2115    Specimen: Urine, Clean Catch Updated: 08/29/23 2133     Color, UA Yellow     Appearance, UA Clear     pH, UA 5.5     Specific Gravity, UA 1.018     Glucose, UA Negative     Ketones, UA Negative     Bilirubin, UA Negative     Blood,  UA Moderate (2+)     Protein, UA Trace     Leuk Esterase, UA Negative     Nitrite, UA Negative     Urobilinogen, UA 1.0 E.U./dL    Narrative:      In absence of clinical symptoms, the presence of pyuria, bacteria, and/or nitrites on the urinalysis result does not correlate with infection.    Occult Blood X 1, Stool - Stool, Per Rectum [[id number]]  (Abnormal) Collected: 08/29/23 2115    Specimen: Stool from Per Rectum Updated: 08/29/23 2134     Fecal Occult Blood Positive    Urinalysis, Microscopic Only - Urine, Clean Catch [[id number]]  (Abnormal) Collected: 08/29/23 2115    Specimen: Urine, Clean Catch Updated: 08/29/23 2140     RBC, UA 13-20 /HPF      WBC, UA None Seen /HPF      Comment: Urine culture not indicated.        Bacteria, UA 1+ /HPF      Squamous Epithelial Cells, UA 0-2 /HPF      Hyaline Casts, UA 3-6 /LPF      Methodology Manual Light Microscopy    High Sensitivity Troponin T 2Hr [[id number]]  (Abnormal) Collected: 08/29/23 2237    Specimen: Blood Updated: 08/29/23 2324     HS Troponin T 56 ng/L      Troponin T Delta -1 ng/L     Narrative:      High Sensitive Troponin T Reference Range:  <10.0 ng/L- Negative Female for AMI  <15.0 ng/L- Negative Male for AMI  >=10 - Abnormal Female indicating possible myocardial injury.  >=15 - Abnormal Male indicating possible myocardial injury.   Clinicians would have to utilize clinical acumen, EKG, Troponin, and serial changes to determine if it is an Acute Myocardial Infarction or myocardial injury due to an underlying chronic condition.                  CT Head Without Contrast    Result Date: 8/29/2023  FINAL REPORT CLINICAL HISTORY: Mental status change, unknown cause FINDINGS: Moderate atrophy and chronic ischemic white matter changes are noted.     No cortical edema is present.  There is no mass or hemorrhage.  Ventricles are normal.  Bone windows show no skull fracture or obvious obstructive lesion.     Impression: 1.  No acute intracranial abnormality or  obvious mass.  2. Atrophy and chronic ischemic white matter changes as above. Authenticated and Electronically Signed by KEYONNA Smith MD on 08/29/2023 10:21:40 PM    CT Abdomen Pelvis With Contrast    Result Date: 8/29/2023  FINAL REPORT TECHNIQUE: Oral and IV contrast enhanced exam CLINICAL HISTORY: RLQ abdominal pain (Age >= 14y) FINDINGS: Abdomen: Lung bases are clear.  There is probable cholelithiasis.  Liver has an unremarkable CT appearance.  There is a mass in the posterior spleen which is nonspecific but measures up to 23 mm.  The  pancreas and adrenal glands are unremarkable.  There are bilateral renal cysts.  Kidneys show no mass or obstruction. No bowel obstruction or fluid collection is seen.  Pelvis: The appendix is normal.  There is significant fecal impaction of the rectal vault.  There is mild inflammatory change of the distal sigmoid colon with stranding extending into the sigmoid mesial colon, probably due to diverticulitis.  No fluid collection or adenopathy is seen.     Impression: 1.  No evidence of appendicitis or bowel obstruction.  2. Inflammatory change of the distal sigmoid colon, suspect diverticulitis.  3.  Significant fecal impaction of the rectosigmoid colon.  4.  Cholelithiasis. Authenticated and Electronically Signed by KEYONNA Smith MD on 08/29/2023 10:21:13 PM        MDM      Initial impression of presenting illness: Altered mental status    DDX: includes but is not limited to: Hypercapnia, anemia, lower GI bleed, viral URI    Patient arrives stable with vitals interpreted by myself.     Pertinent features from physical exam: On home O2, clear to auscultation with no increased work of breathing, wheezing noted, complaining of pain in his right lower quadrant.    Initial diagnostic plan: CBC, CMP, lipase, UA, CRP, lactic acid, magnesium, CT head, chest x-ray, Hemoccult, CT abdomen pelvis    Results from initial plan were reviewed and interpreted by me revealing diverticulitis  seen on CT    Diagnostic information from other sources: Discussed with wife at bedside reviewed past medical records    Interventions / Re-evaluation: Given concern for diverticulitis given Rocephin, Flagyl in the ER    Results/clinical rationale were discussed with patient and wife at bedside    Consultations/Discussion of results with other physicians: Given patient's persistent altered mental status, feels that this is likely delirium secondary to his diverticulitis.  Given this, treated as above and discussed with hospitalist service for admission.  Accepted for further management    Disposition plan: Admit  -----        Final diagnoses:   Diverticulitis   Altered mental status, unspecified altered mental status type          Kwasi Velez MD  08/30/23 0730      Electronically signed by Kwasi Velez MD at 08/30/23 0730       Vital Signs (last day)       Date/Time Temp Temp src Pulse Resp BP Patient Position SpO2    08/30/23 1301 -- -- 128 -- -- -- --    08/30/23 1200 97.9 (36.6) Oral 105 16 142/91 Sitting 97    08/30/23 0751 98.2 (36.8) Oral 102 12 139/79 Lying 98    08/30/23 0407 98 (36.7) Oral 95 18 117/78 Lying 100    08/30/23 0112 97.9 (36.6) Oral 84 18 106/98 Lying 100    08/30/23 0030 -- -- 87 -- 119/80 -- 100    08/29/23 2327 -- -- 98 -- -- -- 99    08/29/23 2315 -- -- 90 -- 124/68 -- 100    08/29/23 2230 -- -- 87 -- 112/74 -- --    08/29/23 2215 -- -- 90 -- 108/78 -- 96    08/29/23 2200 -- -- 85 -- 115/54 -- --    08/29/23 2158 -- -- 90 -- -- -- --    08/29/23 2040 97.8 (36.6) -- -- -- -- -- --    08/29/23 20:39:02 -- -- 89 16 116/66 -- 90          Current Facility-Administered Medications   Medication Dose Route Frequency Provider Last Rate Last Admin    acetaminophen (TYLENOL) tablet 650 mg  650 mg Oral Q4H PRN Kerley, Brian Joseph, DO   650 mg at 08/30/23 1154    Or    acetaminophen (TYLENOL) 160 MG/5ML solution 650 mg  650 mg Oral Q4H PRN Kerley, Brian Joseph, DO        Or     acetaminophen (TYLENOL) suppository 650 mg  650 mg Rectal Q4H PRN Kerley, Brian Joseph, DO        sennosides-docusate (PERICOLACE) 8.6-50 MG per tablet 2 tablet  2 tablet Oral BID Kerley, Brian Joseph, DO        And    polyethylene glycol (MIRALAX) packet 17 g  17 g Oral Daily PRN Kerley, Brian Joseph, DO        And    bisacodyl (DULCOLAX) EC tablet 5 mg  5 mg Oral Daily PRN Kerley, Brian Joseph, DO        And    bisacodyl (DULCOLAX) suppository 10 mg  10 mg Rectal Daily PRN Kerley, Brian Joseph, DO        bisoprolol (ZEBeta) tablet 10 mg  10 mg Oral Daily Ottoniel Alfaro DO   10 mg at 08/30/23 1301    Calcium Replacement - Follow Nurse / BPA Driven Protocol   Does not apply PRN Kerley, Brian Joseph, DO        dilTIAZem CD (CARDIZEM CD) 24 hr capsule 180 mg  180 mg Oral Daily Ottoniel Alfaro DO   180 mg at 08/30/23 1154    Magnesium Standard Dose Replacement - Follow Nurse / BPA Driven Protocol   Does not apply PRN Kerley, Brian Joseph, DO        nitroglycerin (NITROSTAT) SL tablet 0.4 mg  0.4 mg Sublingual Q5 Min PRN Kerley, Brian Joseph, DO        ondansetron (ZOFRAN) injection 4 mg  4 mg Intravenous Q6H PRN Kerley, Brian Joseph, DO        Pharmacy to Dose Zosyn   Does not apply Continuous PRN Kerley, Brian Joseph, DO        Phosphorus Replacement - Follow Nurse / BPA Driven Protocol   Does not apply PRN Kerley, Brian Joseph, DO        piperacillin-tazobactam (ZOSYN) 3.375 g in iso-osmotic dextrose 50 ml (premix)  3.375 g Intravenous Q8H Kerley, Brian Joseph, DO   3.375 g at 08/30/23 0827    Potassium Replacement - Follow Nurse / BPA Driven Protocol   Does not apply PRN Kerley, Brian Joseph, DO        sodium chloride 0.9 % flush 10 mL  10 mL Intravenous Q12H Kerley, Brian Joseph, DO   10 mL at 08/30/23 0222    sodium chloride 0.9 % flush 10 mL  10 mL Intravenous PRN Kerley, Brian Joseph, DO        sodium chloride 0.9 % infusion 40 mL  40 mL Intravenous PRN Kerley, Brian Joseph, DO         Lab Results (last 24  hours)       Procedure Component Value Units Date/Time    High Sensitivity Troponin T 2Hr [[id number]]  (Abnormal) Collected: 08/29/23 2237    Specimen: Blood Updated: 08/29/23 2324     HS Troponin T 56 ng/L      Troponin T Delta -1 ng/L     Narrative:      High Sensitive Troponin T Reference Range:  <10.0 ng/L- Negative Female for AMI  <15.0 ng/L- Negative Male for AMI  >=10 - Abnormal Female indicating possible myocardial injury.  >=15 - Abnormal Male indicating possible myocardial injury.   Clinicians would have to utilize clinical acumen, EKG, Troponin, and serial changes to determine if it is an Acute Myocardial Infarction or myocardial injury due to an underlying chronic condition.         Urinalysis, Microscopic Only - Urine, Clean Catch [[id number]]  (Abnormal) Collected: 08/29/23 2115    Specimen: Urine, Clean Catch Updated: 08/29/23 2140     RBC, UA 13-20 /HPF      WBC, UA None Seen /HPF      Comment: Urine culture not indicated.        Bacteria, UA 1+ /HPF      Squamous Epithelial Cells, UA 0-2 /HPF      Hyaline Casts, UA 3-6 /LPF      Methodology Manual Light Microscopy    Occult Blood X 1, Stool - Stool, Per Rectum [[id number]]  (Abnormal) Collected: 08/29/23 2115    Specimen: Stool from Per Rectum Updated: 08/29/23 2134     Fecal Occult Blood Positive    Urinalysis With Culture If Indicated - Urine, Clean Catch [[id number]]  (Abnormal) Collected: 08/29/23 2115    Specimen: Urine, Clean Catch Updated: 08/29/23 2133     Color, UA Yellow     Appearance, UA Clear     pH, UA 5.5     Specific Gravity, UA 1.018     Glucose, UA Negative     Ketones, UA Negative     Bilirubin, UA Negative     Blood, UA Moderate (2+)     Protein, UA Trace     Leuk Esterase, UA Negative     Nitrite, UA Negative     Urobilinogen, UA 1.0 E.U./dL    Narrative:      In absence of clinical symptoms, the presence of pyuria, bacteria, and/or nitrites on the urinalysis result does not correlate with infection.    Single High Sensitivity  Troponin T [[id number]]  (Abnormal) Collected: 08/29/23 2036    Specimen: Blood Updated: 08/29/23 2116     HS Troponin T 57 ng/L     Narrative:      High Sensitive Troponin T Reference Range:  <10.0 ng/L- Negative Female for AMI  <15.0 ng/L- Negative Male for AMI  >=10 - Abnormal Female indicating possible myocardial injury.  >=15 - Abnormal Male indicating possible myocardial injury.   Clinicians would have to utilize clinical acumen, EKG, Troponin, and serial changes to determine if it is an Acute Myocardial Infarction or myocardial injury due to an underlying chronic condition.         COVID-19 and FLU A/B PCR - Swab, Nasopharynx [[id number]]  (Normal) Collected: 08/29/23 2036    Specimen: Swab from Nasopharynx Updated: 08/29/23 2114     COVID19 Not Detected     Influenza A PCR Not Detected     Influenza B PCR Not Detected    Narrative:      Fact sheet for providers: https://www.fda.gov/media/440024/download    Fact sheet for patients: https://www.fda.gov/media/567272/download    Test performed by PCR.    BNP [[id number]]  (Normal) Collected: 08/29/23 2036    Specimen: Blood Updated: 08/29/23 2103     proBNP 701.9 pg/mL     Narrative:      Among patients with dyspnea, NT-proBNP is highly sensitive for the detection of acute congestive heart failure. In addition NT-proBNP of <300 pg/ml effectively rules out acute congestive heart failure with 99% negative predictive value.      Comprehensive Metabolic Panel [[id number]]  (Abnormal) Collected: 08/29/23 2036    Specimen: Blood Updated: 08/29/23 2100     Glucose 119 mg/dL      BUN 16 mg/dL      Creatinine 1.14 mg/dL      Sodium 135 mmol/L      Potassium 3.7 mmol/L      Chloride 95 mmol/L      CO2 28.7 mmol/L      Calcium 11.6 mg/dL      Total Protein 6.8 g/dL      Albumin 3.4 g/dL      ALT (SGPT) 10 U/L      AST (SGOT) 15 U/L      Alkaline Phosphatase 93 U/L      Total Bilirubin 0.4 mg/dL      Globulin 3.4 gm/dL      A/G Ratio 1.0 g/dL      BUN/Creatinine Ratio 14.0      Anion Gap 11.3 mmol/L      eGFR 63.0 mL/min/1.73     Narrative:      GFR Normal >60  Chronic Kidney Disease <60  Kidney Failure <15    The GFR formula is only valid for adults with stable renal function between ages 18 and 70.    Lipase [[id number]]  (Abnormal) Collected: 08/29/23 2036    Specimen: Blood Updated: 08/29/23 2100     Lipase 11 U/L     Magnesium [[id number]]  (Normal) Collected: 08/29/23 2036    Specimen: Blood Updated: 08/29/23 2100     Magnesium 1.8 mg/dL     C-reactive Protein [[id number]]  (Abnormal) Collected: 08/29/23 2036    Specimen: Blood Updated: 08/29/23 2100     C-Reactive Protein 4.31 mg/dL     Lactic Acid, Plasma [[id number]]  (Normal) Collected: 08/29/23 2036    Specimen: Blood Updated: 08/29/23 2059     Lactate 1.5 mmol/L     Blood Gas, Arterial With Co-Ox [[id number]]  (Abnormal) Collected: 08/29/23 2046    Specimen: Arterial Blood Updated: 08/29/23 2047     Site Right Radial     Elijah's Test Positive     pH, Arterial 7.424 pH units      pCO2, Arterial 46.9 mm Hg      Comment: 83 Value above reference range        pO2, Arterial 80.8 mm Hg      Comment: 84 Value below reference range        HCO3, Arterial 30.7 mmol/L      Comment: 83 Value above reference range        Base Excess, Arterial 5.5 mmol/L      Comment: 83 Value above reference range        O2 Saturation, Arterial 96.8 %      Hematocrit, Blood Gas 32.2 %      Comment: 84 Value below reference range        Oxyhemoglobin 94.1 %      Methemoglobin 0.30 %      Carboxyhemoglobin 2.5 %      A-a DO2 9.7 mmHg      Barometric Pressure for Blood Gas 730 mmHg      Modality Nasal Cannula     Flow Rate 2.0 lpm      Ventilator Mode NA     Collected by [facility]     Comment: Meter: [id number]     :  [id number]        pH, Temp Corrected --     pCO2, Temperature Corrected --     pO2, Temperature Corrected --    CBC & Differential [[id number]]  (Abnormal) Collected: 08/29/23 2036    Specimen: Blood Updated: 08/29/23 2043    Narrative:      The  following orders were created for panel order CBC & Differential.  Procedure                               Abnormality         Status                     ---------                               -----------         ------                     CBC Auto Differential[[id number]]        Abnormal            Final result                 Please view results for these tests on the individual orders.    CBC Auto Differential [[id number]]  (Abnormal) Collected: 08/29/23 2036    Specimen: Blood Updated: 08/29/23 2043     WBC 8.13 10*3/mm3      RBC 3.62 10*6/mm3      Hemoglobin 10.1 g/dL      Hematocrit 31.6 %      MCV 87.3 fL      MCH 27.9 pg      MCHC 32.0 g/dL      RDW 13.6 %      RDW-SD 43.4 fl      MPV 9.8 fL      Platelets 293 10*3/mm3      Neutrophil % 75.0 %      Lymphocyte % 10.8 %      Monocyte % 11.8 %      Eosinophil % 1.5 %      Basophil % 0.5 %      Immature Grans % 0.4 %      Neutrophils, Absolute 6.10 10*3/mm3      Lymphocytes, Absolute 0.88 10*3/mm3      Monocytes, Absolute 0.96 10*3/mm3      Eosinophils, Absolute 0.12 10*3/mm3      Basophils, Absolute 0.04 10*3/mm3      Immature Grans, Absolute 0.03 10*3/mm3      nRBC 0.0 /100 WBC           Imaging Results (Last 24 Hours)       Procedure Component Value Units Date/Time    XR Chest 1 View [[id number]] Collected: 08/30/23 0950     Updated: 08/30/23 0954    Narrative:      PROCEDURE: XR CHEST 1 VW-        HISTORY: AMS     COMPARISON: October 2022.     FINDINGS: The patient is rotated to the left. The heart is borderline in  size. There is mild interstitial disease in the right lung base which  has mildly improved. There is a left perihilar mass. There is no  pneumothorax. There are no acute osseous abnormalities.       Impression:      Left perihilar mass. Chest CT with contrast is recommended.     Mild interstitial disease in the right lung base has mildly improved.                       Images were reviewed, interpreted, and dictated by Dr. Svitlana Alvarado,  MD  Transcribed by Janae Velez PA-C.     This report was signed and finalized on 8/30/2023 9:51 AM by Svitlana Alvarado MD.       CT Abdomen Pelvis With Contrast [[id number]] Collected: 08/29/23 2221     Updated: 08/29/23 2222    Narrative:      FINAL REPORT    TECHNIQUE:  Oral and IV contrast enhanced exam    CLINICAL HISTORY:  RLQ abdominal pain (Age >= 14y)    FINDINGS:  Abdomen: Lung bases are clear.  There is probable  cholelithiasis.  Liver has an unremarkable CT appearance.  There  is a mass in the posterior spleen which is nonspecific but  measures up to 23 mm.  The  pancreas and adrenal glands are  unremarkable.  There are bilateral renal cysts.  Kidneys show no  mass or obstruction. No bowel obstruction or fluid collection is  seen.  Pelvis: The appendix is normal.  There is significant  fecal impaction of the rectal vault.  There is mild inflammatory  change of the distal sigmoid colon with stranding extending into  the sigmoid mesial colon, probably due to diverticulitis.  No  fluid collection or adenopathy is seen.      Impression:      1.  No evidence of appendicitis or bowel obstruction.  2.  Inflammatory change of the distal sigmoid colon, suspect  diverticulitis.  3.  Significant fecal impaction of the  rectosigmoid colon.  4.  Cholelithiasis.    Authenticated and Electronically Signed by KEYONNA Smith MD on  08/29/2023 10:21:13 PM    CT Head Without Contrast [[id number]] Collected: 08/29/23 2221     Updated: 08/29/23 2222    Narrative:      FINAL REPORT    CLINICAL HISTORY:  Mental status change, unknown cause    FINDINGS:  Moderate atrophy and chronic ischemic white matter changes are  noted.     No cortical edema is present.  There is no mass or  hemorrhage.  Ventricles are normal.  Bone windows show no skull  fracture or obvious obstructive lesion.      Impression:      1.  No acute intracranial abnormality or obvious mass.  2.  Atrophy and chronic ischemic white matter changes as  above.    Authenticated and Electronically Signed by KEYONNA Smith MD on  2023 10:21:40 PM             Physician Progress Notes (last 24 hours)        Ottoniel Alfaro DO at 23 1138              [facility]    [facility]    Name:  Ed Spear   Age:  87 y.o.  Sex:  male  :  1935  MRN:  [id number]   Visit Number:  [id number]  Admission Date:  2023  Date Of Service:  23  Primary Care Physician:  Chelo Nixon DO     LOS: 1 day :    Chief Complaint:      Bright red blood per rectum    Subjective:    Patient examined this morning.  Complaining of generalized diffuse abdominal pain that is cramping in nature.  Family at bedside.  Patient also slightly confused.    Hospital Course:    Patient is an 87-year-old man with past medical history of atrial fibrillation on Eliquis, COPD on 3 L baseline, BPH, hypertension, diverticulosis.  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  Presented to [facility] ED on 2023 with concern for intermittent bright red bloody stools per rectum.   ED summary: Afebrile, vital signs stable on room air.  EKG atrial fibrillation, no ST elevations or depressions.  High-sensitivity troponin 57, 56, ACS not suspected.  Hemoglobin 10.1, most recent 9.8 on .  COVID/flu negative.  FOBT positive.  CT abdomen/pelvis inflammatory changes distal sigmoid colon, suspect diverticulitis, significant fecal impaction of the rectosigmoid colon.     Review of Systems:     All systems were reviewed and negative except as mentioned in subjective, assessment and plan.    Vital Signs:    Temp:  [97.8 °F (36.6 °C)-98.2 °F (36.8 °C)] 98.2 °F (36.8 °C)  Heart Rate:  [] 102  Resp:  [12-18] 12  BP: (106-139)/(54-98) 139/79    Intake and output:    I/O last 3 completed shifts:  In: 200 [IV Piggyback:200]  Out: -    I/O this shift:  In: 50 [IV Piggyback:50]  Out: -     Physical Examination:    General Appearance:  Alert and cooperative.  Uncomfortable but in no acute distress.   Head:  Atraumatic and normocephalic.   Eyes: Conjunctivae and sclerae normal, no icterus. No pallor.   Throat: No oral lesions, no thrush, oral mucosa moist.   Neck: Supple, trachea midline, no thyromegaly.   Lungs:   Breath sounds heard bilaterally equally.  No wheezing or crackles.    Heart:  Normal S1 and S2, no murmur, No JVD.   Abdomen:   Normal bowel sounds, Soft, nontender, nondistended, no rebound tenderness.   Extremities: Supple, no edema, no cyanosis, no clubbing.   Skin: No bleeding or rash.   Neurologic: Alert, disoriented..  Generalized weakness.  Able to move all extremities spontaneously.     Laboratory results:    Results from last 7 days   Lab Units 08/29/23 2036   SODIUM mmol/L 135*   POTASSIUM mmol/L 3.7   CHLORIDE mmol/L 95*   CO2 mmol/L 28.7   BUN mg/dL 16   CREATININE mg/dL 1.14   CALCIUM mg/dL 11.6*   BILIRUBIN mg/dL 0.4   ALK PHOS U/L 93   ALT (SGPT) U/L 10   AST (SGOT) U/L 15   GLUCOSE mg/dL 119*     Results from last 7 days   Lab Units 08/29/23 2036   WBC 10*3/mm3 8.13   HEMOGLOBIN g/dL 10.1*   HEMATOCRIT % 31.6*   PLATELETS 10*3/mm3 293         Results from last 7 days   Lab Units 08/29/23  2237 08/29/23 2036   HSTROP T ng/L 56* 57*         Recent Labs     10/28/22  1800 08/29/23  2046   PHART 7.297* 7.424   FJO2FUW 55.5* 46.9*   PO2ART 90.5 80.8   MAZ2UWM 27.1 30.7*   BASEEXCESS 0.2 5.5*      I have reviewed the patient's laboratory results.    Radiology results:    CT Head Without Contrast    Result Date: 8/29/2023  FINAL REPORT CLINICAL HISTORY: Mental status change, unknown cause FINDINGS: Moderate atrophy and chronic ischemic white matter changes are noted.     No cortical edema is present.  There is no mass or hemorrhage.  Ventricles are normal.  Bone windows show no skull fracture or obvious obstructive lesion.      Impression: 1.  No acute intracranial abnormality or obvious mass.  2. Atrophy and chronic ischemic white matter changes as above. Authenticated and Electronically Signed by KEYONNA Smith MD on 08/29/2023 10:21:40 PM    CT Abdomen Pelvis With Contrast    Result Date: 8/29/2023  FINAL REPORT TECHNIQUE: Oral and IV contrast enhanced exam CLINICAL HISTORY: RLQ abdominal pain (Age >= 14y) FINDINGS: Abdomen: Lung bases are clear.  There is probable cholelithiasis.  Liver has an unremarkable CT appearance.  There is a mass in the posterior spleen which is nonspecific but measures up to 23 mm.  The  pancreas and adrenal glands are unremarkable.  There are bilateral renal cysts.  Kidneys show no mass or obstruction. No bowel obstruction or fluid collection is seen.  Pelvis: The appendix is normal.  There is significant fecal impaction of the rectal vault.  There is mild inflammatory change of the distal sigmoid colon with stranding extending into the sigmoid mesial colon, probably due to diverticulitis.  No fluid collection or adenopathy is seen.     Impression: 1.  No evidence of appendicitis or bowel obstruction.  2. Inflammatory change of the distal sigmoid colon, suspect diverticulitis.  3.  Significant fecal impaction of the rectosigmoid colon.  4.  Cholelithiasis. Authenticated and Electronically Signed by KEYONNA Smith MD on 08/29/2023 10:21:13 PM    XR Chest 1 View    Result Date: 8/30/2023  PROCEDURE: XR CHEST 1 VW-    HISTORY: AMS  COMPARISON: October 2022.  FINDINGS: The patient is rotated to the left. The heart is borderline in size. There is mild interstitial disease in the right lung base which has mildly improved. There is a left perihilar mass. There is no pneumothorax. There are no acute osseous abnormalities.      Impression: Left perihilar mass. Chest CT with contrast is recommended.  Mild interstitial disease in the right lung base has mildly improved.        Images were reviewed, interpreted,  and dictated by Dr. Svitlana Alvarado MD Transcribed by Janae Vleez PA-C.  This report was signed and finalized on 2023 9:51 AM by Svitlana Alvarado MD.     I have reviewed the patient's radiology reports.    Medication Review:     I have reviewed the patient's active and prn medications.     Problem List:      Diverticulitis large intestine      Assessment:    GI bleed suspect secondary to diverticulitis cyst vs recent polypectomy POA  Fecal impaction POA  Elevated troponin POA  Hematuria POA  A-fib on Eliquis  COPD on 3 L baseline  Hypertension  BPH  Impaired mobility and ADLs    Plan:    We will continue to monitor patient in the hospital.    GI bleed/fecal impaction  -Suspect GI bleed likely due to diverticulosis.  Could also be due to recent polypectomy performed  in the setting of continued anticoagulation.  - We will hold on chronic Eliquis  - GI consulted and appreciate recommendations  - Monitor H&H, currently stable.  No indication for PRBC transfusion.  -Bowel regimen  -Empiric Rocephin/flagyl    Elevated troponin  A-fib  - Hold Eliquis  - Restart bisoprolol and diltiazem  -Troponins elevated and plateaued fashion.  Suspect NSTEMI type II from demand ischemia.  Low suspicion for ACS.    Hematuria  - Urology consulted appreciate recommendations    PT/OT.  Further orders as clinical course dictates.    DVT Prophylaxis: SCDs  Code Status: DNR/DNI  Diet: N.p.o.  Discharge Plan: Pending    Ottoniel Alfaro DO  23  11:38 EDT    Dictated utilizing Dragon dictation.        Electronically signed by Ottoniel Alfaro DO at 23 1323          Consult Notes (last 24 hours)        Irina Man PA-C at 23 0925        Consult Orders    1. Inpatient Gastroenterology Consult [[id number]] ordered by Kerley, Brian Joseph, DO at 23 0046                      Inpatient Consult      Date of Consultation: 2023  Patient Name: Ed Spear  MRN: [id number]  : 1935     Referring  provider: Ottoniel Alfaro DO    Primary care provider:  Chelo Nixon DO    Reason for consultation: BRBPR and diverticulitis    History of Present Illness:   8/30/2023  This is a 87-year-old male patient with a prior history of hypertension, hyperlipidemia, chronic atrial fibrillation on Eliquis, COPD on home oxygen 3 L nasal cannula, recent history of GI bleeding with ulcerated recto sigmoid polyp, later removed. He presents to emergency room this time due to complaints of hematochezia, some confusion and lower abdominal pain. He was found to have sigmoid diverticulitis and fecal impaction on CTAP with contrast. Hgb at 10.3 with baseline of 11. He was admitted, placed NPO, started on fluids, and given Rocephin and flagyl. Eliquis has been held. GI was consulted for evaluation and management of bright red blood per rectum and diverticulitis.     Patient is a poor historian. He is alone in the room this morning. Per remy review, he was intermittently confused prior to admission. Patient denies any current abdominal pain or nausea. Has had 2 stools overnight, 1 had blood in brief nursing staff thought urinary source.     7/27/2023 previous inpatient GI visit  He lives with his daughter and ambulates with walker.  Patient is a poor historian.  He has stage COPD and is on oxygen for many years now.  As per his daughter he has been constipated recently.  He also had some flareup of his hemorrhoids.  His daughter got some over-the-counter medication for him which helped for a while however he continued to have a intermittent red blood bleeding with the minimal clots.    Yesterday he had a large bowel movement with a blood and she brought him to emergency room today for further evaluation.  He is on Eliquis for chronic atrial fibrillation and he continued to take the medications.  He does get intermittent abdominal cramps.  Denies any nausea vomiting no reflux symptoms.  No prior EGD or colonoscopy.  Prior history  of any GI bleed.     In the emergency room he had blood work done which revealed hemoglobin of 10.3 g/dL compared to his baseline hemoglobin 11.3 in January 2023.  CMP was largely unremarkable.  CT abdomen pelvis done with contrast showed sigmoid diverticulosis without signs of any current bleeding.  Hepatic steatosis and possible distal esophageal thickening.  He has been admitted for further evaluation management of his symptoms.    Subjective     Past Medical History:   Diagnosis Date    A-fib     Acute sinusitis     Allergic rhinitis     Ankle fracture     right    Arthritis, degenerative     BMI 28.0-28.9,adult     COPD (chronic obstructive pulmonary disease)     Cough     Enlarged prostate without lower urinary tract symptoms (luts)     Hyperlipidemia     Hypertension     Obstructive chronic bronchitis with exacerbation     Shortness of breath     Skin cancer     Tinnitus of both ears     Vitamin D deficiency     Wears dentures     full upper/lower - instructed on no adhesive DOS       Past Surgical History:   Procedure Laterality Date    CATARACT EXTRACTION      COLONOSCOPY N/A 7/28/2023    Procedure: COLONOSCOPY;  Surgeon: Dontrell Pham MD;  Location: [facility] ENDOSCOPY;  Service: Gastroenterology;  Laterality: N/A;    COLONOSCOPY N/A 8/2/2023    Procedure: COLONOSCOPY WITH TATTOOING, CLIPPING X1, AND POLYPECTOMY X24;  Surgeon: Dontrell Pham MD;  Location: [facility] ENDOSCOPY;  Service: Gastroenterology;  Laterality: N/A;    ENDOSCOPY N/A 7/28/2023    Procedure: ESOPHAGOGASTRODUODENOSCOPY;  Surgeon: Dontrell Pham MD;  Location: [facility] ENDOSCOPY;  Service: Gastroenterology;  Laterality: N/A;       Family History   Problem Relation Age of Onset    Cancer Mother     Kidney disease Mother     Cancer Father     Cancer Brother        Social History     Socioeconomic History    Marital status:    Tobacco Use    Smoking status: Every Day     Packs/day: 1.50     Years: 60.00     Pack  years: 90.00     Types: Cigarettes     Passive exposure: Current    Smokeless tobacco: Never   Vaping Use    Vaping Use: Never used   Substance and Sexual Activity    Alcohol use: No    Drug use: Never    Sexual activity: Defer     Current Facility-Administered Medications:     acetaminophen (TYLENOL) tablet 650 mg, 650 mg, Oral, Q4H PRN **OR** acetaminophen (TYLENOL) 160 MG/5ML solution 650 mg, 650 mg, Oral, Q4H PRN **OR** acetaminophen (TYLENOL) suppository 650 mg, 650 mg, Rectal, Q4H PRN, Kerley, Brian Joseph, DO    sennosides-docusate (PERICOLACE) 8.6-50 MG per tablet 2 tablet, 2 tablet, Oral, BID **AND** polyethylene glycol (MIRALAX) packet 17 g, 17 g, Oral, Daily PRN **AND** bisacodyl (DULCOLAX) EC tablet 5 mg, 5 mg, Oral, Daily PRN **AND** bisacodyl (DULCOLAX) suppository 10 mg, 10 mg, Rectal, Daily PRN, Kerley, Brian Joseph, DO    Calcium Replacement - Follow Nurse / BPA Driven Protocol, , Does not apply, PRN, Kerley, Brian Joseph, DO    Magnesium Standard Dose Replacement - Follow Nurse / BPA Driven Protocol, , Does not apply, PRN, Kerley, Brian Joseph, DO    nitroglycerin (NITROSTAT) SL tablet 0.4 mg, 0.4 mg, Sublingual, Q5 Min PRN, Kerley, Brian Joseph, DO    ondansetron (ZOFRAN) injection 4 mg, 4 mg, Intravenous, Q6H PRN, Kerley, Brian Joseph, DO    Pharmacy to Dose Zosyn, , Does not apply, Continuous PRN, Kerley, Brian Joseph, DO    Phosphorus Replacement - Follow Nurse / BPA Driven Protocol, , Does not apply, PRN, Kerley, Brian Joseph, DO    piperacillin-tazobactam (ZOSYN) 3.375 g in iso-osmotic dextrose 50 ml (premix), 3.375 g, Intravenous, Q8H, Kerley, Brian Joseph, DO, 3.375 g at 08/30/23 0827    Potassium Replacement - Follow Nurse / BPA Driven Protocol, , Does not apply, PRN, Kerley, Brian Joseph, DO    sodium chloride 0.9 % flush 10 mL, 10 mL, Intravenous, Q12H, Kerley, Brian Joseph, DO, 10 mL at 08/30/23 0222    sodium chloride 0.9 % flush 10 mL, 10 mL, Intravenous, PRN, Kerley, Brian Joseph,  "DO    sodium chloride 0.9 % infusion 40 mL, 40 mL, Intravenous, PRN, Kerley, Brian Joseph, DO    sodium chloride 0.9 % infusion, 125 mL/hr, Intravenous, Continuous, Kerley, Brian Joseph, , Last Rate: 125 mL/hr at 08/30/23 0222, 125 mL/hr at 08/30/23 0222    No Known Allergies    Review of Systems   Constitutional:  Positive for activity change, appetite change and fatigue.   HENT:  Negative for trouble swallowing.    Eyes: Negative.    Respiratory:  Negative for shortness of breath.    Endocrine: Negative.    Allergic/Immunologic: Negative.    Neurological:  Positive for weakness.      The following portions of the patient's history were reviewed and updated as appropriate: allergies, current medications, past family history, past medical history, past social history, past surgical history and problem list.    Objective     Vitals:    08/30/23 0030 08/30/23 0112 08/30/23 0407 08/30/23 0751   BP: 119/80 106/98 117/78 139/79   BP Location:  Left arm Left arm Left arm   Patient Position:  Lying Lying Lying   Pulse: 87 84 95 102   Resp:  18 18 12   Temp:  97.9 °F (36.6 °C) 98 °F (36.7 °C) 98.2 °F (36.8 °C)   TempSrc:  Oral Oral Oral   SpO2: 100% 100% 100% 98%   Weight:  84.6 kg (186 lb 8.2 oz)     Height:  188 cm (74\")         Physical Exam  Constitutional:       General: He is not in acute distress.     Appearance: He is well-developed. He is ill-appearing. He is not diaphoretic.      Comments: frail   HENT:      Head: Normocephalic and atraumatic.      Right Ear: External ear normal.      Left Ear: External ear normal.      Nose: Nose normal.      Mouth/Throat:      Mouth: Mucous membranes are dry.   Eyes:      General: No scleral icterus.        Right eye: No discharge.         Left eye: No discharge.      Conjunctiva/sclera: Conjunctivae normal.   Neck:      Trachea: No tracheal deviation.   Cardiovascular:      Rate and Rhythm: Rhythm irregular.   Pulmonary:      Effort: Pulmonary effort is normal. No respiratory " distress.   Abdominal:      General: Bowel sounds are normal. There is no distension (no significant tenderness).      Palpations: Abdomen is soft.      Tenderness: There is no abdominal tenderness. There is no guarding.      Comments: Scaphoid abdomen   Musculoskeletal:         General: Normal range of motion.      Right lower leg: No edema.      Left lower leg: No edema.   Skin:     General: Skin is warm and dry.      Coloration: Skin is not pale.      Findings: No erythema or rash.   Neurological:      Mental Status: He is alert.      Coordination: Coordination normal.      Comments: Oriented x2 (person and place)   Psychiatric:      Comments: Mildly anxious, answers some questions briefly       Results from last 7 days   Lab Units 08/29/23 2036   SODIUM mmol/L 135*   POTASSIUM mmol/L 3.7   CHLORIDE mmol/L 95*   CO2 mmol/L 28.7   BUN mg/dL 16   CREATININE mg/dL 1.14   CALCIUM mg/dL 11.6*   ALBUMIN g/dL 3.4*   BILIRUBIN mg/dL 0.4   ALK PHOS U/L 93   ALT (SGPT) U/L 10   AST (SGOT) U/L 15   GLUCOSE mg/dL 119*   WBC 10*3/mm3 8.13   HEMOGLOBIN g/dL 10.1*   PLATELETS 10*3/mm3 293       Imaging Results (Last 24 Hours)       Procedure Component Value Units Date/Time    CT Abdomen Pelvis With Contrast [[id number]] Collected: 08/29/23 2221     Updated: 08/29/23 2222    Narrative:      FINAL REPORT    TECHNIQUE:  Oral and IV contrast enhanced exam    CLINICAL HISTORY:  RLQ abdominal pain (Age >= 14y)    FINDINGS:  Abdomen: Lung bases are clear.  There is probable  cholelithiasis.  Liver has an unremarkable CT appearance.  There  is a mass in the posterior spleen which is nonspecific but  measures up to 23 mm.  The  pancreas and adrenal glands are  unremarkable.  There are bilateral renal cysts.  Kidneys show no  mass or obstruction. No bowel obstruction or fluid collection is  seen.  Pelvis: The appendix is normal.  There is significant  fecal impaction of the rectal vault.  There is mild inflammatory  change of the distal  sigmoid colon with stranding extending into  the sigmoid mesial colon, probably due to diverticulitis.  No  fluid collection or adenopathy is seen.      Impression:      1.  No evidence of appendicitis or bowel obstruction.  2.  Inflammatory change of the distal sigmoid colon, suspect  diverticulitis.  3.  Significant fecal impaction of the  rectosigmoid colon.  4.  Cholelithiasis.    Authenticated and Electronically Signed by KEYONNA Smith MD on  08/29/2023 10:21:13 PM    CT Head Without Contrast [[id number]] Collected: 08/29/23 2221     Updated: 08/29/23 2222    Narrative:      FINAL REPORT    CLINICAL HISTORY:  Mental status change, unknown cause    FINDINGS:  Moderate atrophy and chronic ischemic white matter changes are  noted.     No cortical edema is present.  There is no mass or  hemorrhage.  Ventricles are normal.  Bone windows show no skull  fracture or obvious obstructive lesion.      Impression:      1.  No acute intracranial abnormality or obvious mass.  2.  Atrophy and chronic ischemic white matter changes as above.    Authenticated and Electronically Signed by KEYONNA Smith MD on  08/29/2023 10:21:40 PM         EGD and colonoscopy by Dr. Pham 7/28/2023  - Normal oropharynx.  - Z-line irregular, 37 cm from the incisors.  - Low-grade of narrowing and moderate Schatzki ring.  - 3 cm hiatal hernia.  - Erosive esophagitis GEJ  - Erosive gastropathy with stigmata of recent bleeding.  - Normal duodenal bulb, first portion of the duodenum, second portion of the duodenum and third portion of the duodenum.  - No signs of overt bleeding. Iintermittent mild upper GI mucosal bleeding suspected  - Preparation of the colon was inadequate.  - Perianal skin tags found on perianal exam.  - Five 4 to 6 mm polyps in the cecum. Resection not attempted.  - Five 6 to 15 mm polyps in the proximal ascending colon, in the mid ascending colon and in the distal ascending colon. Resection not attempted.  - Seven 8 to 15 mm  polyps in the proximal transverse colon, in the mid transverse colon and in the distal transverse colon. Resection not attempted.  - Five 8 to 12 mm polyps in the proximal descending colon, in the mid descending colon and in the distal descending colon. Resection not attempted.  - Two 4 to 8 mm polyps in the proximal sigmoid colon, in the mid sigmoid colon and in the distal sigmoid colon. Resection not attempted.  - One 15 to 18 mm polyp at the recto-sigmoid colon and has ulceration with recent stigmata of bleeding. Resection not attempted.  - One 10 to 12 mm polyp in the proximal rectum. Resection not attempted.  - One 10 to 12 mm polyp in the mid rectum. Resection not attempted.  - The examined portion of the ileum was normal.  - Diverticulosis in the sigmoid colon. No sigsn of diverticular bleeding  - Non-bleeding external and internal hemorrhoids.  - Patient likely had intermittent bleeding from ulcerated recto sigmoid polyp. Malignant trasformation suspected  No specimens collected.     Colonoscopy by Dr. Pham 8/2/2023  - Hemorrhoids found on perianal exam.  - One 15 to 18 mm polyp in the proximal rectum at 10cm, removed with a hot snare. Resected and retrieved.  - One 12 to 15 mm polyp at the recto-sigmoid colon at about 20cm, removed with a hot snare. Resected and retrieved. Clip (MR conditional) was placed. Clip : [employer]. Injected. Previously ulcerated polyp  - One 12 mm polyp in the distal sigmoid colon, removed with a hot snare. Resected and retrieved.  - Three 4 to 8 mm polyps in the cecum, removed with a hot snare. Resected and retrieved.  - Three 3 to 4 mm polyps in the proximal ascending colon, in the mid ascending colon and in the distal ascending colon, removed with a hot snare. Resected and retrieved.  - Sixteen 4 to 20 mm polyps in the descending colon, in the transverse colon and in the ascending colon, removed with a hot snare. Resected and retrieved. Only half of the  polyps removed with tijerina net after passing scope three times.  - Diverticulosis in the sigmoid colon.  - Non-bleeding external and internal hemorrhoids.  - Still at least ten polyps left colon and rt colon need to be removed with next colonoscopy  Pathology DIAGNOSIS:  A.   RECTAL POLYP, PROXIMAL:  Adenomatous polyp (tubular/tubulovillous adenoma)  Negative for high-grade dysplasia  B.   RECTOSIGMOID COLON, POLYP:  Adenomatous polyp (tubular adenoma)  Negative for high-grade dysplasia  C.   SIGMOID COLON POLYP, DISTAL:  Adenomatous polyp (tubular adenoma)  Negative for high-grade dysplasia  D.   CECUM POLYP, X3:  Adenomatous polyps (tubular adenomas)  Negative for high-grade dysplasia  E.   ASCENDING COLON POLYP, X2:  Adenomatous polyps (tubular adenomas)  Negative for high-grade dysplasia  F.   TRANSVERSE COLON POLYPS, AND DESCENDING X16:  Adenomatous polyps (tubular adenomas)  Negative for high-grade dysplasia     Assessment / Plan      Assessment/Recommendations:   8/30/2023  Hematochezia  Gross hematuria  Acute blood loss anemia  Acute sigmoid diverticulitis  Suspected diverticular bleeding  History of colon polyps  Atrial fibrillation on Eliquis  COPD on oxygen  He presented again this admission with complaints of rectal bleeding. Had 2 other recent admissions for the same reason. Had EGD and colonoscopy in July 2023 and then later colonoscopy in Aug 2023. Findings all documented above. He was previously found to have erosive gastritis with stigmata of bleeding and ulcerated rectosigmoid colon polyp. On later colonoscopy, larger polyp which was previously ulcerated was removed. His Hgb currently is at 10.1, was 9.0 at recent hospital discharge 1 month ago. His baseline is around 11. He had 2 stools since admission, had blood in brief one time but nursing staff related it to hematuria. He denies current abdominal pain but is confused somewhat. No significant abdominal tenderness on exam this morning. He is  asking for something to drink.     Conservative management  Continue antibiotic regimen  Hold Eliquis  Ok to have clear liquids today  Repeat colonoscopy in 6 months for surveillance, due 1/2024  Call GI with concerns    Addendum- 8/30/2023 1220 after speaking with nurse, pt had liquids for last couple of hours. Now with significant right lower quadrant tenderness, holding abdomen, requesting tylenol. Had gross hematuria only per her report, no bloody stools this AM.   Will change to NPO rest of the day  Reassess tomorrow      Thank you very much for letting me participate in the care of this patient.  Please do not hesitate to call me if you have any questions.    Irina Man PA-C  [facility]  8/30/2023  09:25 EDT    Please note that portions of this note may have been completed with a voice recognition program.       Electronically signed by Irina Man PA-C at 08/30/23 1220

## 2023-08-30 NOTE — PROGRESS NOTES
[facility]    [facility]    Name:  Ed Spear   Age:  87 y.o.  Sex:  male  :  1935  MRN:  [id number]   Visit Number:  [id number]  Admission Date:  2023  Date Of Service:  23  Primary Care Physician:  Chelo Nixon DO     LOS: 1 day :    Chief Complaint:      Bright red blood per rectum    Subjective:    Patient examined this morning.  Complaining of generalized diffuse abdominal pain that is cramping in nature.  Family at bedside.  Patient also slightly confused.    Hospital Course:    Patient is an 87-year-old man with past medical history of atrial fibrillation on Eliquis, COPD on 3 L baseline, BPH, hypertension, diverticulosis.  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  Presented to [facility] ED on 2023 with concern for intermittent bright red bloody stools per rectum.   ED summary: Afebrile, vital signs stable on room air.  EKG atrial fibrillation, no ST elevations or depressions.  High-sensitivity troponin 57, 56, ACS not suspected.  Hemoglobin 10.1, most recent 9.8 on .  COVID/flu negative.  FOBT positive.  CT abdomen/pelvis inflammatory changes distal sigmoid colon, suspect diverticulitis, significant fecal impaction of the rectosigmoid colon.     Review of Systems:     All systems were reviewed and negative except as mentioned in subjective, assessment and plan.    Vital Signs:    Temp:  [97.8 °F (36.6 °C)-98.2 °F (36.8 °C)] 98.2 °F (36.8 °C)  Heart Rate:  [] 102  Resp:  [12-18] 12  BP: (106-139)/(54-98) 139/79    Intake and output:    I/O last 3 completed shifts:  In: 200 [IV Piggyback:200]  Out: -   I/O this shift:  In: 50 [IV Piggyback:50]  Out: -     Physical Examination:    General Appearance:  Alert and cooperative.  Uncomfortable but in no acute distress.   Head:  Atraumatic and normocephalic.    Eyes: Conjunctivae and sclerae normal, no icterus. No pallor.   Throat: No oral lesions, no thrush, oral mucosa moist.   Neck: Supple, trachea midline, no thyromegaly.   Lungs:   Breath sounds heard bilaterally equally.  No wheezing or crackles.    Heart:  Normal S1 and S2, no murmur, No JVD.   Abdomen:   Normal bowel sounds, Soft, nontender, nondistended, no rebound tenderness.   Extremities: Supple, no edema, no cyanosis, no clubbing.   Skin: No bleeding or rash.   Neurologic: Alert, disoriented..  Generalized weakness.  Able to move all extremities spontaneously.     Laboratory results:    Results from last 7 days   Lab Units 08/29/23 2036   SODIUM mmol/L 135*   POTASSIUM mmol/L 3.7   CHLORIDE mmol/L 95*   CO2 mmol/L 28.7   BUN mg/dL 16   CREATININE mg/dL 1.14   CALCIUM mg/dL 11.6*   BILIRUBIN mg/dL 0.4   ALK PHOS U/L 93   ALT (SGPT) U/L 10   AST (SGOT) U/L 15   GLUCOSE mg/dL 119*     Results from last 7 days   Lab Units 08/29/23 2036   WBC 10*3/mm3 8.13   HEMOGLOBIN g/dL 10.1*   HEMATOCRIT % 31.6*   PLATELETS 10*3/mm3 293         Results from last 7 days   Lab Units 08/29/23  2237 08/29/23 2036   HSTROP T ng/L 56* 57*         Recent Labs     10/28/22  1800 08/29/23  2046   PHART 7.297* 7.424   RCA4DNH 55.5* 46.9*   PO2ART 90.5 80.8   HBB2BUG 27.1 30.7*   BASEEXCESS 0.2 5.5*      I have reviewed the patient's laboratory results.    Radiology results:    CT Head Without Contrast    Result Date: 8/29/2023  FINAL REPORT CLINICAL HISTORY: Mental status change, unknown cause FINDINGS: Moderate atrophy and chronic ischemic white matter changes are noted.     No cortical edema is present.  There is no mass or hemorrhage.  Ventricles are normal.  Bone windows show no skull fracture or obvious obstructive lesion.     Impression: 1.  No acute intracranial abnormality or obvious mass.  2. Atrophy and chronic ischemic white matter changes as above. Authenticated and Electronically Signed by KEYONNA Smith MD on  08/29/2023 10:21:40 PM    CT Abdomen Pelvis With Contrast    Result Date: 8/29/2023  FINAL REPORT TECHNIQUE: Oral and IV contrast enhanced exam CLINICAL HISTORY: RLQ abdominal pain (Age >= 14y) FINDINGS: Abdomen: Lung bases are clear.  There is probable cholelithiasis.  Liver has an unremarkable CT appearance.  There is a mass in the posterior spleen which is nonspecific but measures up to 23 mm.  The  pancreas and adrenal glands are unremarkable.  There are bilateral renal cysts.  Kidneys show no mass or obstruction. No bowel obstruction or fluid collection is seen.  Pelvis: The appendix is normal.  There is significant fecal impaction of the rectal vault.  There is mild inflammatory change of the distal sigmoid colon with stranding extending into the sigmoid mesial colon, probably due to diverticulitis.  No fluid collection or adenopathy is seen.     Impression: 1.  No evidence of appendicitis or bowel obstruction.  2. Inflammatory change of the distal sigmoid colon, suspect diverticulitis.  3.  Significant fecal impaction of the rectosigmoid colon.  4.  Cholelithiasis. Authenticated and Electronically Signed by KEYONNA Smith MD on 08/29/2023 10:21:13 PM    XR Chest 1 View    Result Date: 8/30/2023  PROCEDURE: XR CHEST 1 VW-    HISTORY: AMS  COMPARISON: October 2022.  FINDINGS: The patient is rotated to the left. The heart is borderline in size. There is mild interstitial disease in the right lung base which has mildly improved. There is a left perihilar mass. There is no pneumothorax. There are no acute osseous abnormalities.      Impression: Left perihilar mass. Chest CT with contrast is recommended.  Mild interstitial disease in the right lung base has mildly improved.        Images were reviewed, interpreted, and dictated by Dr. Svitlana Alvarado MD Transcribed by Janae Velez PA-C.  This report was signed and finalized on 8/30/2023 9:51 AM by Svitlana Alvarado MD.     I have reviewed the patient's radiology  reports.    Medication Review:     I have reviewed the patient's active and prn medications.     Problem List:      Diverticulitis large intestine      Assessment:    GI bleed suspect secondary to diverticulitis cyst vs recent polypectomy POA  Fecal impaction POA  Elevated troponin POA  Hematuria POA  A-fib on Eliquis  COPD on 3 L baseline  Hypertension  BPH  Impaired mobility and ADLs    Plan:    We will continue to monitor patient in the hospital.    GI bleed/fecal impaction  -Suspect GI bleed likely due to diverticulosis.  Could also be due to recent polypectomy performed 802/23 in the setting of continued anticoagulation.  - We will hold on chronic Eliquis  - GI consulted and appreciate recommendations  - Monitor H&H, currently stable.  No indication for PRBC transfusion.  -Bowel regimen  -Empiric Rocephin/flagyl    Elevated troponin  A-fib  - Hold Eliquis  - Restart bisoprolol and diltiazem  -Troponins elevated and plateaued fashion.  Suspect NSTEMI type II from demand ischemia.  Low suspicion for ACS.    Hematuria  - Urology consulted appreciate recommendations    PT/OT.  Further orders as clinical course dictates.    DVT Prophylaxis: SCDs  Code Status: DNR/DNI  Diet: N.p.o.  Discharge Plan: Pending    Ottoniel Alfaro DO  08/30/23  11:38 EDT    Dictated utilizing Dragon dictation.

## 2023-08-30 NOTE — PLAN OF CARE
Goal Outcome Evaluation:      Blood noted in urine and small amount in stool.  Pain controlled with tylenol.  Very restless and frequently pulling off equipment and monitors.  Frequent rounding.

## 2023-08-30 NOTE — DISCHARGE PLACEMENT REQUEST
"STR referral  Ed Spear (87 y.o. Male)       Date of Birth   1935    Social Security Number       Address   [address] [place] [address]    Home Phone   [phone]    MRN   [id number]       Sikh   None    Marital Status                               Admission Date   23    Admission Type   Emergency    Admitting Provider       Attending Provider   Ottoniel Alfaro DO    Department, Room/Bed   [facility] TELEMETRY SDS OVERFLOW, T01/       Discharge Date       Discharge Disposition       Discharge Destination                                 Attending Provider: Ottoniel Alfaro DO    Allergies: No Known Allergies    Isolation: None   Infection: None   Code Status: No CPR    Ht: 188 cm (74\")   Wt: 84.6 kg (186 lb 8.2 oz)    Admission Cmt: None   Principal Problem: Diverticulitis large intestine [K57.32]                   Active Insurance as of 2023       Primary Coverage       Payor Plan Insurance Group Employer/Plan Group    [facility]        Payor Plan Address Payor Plan Phone Number Payor Plan Fax Number Effective Dates    [address] [address] [phone]  2022 - None Entered    [facility] [id number]         Subscriber Name Subscriber Birth Date Member ID       ED SPEAR 1935 [id number]                     Emergency Contacts        (Rel.) Home Phone Work Phone Mobile Phone    Farida Cano (Daughter) -- -- [phone]    NATALIYA BROOKS (Daughter) -- -- --    Mike Spear (Son) [phone] -- --                 History & Physical        Kerley, Brian Joseph, DO at 23 [address] [place] [address] HOSPITALIST   HISTORY AND PHYSICAL      Name:  Ed Spear   Age:  87 y.o.  Sex:  male  :  1935  MRN:  [id number]   Visit Number:  [id number]  Admission Date:  2023  Date Of Service:  23  Primary Care Physician:  Chelo Nixon DO    Chief " Complaint:     Bright red blood per rectum    History Of Presenting Illness:      Patient is an 87-year-old man with past medical history of atrial fibrillation on Eliquis, COPD on 3 L baseline, BPH, hypertension, diverticulosis.  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  Presented to [facility] ED on 8/29/2023 with concern for mitten bright red bloody stools per rectum.  Family says that they were going to follow-up with a primary care doctor, but decided to come to the ED because he was acting intermittently confused.  He says he does have right lower quadrant abdominal pain.  Denied fevers or chills, shortness of air, chest pain, nausea or vomiting.    ED summary: Afebrile, vital signs stable on room air.  EKG atrial fibrillation, no ST elevations or depressions.  High-sensitivity troponin 57, 56, ACS not suspected.  Hemoglobin 10.1, most recent 9.8 on 8/22.  COVID/flu negative.  FOBT positive.  CT abdomen/pelvis inflammatory changes distal sigmoid colon, suspect diverticulitis, significant fecal impaction of the rectosigmoid colon.  For diverticulitis he was was provided Rocephin, metronidazole.    Review Of Systems:    All systems were reviewed and negative except as mentioned in history of presenting illness, assessment and plan.    Past Medical History: Patient  has a past medical history of A-fib, Acute sinusitis, Allergic rhinitis, Ankle fracture, Arthritis, degenerative, BMI 28.0-28.9,adult, COPD (chronic obstructive pulmonary disease), Cough, Enlarged prostate without lower urinary tract symptoms (luts), Hyperlipidemia, Hypertension, Obstructive chronic bronchitis with exacerbation, Shortness of breath, Skin cancer, Tinnitus of both ears, Vitamin D deficiency, and Wears dentures.    Past Surgical History: Patient  has a past surgical history that includes Cataract extraction;  Esophagogastroduodenoscopy (N/A, 7/28/2023); Colonoscopy (N/A, 7/28/2023); and Colonoscopy (N/A, 8/2/2023).    Social History: Patient  reports that he has been smoking cigarettes. He has a 90.00 pack-year smoking history. He has been exposed to tobacco smoke. He has never used smokeless tobacco. He reports that he does not drink alcohol and does not use drugs.    Family History:  Patient's family history has been reviewed and found to be noncontributory.     Allergies:      Patient has no known allergies.    Home Medications:    Prior to Admission Medications       Prescriptions Last Dose Informant Patient Reported? Taking?    acetaminophen (TYLENOL) 325 MG tablet   Yes No    Take 1 tablet by mouth Every 6 (Six) Hours As Needed.    albuterol sulfate  (90 Base) MCG/ACT inhaler   No No    INHALE 2 PUFFS BY MOUTH EVERY 4 HOURS AS NEEDED FOR WHEEZING OR SHORTNESS OF AIR    apixaban (Eliquis) 2.5 MG tablet tablet  Self No No    Take 1 tablet by mouth Every 12 (Twelve) Hours.    bisoprolol (ZEBeta) 10 MG tablet   No No    TAKE ONE TABLET BY MOUTH DAILY    Cartia  MG 24 hr capsule   No No    Take 1 capsule by mouth Daily.    DULoxetine (CYMBALTA) 60 MG capsule   No No    Take 1 capsule by mouth Daily.    famotidine (PEPCID) 40 MG tablet   No No    Take 1 tablet by mouth At Night As Needed for Heartburn.    ipratropium-albuterol (DUO-NEB) 0.5-2.5 mg/3 ml nebulizer   No No    INHALE THREE MILLILITERS ( ONE VIAL )  VIA NEBULIZATION BY MOUTH FOUR TIMES A DAY    sennosides-docusate (PERICOLACE) 8.6-50 MG per tablet   No No    Take 1 tablet by mouth 2 (Two) Times a Day.    simvastatin (ZOCOR) 40 MG tablet   No No    Take 1 tablet by mouth Every Night.    Symbicort 160-4.5 MCG/ACT inhaler   No No    INHALE 2 PUFFS BY MOUTH TWICE A DAY    tamsulosin (FLOMAX) 0.4 MG capsule 24 hr capsule   No No    Take 1 capsule by mouth Daily.          ED Medications:    Medications   metroNIDAZOLE (FLAGYL) IVPB 500 mg (500 mg  "Intravenous New Bag 8/29/23 2301)   iopamidol (ISOVUE-300) 61 % injection 100 mL (100 mL Intravenous Given 8/29/23 2153)   cefTRIAXone (ROCEPHIN) IVPB 1000 mg/50ml dextrose (premix) (0 mg Intravenous Stopped 8/29/23 2301)     Vital Signs:  Temp:  [97.8 °F (36.6 °C)] 97.8 °F (36.6 °C)  Heart Rate:  [85-98] 98  Resp:  [16] 16  BP: (108-124)/(54-78) 124/68        08/29/23 2039   Weight: 94.3 kg (208 lb)     Body mass index is 26.71 kg/m².    Physical Exam:     Most recent vital Signs: /68   Pulse 98   Temp 97.8 °F (36.6 °C)   Resp 16   Ht 188 cm (74\")   Wt 94.3 kg (208 lb)   SpO2 99%   BMI 26.71 kg/m²     Physical Exam  Constitutional:       General: He is not in acute distress.     Appearance: He is not ill-appearing.   HENT:      Mouth/Throat:      Mouth: Mucous membranes are moist.   Eyes:      Extraocular Movements: Extraocular movements intact.   Cardiovascular:      Rate and Rhythm: Normal rate. Rhythm irregular.      Pulses: Normal pulses.      Heart sounds: Normal heart sounds.   Pulmonary:      Effort: Pulmonary effort is normal.      Breath sounds: Normal breath sounds.   Abdominal:      Palpations: Abdomen is soft.      Tenderness: There is no abdominal tenderness.   Musculoskeletal:      Right lower leg: No edema.      Left lower leg: No edema.   Skin:     General: Skin is warm.   Neurological:      General: No focal deficit present.      Mental Status: He is alert.      Comments: Oriented to self and place   Psychiatric:         Mood and Affect: Mood normal.         Thought Content: Thought content normal.       Laboratory data:    I have reviewed the labs done in the emergency room.    Results from last 7 days   Lab Units 08/29/23 2036   SODIUM mmol/L 135*   POTASSIUM mmol/L 3.7   CHLORIDE mmol/L 95*   CO2 mmol/L 28.7   BUN mg/dL 16   CREATININE mg/dL 1.14   CALCIUM mg/dL 11.6*   BILIRUBIN mg/dL 0.4   ALK PHOS U/L 93   ALT (SGPT) U/L 10   AST (SGOT) U/L 15   GLUCOSE mg/dL 119*     Results " from last 7 days   Lab Units 08/29/23 2036   WBC 10*3/mm3 8.13   HEMOGLOBIN g/dL 10.1*   HEMATOCRIT % 31.6*   PLATELETS 10*3/mm3 293         Results from last 7 days   Lab Units 08/29/23 2237 08/29/23 2036   HSTROP T ng/L 56* 57*     Results from last 7 days   Lab Units 08/29/23 2036   PROBNP pg/mL 701.9         Results from last 7 days   Lab Units 08/29/23 2036   LIPASE U/L 11*     Results from last 7 days   Lab Units 08/29/23  2046   PH, ARTERIAL pH units 7.424   PO2 ART mm Hg 80.8   PCO2, ARTERIAL mm Hg 46.9*   HCO3 ART mmol/L 30.7*     Results from last 7 days   Lab Units 08/29/23 2115   COLOR UA  Yellow   GLUCOSE UA  Negative   KETONES UA  Negative   LEUKOCYTES UA  Negative   PH, URINE  5.5   BILIRUBIN UA  Negative   UROBILINOGEN UA  1.0 E.U./dL   RBC UA /HPF 13-20*   WBC UA /HPF None Seen       Pain Management Panel           No data to display                EKG:      EKG atrial fibrillation, no ST elevations or depressions.      Radiology:    CT Head Without Contrast    Result Date: 8/29/2023  FINAL REPORT CLINICAL HISTORY: Mental status change, unknown cause FINDINGS: Moderate atrophy and chronic ischemic white matter changes are noted.     No cortical edema is present.  There is no mass or hemorrhage.  Ventricles are normal.  Bone windows show no skull fracture or obvious obstructive lesion.     1.  No acute intracranial abnormality or obvious mass.  2. Atrophy and chronic ischemic white matter changes as above. Authenticated and Electronically Signed by KEYONNA Smith MD on 08/29/2023 10:21:40 PM    CT Abdomen Pelvis With Contrast    Result Date: 8/29/2023  FINAL REPORT TECHNIQUE: Oral and IV contrast enhanced exam CLINICAL HISTORY: RLQ abdominal pain (Age >= 14y) FINDINGS: Abdomen: Lung bases are clear.  There is probable cholelithiasis.  Liver has an unremarkable CT appearance.  There is a mass in the posterior spleen which is nonspecific but measures up to 23 mm.  The  pancreas and adrenal  glands are unremarkable.  There are bilateral renal cysts.  Kidneys show no mass or obstruction. No bowel obstruction or fluid collection is seen.  Pelvis: The appendix is normal.  There is significant fecal impaction of the rectal vault.  There is mild inflammatory change of the distal sigmoid colon with stranding extending into the sigmoid mesial colon, probably due to diverticulitis.  No fluid collection or adenopathy is seen.     1.  No evidence of appendicitis or bowel obstruction.  2. Inflammatory change of the distal sigmoid colon, suspect diverticulitis.  3.  Significant fecal impaction of the rectosigmoid colon.  4.  Cholelithiasis. Authenticated and Electronically Signed by KEYONNA Smith MD on 08/29/2023 10:21:13 PM     Assessment/Plan:    Inpatient general floor admission 8/29/2023 with rectal bleeding secondary to diverticulitis, elevated troponin demand ischemia ACS not suspected.  After admission gross hematuria noticed by nursing.    At time of admission patient resting comfortably in bed, pleasantly conversational.    Attempted to contact family via phone, no answer.    GI bleed  Diverticulitis  Fecal impaction  Zosyn started 8/30.  IVF.  Gastroenterology consultation, recommendations appreciated.   Hemoglobin stable.  Caution regarding fecal disimpaction due to bleed.    Elevated troponin  [place] demand ischemia.    Hematuria  Nursing noted clots from urethra.  Urology consultation.    Chronic:  atrial fibrillation on Eliquis, COPD on 3 L baseline, BPH, hypertension, diverticulosis.    Hold Eliquis due to lower GI bleed.    Continue other home medications.    Risk Assessment: High  DVT Prophylaxis: SCDs  Code Status: DNR/DNI  Diet: N.p.o.    Advance Care Planning   ACP discussion was held with the patient during this visit. Patient does not have an advance directive, declines further assistance.           Brian Joseph Kerley, DO  08/29/23  23:47 EDT    Dictated utilizing Dragon  dictation.    Electronically signed by Kerley, Brian Joseph, DO at 23 9223       Prior to Admission Medications       Prescriptions Last Dose Informant Patient Reported? Taking?    acetaminophen (TYLENOL) 325 MG tablet 2023  Yes Yes    Take 1 tablet by mouth Every 6 (Six) Hours As Needed.    albuterol sulfate  (90 Base) MCG/ACT inhaler Past Week  No Yes    INHALE 2 PUFFS BY MOUTH EVERY 4 HOURS AS NEEDED FOR WHEEZING OR SHORTNESS OF AIR    apixaban (Eliquis) 2.5 MG tablet tablet 2023 Self No Yes    Take 1 tablet by mouth Every 12 (Twelve) Hours.    bisoprolol (ZEBeta) 10 MG tablet 2023  No Yes    TAKE ONE TABLET BY MOUTH DAILY    Cartia  MG 24 hr capsule 2023  No Yes    Take 1 capsule by mouth Daily.    DULoxetine (CYMBALTA) 60 MG capsule 2023  No Yes    Take 1 capsule by mouth Daily.    famotidine (PEPCID) 40 MG tablet 2023  No Yes    Take 1 tablet by mouth At Night As Needed for Heartburn.    ipratropium-albuterol (DUO-NEB) 0.5-2.5 mg/3 ml nebulizer Past Week  No Yes    INHALE THREE MILLILITERS ( ONE VIAL )  VIA NEBULIZATION BY MOUTH FOUR TIMES A DAY    sennosides-docusate (PERICOLACE) 8.6-50 MG per tablet Past Week  No Yes    Take 1 tablet by mouth 2 (Two) Times a Day.    simvastatin (ZOCOR) 40 MG tablet 2023  No Yes    Take 1 tablet by mouth Every Night.    Symbicort 160-4.5 MCG/ACT inhaler Past Week  No Yes    INHALE 2 PUFFS BY MOUTH TWICE A DAY    tamsulosin (FLOMAX) 0.4 MG capsule 24 hr capsule 2023  No Yes    Take 1 capsule by mouth Daily.             Physical Therapy Notes (most recent note)        Hamlet Arellano, PT Student at 23 1124  Version 1 of 1      Attestation signed by Geraldine Gaxiola, PT at 23 1516    I attest to the accuracy and completeness of this note.                Patient Name: Ed Spear  : 1935    MRN: [id number]                              Today's Date: 2023       Admit Date: 2023    Visit Dx:      ICD-10-CM ICD-9-CM   1. Diverticulitis  K57.92 562.11   2. Altered mental status, unspecified altered mental status type  R41.82 780.97     Patient Active Problem List   Diagnosis    Pulmonary emphysema    Hyperlipidemia    Hypertension    Malignant neoplasm of skin    Vitamin D deficiency    Arthritis, degenerative    Enlarged prostate without lower urinary tract symptoms (luts)    Skin cancer    Chronic bilateral low back pain without sciatica    Atrial fibrillation    Encounter for Medicare annual wellness exam    Vitamin B 12 deficiency    Callus of heel    COPD exacerbation    Acute respiratory failure with hypoxia and hypercapnia    Skin lesion of hand    Lower GI bleed    Chronic anemia    Acute blood loss anemia    Abnormal CT of the abdomen    Adenomatous polyp of colon    Diverticulitis large intestine     Past Medical History:   Diagnosis Date    A-fib     Acute sinusitis     Allergic rhinitis     Ankle fracture     right    Arthritis, degenerative     BMI 28.0-28.9,adult     COPD (chronic obstructive pulmonary disease)     Cough     Enlarged prostate without lower urinary tract symptoms (luts)     Hyperlipidemia     Hypertension     Impaired mobility     Obstructive chronic bronchitis with exacerbation     Shortness of breath     Skin cancer     Tinnitus of both ears     Vitamin D deficiency     Wears dentures     full upper/lower - instructed on no adhesive DOS     Past Surgical History:   Procedure Laterality Date    CATARACT EXTRACTION      COLONOSCOPY N/A 7/28/2023    Procedure: COLONOSCOPY;  Surgeon: Dontrell Pham MD;  Location: [facility] ENDOSCOPY;  Service: Gastroenterology;  Laterality: N/A;    COLONOSCOPY N/A 8/2/2023    Procedure: COLONOSCOPY WITH TATTOOING, CLIPPING X1, AND POLYPECTOMY X24;  Surgeon: Dontrell Pham MD;  Location: [facility] ENDOSCOPY;  Service: Gastroenterology;  Laterality: N/A;    ENDOSCOPY N/A 7/28/2023    Procedure: ESOPHAGOGASTRODUODENOSCOPY;  Surgeon: Ankit  Dontrell MARTINEZ MD;  Location: [facility] ENDOSCOPY;  Service: Gastroenterology;  Laterality: N/A;      General Information       Row Name 08/30/23 1124          Physical Therapy Time and Intention    Document Type evaluation (P)   -NL     Mode of Treatment physical therapy (P)   -NL       Row Name 08/30/23 1124          General Information    Patient Profile Reviewed yes (P)   -NL     Prior Level of Function mod assist:;all household mobility;ADL's (P)   -NL     Existing Precautions/Restrictions fall;oxygen therapy device and L/min (P)   -NL     Barriers to Rehab medically complex;previous functional deficit (P)   -NL       Row Name 08/30/23 1124          Living Environment    People in Home child(magali), adult (P)   -NL       Row Name 08/30/23 1124          Home Main Entrance    Number of Stairs, Main Entrance two (P)   -NL     Stair Railings, Main Entrance railing on right side (ascending) (P)   -NL       Row Name 08/30/23 1124          Stairs Within Home, Primary    Number of Stairs, Within Home, Primary none (P)   -NL       Row Name 08/30/23 1124          Cognition    Orientation Status (Cognition) oriented x 3;oriented to;person;place;situation;verbal cues/prompts needed for orientation (P)   -NL       Row Name 08/30/23 1124          Safety Issues, Functional Mobility    Safety Issues Affecting Function (Mobility) ability to follow commands;safety precaution awareness;safety precautions follow-through/compliance;insight into deficits/self-awareness (P)   -NL     Impairments Affecting Function (Mobility) strength;endurance/activity tolerance;cognition;pain;shortness of breath (P)   -NL     Cognitive Impairments, Mobility Safety/Performance attention;awareness, need for assistance;insight into deficits/self-awareness;safety precaution awareness;safety precaution follow-through (P)   -NL               User Key  (r) = Recorded By, (t) = Taken By, (c) = Cosigned By      Initials Name Provider Type    Hamlet Miller, PT  Student PT Student                   Mobility       Row Name 08/30/23 1124          Bed Mobility    Bed Mobility rolling left;rolling right;supine-sit (P)   -NL     Rolling Left [place] (Bed Mobility) maximum assist (25% patient effort);2 person assist (P)   -NL     Rolling Right [place] (Bed Mobility) maximum assist (25% patient effort);2 person assist (P)   -NL     Supine-Sit [place] (Bed Mobility) moderate assist (50% patient effort);2 person assist (P)   -NL     Assistive Device (Bed Mobility) head of bed elevated;draw sheet;bed rails (P)   -NL       Row Name 08/30/23 1124          Bed-Chair Transfer    Bed-Chair [place] (Transfers) not tested (P)   -NL       Row Name 08/30/23 1124          Sit-Stand Transfer    Sit-Stand [place] (Transfers) not tested (P)   -NL       Row Name 08/30/23 1124          Gait/Stairs (Locomotion)    [place] Level (Gait) not tested (P)   -NL     [place] Level (Stairs) not tested (P)   -NL               User Key  (r) = Recorded By, (t) = Taken By, (c) = Cosigned By      Initials Name Provider Type    NL Hamlet Arellano, PT Student PT Student                   Obj/Interventions       Row Name 08/30/23 1124          Range of Motion Comprehensive    General Range of Motion bilateral lower extremity ROM WFL (P)   -NL       Row Name 08/30/23 1124          Strength Comprehensive (MMT)    General Manual Muscle Testing (MMT) Assessment lower extremity strength deficits identified (P)   -NL     Comment, General Manual Muscle Testing (MMT) Assessment BLE gross strength 3/5 (P)   -NL       Row Name 08/30/23 1124          Balance    Balance Assessment sitting static balance;sitting dynamic balance (P)   -NL     Static Sitting Balance moderate assist (P)   -NL     Dynamic Sitting Balance moderate assist (P)   -NL     Position, Sitting Balance supported;sitting edge of bed (P)   -NL       Row Name 08/30/23 1124          Sensory Assessment (Somatosensory)    Sensory  Assessment (Somatosensory) not tested (P)   -NL               User Key  (r) = Recorded By, (t) = Taken By, (c) = Cosigned By      Initials Name Provider Type    Hamlet Miller, PT Student PT Student                   Goals/Plan       Row Name 08/30/23 1124          Bed Mobility Goal 1 (PT)    Activity/Assistive Device (Bed Mobility Goal 1, PT) bed mobility activities, all (P)   -NL     [place] Level/Cues Needed (Bed Mobility Goal 1, PT) contact guard required (P)   -NL     Time Frame (Bed Mobility Goal 1, PT) long term goal (LTG);10 days (P)   -NL     Progress/Outcomes (Bed Mobility Goal 1, PT) goal ongoing (P)   -NL       Row Name 08/30/23 1124          Transfer Goal 1 (PT)    Activity/Assistive Device (Transfer Goal 1, PT) sit-to-stand/stand-to-sit (P)   -NL     [place] Level/Cues Needed (Transfer Goal 1, PT) minimum assist (75% or more patient effort) (P)   -NL     Time Frame (Transfer Goal 1, PT) long term goal (LTG);10 days (P)   -NL     Progress/Outcome (Transfer Goal 1, PT) goal ongoing (P)   -NL       Row Name 08/30/23 1124          Gait Training Goal 1 (PT)    Activity/Assistive Device (Gait Training Goal 1, PT) gait (walking locomotion);assistive device use;decrease fall risk;increase endurance/gait distance;walker, rolling (P)   -NL     [place] Level (Gait Training Goal 1, PT) minimum assist (75% or more patient effort) (P)   -NL     Distance (Gait Training Goal 1, PT) 10' (P)   -NL     Time Frame (Gait Training Goal 1, PT) long term goal (LTG);10 days (P)   -NL     Progress/Outcome (Gait Training Goal 1, PT) goal ongoing (P)   -NL       Row Name 08/30/23 1124          Patient Education Goal (PT)    Activity (Patient Education Goal, PT) BLE ther-ex x10 per HEP (P)   -NL     [place]/Cues/Accuracy (Memory Goal 2, PT) demonstrates adequately;verbalizes understanding (P)   -NL     Time Frame (Patient Education Goal, PT) long term goal (LTG);10 days (P)   -NL     Progress/Outcome (Patient  Education Goal, PT) goal ongoing (P)   -NL       Row Name 08/30/23 1124          Therapy Assessment/Plan (PT)    Planned Therapy Interventions (PT) strengthening;home exercise program;patient/family education;gait training;bed mobility training;balance training;transfer training (P)   -NL               User Key  (r) = Recorded By, (t) = Taken By, (c) = Cosigned By      Initials Name Provider Type    NL Hamlet Arellano, PT Student PT Student                   Clinical Impression       Row Name 08/30/23 1124          Pain    Pretreatment Pain Rating 8/10 (P)   -NL     Posttreatment Pain Rating 8/10 (P)   -NL     Pain Location upper (P)   -NL     Pain Location - abdomen (P)   -NL     Pain Intervention(s) Repositioned;Ambulation/increased activity (P)   -NL       Row Name 08/30/23 1124          Plan of Care Review    Plan of Care Reviewed With patient;daughter (P)   -NL     Progress no change (P)   -NL     Outcome Evaluation PT evaluation completed today. Patient was received supine in bed, A/Ox3 with verabl cue's, and c/o 8/10 upper abdomen pain. Patient had a HR of 111 before treatment and he then transferred supine-sit with mod Ax2 where he sat EOB for 2 minutes. His HR increased to 153 and he began to c/o head pain so he was returned to supine. Patient then rolled with max Ax2 while therapy changed his brief. Patient's HR was 128 upon exit of the room. Patient is expected to benefit from skilled PT prior to d/c in order to improve tolerance with activity. (P)   -NL       Row Name 08/30/23 1124          Therapy Assessment/Plan (PT)    Patient/Family Therapy Goals Statement (PT) Patient wants to return to prior level of functional ability. (P)   -NL     Rehab Potential (PT) good, to achieve stated therapy goals (P)   -NL     Criteria for Skilled Interventions Met (PT) yes;meets criteria;skilled treatment is necessary (P)   -NL     Therapy Frequency (PT) 5 times/wk (P)   -NL       Row Name 08/30/23 1124          Vital  Signs    Pre SpO2 (%) 95 (P)   -NL     O2 Delivery Pre Treatment supplemental O2 (P)   3L  -NL     Intra SpO2 (%) 90 (P)   -NL     O2 Delivery Intra Treatment supplemental O2 (P)   -NL     Post SpO2 (%) 95 (P)   -NL     O2 Delivery Post Treatment supplemental O2 (P)   -NL     Pre Patient Position Supine (P)   -NL     Intra Patient Position Sitting (P)   -NL     Post Patient Position Supine (P)   -NL       Row Name 08/30/23 1124          Positioning and Restraints    Pre-Treatment Position in bed (P)   -NL     Post Treatment Position bed (P)   -NL     In Bed side lying right;call light within reach;encouraged to call for assist;exit alarm on;with family/caregiver;SCD pump applied;heels elevated (P)   -NL               User Key  (r) = Recorded By, (t) = Taken By, (c) = Cosigned By      Initials Name Provider Type    Hamlet Miller, PT Student PT Student                   Outcome Measures       Row Name 08/30/23 1124 08/30/23 0800       How much help from another person do you currently need...    Turning from your back to your side while in flat bed without using bedrails? 2 (P)   -NL 3  -PK    Moving from lying on back to sitting on the side of a flat bed without bedrails? 2 (P)   -NL 2  -PK    Moving to and from a bed to a chair (including a wheelchair)? 2 (P)   -NL 2  -PK    Standing up from a chair using your arms (e.g., wheelchair, bedside chair)? 2 (P)   -NL 2  -PK    Climbing 3-5 steps with a railing? 1 (P)   -NL 1  -PK    To walk in hospital room? 1 (P)   -NL 2  -PK    AM-PAC 6 Clicks Score (PT) 10 (P)   -NL 12  -PK    Highest level of mobility 4 --> Transferred to chair/commode (P)   -NL 4 --> Transferred to chair/commode  -PK      Row Name 08/30/23 0200          How much help from another person do you currently need...    Turning from your back to your side while in flat bed without using bedrails? 3  -KL     Moving from lying on back to sitting on the side of a flat bed without bedrails? 3  -KL      Moving to and from a bed to a chair (including a wheelchair)? 2  -KL     Standing up from a chair using your arms (e.g., wheelchair, bedside chair)? 2  -KL     Climbing 3-5 steps with a railing? 1  -KL     To walk in hospital room? 1  -KL     AM-PAC 6 Clicks Score (PT) 12  -KL     Highest level of mobility 4 --> Transferred to chair/commode  -KL       Row Name 08/30/23 1124          Functional Assessment    Outcome Measure Options AM-PAC 6 Clicks Basic Mobility (PT) (P)   -NL               User Key  (r) = Recorded By, (t) = Taken By, (c) = Cosigned By      Initials Name Provider Type    PK Milagros Coreas, RN Registered Nurse    Keiko Cotter RN Registered Nurse    Hamlet Miller, PT Student PT Student                                 Physical Therapy Education       Title: PT OT SLP Therapies (In Progress)       Topic: Physical Therapy (In Progress)       Point: Mobility training (In Progress)       Learning Progress Summary             Patient Acceptance, E, NR by NL at 8/30/2023 1124    Comment: Educated on role of PT in stay at the hospital and on importance of movement.   Family Acceptance, E, NR by NL at 8/30/2023 1124    Comment: Educated on role of PT in stay at the hospital and on importance of movement.                         Point: Home exercise program (Not Started)       Learner Progress:  Not documented in this visit.              Point: Body mechanics (Not Started)       Learner Progress:  Not documented in this visit.                              User Key       Initials Effective Dates Name Provider Type Discipline    NL 07/13/23 -  Hamlet Arellano, PT Student PT Student PT                  PT Recommendation and Plan  Planned Therapy Interventions (PT): (P) strengthening, home exercise program, patient/family education, gait training, bed mobility training, balance training, transfer training  Plan of Care Reviewed With: (P) patient, daughter  Progress: (P) no change  Outcome Evaluation: (P) PT  evaluation completed today. Patient was received supine in bed, A/Ox3 with verabl cue's, and c/o 8/10 upper abdomen pain. Patient had a HR of 111 before treatment and he then transferred supine-sit with mod Ax2 where he sat EOB for 2 minutes. His HR increased to 153 and he began to c/o head pain so he was returned to supine. Patient then rolled with max Ax2 while therapy changed his brief. Patient's HR was 128 upon exit of the room. Patient is expected to benefit from skilled PT prior to d/c in order to improve tolerance with activity.     Time Calculation:   PT Evaluation Complexity  History, PT Evaluation Complexity: (P) 3 or more personal factors and/or comorbidities  Examination of Body Systems (PT Eval Complexity): (P) total of 3 or more elements  Clinical Presentation (PT Evaluation Complexity): (P) evolving  Clinical Decision Making (PT Evaluation Complexity): (P) moderate complexity  Overall Complexity (PT Evaluation Complexity): (P) moderate complexity     PT Charges       Row Name 08/30/23 1124             Time Calculation    Start Time 1124 (P)   -NL      PT Received On 08/30/23 (P)   -NL      PT Goal Re-Cert Due Date 09/09/23 (P)   -NL         Untimed Charges    PT Eval/Re-eval Minutes 40 (P)   -NL         Total Minutes    Untimed Charges Total Minutes 40 (P)   -NL       Total Minutes 40 (P)   -NL                User Key  (r) = Recorded By, (t) = Taken By, (c) = Cosigned By      Initials Name Provider Type    NL Hamlet Arellano, PT Student PT Student                  Therapy Charges for Today       Code Description Service Date Service Provider Modifiers Qty    [id number]  PT EVAL MOD COMPLEXITY 3 8/30/2023 Hamlet Arellano, PT Student GP 1            PT G-Codes  Outcome Measure Options: (P) AM-PAC 6 Clicks Basic Mobility (PT)  AM-PAC 6 Clicks Score (PT): (P) 10  PT Discharge Summary  Anticipated Discharge Disposition (PT): (P) inpatient rehabilitation facility, skilled nursing facility    Hamlet Arellano, FRIDA  Student  2023      Electronically signed by Geraldine Gaxiola, PT at 23 1516          Occupational Therapy Notes (most recent note)        Deepthi Barnes, OT at 23 1400          Patient Name: Ed Spear  : 1935    MRN: [id number]                              Today's Date: 2023       Admit Date: 2023    Visit Dx:     ICD-10-CM ICD-9-CM   1. Diverticulitis  K57.92 562.11   2. Altered mental status, unspecified altered mental status type  R41.82 780.97     Patient Active Problem List   Diagnosis    Pulmonary emphysema    Hyperlipidemia    Hypertension    Malignant neoplasm of skin    Vitamin D deficiency    Arthritis, degenerative    Enlarged prostate without lower urinary tract symptoms (luts)    Skin cancer    Chronic bilateral low back pain without sciatica    Atrial fibrillation    Encounter for Medicare annual wellness exam    Vitamin B 12 deficiency    Callus of heel    COPD exacerbation    Acute respiratory failure with hypoxia and hypercapnia    Skin lesion of hand    Lower GI bleed    Chronic anemia    Acute blood loss anemia    Abnormal CT of the abdomen    Adenomatous polyp of colon    Diverticulitis large intestine     Past Medical History:   Diagnosis Date    A-fib     Acute sinusitis     Allergic rhinitis     Ankle fracture     right    Arthritis, degenerative     BMI 28.0-28.9,adult     COPD (chronic obstructive pulmonary disease)     Cough     Enlarged prostate without lower urinary tract symptoms (luts)     Hyperlipidemia     Hypertension     Impaired mobility     Obstructive chronic bronchitis with exacerbation     Shortness of breath     Skin cancer     Tinnitus of both ears     Vitamin D deficiency     Wears dentures     full upper/lower - instructed on no adhesive DOS     Past Surgical History:   Procedure Laterality Date    CATARACT EXTRACTION      COLONOSCOPY N/A 2023    Procedure: COLONOSCOPY;  Surgeon: Dontrell Pham MD;  Location: [facility]  ENDOSCOPY;  Service: Gastroenterology;  Laterality: N/A;    COLONOSCOPY N/A 8/2/2023    Procedure: COLONOSCOPY WITH TATTOOING, CLIPPING X1, AND POLYPECTOMY X24;  Surgeon: Dontrell Pham MD;  Location: [facility] ENDOSCOPY;  Service: Gastroenterology;  Laterality: N/A;    ENDOSCOPY N/A 7/28/2023    Procedure: ESOPHAGOGASTRODUODENOSCOPY;  Surgeon: Dontrell Pham MD;  Location: [facility] ENDOSCOPY;  Service: Gastroenterology;  Laterality: N/A;      General Information       Row Name 08/30/23 1347          OT Time and Intention    Document Type evaluation  -SD     Mode of Treatment occupational therapy  -SD       Row Name 08/30/23 1347          General Information    Patient Profile Reviewed yes  -SD     Prior Level of Function mod assist:;all household mobility;ADL's  Lives with daughter who assists with all self care and mobility. Has a RW, SPC, WC, SC, O2, normally sponge bathes. Uses a BSC during the day, is incontinent at night.  -SD     Barriers to Rehab medically complex;previous functional deficit;cognitive status;hearing deficit  -SD       Row Name 08/30/23 1347          Living Environment    People in Home child(magali), adult  -SD       Row Name 08/30/23 1347          Home Main Entrance    Number of Stairs, Main Entrance two  -SD     Stair Railings, Main Entrance railing on right side (ascending)  -SD       Row Name 08/30/23 1347          Stairs Within Home, Primary    Number of Stairs, Within Home, Primary none  -SD       Row Name 08/30/23 1347          Cognition    Orientation Status (Cognition) oriented x 3;verbal cues/prompts needed for orientation  -SD       Row Name 08/30/23 1344          Safety Issues, Functional Mobility    Safety Issues Affecting Function (Mobility) safety precautions follow-through/compliance;sequencing abilities;safety precaution awareness;friction/shear risk;awareness of need for assistance;ability to follow commands  -SD     Impairments Affecting Function (Mobility)  balance;cognition;coordination;endurance/activity tolerance;motor planning;pain;postural/trunk control;shortness of breath;strength  -SD               User Key  (r) = Recorded By, (t) = Taken By, (c) = Cosigned By      Initials Name Provider Type    Deepthi Vera OT Occupational Therapist                     Mobility/ADL's       Row Name 08/30/23 [facility]0          Bed Mobility    Bed Mobility rolling left;rolling right;supine-sit;sit-supine  -SD     Rolling Left [place] (Bed Mobility) maximum assist (25% patient effort);2 person assist  -SD     Rolling Right [place] (Bed Mobility) maximum assist (25% patient effort);2 person assist  -SD     Supine-Sit [place] (Bed Mobility) moderate assist (50% patient effort);2 person assist  -SD     Sit-Supine [place] (Bed Mobility) moderate assist (50% patient effort);2 person assist  -SD     Assistive Device (Bed Mobility) bed rails;draw sheet;head of bed elevated  -SD     Comment, (Bed Mobility) EOB x 2 mins, HR increased to 153  -SD       Row Name 08/30/23 [facility]          Transfers    Transfers sit-stand transfer  -SD       Row Name 08/30/23 [facility]          Sit-Stand Transfer    Sit-Stand [place] (Transfers) unable to assess  -SD       Row Name 08/30/23 [facility]          Functional Mobility    Functional Mobility- Ind. Level not appropriate to assess  -SD       Row Name 08/30/23 [facility]          Activities of Daily Living    BADL Assessment/Intervention bathing;upper body dressing;lower body dressing;grooming;feeding;toileting  -SD       Row Name 08/30/23 [facility]          Bathing Assessment/Intervention    [place] Level (Bathing) maximum assist (25% patient effort);dependent (less than 25% patient effort)  -SD       Row Name 08/30/23 [facility]          Upper Body Dressing Assessment/Training    [place] Level (Upper Body Dressing) maximum assist (25% patient effort)  -SD       Row Name 08/30/23 [facility]          Lower Body Dressing Assessment/Training     [place] Level (Lower Body Dressing) dependent (less than 25% patient effort)  -SD       Row Name 08/30/23 1350          Grooming Assessment/Training    [place] Level (Grooming) maximum assist (25% patient effort)  -SD       Row Name 08/30/23 1350          Self-Feeding Assessment/Training    [place] Level (Feeding) maximum assist (25% patient effort)  -SD       Row Name 08/30/23 1350          Toileting Assessment/Training    [place] Level (Toileting) change pad/brief;perform perineal hygiene;dependent (less than 25% patient effort)  -SD               User Key  (r) = Recorded By, (t) = Taken By, (c) = Cosigned By      Initials Name Provider Type    SD Deepthi Barnes OT Occupational Therapist                   Obj/Interventions       [facility] Name 08/30/23 1352          Range of Motion Comprehensive    General Range of Motion bilateral upper extremity ROM WFL  -SD       Row Name 08/30/23 1352          Strength Comprehensive (MMT)    General Manual Muscle Testing (MMT) Assessment upper extremity strength deficits identified  -SD     Comment, General Manual Muscle Testing (MMT) Assessment 3/5 - 3+/5  -SD               User Key  (r) = Recorded By, (t) = Taken By, (c) = Cosigned By      Initials Name Provider Type    Deepthi Vera OT Occupational Therapist                   Goals/Plan       Row Name 08/30/23 1359          Transfer Goal 1 (OT)    Activity/Assistive Device (Transfer Goal 1, OT) sit-to-stand/stand-to-sit;walker, rolling  -SD     [place] Level/Cues Needed (Transfer Goal 1, OT) moderate assist (50-74% patient effort)  -SD     Time Frame (Transfer Goal 1, OT) long term goal (LTG)  -SD     Progress/Outcome (Transfer Goal 1, OT) goal ongoing  -SD       Row Name 08/30/23 1358          Dressing Goal 1 (OT)    Activity/Device (Dressing Goal 1, OT) lower body dressing  -SD     [place]/Cues Needed (Dressing Goal 1, OT) moderate assist (50-74% patient effort)  -SD     Time Frame  (Dressing Goal 1, OT) 2 weeks  -SD     Progress/Outcome (Dressing Goal 1, OT) goal ongoing  -SD       Row Name 08/30/23 9893          Toileting Goal 1 (OT)    Activity/Device (Toileting Goal 1, OT) toileting skills, all;commode, bedside without drop arms  -SD     [place] Level/Cues Needed (Toileting Goal 1, OT) moderate assist (50-74% patient effort)  -SD     Time Frame (Toileting Goal 1, OT) 2 weeks  -SD     Progress/Outcome (Toileting Goal 1, OT) goal ongoing  -SD       Row Name 08/30/23 6489          Strength Goal 1 (OT)    Strength Goal 1 (OT) Patient to perform UB ther ex as tolerated  -SD     Time Frame (Strength Goal 1, OT) long term goal (LTG)  -SD     Progress/Outcome (Strength Goal 1, OT) goal ongoing  -SD       Row Name 08/30/23 4889          Therapy Assessment/Plan (OT)    Planned Therapy Interventions (OT) activity tolerance training;adaptive equipment training;BADL retraining;patient/caregiver education/training;ROM/therapeutic exercise;strengthening exercise;transfer/mobility retraining  -SD               User Key  (r) = Recorded By, (t) = Taken By, (c) = Cosigned By      Initials Name Provider Type    SD Deepthi Barnes OT Occupational Therapist                   Clinical Impression       Row Name 08/30/23 2754          Pain Assessment    Pretreatment Pain Rating 8/10  -SD     Posttreatment Pain Rating 8/10  -SD     Pain Location upper  -SD     Pain Location - abdomen  -SD     Pain Intervention(s) Repositioned;Ambulation/increased activity  -SD       Row Name 08/30/23 4998          Plan of Care Review    Plan of Care Reviewed With patient;daughter  -SD     Progress no change  -SD     Outcome Evaluation OT eval completed. Patient supine in bed, Ox3 with VCs, c/o abdominal pain, HR is noted to be tachy. Daughter provides most history. Patient performed supine to sit with mod A x 2, sat EOB briefly, HR increased to 153. Returned to supine, required max A x 2 for rolling in bed, TD for perineal  hygiene and brief change. Patient scooted up in bed max A x 2 and placed in fowlers. Notified RN when brief was changed urine was blood tinged,  following activity. Patient is expected to benefit from continued OT services prior to DC. Patient may benefit from STR as his daughter is having more difficulty taking care of him d/t her own health issues.  -SD       Row Name 08/30/23 4642          Therapy Assessment/Plan (OT)    Patient/Family Therapy Goal Statement (OT) increase strength  -SD     Rehab Potential (OT) fair, will monitor progress closely  -SD     Criteria for Skilled Therapeutic Interventions Met (OT) skilled treatment is necessary  -SD     Therapy Frequency (OT) 3 times/wk  5 times if indicated  -SD       Row Name 08/30/23 0514          Therapy Plan Review/Discharge Plan (OT)    Anticipated Discharge Disposition (OT) inpatient rehabilitation facility  -SD       Row Name 08/30/23 1358          Vital Signs    Pretreatment Heart Rate (beats/min) 111  -SD     Intratreatment Heart Rate (beats/min) 153  -SD     Posttreatment Heart Rate (beats/min) 128  -SD     Pre SpO2 (%) 95  -SD     O2 Delivery Pre Treatment supplemental O2  -SD     Intra SpO2 (%) 90  -SD     O2 Delivery Intra Treatment supplemental O2  -SD     Post SpO2 (%) 95  -SD     O2 Delivery Post Treatment supplemental O2  -SD       Row Name 08/30/23 6367          Positioning and Restraints    Pre-Treatment Position in bed  -SD     Post Treatment Position bed  -SD     In Bed fowlers;call light within reach;encouraged to call for assist;notified nsg;with family/caregiver  -SD               User Key  (r) = Recorded By, (t) = Taken By, (c) = Cosigned By      Initials Name Provider Type    Deepthi Vera OT Occupational Therapist                   Outcome Measures       Row Name 08/30/23 2460          How much help from another is currently needed...    Putting on and taking off regular lower body clothing? 1  -SD     Bathing (including  washing, rinsing, and drying) 1  -SD     Toileting (which includes using toilet bed pan or urinal) 1  -SD     Putting on and taking off regular upper body clothing 2  -SD     Taking care of personal grooming (such as brushing teeth) 2  -SD     Eating meals 2  -SD     AM-PAC 6 Clicks Score (OT) 9  -SD       Row Name 08/30/23 1124 08/30/23 0800       How much help from another person do you currently need...    Turning from your back to your side while in flat bed without using bedrails? 2  -MS (r) NL (t) MS (c) 3  -PK    Moving from lying on back to sitting on the side of a flat bed without bedrails? 2  -MS (r) NL (t) MS (c) 2  -PK    Moving to and from a bed to a chair (including a wheelchair)? 2  -MS (r) NL (t) MS (c) 2  -PK    Standing up from a chair using your arms (e.g., wheelchair, bedside chair)? 2  -MS (r) NL (t) MS (c) 2  -PK    Climbing 3-5 steps with a railing? 1  -MS (r) NL (t) MS (c) 1  -PK    To walk in hospital room? 1  -MS (r) NL (t) MS (c) 2  -PK    AM-PAC 6 Clicks Score (PT) 10  -MS (r) NL (t) 12  -PK    Highest level of mobility 4 --> Transferred to chair/commode  -MS (r) NL (t) 4 --> Transferred to chair/commode  -PK      Row Name 08/30/23 1358 08/30/23 1124       Functional Assessment    Outcome Measure Options AM-PAC 6 Clicks Daily Activity (OT)  -SD AM-PAC 6 Clicks Basic Mobility (PT)  -MS (r) NL (t) MS (c)              User Key  (r) = Recorded By, (t) = Taken By, (c) = Cosigned By      Initials Name Provider Type    Deepthi Vera, OT Occupational Therapist    Antoine Novoa, PT Physical Therapist    PK Milagros Coreas, RN Registered Nurse    Hamlet Miller, PT Student PT Student                    Occupational Therapy Education       Title: PT OT SLP Therapies (In Progress)       Topic: Occupational Therapy (In Progress)       Point: ADL training (Done)       Description:   Instruct learner(s) on proper safety adaptation and remediation techniques during self care or transfers.    Instruct in proper use of assistive devices.                  Learning Progress Summary             Patient Acceptance, E,TB, VU by SD at 8/30/2023 1354    Comment: OT POC   Family Acceptance, E,TB, VU by SD at 8/30/2023 1359    Comment: OT POC                         Point: Home exercise program (Not Started)       Description:   Instruct learner(s) on appropriate technique for monitoring, assisting and/or progressing therapeutic exercises/activities.                  Learner Progress:  Not documented in this visit.              Point: Precautions (Not Started)       Description:   Instruct learner(s) on prescribed precautions during self-care and functional transfers.                  Learner Progress:  Not documented in this visit.              Point: Body mechanics (Not Started)       Description:   Instruct learner(s) on proper positioning and spine alignment during self-care, functional mobility activities and/or exercises.                  Learner Progress:  Not documented in this visit.                              User Key       Initials Effective Dates Name Provider Type Discipline    SD 06/16/21 -  Deepthi Barnes OT Occupational Therapist OT                  OT Recommendation and Plan  Planned Therapy Interventions (OT): activity tolerance training, adaptive equipment training, BADL retraining, patient/caregiver education/training, ROM/therapeutic exercise, strengthening exercise, transfer/mobility retraining  Therapy Frequency (OT): 3 times/wk (5 times if indicated)  Plan of Care Review  Plan of Care Reviewed With: patient, daughter  Progress: no change  Outcome Evaluation: OT eval completed. Patient supine in bed, Ox3 with VCs, c/o abdominal pain, HR is noted to be tachy. Daughter provides most history. Patient performed supine to sit with mod A x 2, sat EOB briefly, HR increased to 153. Returned to supine, required max A x 2 for rolling in bed, TD for perineal hygiene and brief change. Patient  scooted up in bed max A x 2 and placed in fowlers. Notified RN when brief was changed urine was blood tinged,  following activity. Patient is expected to benefit from continued OT services prior to DC. Patient may benefit from STR as his daughter is having more difficulty taking care of him d/t her own health issues.     Time Calculation:   Evaluation Complexity (OT)  Review Occupational Profile/Medical/Therapy History Complexity: expanded/moderate complexity  Assessment, Occupational Performance/Identification of Deficit Complexity: 3-5 performance deficits  Clinical Decision Making Complexity (OT): detailed assessment/moderate complexity  Overall Complexity of Evaluation (OT): moderate complexity     Time Calculation- OT       Row Name 08/30/23 1359             Time Calculation- OT    OT Start Time 1127  -SD      OT Received On 08/30/23  -SD      OT Goal Re-Cert Due Date 09/11/23  -SD         Untimed Charges    OT Eval/Re-eval Minutes 45  -SD         Total Minutes    Untimed Charges Total Minutes 45  -SD       Total Minutes 45  -SD                User Key  (r) = Recorded By, (t) = Taken By, (c) = Cosigned By      Initials Name Provider Type    Deepthi Vera OT Occupational Therapist                  Therapy Charges for Today       Code Description Service Date Service Provider Modifiers Qty    [id number] HC OT EVAL MOD COMPLEXITY 3 8/30/2023 Deepthi Barnes OT GO 1                 Deepthi Barnes OT  8/30/2023    Electronically signed by Deepthi Barnes OT at 08/30/23 1400

## 2023-08-30 NOTE — CONSULTS
Inpatient Consult      Date of Consultation: 2023  Patient Name: Ed Spear  MRN: [id number]  : 1935     Referring provider: Ottoniel Alfaro DO    Primary care provider:  Chelo Nixon DO    Reason for consultation: BRBPR and diverticulitis    History of Present Illness:   2023  This is a 87-year-old male patient with a prior history of hypertension, hyperlipidemia, chronic atrial fibrillation on Eliquis, COPD on home oxygen 3 L nasal cannula, recent history of GI bleeding with ulcerated recto sigmoid polyp, later removed. He presents to emergency room this time due to complaints of hematochezia, some confusion and lower abdominal pain. He was found to have sigmoid diverticulitis and fecal impaction on CTAP with contrast. Hgb at 10.3 with baseline of 11. He was admitted, placed NPO, started on fluids, and given Rocephin and flagyl. Eliquis has been held. GI was consulted for evaluation and management of bright red blood per rectum and diverticulitis.     Patient is a poor historian. He is alone in the room this morning. Per remy review, he was intermittently confused prior to admission. Patient denies any current abdominal pain or nausea. Has had 2 stools overnight, 1 had blood in brief nursing staff thought urinary source.     2023 previous inpatient GI visit  He lives with his daughter and ambulates with walker.  Patient is a poor historian.  He has stage COPD and is on oxygen for many years now.  As per his daughter he has been constipated recently.  He also had some flareup of his hemorrhoids.  His daughter got some over-the-counter medication for him which helped for a while however he continued to have a intermittent red blood bleeding with the minimal clots.    Yesterday he had a large bowel movement with a blood and she brought him to emergency room today for further evaluation.  He is on Eliquis for chronic atrial fibrillation and he continued to take the  medications.  He does get intermittent abdominal cramps.  Denies any nausea vomiting no reflux symptoms.  No prior EGD or colonoscopy.  Prior history of any GI bleed.     In the emergency room he had blood work done which revealed hemoglobin of 10.3 g/dL compared to his baseline hemoglobin 11.3 in January 2023.  CMP was largely unremarkable.  CT abdomen pelvis done with contrast showed sigmoid diverticulosis without signs of any current bleeding.  Hepatic steatosis and possible distal esophageal thickening.  He has been admitted for further evaluation management of his symptoms.    Subjective     Past Medical History:   Diagnosis Date    A-fib     Acute sinusitis     Allergic rhinitis     Ankle fracture     right    Arthritis, degenerative     BMI 28.0-28.9,adult     COPD (chronic obstructive pulmonary disease)     Cough     Enlarged prostate without lower urinary tract symptoms (luts)     Hyperlipidemia     Hypertension     Obstructive chronic bronchitis with exacerbation     Shortness of breath     Skin cancer     Tinnitus of both ears     Vitamin D deficiency     Wears dentures     full upper/lower - instructed on no adhesive DOS       Past Surgical History:   Procedure Laterality Date    CATARACT EXTRACTION      COLONOSCOPY N/A 7/28/2023    Procedure: COLONOSCOPY;  Surgeon: Dontrell Pham MD;  Location: [facility] ENDOSCOPY;  Service: Gastroenterology;  Laterality: N/A;    COLONOSCOPY N/A 8/2/2023    Procedure: COLONOSCOPY WITH TATTOOING, CLIPPING X1, AND POLYPECTOMY X24;  Surgeon: Dontrell Pham MD;  Location: [facility] ENDOSCOPY;  Service: Gastroenterology;  Laterality: N/A;    ENDOSCOPY N/A 7/28/2023    Procedure: ESOPHAGOGASTRODUODENOSCOPY;  Surgeon: Dontrell Pham MD;  Location: [facility] ENDOSCOPY;  Service: Gastroenterology;  Laterality: N/A;       Family History   Problem Relation Age of Onset    Cancer Mother     Kidney disease Mother     Cancer Father     Cancer Brother        Social  History     Socioeconomic History    Marital status:    Tobacco Use    Smoking status: Every Day     Packs/day: 1.50     Years: 60.00     Pack years: 90.00     Types: Cigarettes     Passive exposure: Current    Smokeless tobacco: Never   Vaping Use    Vaping Use: Never used   Substance and Sexual Activity    Alcohol use: No    Drug use: Never    Sexual activity: Defer     Current Facility-Administered Medications:     acetaminophen (TYLENOL) tablet 650 mg, 650 mg, Oral, Q4H PRN **OR** acetaminophen (TYLENOL) 160 MG/5ML solution 650 mg, 650 mg, Oral, Q4H PRN **OR** acetaminophen (TYLENOL) suppository 650 mg, 650 mg, Rectal, Q4H PRN, Kerley, Brian Joseph, DO    sennosides-docusate (PERICOLACE) 8.6-50 MG per tablet 2 tablet, 2 tablet, Oral, BID **AND** polyethylene glycol (MIRALAX) packet 17 g, 17 g, Oral, Daily PRN **AND** bisacodyl (DULCOLAX) EC tablet 5 mg, 5 mg, Oral, Daily PRN **AND** bisacodyl (DULCOLAX) suppository 10 mg, 10 mg, Rectal, Daily PRN, Kerley, Brian Joseph, DO    Calcium Replacement - Follow Nurse / BPA Driven Protocol, , Does not apply, PRN, Kerley, Brian Joseph, DO    Magnesium Standard Dose Replacement - Follow Nurse / BPA Driven Protocol, , Does not apply, PRN, Kerley, Brian Joseph, DO    nitroglycerin (NITROSTAT) SL tablet 0.4 mg, 0.4 mg, Sublingual, Q5 Min PRN, Kerley, Brian Joseph, DO    ondansetron (ZOFRAN) injection 4 mg, 4 mg, Intravenous, Q6H PRN, Kerley, Brian Joseph, DO    Pharmacy to Dose Zosyn, , Does not apply, Continuous PRN, Kerley, Brian Joseph, DO    Phosphorus Replacement - Follow Nurse / BPA Driven Protocol, , Does not apply, PRN, Kerley, Brian Joseph, DO    piperacillin-tazobactam (ZOSYN) 3.375 g in iso-osmotic dextrose 50 ml (premix), 3.375 g, Intravenous, Q8H, Kerley, Brian Joseph, DO, 3.375 g at 08/30/23 0827    Potassium Replacement - Follow Nurse / BPA Driven Protocol, , Does not apply, PRN, Kerley, Brian Joseph,     sodium chloride 0.9 % flush 10 mL, 10 mL,  "Intravenous, Q12H, Kerley, Brian Joseph, DO, 10 mL at 08/30/23 0222    sodium chloride 0.9 % flush 10 mL, 10 mL, Intravenous, PRN, Kerley, Brian Joseph, DO    sodium chloride 0.9 % infusion 40 mL, 40 mL, Intravenous, PRN, Kerley, Brian Joseph,     sodium chloride 0.9 % infusion, 125 mL/hr, Intravenous, Continuous, Kerley, Brian Joseph, DO, Last Rate: 125 mL/hr at 08/30/23 0222, 125 mL/hr at 08/30/23 0222    No Known Allergies    Review of Systems   Constitutional:  Positive for activity change, appetite change and fatigue.   HENT:  Negative for trouble swallowing.    Eyes: Negative.    Respiratory:  Negative for shortness of breath.    Endocrine: Negative.    Allergic/Immunologic: Negative.    Neurological:  Positive for weakness.      The following portions of the patient's history were reviewed and updated as appropriate: allergies, current medications, past family history, past medical history, past social history, past surgical history and problem list.    Objective     Vitals:    08/30/23 0030 08/30/23 0112 08/30/23 0407 08/30/23 0751   BP: 119/80 106/98 117/78 139/79   BP Location:  Left arm Left arm Left arm   Patient Position:  Lying Lying Lying   Pulse: 87 84 95 102   Resp:  18 18 12   Temp:  97.9 °F (36.6 °C) 98 °F (36.7 °C) 98.2 °F (36.8 °C)   TempSrc:  Oral Oral Oral   SpO2: 100% 100% 100% 98%   Weight:  84.6 kg (186 lb 8.2 oz)     Height:  188 cm (74\")         Physical Exam  Constitutional:       General: He is not in acute distress.     Appearance: He is well-developed. He is ill-appearing. He is not diaphoretic.      Comments: frail   HENT:      Head: Normocephalic and atraumatic.      Right Ear: External ear normal.      Left Ear: External ear normal.      Nose: Nose normal.      Mouth/Throat:      Mouth: Mucous membranes are dry.   Eyes:      General: No scleral icterus.        Right eye: No discharge.         Left eye: No discharge.      Conjunctiva/sclera: Conjunctivae normal.   Neck:      " Trachea: No tracheal deviation.   Cardiovascular:      Rate and Rhythm: Rhythm irregular.   Pulmonary:      Effort: Pulmonary effort is normal. No respiratory distress.   Abdominal:      General: Bowel sounds are normal. There is no distension (no significant tenderness).      Palpations: Abdomen is soft.      Tenderness: There is no abdominal tenderness. There is no guarding.      Comments: Scaphoid abdomen   Musculoskeletal:         General: Normal range of motion.      Right lower leg: No edema.      Left lower leg: No edema.   Skin:     General: Skin is warm and dry.      Coloration: Skin is not pale.      Findings: No erythema or rash.   Neurological:      Mental Status: He is alert.      Coordination: Coordination normal.      Comments: Oriented x2 (person and place)   Psychiatric:      Comments: Mildly anxious, answers some questions briefly       Results from last 7 days   Lab Units 08/29/23 2036   SODIUM mmol/L 135*   POTASSIUM mmol/L 3.7   CHLORIDE mmol/L 95*   CO2 mmol/L 28.7   BUN mg/dL 16   CREATININE mg/dL 1.14   CALCIUM mg/dL 11.6*   ALBUMIN g/dL 3.4*   BILIRUBIN mg/dL 0.4   ALK PHOS U/L 93   ALT (SGPT) U/L 10   AST (SGOT) U/L 15   GLUCOSE mg/dL 119*   WBC 10*3/mm3 8.13   HEMOGLOBIN g/dL 10.1*   PLATELETS 10*3/mm3 293       Imaging Results (Last 24 Hours)       Procedure Component Value Units Date/Time    CT Abdomen Pelvis With Contrast [[id number]] Collected: 08/29/23 2221     Updated: 08/29/23 2222    Narrative:      FINAL REPORT    TECHNIQUE:  Oral and IV contrast enhanced exam    CLINICAL HISTORY:  RLQ abdominal pain (Age >= 14y)    FINDINGS:  Abdomen: Lung bases are clear.  There is probable  cholelithiasis.  Liver has an unremarkable CT appearance.  There  is a mass in the posterior spleen which is nonspecific but  measures up to 23 mm.  The  pancreas and adrenal glands are  unremarkable.  There are bilateral renal cysts.  Kidneys show no  mass or obstruction. No bowel obstruction or fluid  collection is  seen.  Pelvis: The appendix is normal.  There is significant  fecal impaction of the rectal vault.  There is mild inflammatory  change of the distal sigmoid colon with stranding extending into  the sigmoid mesial colon, probably due to diverticulitis.  No  fluid collection or adenopathy is seen.      Impression:      1.  No evidence of appendicitis or bowel obstruction.  2.  Inflammatory change of the distal sigmoid colon, suspect  diverticulitis.  3.  Significant fecal impaction of the  rectosigmoid colon.  4.  Cholelithiasis.    Authenticated and Electronically Signed by KEYONNA Smith MD on  08/29/2023 10:21:13 PM    CT Head Without Contrast [[id number]] Collected: 08/29/23 2221     Updated: 08/29/23 2222    Narrative:      FINAL REPORT    CLINICAL HISTORY:  Mental status change, unknown cause    FINDINGS:  Moderate atrophy and chronic ischemic white matter changes are  noted.     No cortical edema is present.  There is no mass or  hemorrhage.  Ventricles are normal.  Bone windows show no skull  fracture or obvious obstructive lesion.      Impression:      1.  No acute intracranial abnormality or obvious mass.  2.  Atrophy and chronic ischemic white matter changes as above.    Authenticated and Electronically Signed by KEYONNA Smith MD on  08/29/2023 10:21:40 PM         EGD and colonoscopy by Dr. Pham 7/28/2023  - Normal oropharynx.  - Z-line irregular, 37 cm from the incisors.  - Low-grade of narrowing and moderate Schatzki ring.  - 3 cm hiatal hernia.  - Erosive esophagitis GEJ  - Erosive gastropathy with stigmata of recent bleeding.  - Normal duodenal bulb, first portion of the duodenum, second portion of the duodenum and third portion of the duodenum.  - No signs of overt bleeding. Iintermittent mild upper GI mucosal bleeding suspected  - Preparation of the colon was inadequate.  - Perianal skin tags found on perianal exam.  - Five 4 to 6 mm polyps in the cecum. Resection not  attempted.  - Five 6 to 15 mm polyps in the proximal ascending colon, in the mid ascending colon and in the distal ascending colon. Resection not attempted.  - Seven 8 to 15 mm polyps in the proximal transverse colon, in the mid transverse colon and in the distal transverse colon. Resection not attempted.  - Five 8 to 12 mm polyps in the proximal descending colon, in the mid descending colon and in the distal descending colon. Resection not attempted.  - Two 4 to 8 mm polyps in the proximal sigmoid colon, in the mid sigmoid colon and in the distal sigmoid colon. Resection not attempted.  - One 15 to 18 mm polyp at the recto-sigmoid colon and has ulceration with recent stigmata of bleeding. Resection not attempted.  - One 10 to 12 mm polyp in the proximal rectum. Resection not attempted.  - One 10 to 12 mm polyp in the mid rectum. Resection not attempted.  - The examined portion of the ileum was normal.  - Diverticulosis in the sigmoid colon. No sigsn of diverticular bleeding  - Non-bleeding external and internal hemorrhoids.  - Patient likely had intermittent bleeding from ulcerated recto sigmoid polyp. Malignant trasformation suspected  No specimens collected.     Colonoscopy by Dr. Pham 8/2/2023  - Hemorrhoids found on perianal exam.  - One 15 to 18 mm polyp in the proximal rectum at 10cm, removed with a hot snare. Resected and retrieved.  - One 12 to 15 mm polyp at the recto-sigmoid colon at about 20cm, removed with a hot snare. Resected and retrieved. Clip (MR conditional) was placed. Clip : [employer] Injected. Previously ulcerated polyp  - One 12 mm polyp in the distal sigmoid colon, removed with a hot snare. Resected and retrieved.  - Three 4 to 8 mm polyps in the cecum, removed with a hot snare. Resected and retrieved.  - Three 3 to 4 mm polyps in the proximal ascending colon, in the mid ascending colon and in the distal ascending colon, removed with a hot snare. Resected and  retrieved.  - Sixteen 4 to 20 mm polyps in the descending colon, in the transverse colon and in the ascending colon, removed with a hot snare. Resected and retrieved. Only half of the polyps removed with tijerina net after passing scope three times.  - Diverticulosis in the sigmoid colon.  - Non-bleeding external and internal hemorrhoids.  - Still at least ten polyps left colon and rt colon need to be removed with next colonoscopy  Pathology DIAGNOSIS:  A.   RECTAL POLYP, PROXIMAL:  Adenomatous polyp (tubular/tubulovillous adenoma)  Negative for high-grade dysplasia  B.   RECTOSIGMOID COLON, POLYP:  Adenomatous polyp (tubular adenoma)  Negative for high-grade dysplasia  C.   SIGMOID COLON POLYP, DISTAL:  Adenomatous polyp (tubular adenoma)  Negative for high-grade dysplasia  D.   CECUM POLYP, X3:  Adenomatous polyps (tubular adenomas)  Negative for high-grade dysplasia  E.   ASCENDING COLON POLYP, X2:  Adenomatous polyps (tubular adenomas)  Negative for high-grade dysplasia  F.   TRANSVERSE COLON POLYPS, AND DESCENDING X16:  Adenomatous polyps (tubular adenomas)  Negative for high-grade dysplasia     Assessment / Plan      Assessment/Recommendations:   8/30/2023  Hematochezia  Gross hematuria  Acute blood loss anemia  Acute sigmoid diverticulitis  Suspected diverticular bleeding  History of colon polyps  Atrial fibrillation on Eliquis  COPD on oxygen  He presented again this admission with complaints of rectal bleeding. Had 2 other recent admissions for the same reason. Had EGD and colonoscopy in July 2023 and then later colonoscopy in Aug 2023. Findings all documented above. He was previously found to have erosive gastritis with stigmata of bleeding and ulcerated rectosigmoid colon polyp. On later colonoscopy, larger polyp which was previously ulcerated was removed. His Hgb currently is at 10.1, was 9.0 at recent hospital discharge 1 month ago. His baseline is around 11. He had 2 stools since admission, had blood in  brief one time but nursing staff related it to hematuria. He denies current abdominal pain but is confused somewhat. No significant abdominal tenderness on exam this morning. He is asking for something to drink.     Conservative management  Continue antibiotic regimen  Hold Eliquis  Ok to have clear liquids today  Repeat colonoscopy in 6 months for surveillance, due 1/2024  Call GI with concerns    Addendum- 8/30/2023 1220 after speaking with nurse, pt had liquids for last couple of hours. Now with significant right lower quadrant tenderness, holding abdomen, requesting tylenol. Had gross hematuria only per her report, no bloody stools this AM.   Will change to NPO rest of the day  Reassess tomorrow      Thank you very much for letting me participate in the care of this patient.  Please do not hesitate to call me if you have any questions.    Irina Man PA-C  [facility]  8/30/2023  09:25 EDT    Please note that portions of this note may have been completed with a voice recognition program.

## 2023-08-30 NOTE — CASE MANAGEMENT/SOCIAL WORK
Discharge Planning Assessment   [facility]     Patient Name: Ed Spear  MRN: [id number]  Today's Date: 8/30/2023    Admit Date: 8/29/2023    Plan: Spoke with pt's son Mike and later daughter Edison for dcp- saw pt who seemed confused. Pt's new primary is Chelo Nixon and pharmacy is [facility], agreeable to meds to bed. No LW or POA. Was inpatient in past 30 days. Current with [facility] . Home DME includes O2 3L from [employer], cane,walker,nebulizer,scales, and bp cuff. Pt lives with 3 daughters and has been at current residence for 50 yrs. Daughter Edison provides transportation. If STR is needed family prefers [facility]. Dcp will be determined by clinical course.   Discharge Needs Assessment       Row Name 08/30/23 1242       Living Environment    People in Home child(magali), adult    Name(s) of People in Home 3 daughters/ edison black/fatuma rincon    Current Living Arrangements home    Duration at Residence 50 yrs    Potentially Unsafe Housing Conditions none    Primary Care Provided by child(magali);self    Family Caregiver if Needed child(magali), adult    Quality of Family Relationships helpful;involved    Able to Return to Prior Arrangements yes       Resource/Environmental Concerns    Resource/Environmental Concerns none    Transportation Concerns none       Food Insecurity    Within the past 12 months, you worried that your food would run out before you got the money to buy more. Sometimes    Within the past 12 months, the food you bought just didn't last and you didn't have money to get more. Never true       Transition Planning    Patient/Family Anticipates Transition to home with family    Transportation Anticipated health plan transportation       Discharge Needs Assessment    Readmission Within the Last 30 Days previous discharge plan unsuccessful    Equipment Currently Used at Home oxygen;nebulizer;bp cuff;scales    Concerns to be Addressed no discharge needs identified    Equipment Needed After Discharge none                    Discharge Plan       Row Name 08/30/23 1245       Plan    Plan Spoke with pt's son Mike and later daughter Farida for dcp- saw pt who seemed confused. Pt's new primary is Chelo Nixon and pharmacy is [facility], agreeable to meds to bed. No LW or POA. Was inpatient in past 30 days. Current with [facility]. Home DME includes O2 3L from [employer], cane,walker,nebulizer,scales, and bp cuff. Pt lives with 3 daughters and has been at current residence for 50 yrs. Daughter Farida provides transportation. If STR is needed family prefers [facility]. Dcp will be determined by clinical course.                  Continued Care and Services - Admitted Since 8/29/2023    Coordination has not been started for this encounter.       Selected Continued Care - Prior Encounters Includes continued care and service providers with selected services from prior encounters from 5/31/2023 to 8/30/2023      Discharged on 7/29/2023 Admission date: 7/27/2023 - Discharge disposition: [place] [facility] [place]      [place] Medical Care       Service Provider Selected Services Address Phone Fax Patient Preferred    [facility] [place] [facility] [address] [place] [address] [phone] --                             Demographic Summary       Row Name 08/30/23 1239       General Information    Admission Type inpatient    Arrived From emergency department    Required Notices Provided Important Message from Medicare    Referral Source admission list    Reason for Consult discharge planning    Preferred Language English       Contact Information    Permission Granted to Share Info With family/designee                   Functional Status       Row Name 08/30/23 1241       Functional Status    Usual Activity Tolerance poor    Current Activity Tolerance poor       Physical Activity    On average, how many days per week do you engage in moderate to strenuous exercise (like a brisk walk)? 0 days    On average, how  many minutes do you engage in exercise at this level? 0 min    Number of minutes of exercise per week 0       Functional Status, IADL    Medications assistive person    Meal Preparation assistive person    Housekeeping completely dependent    Laundry completely dependent    Shopping completely dependent       Mental Status Summary    Recent Changes in Mental Status/Cognitive Functioning mental status       Employment/    Employment Status retired                   Psychosocial    No documentation.                  Abuse/Neglect    No documentation.                  Legal    No documentation.                  Substance Abuse    No documentation.                  Patient Forms    No documentation.                     Shana Hardin RN

## 2023-08-30 NOTE — ED PROVIDER NOTES
Subjective:  History of Present Illness:    Patient is an 87-year-old male with history of A-fib, COPD, hypertension, hyperlipidemia, COPD on 2 L nasal cannula who presents today with altered mental status.  Reports that he has been having intermittent bright red bloody stools per rectum.  Has history of hemorrhoids.  Had been waiting to follow-up with primary care doctor for this, today, has been acting intermittently confused.  He complains of right lower quadrant pain.  Denies any dysuria.  No preceding fevers.  Denies chest pain at this time.  No increased work of breathing.  Stable on his home O2.  Normoglycemic.  No known trauma.      Nurses Notes reviewed and agree, including vitals, allergies, social history and prior medical history.     REVIEW OF SYSTEMS: All systems reviewed and not pertinent unless noted.  Review of Systems   Constitutional:  Positive for activity change. Negative for appetite change, chills, fatigue and fever.   HENT:  Negative for congestion, sinus pressure, sneezing and trouble swallowing.    Eyes:  Negative for discharge and itching.   Respiratory:  Negative for cough and shortness of breath.    Cardiovascular:  Negative for chest pain and palpitations.   Gastrointestinal:  Positive for abdominal pain and anal bleeding. Negative for abdominal distention, diarrhea, nausea and vomiting.   Endocrine: Negative for cold intolerance and heat intolerance.   Genitourinary:  Negative for decreased urine volume, dysuria and urgency.   Musculoskeletal:  Negative for gait problem, neck pain and neck stiffness.   Skin:  Negative for color change and rash.   Allergic/Immunologic: Negative for immunocompromised state.   Neurological:  Negative for facial asymmetry and headaches.   Hematological:  Negative for adenopathy.   Psychiatric/Behavioral:  Positive for confusion. Negative for self-injury and suicidal ideas.      Past Medical History:   Diagnosis Date    A-fib     Acute sinusitis      "Allergic rhinitis     Ankle fracture     right    Arthritis, degenerative     BMI 28.0-28.9,adult     COPD (chronic obstructive pulmonary disease)     Cough     Enlarged prostate without lower urinary tract symptoms (luts)     Hyperlipidemia     Hypertension     Obstructive chronic bronchitis with exacerbation     Shortness of breath     Skin cancer     Tinnitus of both ears     Vitamin D deficiency     Wears dentures     full upper/lower - instructed on no adhesive DOS       Allergies:    Patient has no known allergies.      Past Surgical History:   Procedure Laterality Date    CATARACT EXTRACTION      COLONOSCOPY N/A 7/28/2023    Procedure: COLONOSCOPY;  Surgeon: Dontrell Pham MD;  Location: [facility] ENDOSCOPY;  Service: Gastroenterology;  Laterality: N/A;    COLONOSCOPY N/A 8/2/2023    Procedure: COLONOSCOPY WITH TATTOOING, CLIPPING X1, AND POLYPECTOMY X24;  Surgeon: Dontrell Pham MD;  Location: [facility] ENDOSCOPY;  Service: Gastroenterology;  Laterality: N/A;    ENDOSCOPY N/A 7/28/2023    Procedure: ESOPHAGOGASTRODUODENOSCOPY;  Surgeon: Dontrell Pham MD;  Location: [facility] ENDOSCOPY;  Service: Gastroenterology;  Laterality: N/A;         Social History     Socioeconomic History    Marital status:    Tobacco Use    Smoking status: Every Day     Packs/day: 1.50     Years: 60.00     Pack years: 90.00     Types: Cigarettes     Passive exposure: Current    Smokeless tobacco: Never   Vaping Use    Vaping Use: Never used   Substance and Sexual Activity    Alcohol use: No    Drug use: Never    Sexual activity: Defer         Family History   Problem Relation Age of Onset    Cancer Mother     Kidney disease Mother     Cancer Father     Cancer Brother        Objective  Physical Exam:  /78 (BP Location: Left arm, Patient Position: Lying)   Pulse 95   Temp 98 °F (36.7 °C) (Oral)   Resp 18   Ht 188 cm (74\")   Wt 84.6 kg (186 lb 8.2 oz)   SpO2 100%   BMI 23.95 kg/m²      Physical " Exam  Constitutional:       General: He is not in acute distress.     Appearance: Normal appearance. He is normal weight. He is not ill-appearing.   HENT:      Head: Normocephalic and atraumatic.      Nose: Nose normal. No congestion or rhinorrhea.      Mouth/Throat:      Mouth: Mucous membranes are moist.      Pharynx: Oropharynx is clear.   Eyes:      Extraocular Movements: Extraocular movements intact.      Conjunctiva/sclera: Conjunctivae normal.      Pupils: Pupils are equal, round, and reactive to light.   Cardiovascular:      Rate and Rhythm: Normal rate and regular rhythm.      Pulses: Normal pulses.   Pulmonary:      Effort: Pulmonary effort is normal. No respiratory distress.      Breath sounds: Normal breath sounds.   Abdominal:      General: Abdomen is flat. Bowel sounds are normal. There is no distension.      Palpations: Abdomen is soft.      Tenderness: There is no abdominal tenderness.   Musculoskeletal:         General: No swelling or tenderness. Normal range of motion.      Cervical back: Normal range of motion and neck supple. No rigidity or tenderness.   Skin:     General: Skin is warm and dry.      Capillary Refill: Capillary refill takes less than 2 seconds.   Neurological:      General: No focal deficit present.      Mental Status: He is alert and oriented to person, place, and time. Mental status is at baseline.      Cranial Nerves: No cranial nerve deficit.      Sensory: No sensory deficit.      Motor: No weakness.   Psychiatric:         Mood and Affect: Mood normal.         Behavior: Behavior normal.         Thought Content: Thought content normal.         Judgment: Judgment normal.       Procedures    ED Course:    ED Course as of 08/30/23 0730   Tue Aug 29, 2023   2054 EKG interpreted by me, atrial fibrillation with right axis deviation, no concerning ST changes noted, rate of 94, abnormal EKG [JE]      ED Course User Index  [JE] Kwasi Velez MD       Lab Results (last 24 hours)        Procedure Component Value Units Date/Time    COVID-19 and FLU A/B PCR - Swab, Nasopharynx [[id number]]  (Normal) Collected: 08/29/23 2036    Specimen: Swab from Nasopharynx Updated: 08/29/23 2114     COVID19 Not Detected     Influenza A PCR Not Detected     Influenza B PCR Not Detected    Narrative:      Fact sheet for providers: https://www.fda.gov/media/251538/download    Fact sheet for patients: https://www.fda.gov/media/715642/download    Test performed by PCR.    CBC & Differential [[id number]]  (Abnormal) Collected: 08/29/23 2036    Specimen: Blood Updated: 08/29/23 2043    Narrative:      The following orders were created for panel order CBC & Differential.  Procedure                               Abnormality         Status                     ---------                               -----------         ------                     CBC Auto Differential[[id number]]        Abnormal            Final result                 Please view results for these tests on the individual orders.    Comprehensive Metabolic Panel [[id number]]  (Abnormal) Collected: 08/29/23 2036    Specimen: Blood Updated: 08/29/23 2100     Glucose 119 mg/dL      BUN 16 mg/dL      Creatinine 1.14 mg/dL      Sodium 135 mmol/L      Potassium 3.7 mmol/L      Chloride 95 mmol/L      CO2 28.7 mmol/L      Calcium 11.6 mg/dL      Total Protein 6.8 g/dL      Albumin 3.4 g/dL      ALT (SGPT) 10 U/L      AST (SGOT) 15 U/L      Alkaline Phosphatase 93 U/L      Total Bilirubin 0.4 mg/dL      Globulin 3.4 gm/dL      A/G Ratio 1.0 g/dL      BUN/Creatinine Ratio 14.0     Anion Gap 11.3 mmol/L      eGFR 63.0 mL/min/1.73     Narrative:      GFR Normal >60  Chronic Kidney Disease <60  Kidney Failure <15    The GFR formula is only valid for adults with stable renal function between ages 18 and 70.    Lipase [[id number]]  (Abnormal) Collected: 08/29/23 2036    Specimen: Blood Updated: 08/29/23 2100     Lipase 11 U/L     Single High Sensitivity Troponin T  [[id number]]  (Abnormal) Collected: 08/29/23 2036    Specimen: Blood Updated: 08/29/23 2116     HS Troponin T 57 ng/L     Narrative:      High Sensitive Troponin T Reference Range:  <10.0 ng/L- Negative Female for AMI  <15.0 ng/L- Negative Male for AMI  >=10 - Abnormal Female indicating possible myocardial injury.  >=15 - Abnormal Male indicating possible myocardial injury.   Clinicians would have to utilize clinical acumen, EKG, Troponin, and serial changes to determine if it is an Acute Myocardial Infarction or myocardial injury due to an underlying chronic condition.         BNP [[id number]]  (Normal) Collected: 08/29/23 2036    Specimen: Blood Updated: 08/29/23 2103     proBNP 701.9 pg/mL     Narrative:      Among patients with dyspnea, NT-proBNP is highly sensitive for the detection of acute congestive heart failure. In addition NT-proBNP of <300 pg/ml effectively rules out acute congestive heart failure with 99% negative predictive value.      C-reactive Protein [[id number]]  (Abnormal) Collected: 08/29/23 2036    Specimen: Blood Updated: 08/29/23 2100     C-Reactive Protein 4.31 mg/dL     Lactic Acid, Plasma [[id number]]  (Normal) Collected: 08/29/23 2036    Specimen: Blood Updated: 08/29/23 2059     Lactate 1.5 mmol/L     Magnesium [[id number]]  (Normal) Collected: 08/29/23 2036    Specimen: Blood Updated: 08/29/23 2100     Magnesium 1.8 mg/dL     CBC Auto Differential [[id number]]  (Abnormal) Collected: 08/29/23 2036    Specimen: Blood Updated: 08/29/23 2043     WBC 8.13 10*3/mm3      RBC 3.62 10*6/mm3      Hemoglobin 10.1 g/dL      Hematocrit 31.6 %      MCV 87.3 fL      MCH 27.9 pg      MCHC 32.0 g/dL      RDW 13.6 %      RDW-SD 43.4 fl      MPV 9.8 fL      Platelets 293 10*3/mm3      Neutrophil % 75.0 %      Lymphocyte % 10.8 %      Monocyte % 11.8 %      Eosinophil % 1.5 %      Basophil % 0.5 %      Immature Grans % 0.4 %      Neutrophils, Absolute 6.10 10*3/mm3      Lymphocytes, Absolute 0.88 10*3/mm3       Monocytes, Absolute 0.96 10*3/mm3      Eosinophils, Absolute 0.12 10*3/mm3      Basophils, Absolute 0.04 10*3/mm3      Immature Grans, Absolute 0.03 10*3/mm3      nRBC 0.0 /100 WBC     Blood Gas, Arterial With Co-Ox [[id number]]  (Abnormal) Collected: 08/29/23 2046    Specimen: Arterial Blood Updated: 08/29/23 2047     Site Right Radial     Elijah's Test Positive     pH, Arterial 7.424 pH units      pCO2, Arterial 46.9 mm Hg      Comment: 83 Value above reference range        pO2, Arterial 80.8 mm Hg      Comment: 84 Value below reference range        HCO3, Arterial 30.7 mmol/L      Comment: 83 Value above reference range        Base Excess, Arterial 5.5 mmol/L      Comment: 83 Value above reference range        O2 Saturation, Arterial 96.8 %      Hematocrit, Blood Gas 32.2 %      Comment: 84 Value below reference range        Oxyhemoglobin 94.1 %      Methemoglobin 0.30 %      Carboxyhemoglobin 2.5 %      A-a DO2 9.7 mmHg      Barometric Pressure for Blood Gas 730 mmHg      Modality Nasal Cannula     Flow Rate 2.0 lpm      Ventilator Mode NA     Collected by [facility]     Comment: Meter: [id number]     :  [id number]        pH, Temp Corrected --     pCO2, Temperature Corrected --     pO2, Temperature Corrected --    Urinalysis With Culture If Indicated - Urine, Clean Catch [[id number]]  (Abnormal) Collected: 08/29/23 2115    Specimen: Urine, Clean Catch Updated: 08/29/23 2133     Color, UA Yellow     Appearance, UA Clear     pH, UA 5.5     Specific Gravity, UA 1.018     Glucose, UA Negative     Ketones, UA Negative     Bilirubin, UA Negative     Blood, UA Moderate (2+)     Protein, UA Trace     Leuk Esterase, UA Negative     Nitrite, UA Negative     Urobilinogen, UA 1.0 E.U./dL    Narrative:      In absence of clinical symptoms, the presence of pyuria, bacteria, and/or nitrites on the urinalysis result does not correlate with infection.    Occult Blood X 1, Stool - Stool, Per Rectum [[id number]]  (Abnormal)  Collected: 08/29/23 2115    Specimen: Stool from Per Rectum Updated: 08/29/23 2134     Fecal Occult Blood Positive    Urinalysis, Microscopic Only - Urine, Clean Catch [[id number]]  (Abnormal) Collected: 08/29/23 2115    Specimen: Urine, Clean Catch Updated: 08/29/23 2140     RBC, UA 13-20 /HPF      WBC, UA None Seen /HPF      Comment: Urine culture not indicated.        Bacteria, UA 1+ /HPF      Squamous Epithelial Cells, UA 0-2 /HPF      Hyaline Casts, UA 3-6 /LPF      Methodology Manual Light Microscopy    High Sensitivity Troponin T 2Hr [[id number]]  (Abnormal) Collected: 08/29/23 2237    Specimen: Blood Updated: 08/29/23 2324     HS Troponin T 56 ng/L      Troponin T Delta -1 ng/L     Narrative:      High Sensitive Troponin T Reference Range:  <10.0 ng/L- Negative Female for AMI  <15.0 ng/L- Negative Male for AMI  >=10 - Abnormal Female indicating possible myocardial injury.  >=15 - Abnormal Male indicating possible myocardial injury.   Clinicians would have to utilize clinical acumen, EKG, Troponin, and serial changes to determine if it is an Acute Myocardial Infarction or myocardial injury due to an underlying chronic condition.                  CT Head Without Contrast    Result Date: 8/29/2023  FINAL REPORT CLINICAL HISTORY: Mental status change, unknown cause FINDINGS: Moderate atrophy and chronic ischemic white matter changes are noted.     No cortical edema is present.  There is no mass or hemorrhage.  Ventricles are normal.  Bone windows show no skull fracture or obvious obstructive lesion.     Impression: 1.  No acute intracranial abnormality or obvious mass.  2. Atrophy and chronic ischemic white matter changes as above. Authenticated and Electronically Signed by KEYONNA Smith MD on 08/29/2023 10:21:40 PM    CT Abdomen Pelvis With Contrast    Result Date: 8/29/2023  FINAL REPORT TECHNIQUE: Oral and IV contrast enhanced exam CLINICAL HISTORY: RLQ abdominal pain (Age >= 14y) FINDINGS: Abdomen: Lung  bases are clear.  There is probable cholelithiasis.  Liver has an unremarkable CT appearance.  There is a mass in the posterior spleen which is nonspecific but measures up to 23 mm.  The  pancreas and adrenal glands are unremarkable.  There are bilateral renal cysts.  Kidneys show no mass or obstruction. No bowel obstruction or fluid collection is seen.  Pelvis: The appendix is normal.  There is significant fecal impaction of the rectal vault.  There is mild inflammatory change of the distal sigmoid colon with stranding extending into the sigmoid mesial colon, probably due to diverticulitis.  No fluid collection or adenopathy is seen.     Impression: 1.  No evidence of appendicitis or bowel obstruction.  2. Inflammatory change of the distal sigmoid colon, suspect diverticulitis.  3.  Significant fecal impaction of the rectosigmoid colon.  4.  Cholelithiasis. Authenticated and Electronically Signed by KEYONNA Smith MD on 08/29/2023 10:21:13 PM        MDM      Initial impression of presenting illness: Altered mental status    DDX: includes but is not limited to: Hypercapnia, anemia, lower GI bleed, viral URI    Patient arrives stable with vitals interpreted by myself.     Pertinent features from physical exam: On home O2, clear to auscultation with no increased work of breathing, wheezing noted, complaining of pain in his right lower quadrant.    Initial diagnostic plan: CBC, CMP, lipase, UA, CRP, lactic acid, magnesium, CT head, chest x-ray, Hemoccult, CT abdomen pelvis    Results from initial plan were reviewed and interpreted by me revealing diverticulitis seen on CT    Diagnostic information from other sources: Discussed with wife at bedside reviewed past medical records    Interventions / Re-evaluation: Given concern for diverticulitis given Rocephin, Flagyl in the ER    Results/clinical rationale were discussed with patient and wife at bedside    Consultations/Discussion of results with other physicians: Given  patient's persistent altered mental status, feels that this is likely delirium secondary to his diverticulitis.  Given this, treated as above and discussed with hospitalist service for admission.  Accepted for further management    Disposition plan: Admit  -----        Final diagnoses:   Diverticulitis   Altered mental status, unspecified altered mental status type          Kwasi Velez MD  08/30/23 1420

## 2023-08-30 NOTE — PLAN OF CARE
Goal Outcome Evaluation:  Plan of Care Reviewed With: (P) patient, daughter        Progress: (P) no change  Outcome Evaluation: (P) PT evaluation completed today. Patient was received supine in bed, A/Ox3 with verabl cue's, and c/o 8/10 upper abdomen pain. Patient had a HR of 111 before treatment and he then transferred supine-sit with mod Ax2 where he sat EOB for 2 minutes. His HR increased to 153 and he began to c/o head pain so he was returned to supine. Patient then rolled with max Ax2 while therapy changed his brief. Patient's HR was 128 upon exit of the room. Patient is expected to benefit from skilled PT prior to d/c in order to improve tolerance with activity.      Anticipated Discharge Disposition (PT): (P) inpatient rehabilitation facility, skilled nursing facility

## 2023-08-30 NOTE — THERAPY EVALUATION
Patient Name: Ed Spear  : 1935    MRN: [id number]                              Today's Date: 2023       Admit Date: 2023    Visit Dx:     ICD-10-CM ICD-9-CM   1. Diverticulitis  K57.92 562.11   2. Altered mental status, unspecified altered mental status type  R41.82 780.97     Patient Active Problem List   Diagnosis    Pulmonary emphysema    Hyperlipidemia    Hypertension    Malignant neoplasm of skin    Vitamin D deficiency    Arthritis, degenerative    Enlarged prostate without lower urinary tract symptoms (luts)    Skin cancer    Chronic bilateral low back pain without sciatica    Atrial fibrillation    Encounter for Medicare annual wellness exam    Vitamin B 12 deficiency    Callus of heel    COPD exacerbation    Acute respiratory failure with hypoxia and hypercapnia    Skin lesion of hand    Lower GI bleed    Chronic anemia    Acute blood loss anemia    Abnormal CT of the abdomen    Adenomatous polyp of colon    Diverticulitis large intestine     Past Medical History:   Diagnosis Date    A-fib     Acute sinusitis     Allergic rhinitis     Ankle fracture     right    Arthritis, degenerative     BMI 28.0-28.9,adult     COPD (chronic obstructive pulmonary disease)     Cough     Enlarged prostate without lower urinary tract symptoms (luts)     Hyperlipidemia     Hypertension     Impaired mobility     Obstructive chronic bronchitis with exacerbation     Shortness of breath     Skin cancer     Tinnitus of both ears     Vitamin D deficiency     Wears dentures     full upper/lower - instructed on no adhesive DOS     Past Surgical History:   Procedure Laterality Date    CATARACT EXTRACTION      COLONOSCOPY N/A 2023    Procedure: COLONOSCOPY;  Surgeon: Dontrell Pham MD;  Location: [facility] ENDOSCOPY;  Service: Gastroenterology;  Laterality: N/A;    COLONOSCOPY N/A 2023    Procedure: COLONOSCOPY WITH TATTOOING, CLIPPING X1, AND POLYPECTOMY X24;  Surgeon: Dontrell Pham MD;   Location: [facility] ENDOSCOPY;  Service: Gastroenterology;  Laterality: N/A;    ENDOSCOPY N/A 7/28/2023    Procedure: ESOPHAGOGASTRODUODENOSCOPY;  Surgeon: Dontrell Pham MD;  Location: [facility] ENDOSCOPY;  Service: Gastroenterology;  Laterality: N/A;      General Information       Row Name 08/30/23 1347          OT Time and Intention    Document Type evaluation  -SD     Mode of Treatment occupational therapy  -SD       Row Name 08/30/23 1340          General Information    Patient Profile Reviewed yes  -SD     Prior Level of Function mod assist:;all household mobility;ADL's  Lives with daughter who assists with all self care and mobility. Has a RW, SPC, WC, SC, O2, normally sponge bathes. Uses a BSC during the day, is incontinent at night.  -SD     Barriers to Rehab medically complex;previous functional deficit;cognitive status;hearing deficit  -SD       Row Name 08/30/23 1341          Living Environment    People in Home child(magali), adult  -SD       Row Name 08/30/23 134          Home Main Entrance    Number of Stairs, Main Entrance two  -SD     Stair Railings, Main Entrance railing on right side (ascending)  -SD       Row Name 08/30/23 134          Stairs Within Home, Primary    Number of Stairs, Within Home, Primary none  -SD       Row Name 08/30/23 134          Cognition    Orientation Status (Cognition) oriented x 3;verbal cues/prompts needed for orientation  -SD       Row Name 08/30/23 1344          Safety Issues, Functional Mobility    Safety Issues Affecting Function (Mobility) safety precautions follow-through/compliance;sequencing abilities;safety precaution awareness;friction/shear risk;awareness of need for assistance;ability to follow commands  -SD     Impairments Affecting Function (Mobility) balance;cognition;coordination;endurance/activity tolerance;motor planning;pain;postural/trunk control;shortness of breath;strength  -SD               User Key  (r) = Recorded By, (t) = Taken By, (c) =  Cosigned By      Initials Name Provider Type    SD Deepthi Barnes OT Occupational Therapist                     Mobility/ADL's       Row Name 08/30/23 1350          Bed Mobility    Bed Mobility rolling left;rolling right;supine-sit;sit-supine  -SD     Rolling Left [place] (Bed Mobility) maximum assist (25% patient effort);2 person assist  -SD     Rolling Right [place] (Bed Mobility) maximum assist (25% patient effort);2 person assist  -SD     Supine-Sit [place] (Bed Mobility) moderate assist (50% patient effort);2 person assist  -SD     Sit-Supine [place] (Bed Mobility) moderate assist (50% patient effort);2 person assist  -SD     Assistive Device (Bed Mobility) bed rails;draw sheet;head of bed elevated  -SD     Comment, (Bed Mobility) EOB x 2 mins, HR increased to 153  -SD       Row Name 08/30/23 [facility]          Transfers    Transfers sit-stand transfer  -SD       Row Name 08/30/23 [facility]          Sit-Stand Transfer    Sit-Stand [place] (Transfers) unable to assess  -SD       Row Name 08/30/23 [facility]          Functional Mobility    Functional Mobility- Ind. Level not appropriate to assess  -SD       Row Name 08/30/23 [facility]          Activities of Daily Living    BADL Assessment/Intervention bathing;upper body dressing;lower body dressing;grooming;feeding;toileting  -SD       Row Name 08/30/23 [facility]          Bathing Assessment/Intervention    [place] Level (Bathing) maximum assist (25% patient effort);dependent (less than 25% patient effort)  -SD       Row Name 08/30/23 [facility]          Upper Body Dressing Assessment/Training    [place] Level (Upper Body Dressing) maximum assist (25% patient effort)  -SD       Row Name 08/30/23 [facility]          Lower Body Dressing Assessment/Training    [place] Level (Lower Body Dressing) dependent (less than 25% patient effort)  -SD       Row Name 08/30/23 [facility]          Grooming Assessment/Training    [place] Level (Grooming) maximum assist (25%  patient effort)  -SD       Row Name 08/30/23 1350          Self-Feeding Assessment/Training    [place] Level (Feeding) maximum assist (25% patient effort)  -SD       Row Name 08/30/23 1350          Toileting Assessment/Training    [place] Level (Toileting) change pad/brief;perform perineal hygiene;dependent (less than 25% patient effort)  -SD               User Key  (r) = Recorded By, (t) = Taken By, (c) = Cosigned By      Initials Name Provider Type    Deepthi Vera OT Occupational Therapist                   Obj/Interventions       Row Name 08/30/23 1352          Range of Motion Comprehensive    General Range of Motion bilateral upper extremity ROM WFL  -SD       [facility] Name 08/30/23 1352          Strength Comprehensive (MMT)    General Manual Muscle Testing (MMT) Assessment upper extremity strength deficits identified  -SD     Comment, General Manual Muscle Testing (MMT) Assessment 3/5 - 3+/5  -SD               User Key  (r) = Recorded By, (t) = Taken By, (c) = Cosigned By      Initials Name Provider Type    Deepthi Vera OT Occupational Therapist                   Goals/Plan       Row Name 08/30/23 1358          Transfer Goal 1 (OT)    Activity/Assistive Device (Transfer Goal 1, OT) sit-to-stand/stand-to-sit;walker, rolling  -SD     [place] Level/Cues Needed (Transfer Goal 1, OT) moderate assist (50-74% patient effort)  -SD     Time Frame (Transfer Goal 1, OT) long term goal (LTG)  -SD     Progress/Outcome (Transfer Goal 1, OT) goal ongoing  -SD       Row Name 08/30/23 1358          Dressing Goal 1 (OT)    Activity/Device (Dressing Goal 1, OT) lower body dressing  -SD     [place]/Cues Needed (Dressing Goal 1, OT) moderate assist (50-74% patient effort)  -SD     Time Frame (Dressing Goal 1, OT) 2 weeks  -SD     Progress/Outcome (Dressing Goal 1, OT) goal ongoing  -SD       Row Name 08/30/23 1358          Toileting Goal 1 (OT)    Activity/Device (Toileting Goal 1, OT) toileting  skills, all;commode, bedside without drop arms  -SD     [place] Level/Cues Needed (Toileting Goal 1, OT) moderate assist (50-74% patient effort)  -SD     Time Frame (Toileting Goal 1, OT) 2 weeks  -SD     Progress/Outcome (Toileting Goal 1, OT) goal ongoing  -SD       Row Name 08/30/23 9056          Strength Goal 1 (OT)    Strength Goal 1 (OT) Patient to perform UB ther ex as tolerated  -SD     Time Frame (Strength Goal 1, OT) long term goal (LTG)  -SD     Progress/Outcome (Strength Goal 1, OT) goal ongoing  -SD       Row Name 08/30/23 1352          Therapy Assessment/Plan (OT)    Planned Therapy Interventions (OT) activity tolerance training;adaptive equipment training;BADL retraining;patient/caregiver education/training;ROM/therapeutic exercise;strengthening exercise;transfer/mobility retraining  -SD               User Key  (r) = Recorded By, (t) = Taken By, (c) = Cosigned By      Initials Name Provider Type    SD Deepthi Barnes OT Occupational Therapist                   Clinical Impression       Row Name 08/30/23 1350          Pain Assessment    Pretreatment Pain Rating 8/10  -SD     Posttreatment Pain Rating 8/10  -SD     Pain Location upper  -SD     Pain Location - abdomen  -SD     Pain Intervention(s) Repositioned;Ambulation/increased activity  -SD       Row Name 08/30/23 1352          Plan of Care Review    Plan of Care Reviewed With patient;daughter  -SD     Progress no change  -SD     Outcome Evaluation OT eval completed. Patient supine in bed, Ox3 with VCs, c/o abdominal pain, HR is noted to be tachy. Daughter provides most history. Patient performed supine to sit with mod A x 2, sat EOB briefly, HR increased to 153. Returned to supine, required max A x 2 for rolling in bed, TD for perineal hygiene and brief change. Patient scooted up in bed max A x 2 and placed in fowlers. Notified RN when brief was changed urine was blood tinged,  following activity. Patient is expected to benefit from  continued OT services prior to DC. Patient may benefit from STR as his daughter is having more difficulty taking care of him d/t her own health issues.  -SD       Row Name 08/30/23 8723          Therapy Assessment/Plan (OT)    Patient/Family Therapy Goal Statement (OT) increase strength  -SD     Rehab Potential (OT) fair, will monitor progress closely  -SD     Criteria for Skilled Therapeutic Interventions Met (OT) skilled treatment is necessary  -SD     Therapy Frequency (OT) 3 times/wk  5 times if indicated  -SD       Row Name 08/30/23 1354          Therapy Plan Review/Discharge Plan (OT)    Anticipated Discharge Disposition (OT) inpatient rehabilitation facility  -SD       Row Name 08/30/23 1352          Vital Signs    Pretreatment Heart Rate (beats/min) 111  -SD     Intratreatment Heart Rate (beats/min) 153  -SD     Posttreatment Heart Rate (beats/min) 128  -SD     Pre SpO2 (%) 95  -SD     O2 Delivery Pre Treatment supplemental O2  -SD     Intra SpO2 (%) 90  -SD     O2 Delivery Intra Treatment supplemental O2  -SD     Post SpO2 (%) 95  -SD     O2 Delivery Post Treatment supplemental O2  -SD       Row Name 08/30/23 8973          Positioning and Restraints    Pre-Treatment Position in bed  -SD     Post Treatment Position bed  -SD     In Bed fowlers;call light within reach;encouraged to call for assist;notified nsg;with family/caregiver  -SD               User Key  (r) = Recorded By, (t) = Taken By, (c) = Cosigned By      Initials Name Provider Type    Deepthi Vera OT Occupational Therapist                   Outcome Measures       Row Name 08/30/23 9713          How much help from another is currently needed...    Putting on and taking off regular lower body clothing? 1  -SD     Bathing (including washing, rinsing, and drying) 1  -SD     Toileting (which includes using toilet bed pan or urinal) 1  -SD     Putting on and taking off regular upper body clothing 2  -SD     Taking care of personal grooming (such  as brushing teeth) 2  -SD     Eating meals 2  -SD     AM-PAC 6 Clicks Score (OT) 9  -SD       Row Name 08/30/23 1124 08/30/23 0800       How much help from another person do you currently need...    Turning from your back to your side while in flat bed without using bedrails? 2  -MS (r) NL (t) MS (c) 3  -PK    Moving from lying on back to sitting on the side of a flat bed without bedrails? 2  -MS (r) NL (t) MS (c) 2  -PK    Moving to and from a bed to a chair (including a wheelchair)? 2  -MS (r) NL (t) MS (c) 2  -PK    Standing up from a chair using your arms (e.g., wheelchair, bedside chair)? 2  -MS (r) NL (t) MS (c) 2  -PK    Climbing 3-5 steps with a railing? 1  -MS (r) NL (t) MS (c) 1  -PK    To walk in hospital room? 1  -MS (r) NL (t) MS (c) 2  -PK    AM-PAC 6 Clicks Score (PT) 10  -MS (r) NL (t) 12  -PK    Highest level of mobility 4 --> Transferred to chair/commode  -MS (r) NL (t) 4 --> Transferred to chair/commode  -PK      Row Name 08/30/23 1358 08/30/23 1124       Functional Assessment    Outcome Measure Options AM-PAC 6 Clicks Daily Activity (OT)  -SD AM-PAC 6 Clicks Basic Mobility (PT)  -MS (r) NL (t) MS (c)              User Key  (r) = Recorded By, (t) = Taken By, (c) = Cosigned By      Initials Name Provider Type    Deepthi Vera OT Occupational Therapist    Antoine Novoa, PT Physical Therapist    PK Milagros Coreas, RN Registered Nurse    Hamlet Miller, PT Student PT Student                    Occupational Therapy Education       Title: PT OT SLP Therapies (In Progress)       Topic: Occupational Therapy (In Progress)       Point: ADL training (Done)       Description:   Instruct learner(s) on proper safety adaptation and remediation techniques during self care or transfers.   Instruct in proper use of assistive devices.                  Learning Progress Summary             Patient Acceptance, E,TB, VU by SD at 8/30/2023 6626    Comment: OT POC   Family Acceptance, E,TB, VU by SD at  8/30/2023 0796    Comment: OT POC                         Point: Home exercise program (Not Started)       Description:   Instruct learner(s) on appropriate technique for monitoring, assisting and/or progressing therapeutic exercises/activities.                  Learner Progress:  Not documented in this visit.              Point: Precautions (Not Started)       Description:   Instruct learner(s) on prescribed precautions during self-care and functional transfers.                  Learner Progress:  Not documented in this visit.              Point: Body mechanics (Not Started)       Description:   Instruct learner(s) on proper positioning and spine alignment during self-care, functional mobility activities and/or exercises.                  Learner Progress:  Not documented in this visit.                              User Key       Initials Effective Dates Name Provider Type Discipline    SD 06/16/21 -  Deepthi Barnes OT Occupational Therapist OT                  OT Recommendation and Plan  Planned Therapy Interventions (OT): activity tolerance training, adaptive equipment training, BADL retraining, patient/caregiver education/training, ROM/therapeutic exercise, strengthening exercise, transfer/mobility retraining  Therapy Frequency (OT): 3 times/wk (5 times if indicated)  Plan of Care Review  Plan of Care Reviewed With: patient, daughter  Progress: no change  Outcome Evaluation: OT eval completed. Patient supine in bed, Ox3 with VCs, c/o abdominal pain, HR is noted to be tachy. Daughter provides most history. Patient performed supine to sit with mod A x 2, sat EOB briefly, HR increased to 153. Returned to supine, required max A x 2 for rolling in bed, TD for perineal hygiene and brief change. Patient scooted up in bed max A x 2 and placed in fowlers. Notified RN when brief was changed urine was blood tinged,  following activity. Patient is expected to benefit from continued OT services prior to DC. Patient  may benefit from STR as his daughter is having more difficulty taking care of him d/t her own health issues.     Time Calculation:   Evaluation Complexity (OT)  Review Occupational Profile/Medical/Therapy History Complexity: expanded/moderate complexity  Assessment, Occupational Performance/Identification of Deficit Complexity: 3-5 performance deficits  Clinical Decision Making Complexity (OT): detailed assessment/moderate complexity  Overall Complexity of Evaluation (OT): moderate complexity     Time Calculation- OT       Row Name 08/30/23 1359             Time Calculation- OT    OT Start Time 1127  -SD      OT Received On 08/30/23  -SD      OT Goal Re-Cert Due Date 09/11/23  -SD         Untimed Charges    OT Eval/Re-eval Minutes 45  -SD         Total Minutes    Untimed Charges Total Minutes 45  -SD       Total Minutes 45  -SD                User Key  (r) = Recorded By, (t) = Taken By, (c) = Cosigned By      Initials Name Provider Type    Deepthi Vera OT Occupational Therapist                  Therapy Charges for Today       Code Description Service Date Service Provider Modifiers Qty    [id number] HC OT EVAL MOD COMPLEXITY 3 8/30/2023 Deepthi Barnes OT GO 1                 Deepthi Barnes OT  8/30/2023

## 2023-08-30 NOTE — PROGRESS NOTES
Pharmacokinetic Consult - Piperacillin-tazobactam Dosing    Ed Spear is a 87 y.o. male who has been consulted to dose piperacillin-tazobactam for  intra-abdominal infection .    Current Antimicrobial Therapy    Anti-Infectives (From admission, onward)      Ordered     Dose/Rate Route Frequency Start Stop    08/30/23 0148  piperacillin-tazobactam (ZOSYN) 3.375 g in iso-osmotic dextrose 50 ml (premix)        Ordering Provider: Kerley, Brian Joseph, DO    3.375 g  over 4 Hours Intravenous Every 8 Hours 08/30/23 0845 09/06/23 0844    08/30/23 0148  piperacillin-tazobactam (ZOSYN) 3.375 g in iso-osmotic dextrose 50 ml (premix)        Ordering Provider: Kerley, Brian Joseph, DO    3.375 g  over 30 Minutes Intravenous Once 08/30/23 0245 08/30/23 0252    08/30/23 0046  Pharmacy to Dose Zosyn        Ordering Provider: Kerley, Brian Joseph, DO     Does not apply Continuous PRN 08/30/23 0046 09/06/23 0045    08/29/23 2246  cefTRIAXone (ROCEPHIN) IVPB 1000 mg/50ml dextrose (premix)        Ordering Provider: Kwasi Velez MD    1,000 mg  100 mL/hr over 30 Minutes Intravenous Once 08/29/23 2302 08/29/23 2301    08/29/23 2246  metroNIDAZOLE (FLAGYL) IVPB 500 mg        Ordering Provider: Kwasi Velez MD    500 mg  100 mL/hr over 60 Minutes Intravenous Once 08/29/23 2302 08/30/23 0036            Microbiology Results (last 10 days)       Procedure Component Value - Date/Time    COVID-19 and FLU A/B PCR - Swab, Nasopharynx [[id number]]  (Normal) Collected: 08/29/23 2036    Lab Status: Final result Specimen: Swab from Nasopharynx Updated: 08/29/23 2114     COVID19 Not Detected     Influenza A PCR Not Detected     Influenza B PCR Not Detected    Narrative:      Fact sheet for providers: https://www.fda.gov/media/689585/download    Fact sheet for patients: https://www.fda.gov/media/011547/download    Test performed by PCR.             Allergies  Patient has no known allergies.    Relevant clinical data and objective  history reviewed:  Creatinine   Date Value Ref Range Status   08/29/2023 1.14 0.76 - 1.27 mg/dL Final   02/26/2021 1.10 0.60 - 1.30 mg/dL Final     Comment:     Serial Number: [id number]:  [id number]     Estimated Creatinine Clearance: 56.7 mL/min (by C-G formula based on SCr of 1.14 mg/dL).  No intake/output data recorded.  Patient weight: 84.6 kg (186 lb 8.2 oz)    Asessment/Plan    Initiate piperacillin-tazobactam 3.375 g IV every 8 hours  Pharmacy will monitor Mr. Spear's renal function and clinical status and adjust the piperacillin-tazobactam dose and/or frequency as needed.    Thank you,    Floresita Patten RP,PharmD  8/30/2023  04:11 EDT

## 2023-08-30 NOTE — PLAN OF CARE
CHIEF COMPLAINT:    Chief Complaint   Patient presents with   • Establish Care     Here to get established from Dr Juarez's practice.    • Erectile Dysfunction     Requests rx.    • Hypertension     Quit taking his meds a while ago.        SUBJECTIVE:  Lauri Garza is a 73 year old male who presents for establishing care.  Patient was noted to be tachycardic and hypertensive.  He has been complaining of fatigue, SOB and intermittent palpitations over last 4 days.  He recently came back from a road [place].  No chest pain, dizziness, headaches, nausea/vomiting, leg edema. History of hypertension but has not been taking enalapril for over 1 year.  No known cardiac history. No history of VTE. Former smoker. He has not followed up with his previous PCP in over 1 year. His wife is present today.      Review of systems:    Constitutional: Negative for fever and chills.   Skin: Negative for rash.   HEENT: Negative for eye drainage, rhinorrhea, ear pain, sore throat.  Respiratory: Negative cough, wheezing or shortness of breath.    Cardiovascular: Negative for chest pain, chest pressure or palpitations.   Gastrointestinal: Negative for nausea, vomiting, diarrhea.   Genitourinary: Negative for dysuria, urgency, frequency or hematuria.  Extremities:  Negative for joint swelling or joint pain.  Neurologic:  Negative for change in sensory or motor function.   Psychiatric: Negative for change in mood or mentation.       OBJECTIVE:  PROBLEM LIST:    There is no problem list on file for this patient.      PAST HISTORIES:  I have reviewed the past medical history, family history, social history, medications and allergies listed in the medical record as obtained by my nursing staff and support staff and agree with their documentation.  Allergies, Medications, Medical History, Surgical History, Social History and Family History were reviewed and updated.    Physical Exam:    Vital Signs:    Visit Vitals  BP (!)  Goal Outcome Evaluation:              Outcome Evaluation: New admit          150/118 (BP Location: RUE - Right upper extremity, Patient Position: Sitting, Cuff Size: Large Adult)   Pulse (!) 162   Temp 98 °F (36.7 °C) (Temporal)   Resp 12   Ht 5' 10\" (1.778 m)   Wt 88 kg   SpO2 97%   BMI 27.84 kg/m²     General:   Alert, cooperative, conversive in no acute distress.  Skin:  Warm and dry without rash.    Head:  Normocephalic-atraumatic.   Neck:  Trachea is midline.   Eyes:  Normal conjunctivae and sclerae.    ENT:  Mucous membranes are moist.    Cardiovascular:   Irregularly, irregular, tachycardic. No obvious murmurs  Respiratory:  Normal respiratory effort.  CTA (Clear to auscultation).  No wheezes, rales or rhonchi.  Gastrointestinal:  Inspection: Normal.  Musculoskeletal:  No deformity or edema.    Neurologic:   Oriented x 3.  Cranial nerves 2-12 are grossly intact.  No focal deficits.  Psychiatric:   Cooperative.  Appropriate mood & affect.      Assessment/PLAN:  ASSESSMENT: SOB (shortness of breath)  (primary encounter diagnosis)  Comment: EKG: A-fib with RVR, HR 150s. Discussed findings with patient and wife. Recommend ER evaluation for treatment and further cardiac work up. No obvious risk factors for VTE. He is clinically stable. His wife will take him to ER immediately. ER physician was notified.  Plan: ELECTROCARDIOGRAM 12-LEAD    Palpitation        Instructions provided as documented in the AVS (After Visit Summary).    The patient indicated understanding of the diagnosis and agreed with the plan of care.     Treatment options discussed with patient and explained in detail. The risks, benefits and potential side effects of possible medications were reviewed. Alternatives were discussed. Medication instructions and consequences of not taking the medications were discussed. Patient's understanding was assessed and patient agreed with the plan. Monitoring parameters and expected course outlined. Patient to call or come in if symptoms fail to respond as outlined, or worsen in any  queta.    Jayme Espitia MD

## 2023-08-30 NOTE — THERAPY EVALUATION
Patient Name: Ed Spear  : 1935    MRN: [id number]                              Today's Date: 2023       Admit Date: 2023    Visit Dx:     ICD-10-CM ICD-9-CM   1. Diverticulitis  K57.92 562.11   2. Altered mental status, unspecified altered mental status type  R41.82 780.97     Patient Active Problem List   Diagnosis    Pulmonary emphysema    Hyperlipidemia    Hypertension    Malignant neoplasm of skin    Vitamin D deficiency    Arthritis, degenerative    Enlarged prostate without lower urinary tract symptoms (luts)    Skin cancer    Chronic bilateral low back pain without sciatica    Atrial fibrillation    Encounter for Medicare annual wellness exam    Vitamin B 12 deficiency    Callus of heel    COPD exacerbation    Acute respiratory failure with hypoxia and hypercapnia    Skin lesion of hand    Lower GI bleed    Chronic anemia    Acute blood loss anemia    Abnormal CT of the abdomen    Adenomatous polyp of colon    Diverticulitis large intestine     Past Medical History:   Diagnosis Date    A-fib     Acute sinusitis     Allergic rhinitis     Ankle fracture     right    Arthritis, degenerative     BMI 28.0-28.9,adult     COPD (chronic obstructive pulmonary disease)     Cough     Enlarged prostate without lower urinary tract symptoms (luts)     Hyperlipidemia     Hypertension     Impaired mobility     Obstructive chronic bronchitis with exacerbation     Shortness of breath     Skin cancer     Tinnitus of both ears     Vitamin D deficiency     Wears dentures     full upper/lower - instructed on no adhesive DOS     Past Surgical History:   Procedure Laterality Date    CATARACT EXTRACTION      COLONOSCOPY N/A 2023    Procedure: COLONOSCOPY;  Surgeon: Dontrell Pham MD;  Location: [facility] ENDOSCOPY;  Service: Gastroenterology;  Laterality: N/A;    COLONOSCOPY N/A 2023    Procedure: COLONOSCOPY WITH TATTOOING, CLIPPING X1, AND POLYPECTOMY X24;  Surgeon: Dontrell Pham MD;   Location: [facility] ENDOSCOPY;  Service: Gastroenterology;  Laterality: N/A;    ENDOSCOPY N/A 7/28/2023    Procedure: ESOPHAGOGASTRODUODENOSCOPY;  Surgeon: Dontrell Pham MD;  Location: [facility] ENDOSCOPY;  Service: Gastroenterology;  Laterality: N/A;      General Information       Row Name 08/30/23 1124          Physical Therapy Time and Intention    Document Type evaluation (P)   -NL     Mode of Treatment physical therapy (P)   -NL       Row Name 08/30/23 1124          General Information    Patient Profile Reviewed yes (P)   -NL     Prior Level of Function mod assist:;all household mobility;ADL's (P)   -NL     Existing Precautions/Restrictions fall;oxygen therapy device and L/min (P)   -NL     Barriers to Rehab medically complex;previous functional deficit (P)   -NL       Row Name 08/30/23 1124          Living Environment    People in Home child(magali), adult (P)   -NL       Row Name 08/30/23 1124          Home Main Entrance    Number of Stairs, Main Entrance two (P)   -NL     Stair Railings, Main Entrance railing on right side (ascending) (P)   -NL       Row Name 08/30/23 1124          Stairs Within Home, Primary    Number of Stairs, Within Home, Primary none (P)   -NL       Row Name 08/30/23 1124          Cognition    Orientation Status (Cognition) oriented x 3;oriented to;person;place;situation;verbal cues/prompts needed for orientation (P)   -NL       Row Name 08/30/23 1124          Safety Issues, Functional Mobility    Safety Issues Affecting Function (Mobility) ability to follow commands;safety precaution awareness;safety precautions follow-through/compliance;insight into deficits/self-awareness (P)   -NL     Impairments Affecting Function (Mobility) strength;endurance/activity tolerance;cognition;pain;shortness of breath (P)   -NL     Cognitive Impairments, Mobility Safety/Performance attention;awareness, need for assistance;insight into deficits/self-awareness;safety precaution awareness;safety  precaution follow-through (P)   -NL               User Key  (r) = Recorded By, (t) = Taken By, (c) = Cosigned By      Initials Name Provider Type    Hamlet Miller, PT Student PT Student                   Mobility       Row Name 08/30/23 1124          Bed Mobility    Bed Mobility rolling left;rolling right;supine-sit (P)   -NL     Rolling Left [place] (Bed Mobility) maximum assist (25% patient effort);2 person assist (P)   -NL     Rolling Right [place] (Bed Mobility) maximum assist (25% patient effort);2 person assist (P)   -NL     Supine-Sit [place] (Bed Mobility) moderate assist (50% patient effort);2 person assist (P)   -NL     Assistive Device (Bed Mobility) head of bed elevated;draw sheet;bed rails (P)   -NL       Row Name 08/30/23 1124          Bed-Chair Transfer    Bed-Chair [place] (Transfers) not tested (P)   -NL       Row Name 08/30/23 1124          Sit-Stand Transfer    Sit-Stand [place] (Transfers) not tested (P)   -NL       Row Name 08/30/23 1124          Gait/Stairs (Locomotion)    [place] Level (Gait) not tested (P)   -NL     [place] Level (Stairs) not tested (P)   -NL               User Key  (r) = Recorded By, (t) = Taken By, (c) = Cosigned By      Initials Name Provider Type    Hamlet Miller, PT Student PT Student                   Obj/Interventions       Row Name 08/30/23 1124          Range of Motion Comprehensive    General Range of Motion bilateral lower extremity ROM WFL (P)   -NL       Row Name 08/30/23 1124          Strength Comprehensive (MMT)    General Manual Muscle Testing (MMT) Assessment lower extremity strength deficits identified (P)   -NL     Comment, General Manual Muscle Testing (MMT) Assessment BLE gross strength 3/5 (P)   -NL       Row Name 08/30/23 1124          Balance    Balance Assessment sitting static balance;sitting dynamic balance (P)   -NL     Static Sitting Balance moderate assist (P)   -NL     Dynamic Sitting Balance moderate  assist (P)   -NL     Position, Sitting Balance supported;sitting edge of bed (P)   -NL       Row Name 08/30/23 1124          Sensory Assessment (Somatosensory)    Sensory Assessment (Somatosensory) not tested (P)   -NL               User Key  (r) = Recorded By, (t) = Taken By, (c) = Cosigned By      Initials Name Provider Type    Hamlet Miller, PT Student PT Student                   Goals/Plan       Row Name 08/30/23 1124          Bed Mobility Goal 1 (PT)    Activity/Assistive Device (Bed Mobility Goal 1, PT) bed mobility activities, all (P)   -NL     [place] Level/Cues Needed (Bed Mobility Goal 1, PT) contact guard required (P)   -NL     Time Frame (Bed Mobility Goal 1, PT) long term goal (LTG);10 days (P)   -NL     Progress/Outcomes (Bed Mobility Goal 1, PT) goal ongoing (P)   -NL       Row Name 08/30/23 1124          Transfer Goal 1 (PT)    Activity/Assistive Device (Transfer Goal 1, PT) sit-to-stand/stand-to-sit (P)   -NL     [place] Level/Cues Needed (Transfer Goal 1, PT) minimum assist (75% or more patient effort) (P)   -NL     Time Frame (Transfer Goal 1, PT) long term goal (LTG);10 days (P)   -NL     Progress/Outcome (Transfer Goal 1, PT) goal ongoing (P)   -NL       Row Name 08/30/23 1124          Gait Training Goal 1 (PT)    Activity/Assistive Device (Gait Training Goal 1, PT) gait (walking locomotion);assistive device use;decrease fall risk;increase endurance/gait distance;walker, rolling (P)   -NL     [place] Level (Gait Training Goal 1, PT) minimum assist (75% or more patient effort) (P)   -NL     Distance (Gait Training Goal 1, PT) 10' (P)   -NL     Time Frame (Gait Training Goal 1, PT) long term goal (LTG);10 days (P)   -NL     Progress/Outcome (Gait Training Goal 1, PT) goal ongoing (P)   -NL       Row Name 08/30/23 1124          Patient Education Goal (PT)    Activity (Patient Education Goal, PT) BLE ther-ex x10 per HEP (P)   -NL     [place]/Cues/Accuracy (Memory Goal 2, PT)  demonstrates adequately;verbalizes understanding (P)   -NL     Time Frame (Patient Education Goal, PT) long term goal (LTG);10 days (P)   -NL     Progress/Outcome (Patient Education Goal, PT) goal ongoing (P)   -NL       Row Name 08/30/23 1124          Therapy Assessment/Plan (PT)    Planned Therapy Interventions (PT) strengthening;home exercise program;patient/family education;gait training;bed mobility training;balance training;transfer training (P)   -NL               User Key  (r) = Recorded By, (t) = Taken By, (c) = Cosigned By      Initials Name Provider Type    Hamlet Miller, PT Student PT Student                   Clinical Impression       Row Name 08/30/23 1124          Pain    Pretreatment Pain Rating 8/10 (P)   -NL     Posttreatment Pain Rating 8/10 (P)   -NL     Pain Location upper (P)   -NL     Pain Location - abdomen (P)   -NL     Pain Intervention(s) Repositioned;Ambulation/increased activity (P)   -NL       Row Name 08/30/23 1124          Plan of Care Review    Plan of Care Reviewed With patient;daughter (P)   -NL     Progress no change (P)   -NL     Outcome Evaluation PT evaluation completed today. Patient was received supine in bed, A/Ox3 with verabl cue's, and c/o 8/10 upper abdomen pain. Patient had a HR of 111 before treatment and he then transferred supine-sit with mod Ax2 where he sat EOB for 2 minutes. His HR increased to 153 and he began to c/o head pain so he was returned to supine. Patient then rolled with max Ax2 while therapy changed his brief. Patient's HR was 128 upon exit of the room. Patient is expected to benefit from skilled PT prior to d/c in order to improve tolerance with activity. (P)   -NL       Row Name 08/30/23 1124          Therapy Assessment/Plan (PT)    Patient/Family Therapy Goals Statement (PT) Patient wants to return to prior level of functional ability. (P)   -NL     Rehab Potential (PT) good, to achieve stated therapy goals (P)   -NL     Criteria for Skilled  Interventions Met (PT) yes;meets criteria;skilled treatment is necessary (P)   -NL     Therapy Frequency (PT) 5 times/wk (P)   -NL       Row Name 08/30/23 1124          Vital Signs    Pre SpO2 (%) 95 (P)   -NL     O2 Delivery Pre Treatment supplemental O2 (P)   3L  -NL     Intra SpO2 (%) 90 (P)   -NL     O2 Delivery Intra Treatment supplemental O2 (P)   -NL     Post SpO2 (%) 95 (P)   -NL     O2 Delivery Post Treatment supplemental O2 (P)   -NL     Pre Patient Position Supine (P)   -NL     Intra Patient Position Sitting (P)   -NL     Post Patient Position Supine (P)   -NL       Row Name 08/30/23 1124          Positioning and Restraints    Pre-Treatment Position in bed (P)   -NL     Post Treatment Position bed (P)   -NL     In Bed side lying right;call light within reach;encouraged to call for assist;exit alarm on;with family/caregiver;SCD pump applied;heels elevated (P)   -NL               User Key  (r) = Recorded By, (t) = Taken By, (c) = Cosigned By      Initials Name Provider Type    Hamlet Miller, PT Student PT Student                   Outcome Measures       Row Name 08/30/23 1124 08/30/23 0800       How much help from another person do you currently need...    Turning from your back to your side while in flat bed without using bedrails? 2 (P)   -NL 3  -PK    Moving from lying on back to sitting on the side of a flat bed without bedrails? 2 (P)   -NL 2  -PK    Moving to and from a bed to a chair (including a wheelchair)? 2 (P)   -NL 2  -PK    Standing up from a chair using your arms (e.g., wheelchair, bedside chair)? 2 (P)   -NL 2  -PK    Climbing 3-5 steps with a railing? 1 (P)   -NL 1  -PK    To walk in hospital room? 1 (P)   -NL 2  -PK    AM-PAC 6 Clicks Score (PT) 10 (P)   -NL 12  -PK    Highest level of mobility 4 --> Transferred to chair/commode (P)   -NL 4 --> Transferred to chair/commode  -PK      Row Name 08/30/23 0200          How much help from another person do you currently need...    Turning  from your back to your side while in flat bed without using bedrails? 3  -KL     Moving from lying on back to sitting on the side of a flat bed without bedrails? 3  -KL     Moving to and from a bed to a chair (including a wheelchair)? 2  -KL     Standing up from a chair using your arms (e.g., wheelchair, bedside chair)? 2  -KL     Climbing 3-5 steps with a railing? 1  -KL     To walk in hospital room? 1  -KL     AM-PAC 6 Clicks Score (PT) 12  -KL     Highest level of mobility 4 --> Transferred to chair/commode  -KL       Row Name 08/30/23 1124          Functional Assessment    Outcome Measure Options AM-PAC 6 Clicks Basic Mobility (PT) (P)   -NL               User Key  (r) = Recorded By, (t) = Taken By, (c) = Cosigned By      Initials Name Provider Type    PK Milagros Coreas, RN Registered Nurse    Keiko Cotter RN Registered Nurse    Hamlet Miller, PT Student PT Student                                 Physical Therapy Education       Title: PT OT SLP Therapies (In Progress)       Topic: Physical Therapy (In Progress)       Point: Mobility training (In Progress)       Learning Progress Summary             Patient Acceptance, E, NR by NL at 8/30/2023 1124    Comment: Educated on role of PT in stay at the hospital and on importance of movement.   Family Acceptance, E, NR by NL at 8/30/2023 1124    Comment: Educated on role of PT in stay at the hospital and on importance of movement.                         Point: Home exercise program (Not Started)       Learner Progress:  Not documented in this visit.              Point: Body mechanics (Not Started)       Learner Progress:  Not documented in this visit.                              User Key       Initials Effective Dates Name Provider Type Discipline    NL 07/13/23 -  Hamlet Arellano, PT Student PT Student PT                  PT Recommendation and Plan  Planned Therapy Interventions (PT): (P) strengthening, home exercise program, patient/family education, gait  training, bed mobility training, balance training, transfer training  Plan of Care Reviewed With: (P) patient, daughter  Progress: (P) no change  Outcome Evaluation: (P) PT evaluation completed today. Patient was received supine in bed, A/Ox3 with verabl cue's, and c/o 8/10 upper abdomen pain. Patient had a HR of 111 before treatment and he then transferred supine-sit with mod Ax2 where he sat EOB for 2 minutes. His HR increased to 153 and he began to c/o head pain so he was returned to supine. Patient then rolled with max Ax2 while therapy changed his brief. Patient's HR was 128 upon exit of the room. Patient is expected to benefit from skilled PT prior to d/c in order to improve tolerance with activity.     Time Calculation:   PT Evaluation Complexity  History, PT Evaluation Complexity: (P) 3 or more personal factors and/or comorbidities  Examination of Body Systems (PT Eval Complexity): (P) total of 3 or more elements  Clinical Presentation (PT Evaluation Complexity): (P) evolving  Clinical Decision Making (PT Evaluation Complexity): (P) moderate complexity  Overall Complexity (PT Evaluation Complexity): (P) moderate complexity     PT Charges       Row Name 08/30/23 1124             Time Calculation    Start Time 1124 (P)   -NL      PT Received On 08/30/23 (P)   -NL      PT Goal Re-Cert Due Date 09/09/23 (P)   -NL         Untimed Charges    PT Eval/Re-eval Minutes 40 (P)   -NL         Total Minutes    Untimed Charges Total Minutes 40 (P)   -NL       Total Minutes 40 (P)   -NL                User Key  (r) = Recorded By, (t) = Taken By, (c) = Cosigned By      Initials Name Provider Type    NL Hamlet Arellano, PT Student PT Student                  Therapy Charges for Today       Code Description Service Date Service Provider Modifiers Qty    [id number] HC PT EVAL MOD COMPLEXITY 3 8/30/2023 Hamlet Arellano, PT Student GP 1            PT G-Codes  Outcome Measure Options: (P) AM-PAC 6 Clicks Basic Mobility (PT)  AM-PAC 6  Clicks Score (PT): (P) 10  PT Discharge Summary  Anticipated Discharge Disposition (PT): (P) inpatient rehabilitation facility, skilled nursing facility    Hamlet Arellano, PT Student  8/30/2023

## 2023-08-31 LAB
ANION GAP SERPL CALCULATED.3IONS-SCNC: 13.8 MMOL/L (ref 5–15)
BASOPHILS # BLD AUTO: 0.04 10*3/MM3 (ref 0–0.2)
BASOPHILS NFR BLD AUTO: 0.4 % (ref 0–1.5)
BUN SERPL-MCNC: 21 MG/DL (ref 8–23)
BUN/CREAT SERPL: 14.7 (ref 7–25)
CALCIUM SPEC-SCNC: 12.1 MG/DL (ref 8.6–10.5)
CHLORIDE SERPL-SCNC: 100 MMOL/L (ref 98–107)
CO2 SERPL-SCNC: 26.2 MMOL/L (ref 22–29)
CREAT SERPL-MCNC: 1.43 MG/DL (ref 0.76–1.27)
DEPRECATED RDW RBC AUTO: 44.8 FL (ref 37–54)
EGFRCR SERPLBLD CKD-EPI 2021: 48 ML/MIN/1.73
EOSINOPHIL # BLD AUTO: 0.01 10*3/MM3 (ref 0–0.4)
EOSINOPHIL NFR BLD AUTO: 0.1 % (ref 0.3–6.2)
ERYTHROCYTE [DISTWIDTH] IN BLOOD BY AUTOMATED COUNT: 13.9 % (ref 12.3–15.4)
GLUCOSE SERPL-MCNC: 99 MG/DL (ref 65–99)
HCT VFR BLD AUTO: 36.1 % (ref 37.5–51)
HGB BLD-MCNC: 11.5 G/DL (ref 13–17.7)
IMM GRANULOCYTES # BLD AUTO: 0.05 10*3/MM3 (ref 0–0.05)
IMM GRANULOCYTES NFR BLD AUTO: 0.5 % (ref 0–0.5)
LYMPHOCYTES # BLD AUTO: 0.92 10*3/MM3 (ref 0.7–3.1)
LYMPHOCYTES NFR BLD AUTO: 9.3 % (ref 19.6–45.3)
MCH RBC QN AUTO: 27.9 PG (ref 26.6–33)
MCHC RBC AUTO-ENTMCNC: 31.9 G/DL (ref 31.5–35.7)
MCV RBC AUTO: 87.6 FL (ref 79–97)
MONOCYTES # BLD AUTO: 0.81 10*3/MM3 (ref 0.1–0.9)
MONOCYTES NFR BLD AUTO: 8.1 % (ref 5–12)
NEUTROPHILS NFR BLD AUTO: 8.11 10*3/MM3 (ref 1.7–7)
NEUTROPHILS NFR BLD AUTO: 81.6 % (ref 42.7–76)
NRBC BLD AUTO-RTO: 0 /100 WBC (ref 0–0.2)
PLATELET # BLD AUTO: 274 10*3/MM3 (ref 140–450)
PMV BLD AUTO: 9.9 FL (ref 6–12)
POTASSIUM SERPL-SCNC: 3.5 MMOL/L (ref 3.5–5.2)
POTASSIUM SERPL-SCNC: 3.7 MMOL/L (ref 3.5–5.2)
RBC # BLD AUTO: 4.12 10*6/MM3 (ref 4.14–5.8)
SODIUM SERPL-SCNC: 140 MMOL/L (ref 136–145)
WBC NRBC COR # BLD: 9.94 10*3/MM3 (ref 3.4–10.8)

## 2023-08-31 PROCEDURE — 99232 SBSQ HOSP IP/OBS MODERATE 35: CPT | Performed by: INTERNAL MEDICINE

## 2023-08-31 PROCEDURE — 80048 BASIC METABOLIC PNL TOTAL CA: CPT | Performed by: STUDENT IN AN ORGANIZED HEALTH CARE EDUCATION/TRAINING PROGRAM

## 2023-08-31 PROCEDURE — 84132 ASSAY OF SERUM POTASSIUM: CPT | Performed by: INTERNAL MEDICINE

## 2023-08-31 PROCEDURE — 99232 SBSQ HOSP IP/OBS MODERATE 35: CPT | Performed by: PHYSICIAN ASSISTANT

## 2023-08-31 PROCEDURE — 85025 COMPLETE CBC W/AUTO DIFF WBC: CPT | Performed by: STUDENT IN AN ORGANIZED HEALTH CARE EDUCATION/TRAINING PROGRAM

## 2023-08-31 PROCEDURE — 25010000002 CEFTRIAXONE SODIUM-DEXTROSE 1-3.74 GM-%(50ML) RECONSTITUTED SOLUTION: Performed by: INTERNAL MEDICINE

## 2023-08-31 PROCEDURE — 25010000002 METRONIDAZOLE 500 MG/100ML SOLUTION: Performed by: INTERNAL MEDICINE

## 2023-08-31 RX ORDER — POTASSIUM CHLORIDE 1.5 G/1.58G
40 POWDER, FOR SOLUTION ORAL EVERY 4 HOURS
Status: COMPLETED | OUTPATIENT
Start: 2023-08-31 | End: 2023-08-31

## 2023-08-31 RX ADMIN — METRONIDAZOLE 500 MG: 5 INJECTION, SOLUTION INTRAVENOUS at 11:10

## 2023-08-31 RX ADMIN — METRONIDAZOLE 500 MG: 5 INJECTION, SOLUTION INTRAVENOUS at 17:34

## 2023-08-31 RX ADMIN — BISOPROLOL FUMARATE 10 MG: 5 TABLET ORAL at 09:56

## 2023-08-31 RX ADMIN — DILTIAZEM HYDROCHLORIDE 180 MG: 180 CAPSULE, COATED, EXTENDED RELEASE ORAL at 09:55

## 2023-08-31 RX ADMIN — SENNOSIDES AND DOCUSATE SODIUM 2 TABLET: 50; 8.6 TABLET ORAL at 09:56

## 2023-08-31 RX ADMIN — CEFTRIAXONE 1000 MG: 1 INJECTION, SOLUTION INTRAVENOUS at 17:34

## 2023-08-31 RX ADMIN — POTASSIUM CHLORIDE 40 MEQ: 1.5 POWDER, FOR SOLUTION ORAL at 17:34

## 2023-08-31 RX ADMIN — POTASSIUM CHLORIDE 40 MEQ: 1.5 POWDER, FOR SOLUTION ORAL at 15:13

## 2023-08-31 RX ADMIN — Medication 10 ML: at 21:50

## 2023-08-31 RX ADMIN — Medication 10 ML: at 09:56

## 2023-08-31 NOTE — PROGRESS NOTES
Inpatient Consult      Date of Consultation: 2023  Patient Name: Ed Spear  MRN: [id number]  : 1935     Referring provider: Ottoniel Alfaro DO    Primary care provider:  Chelo Nixon DO    Reason for consultation: BRBPR and diverticulitis     Interval history:   2023  His daughter Farida is at bedside today. She reports he was confused before coming into the hospital. Chart review shows 3 stools yesterday. No continued rectal bleeding reported by nursing staff. He has tolerated regular diet today. No complaints of abdominal pain.     2023  This is a 87-year-old male patient with a prior history of hypertension, hyperlipidemia, chronic atrial fibrillation on Eliquis, COPD on home oxygen 3 L nasal cannula, recent history of GI bleeding with ulcerated recto sigmoid polyp, later removed. He presents to emergency room this time due to complaints of hematochezia, some confusion and lower abdominal pain. He was found to have sigmoid diverticulitis and fecal impaction on CTAP with contrast. Hgb at 10.3 with baseline of 11. He was admitted, placed NPO, started on fluids, and given Rocephin and flagyl. Eliquis has been held. GI was consulted for evaluation and management of bright red blood per rectum and diverticulitis.      Patient is a poor historian. He is alone in the room this morning. Per remy review, he was intermittently confused prior to admission. Patient denies any current abdominal pain or nausea. Has had 2 stools overnight, 1 had blood in brief nursing staff thought urinary source.      2023 previous inpatient GI visit  He lives with his daughter and ambulates with walker.  Patient is a poor historian.  He has stage COPD and is on oxygen for many years now.  As per his daughter he has been constipated recently.  He also had some flareup of his hemorrhoids.  His daughter got some over-the-counter medication for him which helped for a while however he continued to  have a intermittent red blood bleeding with the minimal clots.     Yesterday he had a large bowel movement with a blood and she brought him to emergency room today for further evaluation.  He is on Eliquis for chronic atrial fibrillation and he continued to take the medications.  He does get intermittent abdominal cramps.  Denies any nausea vomiting no reflux symptoms.  No prior EGD or colonoscopy.  Prior history of any GI bleed.     In the emergency room he had blood work done which revealed hemoglobin of 10.3 g/dL compared to his baseline hemoglobin 11.3 in January 2023.  CMP was largely unremarkable.  CT abdomen pelvis done with contrast showed sigmoid diverticulosis without signs of any current bleeding.  Hepatic steatosis and possible distal esophageal thickening.  He has been admitted for further evaluation management of his symptoms.    Subjective     Past Medical History:   Diagnosis Date    A-fib     Acute sinusitis     Allergic rhinitis     Ankle fracture     right    Arthritis, degenerative     BMI 28.0-28.9,adult     COPD (chronic obstructive pulmonary disease)     Cough     Enlarged prostate without lower urinary tract symptoms (luts)     Hyperlipidemia     Hypertension     Impaired mobility     Obstructive chronic bronchitis with exacerbation     Shortness of breath     Skin cancer     Tinnitus of both ears     Vitamin D deficiency     Wears dentures     full upper/lower - instructed on no adhesive DOS     Past Surgical History:   Procedure Laterality Date    CATARACT EXTRACTION      COLONOSCOPY N/A 7/28/2023    Procedure: COLONOSCOPY;  Surgeon: Dontrell Pham MD;  Location: [facility] ENDOSCOPY;  Service: Gastroenterology;  Laterality: N/A;    COLONOSCOPY N/A 8/2/2023    Procedure: COLONOSCOPY WITH TATTOOING, CLIPPING X1, AND POLYPECTOMY X24;  Surgeon: Dontrell Pham MD;  Location: [facility] ENDOSCOPY;  Service: Gastroenterology;  Laterality: N/A;    ENDOSCOPY N/A 7/28/2023    Procedure:  ESOPHAGOGASTRODUODENOSCOPY;  Surgeon: Dontrell Pham MD;  Location: [facility] ENDOSCOPY;  Service: Gastroenterology;  Laterality: N/A;     Family History   Problem Relation Age of Onset    Cancer Mother     Kidney disease Mother     Cancer Father     Cancer Brother      Social History     Socioeconomic History    Marital status:    Tobacco Use    Smoking status: Every Day     Packs/day: 1.50     Years: 60.00     Pack years: 90.00     Types: Cigarettes     Passive exposure: Current    Smokeless tobacco: Never   Vaping Use    Vaping Use: Never used   Substance and Sexual Activity    Alcohol use: No    Drug use: Never    Sexual activity: Defer     Current Facility-Administered Medications:     acetaminophen (TYLENOL) tablet 650 mg, 650 mg, Oral, Q4H PRN, 650 mg at 08/30/23 1657 **OR** acetaminophen (TYLENOL) 160 MG/5ML solution 650 mg, 650 mg, Oral, Q4H PRN **OR** acetaminophen (TYLENOL) suppository 650 mg, 650 mg, Rectal, Q4H PRN, Kerley, Brian Joseph, DO    sennosides-docusate (PERICOLACE) 8.6-50 MG per tablet 2 tablet, 2 tablet, Oral, BID, 2 tablet at 08/31/23 0956 **AND** polyethylene glycol (MIRALAX) packet 17 g, 17 g, Oral, Daily PRN **AND** bisacodyl (DULCOLAX) EC tablet 5 mg, 5 mg, Oral, Daily PRN, 5 mg at 08/30/23 1945 **AND** bisacodyl (DULCOLAX) suppository 10 mg, 10 mg, Rectal, Daily PRN, Kerley, Brian Joseph, DO    bisoprolol (ZEBeta) tablet 10 mg, 10 mg, Oral, Daily, Ottoniel Alfaro DO, 10 mg at 08/31/23 0956    Calcium Replacement - Follow Nurse / BPA Driven Protocol, , Does not apply, PRN, Kerley, Brian Joseph, DO    cefTRIAXone (ROCEPHIN) IVPB 1000 mg/50ml dextrose (premix), 1,000 mg, Intravenous, Q24H, Ottoniel Alfaro DO, Last Rate: 100 mL/hr at 08/30/23 1642, 1,000 mg at 08/30/23 1642    dilTIAZem CD (CARDIZEM CD) 24 hr capsule 180 mg, 180 mg, Oral, Daily, Ottoniel Alfaro DO, 180 mg at 08/31/23 0955    Magnesium Standard Dose Replacement - Follow Nurse / BPA Driven Protocol, , Does not  apply, PRN, Kerley, Brian Joseph, DO    metroNIDAZOLE (FLAGYL) IVPB 500 mg, 500 mg, Intravenous, Q8H, Ottoniel Alfaro DO, Last Rate: 100 mL/hr at 08/31/23 1110, 500 mg at 08/31/23 1110    nitroglycerin (NITROSTAT) SL tablet 0.4 mg, 0.4 mg, Sublingual, Q5 Min PRN, Kerley, Brian Joseph, DO    ondansetron (ZOFRAN) injection 4 mg, 4 mg, Intravenous, Q6H PRN, Kerley, Brian Joseph, DO    Phosphorus Replacement - Follow Nurse / BPA Driven Protocol, , Does not apply, PRN, Kerley, Brian Joseph, DO    potassium chloride (KLOR-CON) packet 40 mEq, 40 mEq, Oral, Q4H, Ottoniel Alfaro DO    Potassium Replacement - Follow Nurse / BPA Driven Protocol, , Does not apply, PRN, Kerley, Brian Joseph, DO    sodium chloride 0.9 % flush 10 mL, 10 mL, Intravenous, Q12H, Kerley, Brian Joseph, DO, 10 mL at 08/31/23 0956    sodium chloride 0.9 % flush 10 mL, 10 mL, Intravenous, PRN, Kerley, Brian Joseph, DO    sodium chloride 0.9 % infusion 40 mL, 40 mL, Intravenous, PRN, Kerley, Brian Joseph, DO    No Known Allergies    Review of Systems   Unable to perform ROS: Mental status change     The following portions of the patient's history were reviewed and updated as appropriate: allergies, current medications, past family history, past medical history, past social history, past surgical history and problem list.    Objective     Vitals:    08/30/23 2341 08/31/23 0430 08/31/23 0700 08/31/23 1100   BP: 116/84 114/79 124/77 113/71   BP Location: Right arm Left arm Left arm Left arm   Patient Position: Lying Lying Lying Lying   Pulse: 96 80 87 102   Resp: 18 16 18 18   Temp: 98.4 °F (36.9 °C) 97.3 °F (36.3 °C) 97.9 °F (36.6 °C) 97.8 °F (36.6 °C)   TempSrc: Axillary Axillary Axillary Axillary   SpO2: 93% 92% 99% 96%   Weight:       Height:         Physical Exam  Constitutional:       General: He is not in acute distress. Lying supine in bed reaching up to grab something.     Appearance: He is well-developed. He is ill-appearing. He is not  diaphoretic.      Comments: frail   HENT:      Head: Normocephalic and atraumatic.      Right Ear: External ear normal.      Left Ear: External ear normal.      Nose: Nose normal.  Eyes:      General: No scleral icterus.        Right eye: No discharge.         Left eye: No discharge.      Conjunctiva/sclera: Conjunctivae normal.   Neck:      Trachea: No tracheal deviation.   Cardiovascular:      Rate and Rhythm: Rhythm irregular.   Pulmonary:      Effort: Pulmonary effort is normal. No respiratory distress.   Abdominal:      General: Bowel sounds are normal. There is no distension (no significant tenderness).      Palpations: Abdomen is soft.      Tenderness: There is no abdominal tenderness. There is no guarding.      Comments: Scaphoid abdomen   Musculoskeletal:         General: Normal range of motion.      Right lower leg: No edema.      Left lower leg: No edema.   Skin:     General: Skin is warm and dry.      Coloration: Skin is not pale.      Findings: No erythema or rash.   Neurological:      Mental Status: He is alert.      Coordination: Coordination normal.      Comments: Oriented x1 (person)   Psychiatric:      Comments: Mildly anxious, answers some questions briefly     Results from last 7 days   Lab Units 08/31/23  0638 08/29/23  2036   SODIUM mmol/L 140 135*   POTASSIUM mmol/L 3.5 3.7   CHLORIDE mmol/L 100 95*   CO2 mmol/L 26.2 28.7   BUN mg/dL 21 16   CREATININE mg/dL 1.43* 1.14   CALCIUM mg/dL 12.1* 11.6*   ALBUMIN g/dL  --  3.4*   BILIRUBIN mg/dL  --  0.4   ALK PHOS U/L  --  93   ALT (SGPT) U/L  --  10   AST (SGOT) U/L  --  15   GLUCOSE mg/dL 99 119*   WBC 10*3/mm3 9.94 8.13   HEMOGLOBIN g/dL 11.5* 10.1*   PLATELETS 10*3/mm3 274 293     CTAP with contrast 8/29/2023  Impression:        1.  No evidence of appendicitis or bowel obstruction.  2.  Inflammatory change of the distal sigmoid colon, suspect  diverticulitis.  3.  Significant fecal impaction of the  rectosigmoid colon.  4.  Cholelithiasis.      EGD and colonoscopy by Dr. Pham 7/28/2023  - Normal oropharynx.  - Z-line irregular, 37 cm from the incisors.  - Low-grade of narrowing and moderate Schatzki ring.  - 3 cm hiatal hernia.  - Erosive esophagitis GEJ  - Erosive gastropathy with stigmata of recent bleeding.  - Normal duodenal bulb, first portion of the duodenum, second portion of the duodenum and third portion of the duodenum.  - No signs of overt bleeding. Iintermittent mild upper GI mucosal bleeding suspected  - Preparation of the colon was inadequate.  - Perianal skin tags found on perianal exam.  - Five 4 to 6 mm polyps in the cecum. Resection not attempted.  - Five 6 to 15 mm polyps in the proximal ascending colon, in the mid ascending colon and in the distal ascending colon. Resection not attempted.  - Seven 8 to 15 mm polyps in the proximal transverse colon, in the mid transverse colon and in the distal transverse colon. Resection not attempted.  - Five 8 to 12 mm polyps in the proximal descending colon, in the mid descending colon and in the distal descending colon. Resection not attempted.  - Two 4 to 8 mm polyps in the proximal sigmoid colon, in the mid sigmoid colon and in the distal sigmoid colon. Resection not attempted.  - One 15 to 18 mm polyp at the recto-sigmoid colon and has ulceration with recent stigmata of bleeding. Resection not attempted.  - One 10 to 12 mm polyp in the proximal rectum. Resection not attempted.  - One 10 to 12 mm polyp in the mid rectum. Resection not attempted.  - The examined portion of the ileum was normal.  - Diverticulosis in the sigmoid colon. No sigsn of diverticular bleeding  - Non-bleeding external and internal hemorrhoids.  - Patient likely had intermittent bleeding from ulcerated recto sigmoid polyp. Malignant trasformation suspected  No specimens collected.      Colonoscopy by Dr. Pham 8/2/2023  - Hemorrhoids found on perianal exam.  - One 15 to 18 mm polyp in the proximal rectum at 10cm,  removed with a hot snare. Resected and retrieved.  - One 12 to 15 mm polyp at the recto-sigmoid colon at about 20cm, removed with a hot snare. Resected and retrieved. Clip (MR conditional) was placed. Clip : [email or web address]. Injected. Previously ulcerated polyp  - One 12 mm polyp in the distal sigmoid colon, removed with a hot snare. Resected and retrieved.  - Three 4 to 8 mm polyps in the cecum, removed with a hot snare. Resected and retrieved.  - Three 3 to 4 mm polyps in the proximal ascending colon, in the mid ascending colon and in the distal ascending colon, removed with a hot snare. Resected and retrieved.  - Sixteen 4 to 20 mm polyps in the descending colon, in the transverse colon and in the ascending colon, removed with a hot snare. Resected and retrieved. Only half of the polyps removed with tijerina net after passing scope three times.  - Diverticulosis in the sigmoid colon.  - Non-bleeding external and internal hemorrhoids.  - Still at least ten polyps left colon and rt colon need to be removed with next colonoscopy  Pathology DIAGNOSIS:  A.   RECTAL POLYP, PROXIMAL:  Adenomatous polyp (tubular/tubulovillous adenoma)  Negative for high-grade dysplasia  B.   RECTOSIGMOID COLON, POLYP:  Adenomatous polyp (tubular adenoma)  Negative for high-grade dysplasia  C.   SIGMOID COLON POLYP, DISTAL:  Adenomatous polyp (tubular adenoma)  Negative for high-grade dysplasia  D.   CECUM POLYP, X3:  Adenomatous polyps (tubular adenomas)  Negative for high-grade dysplasia  E.   ASCENDING COLON POLYP, X2:  Adenomatous polyps (tubular adenomas)  Negative for high-grade dysplasia  F.   TRANSVERSE COLON POLYPS, AND DESCENDING X16:  Adenomatous polyps (tubular adenomas)  Negative for high-grade dysplasia      Assessment / Plan    Assessment/Recommendations:  8/31/2023  Hematochezia  Gross hematuria  Acute blood loss anemia  Acute sigmoid diverticulitis  Suspected diverticular bleeding  History of colon  polyps  Atrial fibrillation on Eliquis  COPD on oxygen  He presented again this admission with complaints of rectal bleeding. Had 2 other recent admissions for the same reason. Had EGD and colonoscopy in July 2023 and then later repeat colonoscopy in Aug 2023. Findings all documented above. He was previously found to have erosive gastritis with stigmata of bleeding and, internal hemorrhoids ulcerated rectosigmoid colon polyp. On most recent colonoscopy 8/2023, larger polyp which was previously ulcerated was removed. His Hgb currently is at 11.5, was 9.0 at recent hospital discharge 1 month ago. His baseline is around 11. He had several stools since admission, 3 yesterday, had blood in brief one time but nursing staff related it to hematuria. No further bleeding. Eiquis has been on hold. He denies current abdominal pain but is confused somewhat. No significant abdominal tenderness on exam today.     Patient likely had a anorectal bleeding associated with the hemorrhoids at the time of his recent reported rectal bleeding.  Diverticular bleeding cannot be ruled out.  He may have a sterile coral colitis versus sigmoid diverticulitis. No signs of any overt GI bleed now.  No indication for any repeat colonoscopy unless there is any overt bleeding that requires intervention.     Patient is high risk for bleeding with continued anticoagulation use  Recommend Dulcolax 2 tablets p.o. daily and suppositories as needed for constipation for now  He may also benefit with starting Linzess 72 mcg once daily on discharge   Conservative management  Continue antibiotic regimen  Already advanced diet  Repeat colonoscopy in 6 months for surveillance, due 1/2024  Keep op GI appt sched 10/16      Thank you very much for letting me participate in the care of this patient.  Please do not hesitate to call me if you have any questions.    Irina Man PA-C  [facility]  8/31/2023  13:10 EDT    Please note that portions of this  note may have been completed with a voice recognition program.

## 2023-08-31 NOTE — THERAPY TREATMENT NOTE
OT tx held d/t confusion; daughter present at bedside and states he has been hallucinating. Will follow up tomorrow as appropriate.

## 2023-08-31 NOTE — THERAPY TREATMENT NOTE
PT treatment held today d/t confusion. Patient has been hallucinating per daughter who is at bedside. PT will follow up tomorrow.

## 2023-08-31 NOTE — PLAN OF CARE
Goal Outcome Evaluation:      Ed remains on room air and denies SOA. He is chronic A-Fib on telemetry. His heart rate elevates to '130's with any activity. He remains alert to self. Potassium replacement this shift. IV antibiotics and antifungal continue.  Daughter and CM are seeking STR.

## 2023-08-31 NOTE — DISCHARGE PLACEMENT REQUEST
"Referral   STR vs LTC   Ed Spear (87 y.o. Male)       Date of Birth   1935    Social Security Number       Address   [address] [place] [address]    Home Phone   [phone]    MRN   [id number]       Presybeterian   None    Marital Status                               Admission Date   23    Admission Type   Emergency    Admitting Provider       Attending Provider   Ottoniel Alfaro DO    Department, Room/Bed   [facility] TELEMETRY 4, 428/1       Discharge Date       Discharge Disposition       Discharge Destination                                 Attending Provider: Ottoniel Alfaro DO    Allergies: No Known Allergies    Isolation: None   Infection: None   Code Status: No CPR    Ht: 188 cm (74\")   Wt: 84.6 kg (186 lb 8.2 oz)    Admission Cmt: None   Principal Problem: Diverticulitis large intestine [K57.32]                   Active Insurance as of 2023       Primary Coverage       Payor Plan Insurance Group Employer/Plan Group    [facility] MEDICARE REPLACEMENT [employer]LL[employer] MEDICARE REPLACEMENT        Payor Plan Address Payor Plan Phone Number Payor Plan Fax Number Effective Dates    [address] [address] [phone]  2022 - None Entered    [facility] [id number]         Subscriber Name Subscriber Birth Date Member ID       ED SPEAR 1935 [id number]                     Emergency Contacts        (Rel.) Home Phone Work Phone Mobile Phone    Farida Cano (Daughter) -- -- [phone]    NATALIYA BROOKS (Daughter) -- -- --    Mike Spear (Son) [phone] -- --                 History & Physical        Kerley, Brian Joseph, DO at 23 [address] [place] [address] HOSPITALIST   HISTORY AND PHYSICAL      Name:  Ed Spear   Age:  87 y.o.  Sex:  male  :  1935  MRN:  [id number]   Visit Number:  [id number]  Admission Date:  2023  Date Of Service:  23  Primary Care Physician:  Chelo Nixon DO    Chief " Complaint:     Bright red blood per rectum    History Of Presenting Illness:      Patient is an 87-year-old man with past medical history of atrial fibrillation on Eliquis, COPD on 3 L baseline, BPH, hypertension, diverticulosis.  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  Presented to [facility] ED on 8/29/2023 with concern for mitten bright red bloody stools per rectum.  Family says that they were going to follow-up with a primary care doctor, but decided to come to the ED because he was acting intermittently confused.  He says he does have right lower quadrant abdominal pain.  Denied fevers or chills, shortness of air, chest pain, nausea or vomiting.    ED summary: Afebrile, vital signs stable on room air.  EKG atrial fibrillation, no ST elevations or depressions.  High-sensitivity troponin 57, 56, ACS not suspected.  Hemoglobin 10.1, most recent 9.8 on 8/22.  COVID/flu negative.  FOBT positive.  CT abdomen/pelvis inflammatory changes distal sigmoid colon, suspect diverticulitis, significant fecal impaction of the rectosigmoid colon.  For diverticulitis he was was provided Rocephin, metronidazole.    Review Of Systems:    All systems were reviewed and negative except as mentioned in history of presenting illness, assessment and plan.    Past Medical History: Patient  has a past medical history of A-fib, Acute sinusitis, Allergic rhinitis, Ankle fracture, Arthritis, degenerative, BMI 28.0-28.9,adult, COPD (chronic obstructive pulmonary disease), Cough, Enlarged prostate without lower urinary tract symptoms (luts), Hyperlipidemia, Hypertension, Obstructive chronic bronchitis with exacerbation, Shortness of breath, Skin cancer, Tinnitus of both ears, Vitamin D deficiency, and Wears dentures.    Past Surgical History: Patient  has a past surgical history that includes Cataract extraction;  Esophagogastroduodenoscopy (N/A, 7/28/2023); Colonoscopy (N/A, 7/28/2023); and Colonoscopy (N/A, 8/2/2023).    Social History: Patient  reports that he has been smoking cigarettes. He has a 90.00 pack-year smoking history. He has been exposed to tobacco smoke. He has never used smokeless tobacco. He reports that he does not drink alcohol and does not use drugs.    Family History:  Patient's family history has been reviewed and found to be noncontributory.     Allergies:      Patient has no known allergies.    Home Medications:    Prior to Admission Medications       Prescriptions Last Dose Informant Patient Reported? Taking?    acetaminophen (TYLENOL) 325 MG tablet   Yes No    Take 1 tablet by mouth Every 6 (Six) Hours As Needed.    albuterol sulfate  (90 Base) MCG/ACT inhaler   No No    INHALE 2 PUFFS BY MOUTH EVERY 4 HOURS AS NEEDED FOR WHEEZING OR SHORTNESS OF AIR    apixaban (Eliquis) 2.5 MG tablet tablet  Self No No    Take 1 tablet by mouth Every 12 (Twelve) Hours.    bisoprolol (ZEBeta) 10 MG tablet   No No    TAKE ONE TABLET BY MOUTH DAILY    Cartia  MG 24 hr capsule   No No    Take 1 capsule by mouth Daily.    DULoxetine (CYMBALTA) 60 MG capsule   No No    Take 1 capsule by mouth Daily.    famotidine (PEPCID) 40 MG tablet   No No    Take 1 tablet by mouth At Night As Needed for Heartburn.    ipratropium-albuterol (DUO-NEB) 0.5-2.5 mg/3 ml nebulizer   No No    INHALE THREE MILLILITERS ( ONE VIAL )  VIA NEBULIZATION BY MOUTH FOUR TIMES A DAY    sennosides-docusate (PERICOLACE) 8.6-50 MG per tablet   No No    Take 1 tablet by mouth 2 (Two) Times a Day.    simvastatin (ZOCOR) 40 MG tablet   No No    Take 1 tablet by mouth Every Night.    Symbicort 160-4.5 MCG/ACT inhaler   No No    INHALE 2 PUFFS BY MOUTH TWICE A DAY    tamsulosin (FLOMAX) 0.4 MG capsule 24 hr capsule   No No    Take 1 capsule by mouth Daily.          ED Medications:    Medications   metroNIDAZOLE (FLAGYL) IVPB 500 mg (500 mg  "Intravenous New Bag 8/29/23 2301)   iopamidol (ISOVUE-300) 61 % injection 100 mL (100 mL Intravenous Given 8/29/23 2153)   cefTRIAXone (ROCEPHIN) IVPB 1000 mg/50ml dextrose (premix) (0 mg Intravenous Stopped 8/29/23 2301)     Vital Signs:  Temp:  [97.8 °F (36.6 °C)] 97.8 °F (36.6 °C)  Heart Rate:  [85-98] 98  Resp:  [16] 16  BP: (108-124)/(54-78) 124/68        08/29/23 2039   Weight: 94.3 kg (208 lb)     Body mass index is 26.71 kg/m².    Physical Exam:     Most recent vital Signs: /68   Pulse 98   Temp 97.8 °F (36.6 °C)   Resp 16   Ht 188 cm (74\")   Wt 94.3 kg (208 lb)   SpO2 99%   BMI 26.71 kg/m²     Physical Exam  Constitutional:       General: He is not in acute distress.     Appearance: He is not ill-appearing.   HENT:      Mouth/Throat:      Mouth: Mucous membranes are moist.   Eyes:      Extraocular Movements: Extraocular movements intact.   Cardiovascular:      Rate and Rhythm: Normal rate. Rhythm irregular.      Pulses: Normal pulses.      Heart sounds: Normal heart sounds.   Pulmonary:      Effort: Pulmonary effort is normal.      Breath sounds: Normal breath sounds.   Abdominal:      Palpations: Abdomen is soft.      Tenderness: There is no abdominal tenderness.   Musculoskeletal:      Right lower leg: No edema.      Left lower leg: No edema.   Skin:     General: Skin is warm.   Neurological:      General: No focal deficit present.      Mental Status: He is alert.      Comments: Oriented to self and place   Psychiatric:         Mood and Affect: Mood normal.         Thought Content: Thought content normal.       Laboratory data:    I have reviewed the labs done in the emergency room.    Results from last 7 days   Lab Units 08/29/23 2036   SODIUM mmol/L 135*   POTASSIUM mmol/L 3.7   CHLORIDE mmol/L 95*   CO2 mmol/L 28.7   BUN mg/dL 16   CREATININE mg/dL 1.14   CALCIUM mg/dL 11.6*   BILIRUBIN mg/dL 0.4   ALK PHOS U/L 93   ALT (SGPT) U/L 10   AST (SGOT) U/L 15   GLUCOSE mg/dL 119*     Results " from last 7 days   Lab Units 08/29/23 2036   WBC 10*3/mm3 8.13   HEMOGLOBIN g/dL 10.1*   HEMATOCRIT % 31.6*   PLATELETS 10*3/mm3 293         Results from last 7 days   Lab Units 08/29/23 2237 08/29/23 2036   HSTROP T ng/L 56* 57*     Results from last 7 days   Lab Units 08/29/23 2036   PROBNP pg/mL 701.9         Results from last 7 days   Lab Units 08/29/23 2036   LIPASE U/L 11*     Results from last 7 days   Lab Units 08/29/23  2046   PH, ARTERIAL pH units 7.424   PO2 ART mm Hg 80.8   PCO2, ARTERIAL mm Hg 46.9*   HCO3 ART mmol/L 30.7*     Results from last 7 days   Lab Units 08/29/23 2115   COLOR UA  Yellow   GLUCOSE UA  Negative   KETONES UA  Negative   LEUKOCYTES UA  Negative   PH, URINE  5.5   BILIRUBIN UA  Negative   UROBILINOGEN UA  1.0 E.U./dL   RBC UA /HPF 13-20*   WBC UA /HPF None Seen       Pain Management Panel           No data to display                EKG:      EKG atrial fibrillation, no ST elevations or depressions.      Radiology:    CT Head Without Contrast    Result Date: 8/29/2023  FINAL REPORT CLINICAL HISTORY: Mental status change, unknown cause FINDINGS: Moderate atrophy and chronic ischemic white matter changes are noted.     No cortical edema is present.  There is no mass or hemorrhage.  Ventricles are normal.  Bone windows show no skull fracture or obvious obstructive lesion.     1.  No acute intracranial abnormality or obvious mass.  2. Atrophy and chronic ischemic white matter changes as above. Authenticated and Electronically Signed by KEYONNA Smith MD on 08/29/2023 10:21:40 PM    CT Abdomen Pelvis With Contrast    Result Date: 8/29/2023  FINAL REPORT TECHNIQUE: Oral and IV contrast enhanced exam CLINICAL HISTORY: RLQ abdominal pain (Age >= 14y) FINDINGS: Abdomen: Lung bases are clear.  There is probable cholelithiasis.  Liver has an unremarkable CT appearance.  There is a mass in the posterior spleen which is nonspecific but measures up to 23 mm.  The  pancreas and adrenal  glands are unremarkable.  There are bilateral renal cysts.  Kidneys show no mass or obstruction. No bowel obstruction or fluid collection is seen.  Pelvis: The appendix is normal.  There is significant fecal impaction of the rectal vault.  There is mild inflammatory change of the distal sigmoid colon with stranding extending into the sigmoid mesial colon, probably due to diverticulitis.  No fluid collection or adenopathy is seen.     1.  No evidence of appendicitis or bowel obstruction.  2. Inflammatory change of the distal sigmoid colon, suspect diverticulitis.  3.  Significant fecal impaction of the rectosigmoid colon.  4.  Cholelithiasis. Authenticated and Electronically Signed by KEYONNA Smith MD on 08/29/2023 10:21:13 PM     Assessment/Plan:    Inpatient general floor admission 8/29/2023 with rectal bleeding secondary to diverticulitis, elevated troponin demand ischemia ACS not suspected.  After admission gross hematuria noticed by nursing.    At time of admission patient resting comfortably in bed, pleasantly conversational.    Attempted to contact family via phone, no answer.    GI bleed  Diverticulitis  Fecal impaction  Zosyn started 8/30.  IVF.  Gastroenterology consultation, recommendations appreciated.   Hemoglobin stable.  Caution regarding fecal disimpaction due to bleed.    Elevated troponin  Bradley demand ischemia.    Hematuria  Nursing noted clots from urethra.  Urology consultation.    Chronic:  atrial fibrillation on Eliquis, COPD on 3 L baseline, BPH, hypertension, diverticulosis.    Hold Eliquis due to lower GI bleed.    Continue other home medications.    Risk Assessment: High  DVT Prophylaxis: SCDs  Code Status: DNR/DNI  Diet: N.p.o.    Advance Care Planning   ACP discussion was held with the patient during this visit. Patient does not have an advance directive, declines further assistance.           Brian Joseph Kerley, DO  08/29/23  23:47 EDT    Dictated utilizing Dragon  dictation.    Electronically signed by Kerley, Brian Joseph, DO at 08/30/23 0505       Current Facility-Administered Medications   Medication Dose Route Frequency Provider Last Rate Last Admin    acetaminophen (TYLENOL) tablet 650 mg  650 mg Oral Q4H PRN Kerley, Brian Joseph, DO   650 mg at 08/30/23 1657    Or    acetaminophen (TYLENOL) 160 MG/5ML solution 650 mg  650 mg Oral Q4H PRN Kerley, Brian Joseph, DO        Or    acetaminophen (TYLENOL) suppository 650 mg  650 mg Rectal Q4H PRN Kerley, Brian Joseph, DO        sennosides-docusate (PERICOLACE) 8.6-50 MG per tablet 2 tablet  2 tablet Oral BID Kerley, Brian Joseph, DO   2 tablet at 08/31/23 0956    And    polyethylene glycol (MIRALAX) packet 17 g  17 g Oral Daily PRN Kerley, Brian Joseph, DO        And    bisacodyl (DULCOLAX) EC tablet 5 mg  5 mg Oral Daily PRN Kerley, Brian Joseph, DO   5 mg at 08/30/23 1945    And    bisacodyl (DULCOLAX) suppository 10 mg  10 mg Rectal Daily PRN Kerley, Brian Joseph, DO        bisoprolol (ZEBeta) tablet 10 mg  10 mg Oral Daily Ottoniel Alfaro DO   10 mg at 08/31/23 0956    Calcium Replacement - Follow Nurse / BPA Driven Protocol   Does not apply PRN Kerley, Brian Joseph, DO        cefTRIAXone (ROCEPHIN) IVPB 1000 mg/50ml dextrose (premix)  1,000 mg Intravenous Q24H Ottoniel Alfaro  mL/hr at 08/30/23 1642 1,000 mg at 08/30/23 1642    dilTIAZem CD (CARDIZEM CD) 24 hr capsule 180 mg  180 mg Oral Daily Ottoniel Alfaro DO   180 mg at 08/31/23 0955    Magnesium Standard Dose Replacement - Follow Nurse / BPA Driven Protocol   Does not apply PRN Kerley, Brian Joseph, DO        metroNIDAZOLE (FLAGYL) IVPB 500 mg  500 mg Intravenous Q8H Ottoniel Alfaro  mL/hr at 08/31/23 1110 500 mg at 08/31/23 1110    nitroglycerin (NITROSTAT) SL tablet 0.4 mg  0.4 mg Sublingual Q5 Min PRN Kerley, Brian Joseph, DO        ondansetron (ZOFRAN) injection 4 mg  4 mg Intravenous Q6H PRN Kerley, Brian Joseph, DO        Phosphorus Replacement  - Follow Nurse / BPA Driven Protocol   Does not apply PRN Kerley, Brian Joseph, DO        potassium chloride (KLOR-CON) packet 40 mEq  40 mEq Oral Q4H Ottoniel Alfaro, DO        Potassium Replacement - Follow Nurse / BPA Driven Protocol   Does not apply PRN Kerley, Brian Joseph, DO        sodium chloride 0.9 % flush 10 mL  10 mL Intravenous Q12H Kerley, Brian Joseph, DO   10 mL at 23 0956    sodium chloride 0.9 % flush 10 mL  10 mL Intravenous PRN Kerley, Brian Joseph, DO        sodium chloride 0.9 % infusion 40 mL  40 mL Intravenous PRN Kerley, Brian Joseph, DO            Physical Therapy Notes (last 7 days)        Hamlet Arellano, PT Student at 23 1124  Version 1 of       Attestation signed by Geraldine Gaxiola, PT at 23    I attest to the accuracy and completeness of this note.                Goal Outcome Evaluation:  Plan of Care Reviewed With: (P) patient, daughter        Progress: (P) no change  Outcome Evaluation: (P) PT evaluation completed today. Patient was received supine in bed, A/Ox3 with verabl cue's, and c/o 8/10 upper abdomen pain. Patient had a HR of 111 before treatment and he then transferred supine-sit with mod Ax2 where he sat EOB for 2 minutes. His HR increased to 153 and he began to c/o head pain so he was returned to supine. Patient then rolled with max Ax2 while therapy changed his brief. Patient's HR was 128 upon exit of the room. Patient is expected to benefit from skilled PT prior to d/c in order to improve tolerance with activity.      Anticipated Discharge Disposition (PT): (P) inpatient rehabilitation facility, skilled nursing facility    Electronically signed by Geraldine Gaxiola, PT at 23       Hamlet Arellano, PT Student at 23 1124  Version 1 of 1      Attestation signed by Geraldine Gaxiola, PT at 236    I attest to the accuracy and completeness of this note.                Patient Name: Ed Spear  : 1935    MRN: [id number]                               Today's Date: 8/30/2023       Admit Date: 8/29/2023    Visit Dx:     ICD-10-CM ICD-9-CM   1. Diverticulitis  K57.92 562.11   2. Altered mental status, unspecified altered mental status type  R41.82 780.97     Patient Active Problem List   Diagnosis    Pulmonary emphysema    Hyperlipidemia    Hypertension    Malignant neoplasm of skin    Vitamin D deficiency    Arthritis, degenerative    Enlarged prostate without lower urinary tract symptoms (luts)    Skin cancer    Chronic bilateral low back pain without sciatica    Atrial fibrillation    Encounter for Medicare annual wellness exam    Vitamin B 12 deficiency    Callus of heel    COPD exacerbation    Acute respiratory failure with hypoxia and hypercapnia    Skin lesion of hand    Lower GI bleed    Chronic anemia    Acute blood loss anemia    Abnormal CT of the abdomen    Adenomatous polyp of colon    Diverticulitis large intestine     Past Medical History:   Diagnosis Date    A-fib     Acute sinusitis     Allergic rhinitis     Ankle fracture     right    Arthritis, degenerative     BMI 28.0-28.9,adult     COPD (chronic obstructive pulmonary disease)     Cough     Enlarged prostate without lower urinary tract symptoms (luts)     Hyperlipidemia     Hypertension     Impaired mobility     Obstructive chronic bronchitis with exacerbation     Shortness of breath     Skin cancer     Tinnitus of both ears     Vitamin D deficiency     Wears dentures     full upper/lower - instructed on no adhesive DOS     Past Surgical History:   Procedure Laterality Date    CATARACT EXTRACTION      COLONOSCOPY N/A 7/28/2023    Procedure: COLONOSCOPY;  Surgeon: Dontrell Pham MD;  Location: [facility] ENDOSCOPY;  Service: Gastroenterology;  Laterality: N/A;    COLONOSCOPY N/A 8/2/2023    Procedure: COLONOSCOPY WITH TATTOOING, CLIPPING X1, AND POLYPECTOMY X24;  Surgeon: Dontrell Pham MD;  Location: [facility] ENDOSCOPY;  Service: Gastroenterology;  Laterality: N/A;     ENDOSCOPY N/A 7/28/2023    Procedure: ESOPHAGOGASTRODUODENOSCOPY;  Surgeon: Dontrell Pham MD;  Location: [facility] ENDOSCOPY;  Service: Gastroenterology;  Laterality: N/A;      General Information       Row Name 08/30/23 1124          Physical Therapy Time and Intention    Document Type evaluation (P)   -NL     Mode of Treatment physical therapy (P)   -NL       Row Name 08/30/23 1124          General Information    Patient Profile Reviewed yes (P)   -NL     Prior Level of Function mod assist:;all household mobility;ADL's (P)   -NL     Existing Precautions/Restrictions fall;oxygen therapy device and L/min (P)   -NL     Barriers to Rehab medically complex;previous functional deficit (P)   -NL       Row Name 08/30/23 1124          Living Environment    People in Home child(magali), adult (P)   -NL       Row Name 08/30/23 1124          Home Main Entrance    Number of Stairs, Main Entrance two (P)   -NL     Stair Railings, Main Entrance railing on right side (ascending) (P)   -NL       Row Name 08/30/23 1124          Stairs Within Home, Primary    Number of Stairs, Within Home, Primary none (P)   -NL       Row Name 08/30/23 1124          Cognition    Orientation Status (Cognition) oriented x 3;oriented to;person;place;situation;verbal cues/prompts needed for orientation (P)   -NL       Row Name 08/30/23 1124          Safety Issues, Functional Mobility    Safety Issues Affecting Function (Mobility) ability to follow commands;safety precaution awareness;safety precautions follow-through/compliance;insight into deficits/self-awareness (P)   -NL     Impairments Affecting Function (Mobility) strength;endurance/activity tolerance;cognition;pain;shortness of breath (P)   -NL     Cognitive Impairments, Mobility Safety/Performance attention;awareness, need for assistance;insight into deficits/self-awareness;safety precaution awareness;safety precaution follow-through (P)   -NL               User Key  (r) = Recorded By, (t) =  Taken By, (c) = Cosigned By      Initials Name Provider Type    Hamlet Miller, PT Student PT Student                   Mobility       Row Name 08/30/23 1124          Bed Mobility    Bed Mobility rolling left;rolling right;supine-sit (P)   -NL     Rolling Left [place] (Bed Mobility) maximum assist (25% patient effort);2 person assist (P)   -NL     Rolling Right [place] (Bed Mobility) maximum assist (25% patient effort);2 person assist (P)   -NL     Supine-Sit [place] (Bed Mobility) moderate assist (50% patient effort);2 person assist (P)   -NL     Assistive Device (Bed Mobility) head of bed elevated;draw sheet;bed rails (P)   -NL       Row Name 08/30/23 1124          Bed-Chair Transfer    Bed-Chair [place] (Transfers) not tested (P)   -NL       Row Name 08/30/23 1124          Sit-Stand Transfer    Sit-Stand [place] (Transfers) not tested (P)   -NL       Row Name 08/30/23 1124          Gait/Stairs (Locomotion)    [place] Level (Gait) not tested (P)   -NL     [place] Level (Stairs) not tested (P)   -NL               User Key  (r) = Recorded By, (t) = Taken By, (c) = Cosigned By      Initials Name Provider Type    Hamlet Miller, PT Student PT Student                   Obj/Interventions       Row Name 08/30/23 1124          Range of Motion Comprehensive    General Range of Motion bilateral lower extremity ROM WFL (P)   -NL       Row Name 08/30/23 1124          Strength Comprehensive (MMT)    General Manual Muscle Testing (MMT) Assessment lower extremity strength deficits identified (P)   -NL     Comment, General Manual Muscle Testing (MMT) Assessment BLE gross strength 3/5 (P)   -NL       Row Name 08/30/23 1124          Balance    Balance Assessment sitting static balance;sitting dynamic balance (P)   -NL     Static Sitting Balance moderate assist (P)   -NL     Dynamic Sitting Balance moderate assist (P)   -NL     Position, Sitting Balance supported;sitting edge of bed (P)   -NL        Row Name 08/30/23 1124          Sensory Assessment (Somatosensory)    Sensory Assessment (Somatosensory) not tested (P)   -NL               User Key  (r) = Recorded By, (t) = Taken By, (c) = Cosigned By      Initials Name Provider Type    Hamlet Miller, PT Student PT Student                   Goals/Plan       Row Name 08/30/23 1124          Bed Mobility Goal 1 (PT)    Activity/Assistive Device (Bed Mobility Goal 1, PT) bed mobility activities, all (P)   -NL     [place] Level/Cues Needed (Bed Mobility Goal 1, PT) contact guard required (P)   -NL     Time Frame (Bed Mobility Goal 1, PT) long term goal (LTG);10 days (P)   -NL     Progress/Outcomes (Bed Mobility Goal 1, PT) goal ongoing (P)   -NL       Row Name 08/30/23 1124          Transfer Goal 1 (PT)    Activity/Assistive Device (Transfer Goal 1, PT) sit-to-stand/stand-to-sit (P)   -NL     [place] Level/Cues Needed (Transfer Goal 1, PT) minimum assist (75% or more patient effort) (P)   -NL     Time Frame (Transfer Goal 1, PT) long term goal (LTG);10 days (P)   -NL     Progress/Outcome (Transfer Goal 1, PT) goal ongoing (P)   -NL       Row Name 08/30/23 1124          Gait Training Goal 1 (PT)    Activity/Assistive Device (Gait Training Goal 1, PT) gait (walking locomotion);assistive device use;decrease fall risk;increase endurance/gait distance;walker, rolling (P)   -NL     [place] Level (Gait Training Goal 1, PT) minimum assist (75% or more patient effort) (P)   -NL     Distance (Gait Training Goal 1, PT) 10' (P)   -NL     Time Frame (Gait Training Goal 1, PT) long term goal (LTG);10 days (P)   -NL     Progress/Outcome (Gait Training Goal 1, PT) goal ongoing (P)   -NL       Row Name 08/30/23 1124          Patient Education Goal (PT)    Activity (Patient Education Goal, PT) BLE ther-ex x10 per HEP (P)   -NL     [place]/Cues/Accuracy (Memory Goal 2, PT) demonstrates adequately;verbalizes understanding (P)   -NL     Time Frame (Patient  Education Goal, PT) long term goal (LTG);10 days (P)   -NL     Progress/Outcome (Patient Education Goal, PT) goal ongoing (P)   -NL       Row Name 08/30/23 1124          Therapy Assessment/Plan (PT)    Planned Therapy Interventions (PT) strengthening;home exercise program;patient/family education;gait training;bed mobility training;balance training;transfer training (P)   -NL               User Key  (r) = Recorded By, (t) = Taken By, (c) = Cosigned By      Initials Name Provider Type    NL Hamlet Arellano, PT Student PT Student                   Clinical Impression       Row Name 08/30/23 1124          Pain    Pretreatment Pain Rating 8/10 (P)   -NL     Posttreatment Pain Rating 8/10 (P)   -NL     Pain Location upper (P)   -NL     Pain Location - abdomen (P)   -NL     Pain Intervention(s) Repositioned;Ambulation/increased activity (P)   -NL       Row Name 08/30/23 1124          Plan of Care Review    Plan of Care Reviewed With patient;daughter (P)   -NL     Progress no change (P)   -NL     Outcome Evaluation PT evaluation completed today. Patient was received supine in bed, A/Ox3 with verabl cue's, and c/o 8/10 upper abdomen pain. Patient had a HR of 111 before treatment and he then transferred supine-sit with mod Ax2 where he sat EOB for 2 minutes. His HR increased to 153 and he began to c/o head pain so he was returned to supine. Patient then rolled with max Ax2 while therapy changed his brief. Patient's HR was 128 upon exit of the room. Patient is expected to benefit from skilled PT prior to d/c in order to improve tolerance with activity. (P)   -NL       Row Name 08/30/23 1124          Therapy Assessment/Plan (PT)    Patient/Family Therapy Goals Statement (PT) Patient wants to return to prior level of functional ability. (P)   -NL     Rehab Potential (PT) good, to achieve stated therapy goals (P)   -NL     Criteria for Skilled Interventions Met (PT) yes;meets criteria;skilled treatment is necessary (P)   -NL      Therapy Frequency (PT) 5 times/wk (P)   -NL       Row Name 08/30/23 1124          Vital Signs    Pre SpO2 (%) 95 (P)   -NL     O2 Delivery Pre Treatment supplemental O2 (P)   3L  -NL     Intra SpO2 (%) 90 (P)   -NL     O2 Delivery Intra Treatment supplemental O2 (P)   -NL     Post SpO2 (%) 95 (P)   -NL     O2 Delivery Post Treatment supplemental O2 (P)   -NL     Pre Patient Position Supine (P)   -NL     Intra Patient Position Sitting (P)   -NL     Post Patient Position Supine (P)   -NL       Row Name 08/30/23 1124          Positioning and Restraints    Pre-Treatment Position in bed (P)   -NL     Post Treatment Position bed (P)   -NL     In Bed side lying right;call light within reach;encouraged to call for assist;exit alarm on;with family/caregiver;SCD pump applied;heels elevated (P)   -NL               User Key  (r) = Recorded By, (t) = Taken By, (c) = Cosigned By      Initials Name Provider Type    Hamlet Miller, PT Student PT Student                   Outcome Measures       Row Name 08/30/23 1124 08/30/23 0800       How much help from another person do you currently need...    Turning from your back to your side while in flat bed without using bedrails? 2 (P)   -NL 3  -PK    Moving from lying on back to sitting on the side of a flat bed without bedrails? 2 (P)   -NL 2  -PK    Moving to and from a bed to a chair (including a wheelchair)? 2 (P)   -NL 2  -PK    Standing up from a chair using your arms (e.g., wheelchair, bedside chair)? 2 (P)   -NL 2  -PK    Climbing 3-5 steps with a railing? 1 (P)   -NL 1  -PK    To walk in hospital room? 1 (P)   -NL 2  -PK    AM-PAC 6 Clicks Score (PT) 10 (P)   -NL 12  -PK    Highest level of mobility 4 --> Transferred to chair/commode (P)   -NL 4 --> Transferred to chair/commode  -PK      Row Name 08/30/23 0200          How much help from another person do you currently need...    Turning from your back to your side while in flat bed without using bedrails? 3  -KL     Moving  from lying on back to sitting on the side of a flat bed without bedrails? 3  -KL     Moving to and from a bed to a chair (including a wheelchair)? 2  -KL     Standing up from a chair using your arms (e.g., wheelchair, bedside chair)? 2  -KL     Climbing 3-5 steps with a railing? 1  -KL     To walk in hospital room? 1  -KL     AM-PAC 6 Clicks Score (PT) 12  -KL     Highest level of mobility 4 --> Transferred to chair/commode  -KL       Row Name 08/30/23 1124          Functional Assessment    Outcome Measure Options AM-PAC 6 Clicks Basic Mobility (PT) (P)   -NL               User Key  (r) = Recorded By, (t) = Taken By, (c) = Cosigned By      Initials Name Provider Type    PK Milagros Coreas, RN Registered Nurse    Keiko Cotter RN Registered Nurse    Hamlet Miller, PT Student PT Student                                 Physical Therapy Education       Title: PT OT SLP Therapies (In Progress)       Topic: Physical Therapy (In Progress)       Point: Mobility training (In Progress)       Learning Progress Summary             Patient Acceptance, E, NR by NL at 8/30/2023 1124    Comment: Educated on role of PT in stay at the hospital and on importance of movement.   Family Acceptance, E, NR by NL at 8/30/2023 1124    Comment: Educated on role of PT in stay at the hospital and on importance of movement.                         Point: Home exercise program (Not Started)       Learner Progress:  Not documented in this visit.              Point: Body mechanics (Not Started)       Learner Progress:  Not documented in this visit.                              User Key       Initials Effective Dates Name Provider Type Discipline    NL 07/13/23 -  Hamlet Arellano, PT Student PT Student PT                  PT Recommendation and Plan  Planned Therapy Interventions (PT): (P) strengthening, home exercise program, patient/family education, gait training, bed mobility training, balance training, transfer training  Plan of Care Reviewed  With: (P) patient, daughter  Progress: (P) no change  Outcome Evaluation: (P) PT evaluation completed today. Patient was received supine in bed, A/Ox3 with verabl cue's, and c/o 8/10 upper abdomen pain. Patient had a HR of 111 before treatment and he then transferred supine-sit with mod Ax2 where he sat EOB for 2 minutes. His HR increased to 153 and he began to c/o head pain so he was returned to supine. Patient then rolled with max Ax2 while therapy changed his brief. Patient's HR was 128 upon exit of the room. Patient is expected to benefit from skilled PT prior to d/c in order to improve tolerance with activity.     Time Calculation:   PT Evaluation Complexity  History, PT Evaluation Complexity: (P) 3 or more personal factors and/or comorbidities  Examination of Body Systems (PT Eval Complexity): (P) total of 3 or more elements  Clinical Presentation (PT Evaluation Complexity): (P) evolving  Clinical Decision Making (PT Evaluation Complexity): (P) moderate complexity  Overall Complexity (PT Evaluation Complexity): (P) moderate complexity     PT Charges       Row Name 08/30/23 1124             Time Calculation    Start Time 1124 (P)   -NL      PT Received On 08/30/23 (P)   -NL      PT Goal Re-Cert Due Date 09/09/23 (P)   -NL         Untimed Charges    PT Eval/Re-eval Minutes 40 (P)   -NL         Total Minutes    Untimed Charges Total Minutes 40 (P)   -NL       Total Minutes 40 (P)   -NL                User Key  (r) = Recorded By, (t) = Taken By, (c) = Cosigned By      Initials Name Provider Type    NL Hamlet Arellano, PT Student PT Student                  Therapy Charges for Today       Code Description Service Date Service Provider Modifiers Qty    [id number]  PT EVAL MOD COMPLEXITY 3 8/30/2023 Hamlet Arellano, PT Student GP 1            PT G-Codes  Outcome Measure Options: (P) AM-PAC 6 Clicks Basic Mobility (PT)  AM-PAC 6 Clicks Score (PT): (P) 10  PT Discharge Summary  Anticipated Discharge Disposition (PT):  (P) inpatient rehabilitation facility, skilled nursing facility    Hamlet Arellano, PT Student  2023      Electronically signed by Geraldine Gaxiola, PT at 23 1516          Occupational Therapy Notes (last 72 hours)        Deepthi Barnes, OT at 23 1400          Patient Name: Ed Spear  : 1935    MRN: [id number]                              Today's Date: 2023       Admit Date: 2023    Visit Dx:     ICD-10-CM ICD-9-CM   1. Diverticulitis  K57.92 562.11   2. Altered mental status, unspecified altered mental status type  R41.82 780.97     Patient Active Problem List   Diagnosis    Pulmonary emphysema    Hyperlipidemia    Hypertension    Malignant neoplasm of skin    Vitamin D deficiency    Arthritis, degenerative    Enlarged prostate without lower urinary tract symptoms (luts)    Skin cancer    Chronic bilateral low back pain without sciatica    Atrial fibrillation    Encounter for Medicare annual wellness exam    Vitamin B 12 deficiency    Callus of heel    COPD exacerbation    Acute respiratory failure with hypoxia and hypercapnia    Skin lesion of hand    Lower GI bleed    Chronic anemia    Acute blood loss anemia    Abnormal CT of the abdomen    Adenomatous polyp of colon    Diverticulitis large intestine     Past Medical History:   Diagnosis Date    A-fib     Acute sinusitis     Allergic rhinitis     Ankle fracture     right    Arthritis, degenerative     BMI 28.0-28.9,adult     COPD (chronic obstructive pulmonary disease)     Cough     Enlarged prostate without lower urinary tract symptoms (luts)     Hyperlipidemia     Hypertension     Impaired mobility     Obstructive chronic bronchitis with exacerbation     Shortness of breath     Skin cancer     Tinnitus of both ears     Vitamin D deficiency     Wears dentures     full upper/lower - instructed on no adhesive DOS     Past Surgical History:   Procedure Laterality Date    CATARACT EXTRACTION      COLONOSCOPY N/A 2023     Procedure: COLONOSCOPY;  Surgeon: Dontrell Pham MD;  Location: [facility] ENDOSCOPY;  Service: Gastroenterology;  Laterality: N/A;    COLONOSCOPY N/A 8/2/2023    Procedure: COLONOSCOPY WITH TATTOOING, CLIPPING X1, AND POLYPECTOMY X24;  Surgeon: Dontrell Pham MD;  Location: [facility] ENDOSCOPY;  Service: Gastroenterology;  Laterality: N/A;    ENDOSCOPY N/A 7/28/2023    Procedure: ESOPHAGOGASTRODUODENOSCOPY;  Surgeon: Dontrell Pham MD;  Location: [facility] ENDOSCOPY;  Service: Gastroenterology;  Laterality: N/A;      General Information       Row Name 08/30/23 0377          OT Time and Intention    Document Type evaluation  -SD     Mode of Treatment occupational therapy  -SD       Row Name 08/30/23 1347          General Information    Patient Profile Reviewed yes  -SD     Prior Level of Function mod assist:;all household mobility;ADL's  Lives with daughter who assists with all self care and mobility. Has a RW, SPC, WC, SC, O2, normally sponge bathes. Uses a BSC during the day, is incontinent at night.  -SD     Barriers to Rehab medically complex;previous functional deficit;cognitive status;hearing deficit  -SD       Row Name 08/30/23 1347          Living Environment    People in Home child(magali), adult  -SD       Row Name 08/30/23 1347          Home Main Entrance    Number of Stairs, Main Entrance two  -SD     Stair Railings, Main Entrance railing on right side (ascending)  -SD       Row Name 08/30/23 1347          Stairs Within Home, Primary    Number of Stairs, Within Home, Primary none  -SD       Row Name 08/30/23 1347          Cognition    Orientation Status (Cognition) oriented x 3;verbal cues/prompts needed for orientation  -SD       Row Name 08/30/23 1347          Safety Issues, Functional Mobility    Safety Issues Affecting Function (Mobility) safety precautions follow-through/compliance;sequencing abilities;safety precaution awareness;friction/shear risk;awareness of need for  assistance;ability to follow commands  -SD     Impairments Affecting Function (Mobility) balance;cognition;coordination;endurance/activity tolerance;motor planning;pain;postural/trunk control;shortness of breath;strength  -SD               User Key  (r) = Recorded By, (t) = Taken By, (c) = Cosigned By      Initials Name Provider Type    SD Deepthi Barnes OT Occupational Therapist                     Mobility/ADL's       Row Name 08/30/23 [facility]          Bed Mobility    Bed Mobility rolling left;rolling right;supine-sit;sit-supine  -SD     Rolling Left [place] (Bed Mobility) maximum assist (25% patient effort);2 person assist  -SD     Rolling Right [place] (Bed Mobility) maximum assist (25% patient effort);2 person assist  -SD     Supine-Sit [place] (Bed Mobility) moderate assist (50% patient effort);2 person assist  -SD     Sit-Supine [place] (Bed Mobility) moderate assist (50% patient effort);2 person assist  -SD     Assistive Device (Bed Mobility) bed rails;draw sheet;head of bed elevated  -SD     Comment, (Bed Mobility) EOB x 2 mins, HR increased to 153  -SD       Row Name 08/30/23 [facility]          Transfers    Transfers sit-stand transfer  -SD       Row Name 08/30/23 [facility]          Sit-Stand Transfer    Sit-Stand [place] (Transfers) unable to assess  -SD       Row Name 08/30/23 [facility]          Functional Mobility    Functional Mobility- Ind. Level not appropriate to assess  -SD       Row Name 08/30/23 [facility]          Activities of Daily Living    BADL Assessment/Intervention bathing;upper body dressing;lower body dressing;grooming;feeding;toileting  -SD       Row Name 08/30/23 [facility]          Bathing Assessment/Intervention    [place] Level (Bathing) maximum assist (25% patient effort);dependent (less than 25% patient effort)  -SD       Row Name 08/30/23 [facility]          Upper Body Dressing Assessment/Training    [place] Level (Upper Body Dressing) maximum assist (25% patient effort)  -SD        Row Name 08/30/23 1350          Lower Body Dressing Assessment/Training    [place] Level (Lower Body Dressing) dependent (less than 25% patient effort)  -SD       Row Name 08/30/23 1350          Grooming Assessment/Training    [place] Level (Grooming) maximum assist (25% patient effort)  -SD       Row Name 08/30/23 1350          Self-Feeding Assessment/Training    [place] Level (Feeding) maximum assist (25% patient effort)  -SD       Row Name 08/30/23 1350          Toileting Assessment/Training    [place] Level (Toileting) change pad/brief;perform perineal hygiene;dependent (less than 25% patient effort)  -SD               User Key  (r) = Recorded By, (t) = Taken By, (c) = Cosigned By      Initials Name Provider Type    Deepthi Vera OT Occupational Therapist                   Obj/Interventions       Row Name 08/30/23 1352          Range of Motion Comprehensive    General Range of Motion bilateral upper extremity ROM WFL  -SD       Row Name 08/30/23 [facility]          Strength Comprehensive (MMT)    General Manual Muscle Testing (MMT) Assessment upper extremity strength deficits identified  -SD     Comment, General Manual Muscle Testing (MMT) Assessment 3/5 - 3+/5  -SD               User Key  (r) = Recorded By, (t) = Taken By, (c) = Cosigned By      Initials Name Provider Type    Deepthi Vera OT Occupational Therapist                   Goals/Plan       Row Name 08/30/23 5368          Transfer Goal 1 (OT)    Activity/Assistive Device (Transfer Goal 1, OT) sit-to-stand/stand-to-sit;walker, rolling  -SD     [place] Level/Cues Needed (Transfer Goal 1, OT) moderate assist (50-74% patient effort)  -SD     Time Frame (Transfer Goal 1, OT) long term goal (LTG)  -SD     Progress/Outcome (Transfer Goal 1, OT) goal ongoing  -SD       Row Name 08/30/23 3438          Dressing Goal 1 (OT)    Activity/Device (Dressing Goal 1, OT) lower body dressing  -SD     [place]/Cues Needed  (Dressing Goal 1, OT) moderate assist (50-74% patient effort)  -SD     Time Frame (Dressing Goal 1, OT) 2 weeks  -SD     Progress/Outcome (Dressing Goal 1, OT) goal ongoing  -SD       Row Name 08/30/23 1358          Toileting Goal 1 (OT)    Activity/Device (Toileting Goal 1, OT) toileting skills, all;commode, bedside without drop arms  -SD     [place] Level/Cues Needed (Toileting Goal 1, OT) moderate assist (50-74% patient effort)  -SD     Time Frame (Toileting Goal 1, OT) 2 weeks  -SD     Progress/Outcome (Toileting Goal 1, OT) goal ongoing  -SD       Row Name 08/30/23 1358          Strength Goal 1 (OT)    Strength Goal 1 (OT) Patient to perform UB ther ex as tolerated  -SD     Time Frame (Strength Goal 1, OT) long term goal (LTG)  -SD     Progress/Outcome (Strength Goal 1, OT) goal ongoing  -SD       Row Name 08/30/23 1356          Therapy Assessment/Plan (OT)    Planned Therapy Interventions (OT) activity tolerance training;adaptive equipment training;BADL retraining;patient/caregiver education/training;ROM/therapeutic exercise;strengthening exercise;transfer/mobility retraining  -SD               User Key  (r) = Recorded By, (t) = Taken By, (c) = Cosigned By      Initials Name Provider Type    Deepthi Vera OT Occupational Therapist                   Clinical Impression       Row Name 08/30/23 6986          Pain Assessment    Pretreatment Pain Rating 8/10  -SD     Posttreatment Pain Rating 8/10  -SD     Pain Location upper  -SD     Pain Location - abdomen  -SD     Pain Intervention(s) Repositioned;Ambulation/increased activity  -SD       Row Name 08/30/23 1748          Plan of Care Review    Plan of Care Reviewed With patient;daughter  -SD     Progress no change  -SD     Outcome Evaluation OT eval completed. Patient supine in bed, Ox3 with VCs, c/o abdominal pain, HR is noted to be tachy. Daughter provides most history. Patient performed supine to sit with mod A x 2, sat EOB briefly, HR increased to  153. Returned to supine, required max A x 2 for rolling in bed, TD for perineal hygiene and brief change. Patient scooted up in bed max A x 2 and placed in fowlers. Notified RN when brief was changed urine was blood tinged,  following activity. Patient is expected to benefit from continued OT services prior to DC. Patient may benefit from STR as his daughter is having more difficulty taking care of him d/t her own health issues.  -SD       Row Name 08/30/23 135          Therapy Assessment/Plan (OT)    Patient/Family Therapy Goal Statement (OT) increase strength  -SD     Rehab Potential (OT) fair, will monitor progress closely  -SD     Criteria for Skilled Therapeutic Interventions Met (OT) skilled treatment is necessary  -SD     Therapy Frequency (OT) 3 times/wk  5 times if indicated  -SD       Row Name 08/30/23 5947          Therapy Plan Review/Discharge Plan (OT)    Anticipated Discharge Disposition (OT) inpatient rehabilitation facility  -SD       Row Name 08/30/23 1352          Vital Signs    Pretreatment Heart Rate (beats/min) 111  -SD     Intratreatment Heart Rate (beats/min) 153  -SD     Posttreatment Heart Rate (beats/min) 128  -SD     Pre SpO2 (%) 95  -SD     O2 Delivery Pre Treatment supplemental O2  -SD     Intra SpO2 (%) 90  -SD     O2 Delivery Intra Treatment supplemental O2  -SD     Post SpO2 (%) 95  -SD     O2 Delivery Post Treatment supplemental O2  -SD       Row Name 08/30/23 2364          Positioning and Restraints    Pre-Treatment Position in bed  -SD     Post Treatment Position bed  -SD     In Bed fowlers;call light within reach;encouraged to call for assist;notified nsg;with family/caregiver  -SD               User Key  (r) = Recorded By, (t) = Taken By, (c) = Cosigned By      Initials Name Provider Type    Deepthi Vera, OT Occupational Therapist                   Outcome Measures       Row Name 08/30/23 8915          How much help from another is currently needed...    Putting on  and taking off regular lower body clothing? 1  -SD     Bathing (including washing, rinsing, and drying) 1  -SD     Toileting (which includes using toilet bed pan or urinal) 1  -SD     Putting on and taking off regular upper body clothing 2  -SD     Taking care of personal grooming (such as brushing teeth) 2  -SD     Eating meals 2  -SD     AM-PAC 6 Clicks Score (OT) 9  -SD       Row Name 08/30/23 1124 08/30/23 0800       How much help from another person do you currently need...    Turning from your back to your side while in flat bed without using bedrails? 2  -MS (r) NL (t) MS (c) 3  -PK    Moving from lying on back to sitting on the side of a flat bed without bedrails? 2  -MS (r) NL (t) MS (c) 2  -PK    Moving to and from a bed to a chair (including a wheelchair)? 2  -MS (r) NL (t) MS (c) 2  -PK    Standing up from a chair using your arms (e.g., wheelchair, bedside chair)? 2  -MS (r) NL (t) MS (c) 2  -PK    Climbing 3-5 steps with a railing? 1  -MS (r) NL (t) MS (c) 1  -PK    To walk in hospital room? 1  -MS (r) NL (t) MS (c) 2  -PK    AM-PAC 6 Clicks Score (PT) 10  -MS (r) NL (t) 12  -PK    Highest level of mobility 4 --> Transferred to chair/commode  -MS (r) NL (t) 4 --> Transferred to chair/commode  -PK      Row Name 08/30/23 1358 08/30/23 1124       Functional Assessment    Outcome Measure Options AM-PAC 6 Clicks Daily Activity (OT)  -SD AM-PAC 6 Clicks Basic Mobility (PT)  -MS (r) NL (t) MS (c)              User Key  (r) = Recorded By, (t) = Taken By, (c) = Cosigned By      Initials Name Provider Type    Deepthi Vera, OT Occupational Therapist    Antoine Novoa, PT Physical Therapist    PK Milagros Coreas, RN Registered Nurse    Hamlet Miller, PT Student PT Student                    Occupational Therapy Education       Title: PT OT SLP Therapies (In Progress)       Topic: Occupational Therapy (In Progress)       Point: ADL training (Done)       Description:   Instruct learner(s) on proper safety  adaptation and remediation techniques during self care or transfers.   Instruct in proper use of assistive devices.                  Learning Progress Summary             Patient Acceptance, E,TB, VU by SD at 8/30/2023 1357    Comment: OT POC   Family Acceptance, E,TB, VU by SD at 8/30/2023 1359    Comment: OT POC                         Point: Home exercise program (Not Started)       Description:   Instruct learner(s) on appropriate technique for monitoring, assisting and/or progressing therapeutic exercises/activities.                  Learner Progress:  Not documented in this visit.              Point: Precautions (Not Started)       Description:   Instruct learner(s) on prescribed precautions during self-care and functional transfers.                  Learner Progress:  Not documented in this visit.              Point: Body mechanics (Not Started)       Description:   Instruct learner(s) on proper positioning and spine alignment during self-care, functional mobility activities and/or exercises.                  Learner Progress:  Not documented in this visit.                              User Key       Initials Effective Dates Name Provider Type Discipline    SD 06/16/21 -  Deepthi Barnes OT Occupational Therapist OT                  OT Recommendation and Plan  Planned Therapy Interventions (OT): activity tolerance training, adaptive equipment training, BADL retraining, patient/caregiver education/training, ROM/therapeutic exercise, strengthening exercise, transfer/mobility retraining  Therapy Frequency (OT): 3 times/wk (5 times if indicated)  Plan of Care Review  Plan of Care Reviewed With: patient, daughter  Progress: no change  Outcome Evaluation: OT eval completed. Patient supine in bed, Ox3 with VCs, c/o abdominal pain, HR is noted to be tachy. Daughter provides most history. Patient performed supine to sit with mod A x 2, sat EOB briefly, HR increased to 153. Returned to supine, required max A x 2 for  rolling in bed, TD for perineal hygiene and brief change. Patient scooted up in bed max A x 2 and placed in fowlers. Notified RN when brief was changed urine was blood tinged,  following activity. Patient is expected to benefit from continued OT services prior to DC. Patient may benefit from STR as his daughter is having more difficulty taking care of him d/t her own health issues.     Time Calculation:   Evaluation Complexity (OT)  Review Occupational Profile/Medical/Therapy History Complexity: expanded/moderate complexity  Assessment, Occupational Performance/Identification of Deficit Complexity: 3-5 performance deficits  Clinical Decision Making Complexity (OT): detailed assessment/moderate complexity  Overall Complexity of Evaluation (OT): moderate complexity     Time Calculation- OT       Row Name 08/30/23 1359             Time Calculation- OT    OT Start Time 1127  -SD      OT Received On 08/30/23  -SD      OT Goal Re-Cert Due Date 09/11/23  -SD         Untimed Charges    OT Eval/Re-eval Minutes 45  -SD         Total Minutes    Untimed Charges Total Minutes 45  -SD       Total Minutes 45  -SD                User Key  (r) = Recorded By, (t) = Taken By, (c) = Cosigned By      Initials Name Provider Type    SD Deepthi Barnes OT Occupational Therapist                  Therapy Charges for Today       Code Description Service Date Service Provider Modifiers Qty    [id number] HC OT EVAL MOD COMPLEXITY 3 8/30/2023 Deepthi Barnes OT GO 1                 Deepthi Barnes OT  8/30/2023    Electronically signed by Deepthi Barnes OT at 08/30/23 1400       Deepthi Barnes OT at 08/30/23 1359          Goal Outcome Evaluation:  Plan of Care Reviewed With: patient, daughter        Progress: no change  Outcome Evaluation: OT eval completed. Patient supine in bed, Ox3 with VCs, c/o abdominal pain, HR is noted to be tachy. Daughter provides most history. Patient performed supine to sit with mod A x 2, sat EOB  briefly, HR increased to 153. Returned to supine, required max A x 2 for rolling in bed, TD for perineal hygiene and brief change. Patient scooted up in bed max A x 2 and placed in fowlers. Notified RN when brief was changed urine was blood tinged,  following activity. Patient is expected to benefit from continued OT services prior to DC. Patient may benefit from STR as his daughter is having more difficulty taking care of him d/t her own health issues.      Anticipated Discharge Disposition (OT): inpatient rehabilitation facility    Electronically signed by Deepthi Barnes, MINH at 08/30/23 4934

## 2023-08-31 NOTE — PROGRESS NOTES
[facility]    Name:  Ed Spear   Age:  87 y.o.  Sex:  male  :  1935  MRN:  [id number]   Visit Number:  [id number]  Admission Date:  2023  Date Of Service:  23  Primary Care Physician:  Chelo Nixon DO     LOS: 2 days :    Chief Complaint:      Bright red blood per rectum    Subjective:    Patient examined this morning.  Patient pleasantly confused.  Unable to tell me his name or where he is.  States he thinks he is at [place].  No longer complaining of abdominal pain.  No guarding.  Nontender exam.  No acute events per nursing staff.    Hospital Course:    Patient is an 87-year-old man with past medical history of atrial fibrillation on Eliquis, COPD on 3 L baseline, BPH, hypertension, diverticulosis.  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  Presented to [facility] ED on 2023 with concern for intermittent bright red bloody stools per rectum.   ED summary: Afebrile, vital signs stable on room air.  EKG atrial fibrillation, no ST elevations or depressions.  High-sensitivity troponin 57, 56, ACS not suspected.  Hemoglobin 10.1, most recent 9.8 on .  COVID/flu negative.  FOBT positive.  CT abdomen/pelvis inflammatory changes distal sigmoid colon, suspect diverticulitis, significant fecal impaction of the rectosigmoid colon.     Review of Systems:     All systems were reviewed and negative except as mentioned in subjective, assessment and plan.    Vital Signs:    Temp:  [97.1 °F (36.2 °C)-98.4 °F (36.9 °C)] 97.9 °F (36.6 °C)  Heart Rate:  [] 95  Resp:  [16-18] 18  BP: (113-124)/(71-84) 124/74    Intake and output:    I/O last 3 completed shifts:  In: 875 [I.V.:375; IV Piggyback:500]  Out: -   I/O this shift:  In: 100 [IV Piggyback:100]  Out: -     Physical Examination: Examination     General  Appearance:  Alert and cooperative.  Uncomfortable but in no acute distress.   Head:  Atraumatic and normocephalic.   Eyes: Conjunctivae and sclerae normal, no icterus. No pallor.   Throat: No oral lesions, no thrush, oral mucosa moist.   Neck: Supple, trachea midline, no thyromegaly.   Lungs:   Breath sounds heard bilaterally equally.  No wheezing or crackles.    Heart:  Normal S1 and S2, no murmur, No JVD.   Abdomen:   Normal bowel sounds, Soft, nontender, nondistended, no rebound tenderness.   Extremities: Supple, no edema, no cyanosis, no clubbing.   Skin: No bleeding or rash.   Neurologic: Alert, disoriented..  Generalized weakness.  Able to move all extremities spontaneously.     Laboratory results:    Results from last 7 days   Lab Units 08/31/23  0638 08/29/23 2036   SODIUM mmol/L 140 135*   POTASSIUM mmol/L 3.5 3.7   CHLORIDE mmol/L 100 95*   CO2 mmol/L 26.2 28.7   BUN mg/dL 21 16   CREATININE mg/dL 1.43* 1.14   CALCIUM mg/dL 12.1* 11.6*   BILIRUBIN mg/dL  --  0.4   ALK PHOS U/L  --  93   ALT (SGPT) U/L  --  10   AST (SGOT) U/L  --  15   GLUCOSE mg/dL 99 119*     Results from last 7 days   Lab Units 08/31/23  0638 08/29/23 2036   WBC 10*3/mm3 9.94 8.13   HEMOGLOBIN g/dL 11.5* 10.1*   HEMATOCRIT % 36.1* 31.6*   PLATELETS 10*3/mm3 274 293         Results from last 7 days   Lab Units 08/29/23 2237 08/29/23 2036   HSTROP T ng/L 56* 57*         Recent Labs     10/28/22  1800 08/29/23  2046   PHART 7.297* 7.424   VLX1GRU 55.5* 46.9*   PO2ART 90.5 80.8   EXU3QSA 27.1 30.7*   BASEEXCESS 0.2 5.5*      I have reviewed the patient's laboratory results.    Radiology results:    CT Head Without Contrast    Result Date: 8/29/2023  FINAL REPORT CLINICAL HISTORY: Mental status change, unknown cause FINDINGS: Moderate atrophy and chronic ischemic white matter changes are noted.     No cortical edema is present.  There is no mass or hemorrhage.  Ventricles are normal.  Bone windows show no skull fracture or obvious  obstructive lesion.     Impression: 1.  No acute intracranial abnormality or obvious mass.  2. Atrophy and chronic ischemic white matter changes as above. Authenticated and Electronically Signed by KEYONNA Smith MD on 08/29/2023 10:21:40 PM    CT Abdomen Pelvis With Contrast    Result Date: 8/29/2023  FINAL REPORT TECHNIQUE: Oral and IV contrast enhanced exam CLINICAL HISTORY: RLQ abdominal pain (Age >= 14y) FINDINGS: Abdomen: Lung bases are clear.  There is probable cholelithiasis.  Liver has an unremarkable CT appearance.  There is a mass in the posterior spleen which is nonspecific but measures up to 23 mm.  The  pancreas and adrenal glands are unremarkable.  There are bilateral renal cysts.  Kidneys show no mass or obstruction. No bowel obstruction or fluid collection is seen.  Pelvis: The appendix is normal.  There is significant fecal impaction of the rectal vault.  There is mild inflammatory change of the distal sigmoid colon with stranding extending into the sigmoid mesial colon, probably due to diverticulitis.  No fluid collection or adenopathy is seen.     Impression: 1.  No evidence of appendicitis or bowel obstruction.  2. Inflammatory change of the distal sigmoid colon, suspect diverticulitis.  3.  Significant fecal impaction of the rectosigmoid colon.  4.  Cholelithiasis. Authenticated and Electronically Signed by KEYONNA Smith MD on 08/29/2023 10:21:13 PM    XR Chest 1 View    Result Date: 8/30/2023  PROCEDURE: XR CHEST 1 VW-    HISTORY: AMS  COMPARISON: October 2022.  FINDINGS: The patient is rotated to the left. The heart is borderline in size. There is mild interstitial disease in the right lung base which has mildly improved. There is a left perihilar mass. There is no pneumothorax. There are no acute osseous abnormalities.      Impression: Left perihilar mass. Chest CT with contrast is recommended.  Mild interstitial disease in the right lung base has mildly improved.        Images were  reviewed, interpreted, and dictated by Dr. Svitlana Alvarado MD Transcribed by Janae Velez PA-C.  This report was signed and finalized on 8/30/2023 9:51 AM by Svitlana Alvarado MD.     I have reviewed the patient's radiology reports.    Medication Review:     I have reviewed the patient's active and prn medications.     Problem List:      Diverticulitis large intestine      Assessment:    GI bleed suspect secondary to diverticulitis cyst vs recent polypectomy POA  Fecal impaction POA  Elevated troponin POA  Hematuria POA  A-fib on Eliquis  COPD on 3 L baseline  Hypertension  BPH  Impaired mobility and ADLs    Plan:    We will continue to monitor patient in the hospital.    GI bleed/fecal impaction  -Suspect GI bleed likely due to diverticulosis.  Could also be due to recent polypectomy performed 802/23 in the setting of continued anticoagulation.  - We will hold on chronic Eliquis  - GI consulted and appreciate recommendations  - Monitor H&H, currently stable.  No indication for PRBC transfusion.  -Bowel regimen  -Empiric Rocephin/flagyl  -He may also benefit with starting Linzess 72 mcg once daily on discharge      Elevated troponin  A-fib  - Hold Eliquis  - Restart bisoprolol and diltiazem  -Troponins elevated and plateaued fashion.  Suspect NSTEMI type II from demand ischemia.  Low suspicion for ACS.    Hematuria  - Improving, will need urology follow-up as an outpatient.    PT/OT.  Further orders as clinical course dictates.    DVT Prophylaxis: SCDs  Code Status: DNR/DNI  Diet: Mechanical soft  Discharge Plan: Pending; STR vs LTC    Ottoniel Alfaro DO  08/31/23  17:24 EDT    Dictated utilizing Dragon dictation.

## 2023-09-01 LAB
AMORPH URATE CRY URNS QL MICRO: ABNORMAL /HPF
ANION GAP SERPL CALCULATED.3IONS-SCNC: 11.5 MMOL/L (ref 5–15)
BACTERIA UR QL AUTO: ABNORMAL /HPF
BILIRUB UR QL STRIP: ABNORMAL
BUN SERPL-MCNC: 36 MG/DL (ref 8–23)
BUN/CREAT SERPL: 20.3 (ref 7–25)
CALCIUM SPEC-SCNC: 13 MG/DL (ref 8.6–10.5)
CHLORIDE SERPL-SCNC: 103 MMOL/L (ref 98–107)
CLARITY UR: ABNORMAL
CO2 SERPL-SCNC: 28.5 MMOL/L (ref 22–29)
COLOR UR: ABNORMAL
CREAT SERPL-MCNC: 1.77 MG/DL (ref 0.76–1.27)
EGFRCR SERPLBLD CKD-EPI 2021: 37.2 ML/MIN/1.73
GLUCOSE SERPL-MCNC: 117 MG/DL (ref 65–99)
GLUCOSE UR STRIP-MCNC: NEGATIVE MG/DL
HGB UR QL STRIP.AUTO: ABNORMAL
KETONES UR QL STRIP: NEGATIVE
LEUKOCYTE ESTERASE UR QL STRIP.AUTO: ABNORMAL
NITRITE UR QL STRIP: NEGATIVE
PH UR STRIP.AUTO: <=5 [PH] (ref 5–8)
POTASSIUM SERPL-SCNC: 3.9 MMOL/L (ref 3.5–5.2)
PROT UR QL STRIP: ABNORMAL
RBC # UR STRIP: ABNORMAL /HPF
REF LAB TEST METHOD: ABNORMAL
SODIUM SERPL-SCNC: 143 MMOL/L (ref 136–145)
SP GR UR STRIP: 1.02 (ref 1–1.03)
SQUAMOUS #/AREA URNS HPF: ABNORMAL /HPF
UROBILINOGEN UR QL STRIP: ABNORMAL
WBC # UR STRIP: ABNORMAL /HPF

## 2023-09-01 PROCEDURE — 81001 URINALYSIS AUTO W/SCOPE: CPT | Performed by: INTERNAL MEDICINE

## 2023-09-01 PROCEDURE — 99232 SBSQ HOSP IP/OBS MODERATE 35: CPT | Performed by: INTERNAL MEDICINE

## 2023-09-01 PROCEDURE — 25010000002 CEFTRIAXONE SODIUM-DEXTROSE 1-3.74 GM-%(50ML) RECONSTITUTED SOLUTION: Performed by: INTERNAL MEDICINE

## 2023-09-01 PROCEDURE — 25010000002 METRONIDAZOLE 500 MG/100ML SOLUTION: Performed by: INTERNAL MEDICINE

## 2023-09-01 PROCEDURE — 80048 BASIC METABOLIC PNL TOTAL CA: CPT | Performed by: STUDENT IN AN ORGANIZED HEALTH CARE EDUCATION/TRAINING PROGRAM

## 2023-09-01 RX ORDER — SODIUM CHLORIDE 9 MG/ML
75 INJECTION, SOLUTION INTRAVENOUS CONTINUOUS
Status: DISCONTINUED | OUTPATIENT
Start: 2023-09-01 | End: 2023-09-02

## 2023-09-01 RX ADMIN — BISOPROLOL FUMARATE 10 MG: 5 TABLET ORAL at 09:36

## 2023-09-01 RX ADMIN — METRONIDAZOLE 500 MG: 5 INJECTION, SOLUTION INTRAVENOUS at 09:54

## 2023-09-01 RX ADMIN — Medication 10 ML: at 09:37

## 2023-09-01 RX ADMIN — ACETAMINOPHEN 650 MG: 325 TABLET, FILM COATED ORAL at 17:48

## 2023-09-01 RX ADMIN — METRONIDAZOLE 500 MG: 5 INJECTION, SOLUTION INTRAVENOUS at 02:15

## 2023-09-01 RX ADMIN — SODIUM CHLORIDE 75 ML/HR: 9 INJECTION, SOLUTION INTRAVENOUS at 09:51

## 2023-09-01 RX ADMIN — Medication 10 ML: at 02:15

## 2023-09-01 RX ADMIN — METRONIDAZOLE 500 MG: 5 INJECTION, SOLUTION INTRAVENOUS at 17:40

## 2023-09-01 RX ADMIN — CEFTRIAXONE 1000 MG: 1 INJECTION, SOLUTION INTRAVENOUS at 17:39

## 2023-09-01 RX ADMIN — DILTIAZEM HYDROCHLORIDE 180 MG: 180 CAPSULE, COATED, EXTENDED RELEASE ORAL at 09:36

## 2023-09-01 NOTE — PROGRESS NOTES
[facility]    Name:  Ed Spear   Age:  87 y.o.  Sex:  male  :  1935  MRN:  [id number]   Visit Number:  [id number]  Admission Date:  2023  Date Of Service:  23  Primary Care Physician:  Chelo Nixon DO     LOS: 3 days :    Chief Complaint:      Bright red blood per rectum    Subjective:    Patient examined this morning.  Patient pleasantly confused, at baseline.  No family present.  No abdominal pain on exam.  Tolerating mechanical soft diet.  No acute events overnight per nursing staff.    Hospital Course:    Patient is an 87-year-old man with past medical history of atrial fibrillation on Eliquis, COPD on 3 L baseline, BPH, hypertension, diverticulosis.  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  Presented to [facility] ED on 2023 with concern for intermittent bright red bloody stools per rectum.   ED summary: Afebrile, vital signs stable on room air.  EKG atrial fibrillation, no ST elevations or depressions.  High-sensitivity troponin 57, 56, ACS not suspected.  Hemoglobin 10.1, most recent 9.8 on .  COVID/flu negative.  FOBT positive.  CT abdomen/pelvis inflammatory changes distal sigmoid colon, suspect diverticulitis, significant fecal impaction of the rectosigmoid colon.     Review of Systems:     All systems were reviewed and negative except as mentioned in subjective, assessment and plan.    Vital Signs:    Temp:  [97.6 °F (36.4 °C)-98 °F (36.7 °C)] 97.6 °F (36.4 °C)  Heart Rate:  [] 88  Resp:  [17-24] 24  BP: (113-135)/(69-81) 133/77    Intake and output:    I/O last 3 completed shifts:  In: 450 [IV Piggyback:450]  Out: 50 [Urine:50]  I/O this shift:  In: 120 [P.O.:120]  Out: -     Physical Examination: Examination 23    General Appearance:  Alert and cooperative.  Uncomfortable but  in no acute distress.   Head:  Atraumatic and normocephalic.   Eyes: Conjunctivae and sclerae normal, no icterus. No pallor.   Throat: No oral lesions, no thrush, oral mucosa moist.   Neck: Supple, trachea midline, no thyromegaly.   Lungs:   Breath sounds heard bilaterally equally.  No wheezing or crackles.    Heart:  Normal S1 and S2, no murmur, No JVD.   Abdomen:   Normal bowel sounds, Soft, nontender, nondistended, no rebound tenderness.   Extremities: Supple, no edema, no cyanosis, no clubbing.   Skin: No bleeding or rash.   Neurologic: Alert, disoriented..  At baseline.  Generalized weakness.  Able to move all extremities spontaneously.     Laboratory results:    Results from last 7 days   Lab Units 09/01/23  0640 08/31/23 2120 08/31/23 0638 08/29/23 2036   SODIUM mmol/L 143  --  140 135*   POTASSIUM mmol/L 3.9 3.7 3.5 3.7   CHLORIDE mmol/L 103  --  100 95*   CO2 mmol/L 28.5  --  26.2 28.7   BUN mg/dL 36*  --  21 16   CREATININE mg/dL 1.77*  --  1.43* 1.14   CALCIUM mg/dL 13.0*  --  12.1* 11.6*   BILIRUBIN mg/dL  --   --   --  0.4   ALK PHOS U/L  --   --   --  93   ALT (SGPT) U/L  --   --   --  10   AST (SGOT) U/L  --   --   --  15   GLUCOSE mg/dL 117*  --  99 119*     Results from last 7 days   Lab Units 08/31/23 0638 08/29/23 2036   WBC 10*3/mm3 9.94 8.13   HEMOGLOBIN g/dL 11.5* 10.1*   HEMATOCRIT % 36.1* 31.6*   PLATELETS 10*3/mm3 274 293         Results from last 7 days   Lab Units 08/29/23 2237 08/29/23 2036   HSTROP T ng/L 56* 57*         Recent Labs     10/28/22  1800 08/29/23 2046   PHART 7.297* 7.424   XON2DJB 55.5* 46.9*   PO2ART 90.5 80.8   RUV3MMR 27.1 30.7*   BASEEXCESS 0.2 5.5*      I have reviewed the patient's laboratory results.    Radiology results:    No radiology results from the last 24 hrs  I have reviewed the patient's radiology reports.    Medication Review:     I have reviewed the patient's active and prn medications.     Problem List:      Diverticulitis large  intestine      Assessment:    GI bleed suspect secondary to diverticulitis cyst vs recent polypectomy POA  Fecal impaction POA  Elevated troponin POA  Hematuria POA  A-fib on Eliquis  COPD on 3 L baseline  Hypertension  BPH  Impaired mobility and ADLs    Plan:    We will continue to monitor patient in the hospital.    GI bleed/fecal impaction  -Suspect GI bleed likely due to diverticulosis.  Could also be due to recent polypectomy performed 802/23 in the setting of continued anticoagulation.  No plans for endoscopy.  - We will hold on chronic Eliquis  - GI consulted and appreciate recommendations  - Monitor H&H, currently stable.  No indication for PRBC transfusion.  -Bowel regimen  -Empiric Rocephin/flagyl  -He may also benefit with starting Linzess 72 mcg once daily on discharge      Elevated troponin  A-fib  - Hold Eliquis  - Restart bisoprolol and diltiazem  -Troponins elevated and plateaued fashion.  Suspect NSTEMI type II from demand ischemia.  Low suspicion for ACS.    Hematuria  - Improving, will need urology follow-up as an outpatient.    JIGNESH  - We will start IV fluids and encourage oral intake, suspect secondary to prerenal azotemia from dehydration  -We will obtain bladder scan     PT/OT.  Further orders as clinical course dictates.    DVT Prophylaxis: SCDs  Code Status: DNR/DNI  Diet: Mechanical soft  Discharge Plan: Pending; STR vs LTC    Ottoniel Alfaro DO  09/01/23  09:09 EDT    Dictated utilizing Dragon dictation.

## 2023-09-01 NOTE — PLAN OF CARE
Goal Outcome Evaluation:  Plan of Care Reviewed With: patient           Outcome Evaluation: total care patient with bed alarm on at all times-medications given per Dr. Alfaro's orders-scheduled IV antibiotics per orders-monitor labs and continue to monitor patient

## 2023-09-01 NOTE — CASE MANAGEMENT/SOCIAL WORK
1345: CM spoke with pt. and pt's daughters at bedside.  IMM given to daughter/Farida Cano. Verbalized understanding.      1400: LIBBY spoke with Sydnie/[facility] and confirmed bed ready for admit. Informed Sydnie may be tomorrow for admission. Verbalized Understanding. Dr. Alfaro stated she wants to watch renal status overnight.

## 2023-09-01 NOTE — THERAPY TREATMENT NOTE
"OT attempted to see pt for OT treatment. Pt refused stating \"he isn't feeling well\". OT encouraged bed lvl exercise and engagement and pt refused. OT to f/u at a later time.   "

## 2023-09-01 NOTE — PLAN OF CARE
Goal Outcome Evaluation:  Plan of Care Reviewed With: patient, daughter        Progress: no change       Ed had 762 ml urine inn bladder when bladder scanned. 16 F. [place] anchored and brown urine with blood and sediment returned. See UA results. IV antibiotics continue. He continues on 2-3L NC to maintain SpO2 above 90%. Family at bedside today.  He has been approved for a bed at [facility]. Discharge pending clinical status.

## 2023-09-02 VITALS
DIASTOLIC BLOOD PRESSURE: 66 MMHG | SYSTOLIC BLOOD PRESSURE: 127 MMHG | BODY MASS INDEX: 23.09 KG/M2 | OXYGEN SATURATION: 97 % | RESPIRATION RATE: 19 BRPM | HEART RATE: 82 BPM | WEIGHT: 179.9 LBS | TEMPERATURE: 97.6 F | HEIGHT: 74 IN

## 2023-09-02 PROBLEM — R33.8 ACUTE URINARY RETENTION: Chronic | Status: ACTIVE | Noted: 2023-09-02

## 2023-09-02 LAB
ANION GAP SERPL CALCULATED.3IONS-SCNC: 9.5 MMOL/L (ref 5–15)
BUN SERPL-MCNC: 30 MG/DL (ref 8–23)
BUN/CREAT SERPL: 21.7 (ref 7–25)
CALCIUM SPEC-SCNC: 11.8 MG/DL (ref 8.6–10.5)
CHLORIDE SERPL-SCNC: 109 MMOL/L (ref 98–107)
CO2 SERPL-SCNC: 29.5 MMOL/L (ref 22–29)
CREAT SERPL-MCNC: 1.38 MG/DL (ref 0.76–1.27)
DEPRECATED RDW RBC AUTO: 46.5 FL (ref 37–54)
EGFRCR SERPLBLD CKD-EPI 2021: 50.1 ML/MIN/1.73
ERYTHROCYTE [DISTWIDTH] IN BLOOD BY AUTOMATED COUNT: 14.4 % (ref 12.3–15.4)
GLUCOSE SERPL-MCNC: 108 MG/DL (ref 65–99)
HCT VFR BLD AUTO: 33.5 % (ref 37.5–51)
HGB BLD-MCNC: 10.3 G/DL (ref 13–17.7)
MCH RBC QN AUTO: 27.6 PG (ref 26.6–33)
MCHC RBC AUTO-ENTMCNC: 30.7 G/DL (ref 31.5–35.7)
MCV RBC AUTO: 89.8 FL (ref 79–97)
PLATELET # BLD AUTO: 300 10*3/MM3 (ref 140–450)
PMV BLD AUTO: 10.3 FL (ref 6–12)
POTASSIUM SERPL-SCNC: 3.2 MMOL/L (ref 3.5–5.2)
RBC # BLD AUTO: 3.73 10*6/MM3 (ref 4.14–5.8)
SODIUM SERPL-SCNC: 148 MMOL/L (ref 136–145)
WBC NRBC COR # BLD: 9.89 10*3/MM3 (ref 3.4–10.8)

## 2023-09-02 PROCEDURE — 85027 COMPLETE CBC AUTOMATED: CPT | Performed by: INTERNAL MEDICINE

## 2023-09-02 PROCEDURE — 25010000002 METRONIDAZOLE 500 MG/100ML SOLUTION: Performed by: INTERNAL MEDICINE

## 2023-09-02 PROCEDURE — 80048 BASIC METABOLIC PNL TOTAL CA: CPT | Performed by: STUDENT IN AN ORGANIZED HEALTH CARE EDUCATION/TRAINING PROGRAM

## 2023-09-02 PROCEDURE — 99239 HOSP IP/OBS DSCHRG MGMT >30: CPT | Performed by: INTERNAL MEDICINE

## 2023-09-02 RX ORDER — DEXTROSE MONOHYDRATE 50 MG/ML
75 INJECTION, SOLUTION INTRAVENOUS CONTINUOUS
Status: ACTIVE | OUTPATIENT
Start: 2023-09-02 | End: 2023-09-02

## 2023-09-02 RX ORDER — METRONIDAZOLE 500 MG/1
500 TABLET ORAL 3 TIMES DAILY
Qty: 6 TABLET | Refills: 0 | Status: SHIPPED | OUTPATIENT
Start: 2023-09-02 | End: 2023-09-04

## 2023-09-02 RX ORDER — DEXTROSE MONOHYDRATE 50 MG/ML
50 INJECTION, SOLUTION INTRAVENOUS CONTINUOUS
Status: DISCONTINUED | OUTPATIENT
Start: 2023-09-02 | End: 2023-09-02

## 2023-09-02 RX ORDER — POTASSIUM CHLORIDE 1.5 G/1.58G
40 POWDER, FOR SOLUTION ORAL ONCE
Status: COMPLETED | OUTPATIENT
Start: 2023-09-02 | End: 2023-09-02

## 2023-09-02 RX ORDER — POTASSIUM CHLORIDE 750 MG/1
40 CAPSULE, EXTENDED RELEASE ORAL ONCE
Status: DISCONTINUED | OUTPATIENT
Start: 2023-09-02 | End: 2023-09-02 | Stop reason: ALTCHOICE

## 2023-09-02 RX ADMIN — ACETAMINOPHEN 650 MG: 325 TABLET, FILM COATED ORAL at 09:45

## 2023-09-02 RX ADMIN — DEXTROSE MONOHYDRATE 75 ML/HR: 50 INJECTION, SOLUTION INTRAVENOUS at 10:23

## 2023-09-02 RX ADMIN — SENNOSIDES AND DOCUSATE SODIUM 2 TABLET: 50; 8.6 TABLET ORAL at 02:00

## 2023-09-02 RX ADMIN — POTASSIUM CHLORIDE 40 MEQ: 1.5 POWDER, FOR SOLUTION ORAL at 10:23

## 2023-09-02 RX ADMIN — METRONIDAZOLE 500 MG: 5 INJECTION, SOLUTION INTRAVENOUS at 09:46

## 2023-09-02 RX ADMIN — METRONIDAZOLE 500 MG: 5 INJECTION, SOLUTION INTRAVENOUS at 02:00

## 2023-09-02 RX ADMIN — Medication 10 ML: at 09:46

## 2023-09-02 NOTE — CASE MANAGEMENT/SOCIAL WORK
Case Management Discharge Note      Final Note: Pt. will be discharging to [facility] and rehab. Pt. will need EMS transport.         Selected Continued Care - Admitted Since 8/29/2023       Destination Coordination complete.      Service Provider Selected Services Address Phone Fax Patient Preferred    [facility] [address] [address] [phone] --       Internal Comment last updated by Jeniffer Phillips RN 8/31/2023 Jazmin Regalado confirmed bed available and precert started 8/31/23.                         Durable Medical Equipment    No services have been selected for the patient.                Dialysis/Infusion    No services have been selected for the patient.                Home Medical Care    No services have been selected for the patient.                Therapy    No services have been selected for the patient.                Community Resources    No services have been selected for the patient.                Community & DME    No services have been selected for the patient.                    Selected Continued Care - Prior Encounters Includes continued care and service providers with selected services from prior encounters from 5/31/2023 to 9/2/2023      Discharged on 7/29/2023 Admission date: 7/27/2023 - Discharge disposition: Home-Health Care [facility]      Home Medical Care       Service Provider Selected Services Address Phone Fax Patient Preferred    [facility] [address] [facility] [address] [place] [address] [phone] --                          Transportation Services  Ambulance: [facility]    Final Discharge Disposition Code: 03 - skilled nursing facility (SNF)

## 2023-09-02 NOTE — NURSING NOTE
I have tried several times to call [facility] to give report on Ed, no one is answering the phones.

## 2023-09-02 NOTE — DISCHARGE SUMMARY
[facility]   DISCHARGE SUMMARY      Name:  Ed Spear   Age:  87 y.o.  Sex:  male  :  1935  MRN:  [id number]   Visit Number:  [id number]    Admission Date:  2023  Date of Discharge:  2023  Primary Care Physician:  Chelo Nixon, DO      Discharge Diagnoses:     GI bleed suspect secondary to diverticulitis cyst vs recent polypectomy POA  Fecal impaction POA  Elevated troponin POA  Hematuria POA  A-fib on Eliquis  COPD on 3 L baseline  Hypertension  BPH  Impaired mobility and ADLs    Problem List:     Active Hospital Problems    Diagnosis  POA    **Diverticulitis large intestine [K57.32]  Yes    Acute urinary retention [R33.8]  Unknown      Resolved Hospital Problems   No resolved problems to display.     Presenting Problem:    Chief Complaint   Patient presents with    Altered Mental Status     Pt c/o ams and blood in stool per ems.       Consults:     Consulting Physician(s)       Provider Relationship Specialty    Dontrell Pham MD Consulting Physician Gastroenterology          Procedures Performed:        History of presenting illness/Hospital Course:    Patient is an 87-year-old man with past medical history of atrial fibrillation on Eliquis, COPD on 3 L baseline, BPH, hypertension, diverticulosis.  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  Presented to [facility] ED on 2023 with concern for intermittent bright red bloody stools per rectum.   ED summary: Afebrile, vital signs stable on room air.  EKG atrial fibrillation, no ST elevations or depressions.  High-sensitivity troponin 57, 56, ACS not suspected.  Hemoglobin 10.1, most recent 9.8 on .  COVID/flu negative.  FOBT positive.  CT abdomen/pelvis inflammatory changes distal sigmoid colon, suspect diverticulitis, significant fecal impaction of the rectosigmoid  colon.   Suspect GI bleed likely due to diverticulosis.  Could also be due to recent polypectomy performed 802/23 in the setting of continued anticoagulation.  GI did not recommend endoscopy.  Patient's Eliquis held.  Recommend to restart in 1 week.  H&H remained stable, no indication for PRBC transfusion.  Patient responded well to Rocephin and Flagyl.  Completed Rocephin while inpatient and discharged with prescription to complete Flagyl.  Follow-up with gastroenterology as an outpatient.  Also recommend daily bowel regimen.  Patient also found to have JIGNESH secondary to acute urinary retention.  JIGNESH improved with IV fluids and insertion of indwelling Tatum catheter.  Discharged to short-term rehab with Tatum catheter in place.  Recommend outpatient follow-up with urology.    Vital Signs:    Temp:  [97.4 °F (36.3 °C)-98.3 °F (36.8 °C)] 97.6 °F (36.4 °C)  Heart Rate:  [68-81] 79  Resp:  [18-24] 18  BP: (106-127)/(60-90) 118/85    Physical Exam:    General Appearance:  Alert and cooperative.    Head:  Atraumatic and normocephalic.   Eyes: Conjunctivae and sclerae normal, no icterus. No pallor.   Ears:  Ears with no abnormalities noted.   Throat: No oral lesions, no thrush, oral mucosa moist.   Neck: Supple, trachea midline, no thyromegaly.   Back:   No kyphoscoliosis present. No tenderness to palpation.   Lungs:   Breath sounds heard bilaterally equally.  No crackles or wheezing. No Pleural rub or bronchial breathing.   Heart:  Normal S1 and S2, no murmur, no gallop, no rub. No JVD.   Abdomen:   Normal bowel sounds, no masses, no organomegaly. Soft, nontender, nondistended, no rebound tenderness.   Extremities: Supple, no edema, no cyanosis, no clubbing.   Pulses: Pulses palpable bilaterally.   Skin: No bleeding or rash.   Neurologic: Alert, oriented to self.  Pleasantly confused.  No facial asymmetry. Moves all four limbs.      Pertinent Lab Results:     Results from last 7 days   Lab Units 09/02/23  3869  09/01/23  0640 08/31/23 2120 08/31/23 0638 08/29/23 2036   SODIUM mmol/L 148* 143  --  140 135*   POTASSIUM mmol/L 3.2* 3.9 3.7 3.5 3.7   CHLORIDE mmol/L 109* 103  --  100 95*   CO2 mmol/L 29.5* 28.5  --  26.2 28.7   BUN mg/dL 30* 36*  --  21 16   CREATININE mg/dL 1.38* 1.77*  --  1.43* 1.14   CALCIUM mg/dL 11.8* 13.0*  --  12.1* 11.6*   BILIRUBIN mg/dL  --   --   --   --  0.4   ALK PHOS U/L  --   --   --   --  93   ALT (SGPT) U/L  --   --   --   --  10   AST (SGOT) U/L  --   --   --   --  15   GLUCOSE mg/dL 108* 117*  --  99 119*     Results from last 7 days   Lab Units 09/02/23 0828 08/31/23 0638 08/29/23 2036   WBC 10*3/mm3 9.89 9.94 8.13   HEMOGLOBIN g/dL 10.3* 11.5* 10.1*   HEMATOCRIT % 33.5* 36.1* 31.6*   PLATELETS 10*3/mm3 300 274 293         Results from last 7 days   Lab Units 08/29/23 2237 08/29/23 2036   HSTROP T ng/L 56* 57*     Results from last 7 days   Lab Units 08/29/23 2036   PROBNP pg/mL 701.9         Results from last 7 days   Lab Units 08/29/23 2036   LIPASE U/L 11*     Results from last 7 days   Lab Units 08/29/23 2046   PH, ARTERIAL pH units 7.424   PO2 ART mm Hg 80.8   PCO2, ARTERIAL mm Hg 46.9*   HCO3 ART mmol/L 30.7*           Pertinent Radiology Results:    Imaging Results (All)       Procedure Component Value Units Date/Time    XR Chest 1 View [[id number]] Collected: 08/30/23 0950     Updated: 08/30/23 0954    Narrative:      PROCEDURE: XR CHEST 1 VW-        HISTORY: AMS     COMPARISON: October 2022.     FINDINGS: The patient is rotated to the left. The heart is borderline in  size. There is mild interstitial disease in the right lung base which  has mildly improved. There is a left perihilar mass. There is no  pneumothorax. There are no acute osseous abnormalities.       Impression:      Left perihilar mass. Chest CT with contrast is recommended.     Mild interstitial disease in the right lung base has mildly improved.                       Images were reviewed, interpreted, and  dictated by Dr. Svitlana Alvarado MD  Transcribed by Janae Velez PA-C.     This report was signed and finalized on 8/30/2023 9:51 AM by Svitlana Alvarado MD.       CT Abdomen Pelvis With Contrast [[id number]] Collected: 08/29/23 2221     Updated: 08/29/23 2222    Narrative:      FINAL REPORT    TECHNIQUE:  Oral and IV contrast enhanced exam    CLINICAL HISTORY:  RLQ abdominal pain (Age >= 14y)    FINDINGS:  Abdomen: Lung bases are clear.  There is probable  cholelithiasis.  Liver has an unremarkable CT appearance.  There  is a mass in the posterior spleen which is nonspecific but  measures up to 23 mm.  The  pancreas and adrenal glands are  unremarkable.  There are bilateral renal cysts.  Kidneys show no  mass or obstruction. No bowel obstruction or fluid collection is  seen.  Pelvis: The appendix is normal.  There is significant  fecal impaction of the rectal vault.  There is mild inflammatory  change of the distal sigmoid colon with stranding extending into  the sigmoid mesial colon, probably due to diverticulitis.  No  fluid collection or adenopathy is seen.      Impression:      1.  No evidence of appendicitis or bowel obstruction.  2.  Inflammatory change of the distal sigmoid colon, suspect  diverticulitis.  3.  Significant fecal impaction of the  rectosigmoid colon.  4.  Cholelithiasis.    Authenticated and Electronically Signed by KEYONNA Smith MD on  08/29/2023 10:21:13 PM    CT Head Without Contrast [[id number]] Collected: 08/29/23 2221     Updated: 08/29/23 2222    Narrative:      FINAL REPORT    CLINICAL HISTORY:  Mental status change, unknown cause    FINDINGS:  Moderate atrophy and chronic ischemic white matter changes are  noted.     No cortical edema is present.  There is no mass or  hemorrhage.  Ventricles are normal.  Bone windows show no skull  fracture or obvious obstructive lesion.      Impression:      1.  No acute intracranial abnormality or obvious mass.  2.  Atrophy and chronic ischemic white  matter changes as above.    Authenticated and Electronically Signed by KEYONNA Smith MD on  08/29/2023 10:21:40 PM            Echo:    Results for orders placed during the hospital encounter of 10/28/22    Adult Transthoracic Echo Complete W/ Cont if Necessary Per Protocol    Interpretation Summary  1.  Normal left ventricular size and systolic function, LVEF 65-70%.  2.  Grade 1 diastolic dysfunction.  3.  Mild right ventricular dilation with normal RV systolic function by TAPSE.  4.  Mild left atrial dilation.  5.  Mild calcification of aortic valve without significant stenosis.    Condition on Discharge:      Stable.    Code status during the hospital stay:    Code Status and Medical Interventions:   Ordered at: 08/30/23 0612     Medical Intervention Limits:    NO intubation (DNI)     Code Status (Patient has no pulse and is not breathing):    No CPR (Do Not Attempt to Resuscitate)     Medical Interventions (Patient has pulse or is breathing):    Limited Support     Discharge Disposition:    Rehab Facility or Unit (DC - External)    Discharge Medications:       Discharge Medications        New Medications        Instructions Start Date   metroNIDAZOLE 500 MG tablet  Commonly known as: Flagyl   500 mg, Oral, 3 Times Daily             Continue These Medications        Instructions Start Date   acetaminophen 325 MG tablet  Commonly known as: TYLENOL   325 mg, Oral, Every 6 Hours PRN      albuterol sulfate  (90 Base) MCG/ACT inhaler  Commonly known as: PROVENTIL HFA;VENTOLIN HFA;PROAIR HFA   INHALE 2 PUFFS BY MOUTH EVERY 4 HOURS AS NEEDED FOR WHEEZING OR SHORTNESS OF AIR      bisoprolol 10 MG tablet  Commonly known as: ZEBeta   TAKE ONE TABLET BY MOUTH DAILY      Cartia  MG 24 hr capsule  Generic drug: dilTIAZem CD   180 mg, Oral, Daily      DULoxetine 60 MG capsule  Commonly known as: CYMBALTA   60 mg, Oral, Daily      famotidine 40 MG tablet  Commonly known as: PEPCID   40 mg, Oral, Nightly PRN       ipratropium-albuterol 0.5-2.5 mg/3 ml nebulizer  Commonly known as: DUO-NEB   INHALE THREE MILLILITERS ( ONE VIAL )  VIA NEBULIZATION BY MOUTH FOUR TIMES A DAY      sennosides-docusate 8.6-50 MG per tablet  Commonly known as: PERICOLACE   1 tablet, Oral, 2 Times Daily      simvastatin 40 MG tablet  Commonly known as: ZOCOR   40 mg, Oral, Nightly      Symbicort 160-4.5 MCG/ACT inhaler  Generic drug: budesonide-formoterol   INHALE 2 PUFFS BY MOUTH TWICE A DAY      tamsulosin 0.4 MG capsule 24 hr capsule  Commonly known as: FLOMAX   0.4 mg, Oral, Daily             Stop These Medications      apixaban 2.5 MG tablet tablet  Commonly known as: Eliquis            Discharge Diet:     Diet Instructions       Diet: Regular/House Diet; Regular Texture (IDDSI 7); Thin (IDDSI 0)      Discharge Diet: Regular/House Diet    Texture: Regular Texture (IDDSI 7)    Fluid Consistency: Thin (IDDSI 0)          Activity at Discharge:     Activity Instructions       Activity as Tolerated            Follow-up Appointments:    Additional Instructions for the Follow-ups that You Need to Schedule       Discharge Follow-up with PCP   As directed       Currently Documented PCP:    Chelo Nixon DO    PCP Phone Number:    [phone]     Follow Up Details: 1-2 weeks               Contact information for follow-up providers       Chavez Sepulveda MD Follow up.    Specialty: Urology  Contact information:  [address]  [place] [address]  [phone]               Chelo Nixon DO .    Specialty: Family Medicine  Why: 1-2 weeks  Contact information:  [address]  [address]  [place] [address]  [phone]               Chavez Sepulveda MD .    Specialty: Urology  Contact information:  [address]  [place] [address]  [phone]                       Contact information for after-discharge care       Destination       [facility] .    Service: Skilled  Nursing  Contact information:  [address] [facility] [address] [phone]                                 Future Appointments   Date Time Provider Department Center   10/16/2023 11:00 AM Irina Man PA-C [facility]     Test Results Pending at Discharge:           Ottoniel Alfaro DO  09/02/23  10:35 EDT    Time: I spent >30 minutes on this discharge activity which included: face-to-face encounter with the patient, reviewing the data in the system, coordination of the care with the nursing staff as well as consultants, documentation, and entering orders.     Dictated utilizing Dragon dictation.

## 2023-09-02 NOTE — PLAN OF CARE
Goal Outcome Evaluation:  Plan of Care Reviewed With: patient, daughter        Progress: improving   Ed has met protocol for safe discharge per provider. He will discharge to [facility] by EMS. Daughter is at bedside. Discharge teaching complete.

## 2023-09-02 NOTE — PLAN OF CARE
Goal Outcome Evaluation:  Plan of Care Reviewed With: patient        Progress: no change  Outcome Evaluation: total care patient with bed alarm on at all times-IV fluids infusing-medications given per Dr. Alfaro's orders-monitor labs and continue to monitor patient-scheduled IV antibiotics per orders-possible discharge 09- to [facility] with EMS providing transport

## 2023-09-03 NOTE — PAYOR COMM NOTE
"To:  [employer]  From: Sally Champion RN  Phone: [phone]  Fax: [phone]  NPI: [id number]  TIN: [id number]  Member ID: [id number]  MRN: [id number]    Ed Spear (87 y.o. Male)       Date of Birth   1935    Social Security Number       Address   [address] [place] [address]    Home Phone   [phone]    MRN   [id number]       Confucianist   None    Marital Status                               Admission Date   23    Admission Type   Emergency    Admitting Provider       Attending Provider       Department, Room/Bed   [facility] TELEMETRY 4 428/       Discharge Date   2023    Discharge Disposition   Rehab Facility or Unit (DC - External)    Discharge Destination   Other                              Attending Provider: (none)   Allergies: No Known Allergies    Isolation: None   Infection: None   Code Status: Prior    Ht: 188 cm (74\")   Wt: 81.6 kg (179 lb 14.3 oz)    Admission Cmt: None   Principal Problem: Diverticulitis large intestine [K57.32]                   Active Insurance as of 2023       Primary Coverage       Payor Plan Insurance Group Employer/Plan Group    [facility] [employer] MED[employer]ARE REPLACEMENT        Payor Plan Address Payor Plan Phone Number Payor Plan Fax Number Effective Dates    [address] [address] [phone]  2022 - None Entered    [facility] [address]         Subscriber Name Subscriber Birth Date Member ID       ED SPEAR 1935 [id number]                     Emergency Contacts        (Rel.) Home Phone Work Phone Mobile Phone    Farida Cano (Daughter) -- -- [phone]    NATALIYA BROOKS (Daughter) -- -- --    Maria ElenaMike paredes (Son) [phone] -- --                 Discharge Summary        Ottoniel Alfaro DO at 23 1035              [facility] HOSPITALIST   DISCHARGE SUMMARY      Name:  Ed Spear   Age:  87 y.o.  Sex:  male  :  1935  MRN:  [id number]   Visit " Number:  [id number]    Admission Date:  8/29/2023  Date of Discharge:  9/2/2023  Primary Care Physician:  Chelo Nixon, DO      Discharge Diagnoses:     GI bleed suspect secondary to diverticulitis cyst vs recent polypectomy POA  Fecal impaction POA  Elevated troponin POA  Hematuria POA  A-fib on Eliquis  COPD on 3 L baseline  Hypertension  BPH  Impaired mobility and ADLs    Problem List:     Active Hospital Problems    Diagnosis  POA    **Diverticulitis large intestine [K57.32]  Yes    Acute urinary retention [R33.8]  Unknown      Resolved Hospital Problems   No resolved problems to display.     Presenting Problem:    Chief Complaint   Patient presents with    Altered Mental Status     Pt c/o ams and blood in stool per ems.       Consults:     Consulting Physician(s)       Provider Relationship Specialty    Dontrell Pham MD Consulting Physician Gastroenterology          Procedures Performed:        History of presenting illness/Hospital Course:    Patient is an 87-year-old man with past medical history of atrial fibrillation on Eliquis, COPD on 3 L baseline, BPH, hypertension, diverticulosis.  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  Presented to [facility] ED on 8/29/2023 with concern for intermittent bright red bloody stools per rectum.   ED summary: Afebrile, vital signs stable on room air.  EKG atrial fibrillation, no ST elevations or depressions.  High-sensitivity troponin 57, 56, ACS not suspected.  Hemoglobin 10.1, most recent 9.8 on 8/22.  COVID/flu negative.  FOBT positive.  CT abdomen/pelvis inflammatory changes distal sigmoid colon, suspect diverticulitis, significant fecal impaction of the rectosigmoid colon.   Suspect GI bleed likely due to diverticulosis.  Could also be due to recent polypectomy performed 802/23 in the setting of continued  anticoagulation.  GI did not recommend endoscopy.  Patient's Eliquis held.  Recommend to restart in 1 week.  H&H remained stable, no indication for PRBC transfusion.  Patient responded well to Rocephin and Flagyl.  Completed Rocephin while inpatient and discharged with prescription to complete Flagyl.  Follow-up with gastroenterology as an outpatient.  Also recommend daily bowel regimen.  Patient also found to have JIGNESH secondary to acute urinary retention.  JIGNESH improved with IV fluids and insertion of indwelling Tatum catheter.  Discharged to short-term rehab with Tatum catheter in place.  Recommend outpatient follow-up with urology.    Vital Signs:    Temp:  [97.4 °F (36.3 °C)-98.3 °F (36.8 °C)] 97.6 °F (36.4 °C)  Heart Rate:  [68-81] 79  Resp:  [18-24] 18  BP: (106-127)/(60-90) 118/85    Physical Exam:    General Appearance:  Alert and cooperative.    Head:  Atraumatic and normocephalic.   Eyes: Conjunctivae and sclerae normal, no icterus. No pallor.   Ears:  Ears with no abnormalities noted.   Throat: No oral lesions, no thrush, oral mucosa moist.   Neck: Supple, trachea midline, no thyromegaly.   Back:   No kyphoscoliosis present. No tenderness to palpation.   Lungs:   Breath sounds heard bilaterally equally.  No crackles or wheezing. No Pleural rub or bronchial breathing.   Heart:  Normal S1 and S2, no murmur, no gallop, no rub. No JVD.   Abdomen:   Normal bowel sounds, no masses, no organomegaly. Soft, nontender, nondistended, no rebound tenderness.   Extremities: Supple, no edema, no cyanosis, no clubbing.   Pulses: Pulses palpable bilaterally.   Skin: No bleeding or rash.   Neurologic: Alert, oriented to self.  Pleasantly confused.  No facial asymmetry. Moves all four limbs.      Pertinent Lab Results:     Results from last 7 days   Lab Units 09/02/23  0828 09/01/23  0640 08/31/23 2120 08/31/23  0638 08/29/23 2036   SODIUM mmol/L 148* 143  --  140 135*   POTASSIUM mmol/L 3.2* 3.9 3.7 3.5 3.7   CHLORIDE  mmol/L 109* 103  --  100 95*   CO2 mmol/L 29.5* 28.5  --  26.2 28.7   BUN mg/dL 30* 36*  --  21 16   CREATININE mg/dL 1.38* 1.77*  --  1.43* 1.14   CALCIUM mg/dL 11.8* 13.0*  --  12.1* 11.6*   BILIRUBIN mg/dL  --   --   --   --  0.4   ALK PHOS U/L  --   --   --   --  93   ALT (SGPT) U/L  --   --   --   --  10   AST (SGOT) U/L  --   --   --   --  15   GLUCOSE mg/dL 108* 117*  --  99 119*     Results from last 7 days   Lab Units 09/02/23  0828 08/31/23 0638 08/29/23 2036   WBC 10*3/mm3 9.89 9.94 8.13   HEMOGLOBIN g/dL 10.3* 11.5* 10.1*   HEMATOCRIT % 33.5* 36.1* 31.6*   PLATELETS 10*3/mm3 300 274 293         Results from last 7 days   Lab Units 08/29/23 2237 08/29/23 2036   HSTROP T ng/L 56* 57*     Results from last 7 days   Lab Units 08/29/23 2036   PROBNP pg/mL 701.9         Results from last 7 days   Lab Units 08/29/23 2036   LIPASE U/L 11*     Results from last 7 days   Lab Units 08/29/23  2046   PH, ARTERIAL pH units 7.424   PO2 ART mm Hg 80.8   PCO2, ARTERIAL mm Hg 46.9*   HCO3 ART mmol/L 30.7*           Pertinent Radiology Results:    Imaging Results (All)       Procedure Component Value Units Date/Time    XR Chest 1 View [[id number]] Collected: 08/30/23 0950     Updated: 08/30/23 0954    Narrative:      PROCEDURE: XR CHEST 1 VW-        HISTORY: AMS     COMPARISON: October 2022.     FINDINGS: The patient is rotated to the left. The heart is borderline in  size. There is mild interstitial disease in the right lung base which  has mildly improved. There is a left perihilar mass. There is no  pneumothorax. There are no acute osseous abnormalities.       Impression:      Left perihilar mass. Chest CT with contrast is recommended.     Mild interstitial disease in the right lung base has mildly improved.                       Images were reviewed, interpreted, and dictated by Dr. Svitlana Alvarado MD  Transcribed by Janae Velez PA-C.     This report was signed and finalized on 8/30/2023 9:51 AM by Svitlana Alvarado,  MD.       CT Abdomen Pelvis With Contrast [[id number]] Collected: 08/29/23 2221     Updated: 08/29/23 2222    Narrative:      FINAL REPORT    TECHNIQUE:  Oral and IV contrast enhanced exam    CLINICAL HISTORY:  RLQ abdominal pain (Age >= 14y)    FINDINGS:  Abdomen: Lung bases are clear.  There is probable  cholelithiasis.  Liver has an unremarkable CT appearance.  There  is a mass in the posterior spleen which is nonspecific but  measures up to 23 mm.  The  pancreas and adrenal glands are  unremarkable.  There are bilateral renal cysts.  Kidneys show no  mass or obstruction. No bowel obstruction or fluid collection is  seen.  Pelvis: The appendix is normal.  There is significant  fecal impaction of the rectal vault.  There is mild inflammatory  change of the distal sigmoid colon with stranding extending into  the sigmoid mesial colon, probably due to diverticulitis.  No  fluid collection or adenopathy is seen.      Impression:      1.  No evidence of appendicitis or bowel obstruction.  2.  Inflammatory change of the distal sigmoid colon, suspect  diverticulitis.  3.  Significant fecal impaction of the  rectosigmoid colon.  4.  Cholelithiasis.    Authenticated and Electronically Signed by KEYONNA Smith MD on  08/29/2023 10:21:13 PM    CT Head Without Contrast [[id number]] Collected: 08/29/23 2221     Updated: 08/29/23 2222    Narrative:      FINAL REPORT    CLINICAL HISTORY:  Mental status change, unknown cause    FINDINGS:  Moderate atrophy and chronic ischemic white matter changes are  noted.     No cortical edema is present.  There is no mass or  hemorrhage.  Ventricles are normal.  Bone windows show no skull  fracture or obvious obstructive lesion.      Impression:      1.  No acute intracranial abnormality or obvious mass.  2.  Atrophy and chronic ischemic white matter changes as above.    Authenticated and Electronically Signed by KEYONNA Smith MD on  08/29/2023 10:21:40 PM            Echo:    Results for  orders placed during the hospital encounter of 10/28/22    Adult Transthoracic Echo Complete W/ Cont if Necessary Per Protocol    Interpretation Summary  1.  Normal left ventricular size and systolic function, LVEF 65-70%.  2.  Grade 1 diastolic dysfunction.  3.  Mild right ventricular dilation with normal RV systolic function by TAPSE.  4.  Mild left atrial dilation.  5.  Mild calcification of aortic valve without significant stenosis.    Condition on Discharge:      Stable.    Code status during the hospital stay:    Code Status and Medical Interventions:   Ordered at: 08/30/23 0612     Medical Intervention Limits:    NO intubation (DNI)     Code Status (Patient has no pulse and is not breathing):    No CPR (Do Not Attempt to Resuscitate)     Medical Interventions (Patient has pulse or is breathing):    Limited Support     Discharge Disposition:    Rehab Facility or Unit (DC - External)    Discharge Medications:       Discharge Medications        New Medications        Instructions Start Date   metroNIDAZOLE 500 MG tablet  Commonly known as: Flagyl   500 mg, Oral, 3 Times Daily             Continue These Medications        Instructions Start Date   acetaminophen 325 MG tablet  Commonly known as: TYLENOL   325 mg, Oral, Every 6 Hours PRN      albuterol sulfate  (90 Base) MCG/ACT inhaler  Commonly known as: PROVENTIL HFA;VENTOLIN HFA;PROAIR HFA   INHALE 2 PUFFS BY MOUTH EVERY 4 HOURS AS NEEDED FOR WHEEZING OR SHORTNESS OF AIR      bisoprolol 10 MG tablet  Commonly known as: ZEBeta   TAKE ONE TABLET BY MOUTH DAILY      Cartia  MG 24 hr capsule  Generic drug: dilTIAZem CD   180 mg, Oral, Daily      DULoxetine 60 MG capsule  Commonly known as: CYMBALTA   60 mg, Oral, Daily      famotidine 40 MG tablet  Commonly known as: PEPCID   40 mg, Oral, Nightly PRN      ipratropium-albuterol 0.5-2.5 mg/3 ml nebulizer  Commonly known as: DUO-NEB   INHALE THREE MILLILITERS ( ONE VIAL )  VIA NEBULIZATION BY MOUTH FOUR  TIMES A DAY      sennosides-docusate 8.6-50 MG per tablet  Commonly known as: PERICOLACE   1 tablet, Oral, 2 Times Daily      simvastatin 40 MG tablet  Commonly known as: ZOCOR   40 mg, Oral, Nightly      Symbicort 160-4.5 MCG/ACT inhaler  Generic drug: budesonide-formoterol   INHALE 2 PUFFS BY MOUTH TWICE A DAY      tamsulosin 0.4 MG capsule 24 hr capsule  Commonly known as: FLOMAX   0.4 mg, Oral, Daily             Stop These Medications      apixaban 2.5 MG tablet tablet  Commonly known as: Eliquis            Discharge Diet:     Diet Instructions       Diet: Regular/House Diet; Regular Texture (IDDSI 7); Thin (IDDSI 0)      Discharge Diet: Regular/House Diet    Texture: Regular Texture (IDDSI 7)    Fluid Consistency: Thin (IDDSI 0)          Activity at Discharge:     Activity Instructions       Activity as Tolerated            Follow-up Appointments:    Additional Instructions for the Follow-ups that You Need to Schedule       Discharge Follow-up with PCP   As directed       Currently Documented PCP:    Chelo Nixon DO    PCP Phone Number:    [phone]     Follow Up Details: 1-2 weeks               Contact information for follow-up providers       Chavez Sepulveda MD Follow up.    Specialty: Urology  Contact information:  [address] [facility] [address]  [facility] [id number] [phone]               Chelo Nixon DO .    Specialty: Family Medicine  Why: 1-2 weeks  Contact information:  [address]  [address]  [facility] [id number] [phone]               Chavez Sepulveda MD .    Specialty: Urology  Contact information:  [address] [facility] [address] [facility] [address]  [facility] [address]  [phone]                       Contact information for after-discharge care       Destination       [facility] .    Service: Skilled Nursing  Contact information:  [address] [facility] [address] [phone]                                 Future Appointments   Date Time  Provider Department [place]   10/16/2023 11:00 AM Irina Man PA-C [facility]     Test Results Pending at Discharge:           Ottoniel Alfaro DO  09/02/23  10:35 EDT    Time: I spent >30 minutes on this discharge activity which included: face-to-face encounter with the patient, reviewing the data in the system, coordination of the care with the nursing staff as well as consultants, documentation, and entering orders.     Dictated utilizing Dragon dictation.        Electronically signed by Ottoniel Alfaro DO at 09/02/23 0667

## 2023-09-04 DIAGNOSIS — E78.2 MIXED HYPERLIPIDEMIA: ICD-10-CM

## 2023-09-05 RX ORDER — DULOXETIN HYDROCHLORIDE 60 MG/1
60 CAPSULE, DELAYED RELEASE ORAL DAILY
Qty: 90 CAPSULE | Refills: 1 | Status: SHIPPED | OUTPATIENT
Start: 2023-09-05

## 2023-09-05 RX ORDER — TAMSULOSIN HYDROCHLORIDE 0.4 MG/1
1 CAPSULE ORAL DAILY
Qty: 90 CAPSULE | Refills: 1 | Status: SHIPPED | OUTPATIENT
Start: 2023-09-05

## 2023-09-05 RX ORDER — DILTIAZEM HYDROCHLORIDE 180 MG/1
180 CAPSULE, EXTENDED RELEASE ORAL DAILY
Qty: 90 CAPSULE | Refills: 1 | Status: SHIPPED | OUTPATIENT
Start: 2023-09-05

## 2023-09-05 RX ORDER — SIMVASTATIN 40 MG
TABLET ORAL
Qty: 90 TABLET | Refills: 1 | Status: SHIPPED | OUTPATIENT
Start: 2023-09-05

## 2023-09-05 NOTE — TELEPHONE ENCOUNTER
Rx Refill Note  Requested Prescriptions     Pending Prescriptions Disp Refills    DULoxetine (CYMBALTA) 60 MG capsule [Pharmacy Med Name: DULoxetine HCL DR 60 MG CAPSULE] 90 capsule 1     Sig: TAKE 1 CAPSULE BY MOUTH DAILY    simvastatin (ZOCOR) 40 MG tablet [Pharmacy Med Name: SIMVASTATIN 40 MG TABLET] 90 tablet 1     Sig: TAKE ONE TABLET BY MOUTH ONCE NIGHTLY    tamsulosin (FLOMAX) 0.4 MG capsule 24 hr capsule [Pharmacy Med Name: TAMSULOSIN HCL 0.4 MG CAPSULE] 90 capsule 1     Sig: TAKE 1 CAPSULE BY MOUTH DAILY    dilTIAZem CD (Cartia XT) 180 MG 24 hr capsule [Pharmacy Med Name: CARTIA  MG CAPSULE] 90 capsule 1     Sig: TAKE 1 CAPSULE BY MOUTH DAILY      Last office visit with prescribing clinician: Visit date not found   Last telemedicine visit with prescribing clinician: Visit date not found   Next office visit with prescribing clinician: Visit date not found                         Laura Askew MA  09/05/23, 13:56 EDT

## 2023-09-08 RX ORDER — FAMOTIDINE 40 MG/1
TABLET, FILM COATED ORAL
Qty: 90 TABLET | Refills: 1 | Status: SHIPPED | OUTPATIENT
Start: 2023-09-08
